# Patient Record
Sex: MALE | Race: BLACK OR AFRICAN AMERICAN | NOT HISPANIC OR LATINO | Employment: OTHER | URBAN - METROPOLITAN AREA
[De-identification: names, ages, dates, MRNs, and addresses within clinical notes are randomized per-mention and may not be internally consistent; named-entity substitution may affect disease eponyms.]

---

## 2017-01-24 ENCOUNTER — HOSPITAL ENCOUNTER (EMERGENCY)
Facility: HOSPITAL | Age: 57
Discharge: LEFT BEFORE DISCHARGE | End: 2017-01-24

## 2017-01-24 ENCOUNTER — APPOINTMENT (OUTPATIENT)
Dept: GENERAL RADIOLOGY | Facility: HOSPITAL | Age: 57
End: 2017-01-24

## 2017-01-24 VITALS
WEIGHT: 294 LBS | SYSTOLIC BLOOD PRESSURE: 125 MMHG | HEART RATE: 79 BPM | HEIGHT: 74 IN | DIASTOLIC BLOOD PRESSURE: 79 MMHG | OXYGEN SATURATION: 96 % | TEMPERATURE: 98 F | BODY MASS INDEX: 37.73 KG/M2 | RESPIRATION RATE: 20 BRPM

## 2017-01-24 LAB
ALBUMIN SERPL-MCNC: 3.8 G/DL (ref 3.2–4.8)
ALBUMIN/GLOB SERPL: 0.9 G/DL (ref 1.5–2.5)
ALP SERPL-CCNC: 130 U/L (ref 25–100)
ALT SERPL W P-5'-P-CCNC: 29 U/L (ref 7–40)
ANION GAP SERPL CALCULATED.3IONS-SCNC: 5 MMOL/L (ref 3–11)
AST SERPL-CCNC: 36 U/L (ref 0–33)
BASOPHILS # BLD AUTO: 0.01 10*3/MM3 (ref 0–0.2)
BASOPHILS NFR BLD AUTO: 0.2 % (ref 0–1)
BILIRUB SERPL-MCNC: 0.5 MG/DL (ref 0.3–1.2)
BNP SERPL-MCNC: 8 PG/ML (ref 0–100)
BUN BLD-MCNC: 16 MG/DL (ref 9–23)
BUN/CREAT SERPL: 14.5 (ref 7–25)
CALCIUM SPEC-SCNC: 9.4 MG/DL (ref 8.7–10.4)
CHLORIDE SERPL-SCNC: 102 MMOL/L (ref 99–109)
CO2 SERPL-SCNC: 25 MMOL/L (ref 20–31)
CREAT BLD-MCNC: 1.1 MG/DL (ref 0.6–1.3)
DEPRECATED RDW RBC AUTO: 40 FL (ref 37–54)
EOSINOPHIL # BLD AUTO: 0.13 10*3/MM3 (ref 0.1–0.3)
EOSINOPHIL NFR BLD AUTO: 2 % (ref 0–3)
ERYTHROCYTE [DISTWIDTH] IN BLOOD BY AUTOMATED COUNT: 12.6 % (ref 11.3–14.5)
GFR SERPL CREATININE-BSD FRML MDRD: 84 ML/MIN/1.73
GLOBULIN UR ELPH-MCNC: 4.2 GM/DL
GLUCOSE BLD-MCNC: 253 MG/DL (ref 70–100)
HCT VFR BLD AUTO: 44.9 % (ref 38.9–50.9)
HGB BLD-MCNC: 16.1 G/DL (ref 13.1–17.5)
HOLD SPECIMEN: NORMAL
HOLD SPECIMEN: NORMAL
IMM GRANULOCYTES # BLD: 0.01 10*3/MM3 (ref 0–0.03)
IMM GRANULOCYTES NFR BLD: 0.2 % (ref 0–0.6)
LIPASE SERPL-CCNC: 50 U/L (ref 6–51)
LYMPHOCYTES # BLD AUTO: 2.44 10*3/MM3 (ref 0.6–4.8)
LYMPHOCYTES NFR BLD AUTO: 38.1 % (ref 24–44)
MCH RBC QN AUTO: 31 PG (ref 27–31)
MCHC RBC AUTO-ENTMCNC: 35.9 G/DL (ref 32–36)
MCV RBC AUTO: 86.3 FL (ref 80–99)
MONOCYTES # BLD AUTO: 0.51 10*3/MM3 (ref 0–1)
MONOCYTES NFR BLD AUTO: 8 % (ref 0–12)
NEUTROPHILS # BLD AUTO: 3.31 10*3/MM3 (ref 1.5–8.3)
NEUTROPHILS NFR BLD AUTO: 51.5 % (ref 41–71)
PLATELET # BLD AUTO: 188 10*3/MM3 (ref 150–450)
PMV BLD AUTO: 9 FL (ref 6–12)
POTASSIUM BLD-SCNC: 4.7 MMOL/L (ref 3.5–5.5)
PROT SERPL-MCNC: 8 G/DL (ref 5.7–8.2)
RBC # BLD AUTO: 5.2 10*6/MM3 (ref 4.2–5.76)
SODIUM BLD-SCNC: 132 MMOL/L (ref 132–146)
WBC NRBC COR # BLD: 6.41 10*3/MM3 (ref 3.5–10.8)
WHOLE BLOOD HOLD SPECIMEN: NORMAL
WHOLE BLOOD HOLD SPECIMEN: NORMAL

## 2017-01-24 PROCEDURE — 83690 ASSAY OF LIPASE: CPT

## 2017-01-24 PROCEDURE — 83880 ASSAY OF NATRIURETIC PEPTIDE: CPT

## 2017-01-24 PROCEDURE — 85025 COMPLETE CBC W/AUTO DIFF WBC: CPT

## 2017-01-24 PROCEDURE — 80053 COMPREHEN METABOLIC PANEL: CPT

## 2017-01-24 PROCEDURE — 99211 OFF/OP EST MAY X REQ PHY/QHP: CPT

## 2017-01-24 PROCEDURE — 93005 ELECTROCARDIOGRAM TRACING: CPT

## 2017-01-24 RX ORDER — SODIUM CHLORIDE 0.9 % (FLUSH) 0.9 %
10 SYRINGE (ML) INJECTION AS NEEDED
Status: DISCONTINUED | OUTPATIENT
Start: 2017-01-24 | End: 2017-01-24 | Stop reason: HOSPADM

## 2017-01-24 RX ORDER — ASPIRIN 81 MG/1
324 TABLET, CHEWABLE ORAL ONCE
Status: DISCONTINUED | OUTPATIENT
Start: 2017-01-24 | End: 2017-01-24 | Stop reason: HOSPADM

## 2017-01-25 ENCOUNTER — DOCUMENTATION (OUTPATIENT)
Dept: NEUROSURGERY | Facility: CLINIC | Age: 57
End: 2017-01-25

## 2018-02-27 ENCOUNTER — OFFICE VISIT (OUTPATIENT)
Dept: NEUROSURGERY | Facility: CLINIC | Age: 58
End: 2018-02-27

## 2018-02-27 VITALS
TEMPERATURE: 97.6 F | DIASTOLIC BLOOD PRESSURE: 76 MMHG | SYSTOLIC BLOOD PRESSURE: 111 MMHG | WEIGHT: 299.8 LBS | BODY MASS INDEX: 38.48 KG/M2 | HEIGHT: 74 IN

## 2018-02-27 DIAGNOSIS — M54.50 CHRONIC BILATERAL LOW BACK PAIN WITHOUT SCIATICA: ICD-10-CM

## 2018-02-27 DIAGNOSIS — Z79.4 TYPE 2 DIABETES MELLITUS WITH HYPERGLYCEMIA, WITH LONG-TERM CURRENT USE OF INSULIN (HCC): ICD-10-CM

## 2018-02-27 DIAGNOSIS — E11.65 TYPE 2 DIABETES MELLITUS WITH HYPERGLYCEMIA, WITH LONG-TERM CURRENT USE OF INSULIN (HCC): ICD-10-CM

## 2018-02-27 DIAGNOSIS — G89.29 CHRONIC BILATERAL LOW BACK PAIN WITHOUT SCIATICA: ICD-10-CM

## 2018-02-27 DIAGNOSIS — M19.90 ARTHRITIS: ICD-10-CM

## 2018-02-27 DIAGNOSIS — M43.10 ACQUIRED SPONDYLOLISTHESIS: Primary | ICD-10-CM

## 2018-02-27 DIAGNOSIS — I10 ESSENTIAL HYPERTENSION: ICD-10-CM

## 2018-02-27 PROBLEM — E11.9 DIABETES MELLITUS (HCC): Status: ACTIVE | Noted: 2018-02-27

## 2018-02-27 PROBLEM — M43.16 SPONDYLOLISTHESIS OF LUMBAR REGION: Status: ACTIVE | Noted: 2018-02-27

## 2018-02-27 PROCEDURE — 99214 OFFICE O/P EST MOD 30 MIN: CPT | Performed by: PHYSICIAN ASSISTANT

## 2018-02-27 RX ORDER — INSULIN NPH HUM/REG INSULIN HM 70-30/ML
45 VIAL (ML) SUBCUTANEOUS 2 TIMES DAILY WITH MEALS
COMMUNITY
Start: 2018-02-08

## 2018-02-27 RX ORDER — DICLOFENAC SODIUM 75 MG/1
TABLET, DELAYED RELEASE ORAL
COMMUNITY
Start: 2018-02-26 | End: 2018-07-24

## 2018-02-27 RX ORDER — HYDROCHLOROTHIAZIDE 12.5 MG/1
12.5 TABLET ORAL EVERY MORNING
Status: ON HOLD | COMMUNITY
Start: 2018-01-22 | End: 2022-07-18

## 2018-02-27 RX ORDER — OXYCODONE AND ACETAMINOPHEN 10; 325 MG/1; MG/1
TABLET ORAL
Refills: 0 | COMMUNITY
Start: 2018-02-23 | End: 2018-07-24

## 2018-02-27 RX ORDER — FUROSEMIDE 20 MG/1
TABLET ORAL
COMMUNITY
Start: 2018-02-07 | End: 2018-07-24

## 2018-02-27 RX ORDER — RANOLAZINE 500 MG/1
500 TABLET, FILM COATED, EXTENDED RELEASE ORAL EVERY 12 HOURS SCHEDULED
COMMUNITY
Start: 2017-12-27 | End: 2022-06-06

## 2018-02-27 RX ORDER — CYCLOBENZAPRINE HCL 10 MG
TABLET ORAL
Refills: 1 | COMMUNITY
Start: 2018-02-23 | End: 2018-07-24

## 2018-02-27 RX ORDER — ATORVASTATIN CALCIUM 20 MG/1
40 TABLET, FILM COATED ORAL EVERY MORNING
COMMUNITY
Start: 2018-02-07

## 2018-03-22 ENCOUNTER — HOSPITAL ENCOUNTER (OUTPATIENT)
Dept: MRI IMAGING | Facility: HOSPITAL | Age: 58
Discharge: HOME | End: 2018-03-22
Admitting: PHYSICIAN ASSISTANT

## 2018-03-22 PROCEDURE — 72148 MRI LUMBAR SPINE W/O DYE: CPT

## 2018-03-23 ENCOUNTER — OFFICE VISIT (OUTPATIENT)
Dept: NEUROSURGERY | Facility: CLINIC | Age: 58
End: 2018-03-23

## 2018-03-23 ENCOUNTER — HOSPITAL ENCOUNTER (OUTPATIENT)
Dept: GENERAL RADIOLOGY | Facility: HOSPITAL | Age: 58
Discharge: HOME | End: 2018-03-23
Attending: NEUROLOGICAL SURGERY | Admitting: NEUROLOGICAL SURGERY

## 2018-03-23 VITALS
OXYGEN SATURATION: 94 % | HEIGHT: 74 IN | TEMPERATURE: 96.3 F | HEART RATE: 96 BPM | WEIGHT: 291 LBS | BODY MASS INDEX: 37.35 KG/M2 | SYSTOLIC BLOOD PRESSURE: 112 MMHG | DIASTOLIC BLOOD PRESSURE: 78 MMHG

## 2018-03-23 DIAGNOSIS — Z79.4 TYPE 2 DIABETES MELLITUS WITH HYPERGLYCEMIA, WITH LONG-TERM CURRENT USE OF INSULIN (HCC): ICD-10-CM

## 2018-03-23 DIAGNOSIS — M19.90 ARTHRITIS: ICD-10-CM

## 2018-03-23 DIAGNOSIS — E11.65 TYPE 2 DIABETES MELLITUS WITH HYPERGLYCEMIA, WITH LONG-TERM CURRENT USE OF INSULIN (HCC): ICD-10-CM

## 2018-03-23 DIAGNOSIS — G89.29 CHRONIC BILATERAL LOW BACK PAIN WITHOUT SCIATICA: ICD-10-CM

## 2018-03-23 DIAGNOSIS — M43.10 ACQUIRED SPONDYLOLISTHESIS: ICD-10-CM

## 2018-03-23 DIAGNOSIS — I10 ESSENTIAL HYPERTENSION: ICD-10-CM

## 2018-03-23 DIAGNOSIS — M48.062 SPINAL STENOSIS, LUMBAR REGION, WITH NEUROGENIC CLAUDICATION: ICD-10-CM

## 2018-03-23 DIAGNOSIS — M54.50 CHRONIC BILATERAL LOW BACK PAIN WITHOUT SCIATICA: ICD-10-CM

## 2018-03-23 DIAGNOSIS — I10 ESSENTIAL HYPERTENSION: Primary | ICD-10-CM

## 2018-03-23 PROCEDURE — 99213 OFFICE O/P EST LOW 20 MIN: CPT | Performed by: NEUROLOGICAL SURGERY

## 2018-03-23 PROCEDURE — 72110 X-RAY EXAM L-2 SPINE 4/>VWS: CPT

## 2018-03-23 NOTE — PROGRESS NOTES
Patient: Amadeo Kong  :  1960  Chart #:  [id number]    Date of Service: 18    Chief Complaint:   Chief Complaint   Patient presents with   • Back Pain       Back Pain   Chronicity: Mr. Kong returns today for a follow-up on his back pain. The current episode started more than 1 year ago (He has normal sensation in his feet bilaterally.  He has pain when his lower extremities are raised, especially the right.). The problem occurs constantly. The problem has been gradually worsening since onset. The pain is present in the lumbar spine. The quality of the pain is described as aching. The pain radiates to the right thigh and left thigh (He has pain that starts in his low back, then runs down his lower extremities bilaterally to the ankles.). The pain is at a severity of 6/10. The pain is moderate (His pain is moderate to severe.  His pain increases when he is ambulatory.). The symptoms are aggravated by position and standing. Stiffness is present in the morning. Associated symptoms include leg pain. Pertinent negatives include no numbness or tingling. (He does not have leg pain when reclining but does have pain with standing and walking, which are limited.  He has not had spine surgery.  He has had R hip replacement and bilateral knee replacement.) Risk factors include lack of exercise, sedentary lifestyle and obesity. He has tried bed rest, muscle relaxant and NSAIDs (he takes diclofenac, flexeril and percocet.) for the symptoms. The treatment provided mild relief.       Radiographic Images:   MRI of the lumbar spine dated 18 shows he has dessication at the L1, L4 and T12 region.  There is minimum spondylosis at L4-L5.  There is mild central stenosis.  He has facet hyptertrophy.  There is fluid in the facets at L4-L5.  His disc appear normal.      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 The acetabular component has been cemented  into place.  The femoral component has been pressfit into place. Normal anatomic alignment has been achieved. The pelvic ring is intact.     Past Medical History:   Diagnosis Date   • Arthritis    • Diabetes mellitus    • Hypertension      Current Outpatient Prescriptions   Medication Sig Dispense Refill   • albuterol (PROVENTIL) (2.5 MG/3ML) 0.083% nebulizer solution Take 2.5 mg by nebulization Every 4 (Four) Hours As Needed for wheezing.     • amLODIPine (NORVASC) 5 MG tablet Take 5 mg by mouth Daily.     • atorvastatin (LIPITOR) 20 MG tablet      • budesonide-formoterol (SYMBICORT) 160-4.5 MCG/ACT inhaler Inhale 2 puffs 2 (Two) Times a Day.     • carvedilol (COREG) 3.125 MG tablet Take 3.125 mg by mouth 2 (Two) Times a Day With Meals.     • cyclobenzaprine (FLEXERIL) 10 MG tablet TK 1 T PO BID PRN  1   • diclofenac (VOLTAREN) 75 MG EC tablet      • furosemide (LASIX) 20 MG tablet      • HUMULIN 70/30 (70-30) 100 UNIT/ML injection      • hydrochlorothiazide (HYDRODIURIL) 12.5 MG tablet      • isosorbide mononitrate (IMDUR) 30 MG 24 hr tablet Take 30 mg by mouth Daily.     • lisinopril (PRINIVIL,ZESTRIL) 20 MG tablet Take 20 mg by mouth Daily.     • montelukast (SINGULAIR) 10 MG tablet Take 10 mg by mouth Every Night.     • oxyCODONE-acetaminophen (PERCOCET)  MG per tablet TK 1 T PO TID  0   • RANEXA 500 MG 12 hr tablet        No current facility-administered medications for this visit.       No Known Allergies  Social History     Social History   • Marital status: Single     Social History Main Topics   • Smoking status: Never Smoker   • Smokeless tobacco: Never Used   • Alcohol use Yes   • Drug use: No   • Sexual activity: Defer     Other Topics Concern   • Not on file     Family History   Problem Relation Age of Onset   • Hypertension Mother      Past Surgical History:   Procedure Laterality Date   • [facility]   • KNEE SURGERY Bilateral     Right knee- 2008, left knee-  "2010- [facility]     Review of Systems   Constitutional: Positive for activity change, appetite change, fatigue and unexpected weight change.   HENT: Positive for congestion, sore throat and tinnitus.    Eyes: Positive for visual disturbance.   Respiratory: Positive for apnea.    Endocrine: Positive for cold intolerance, polydipsia and polyuria.   Genitourinary: Positive for flank pain.   Musculoskeletal: Positive for arthralgias, back pain, joint swelling, myalgias, neck pain and neck stiffness.   Allergic/Immunologic: Positive for environmental allergies and food allergies.   Neurological: Negative for tingling and numbness.   All other systems reviewed and are negative.    Vitals:    03/23/18 0914   BP: 112/78   BP Location: Right arm   Patient Position: Sitting   Pulse: 96   Temp: 96.3 °F (35.7 °C)   TempSrc: Temporal Artery    SpO2: 94%   Weight: 132 kg (291 lb)   Height: 188 cm (74.02\")     Physical Exam  Neurologic Exam  Physical Exam   Constitutional: The patient is oriented to person, place, and time.  The patient appears well-developed and well-nourished and in no distress.   Neat obese o/w healthy male  Neck: Trachea normal and normal range of motion. No thyroid mass present.   Cardiovascular: Regular rhythm.   Pulses:  Carotid pulses are 2+ on the right side, and 2+ on the left side.  Radial pulses are 2+ on the right side, and 2+ on the left side.   Dorsalis pedis pulses are 2+ on the right side, and 2+ on the left side.   Pulmonary/Chest: Effort normal   Musculoskeletal:   Lumbar back: The patient exhibits pain with movement. The patient exhibits normal range of motion, no deformity and no spasm.   Mild stiffness; SLR increased low back pain; Hip ROM normal     R knee does not fully extend;  Both knees replaced.    Neurologic Exam      Mental Status   Oriented to person, place, and time.   Attention: normal. Concentration: normal.   Speech: speech is normal   Level of consciousness: " alert  Knowledge: good and consistent with education.   Normal comprehension.      Cranial Nerves   Cranial nerves II through XII intact.     Motor Exam   Muscle bulk: normal  Overall muscle tone: normal  No Pronator Drift    Strength   Strength 5/5 throughout.      Sensory Exam   Light touch normal.   Proprioception normal.      Gait, Coordination, and Reflexes      Gait: normal     Tremor   Resting tremor: absent  Intention tremor: absent  Action tremor: absent     Reflexes   Right biceps: 1+  Left biceps: 1+  Right triceps: 1+  Left triceps: 1+  Right patellar: 0+  Left patellar: 0+  Right achilles: 0+  Left achilles: 0+  No Babinski signs  Right Herrera: absent  Left Herrera: absent  Right ankle clonus: absent  Left ankle clonus: absent  LIANET normal; R handed      Amadeo was seen today for back pain.    Diagnoses and all orders for this visit:    Essential hypertension  -     XR Spine Lumbar AP & Lateral With Flex & Ext; Future    Acquired spondylolisthesis  -     XR Spine Lumbar AP & Lateral With Flex & Ext; Future    Type 2 diabetes mellitus with hyperglycemia, with long-term current use of insulin  -     XR Spine Lumbar AP & Lateral With Flex & Ext; Future    Chronic bilateral low back pain without sciatica  -     XR Spine Lumbar AP & Lateral With Flex & Ext; Future    Arthritis  -     XR Spine Lumbar AP & Lateral With Flex & Ext; Future    Spinal stenosis, lumbar region, with neurogenic claudication      Plan:  Order lumbar spine x-rays with standing lateral F&E views;  Monitor symptoms for review at next visit after studies;  He is to see Dr. Farfan in a couple of weeks about his left hip.  I will make further recommendations regarding treatment options at next visit.    I, Dr. Yo Bryson, personally performed the services described in the documentation as scribed in my presence, and the documentation is both accurate and complete.    Yo Bryson MD

## 2018-04-04 ENCOUNTER — OFFICE VISIT (OUTPATIENT)
Dept: NEUROSURGERY | Facility: CLINIC | Age: 58
End: 2018-04-04

## 2018-04-04 VITALS
HEART RATE: 82 BPM | DIASTOLIC BLOOD PRESSURE: 68 MMHG | BODY MASS INDEX: 38.84 KG/M2 | SYSTOLIC BLOOD PRESSURE: 120 MMHG | OXYGEN SATURATION: 93 % | TEMPERATURE: 97.3 F | HEIGHT: 74 IN | WEIGHT: 302.6 LBS

## 2018-04-04 DIAGNOSIS — G89.29 CHRONIC BILATERAL LOW BACK PAIN WITH BILATERAL SCIATICA: ICD-10-CM

## 2018-04-04 DIAGNOSIS — M54.42 CHRONIC BILATERAL LOW BACK PAIN WITH BILATERAL SCIATICA: ICD-10-CM

## 2018-04-04 DIAGNOSIS — E11.65 TYPE 2 DIABETES MELLITUS WITH HYPERGLYCEMIA, WITH LONG-TERM CURRENT USE OF INSULIN (HCC): ICD-10-CM

## 2018-04-04 DIAGNOSIS — M19.90 ARTHRITIS: ICD-10-CM

## 2018-04-04 DIAGNOSIS — Z79.4 TYPE 2 DIABETES MELLITUS WITH HYPERGLYCEMIA, WITH LONG-TERM CURRENT USE OF INSULIN (HCC): ICD-10-CM

## 2018-04-04 DIAGNOSIS — M43.10 ACQUIRED SPONDYLOLISTHESIS: ICD-10-CM

## 2018-04-04 DIAGNOSIS — I10 ESSENTIAL HYPERTENSION: Primary | ICD-10-CM

## 2018-04-04 DIAGNOSIS — M48.062 SPINAL STENOSIS, LUMBAR REGION, WITH NEUROGENIC CLAUDICATION: ICD-10-CM

## 2018-04-04 DIAGNOSIS — M54.41 CHRONIC BILATERAL LOW BACK PAIN WITH BILATERAL SCIATICA: ICD-10-CM

## 2018-04-04 PROCEDURE — 99214 OFFICE O/P EST MOD 30 MIN: CPT | Performed by: NEUROLOGICAL SURGERY

## 2018-04-04 NOTE — PROGRESS NOTES
Patient: Amadeo Kong  :  1960  Chart #:  [id number]    Date of Service: 18    Chief Complaint:   Chief Complaint   Patient presents with   • Back Pain       Back Pain   Chronicity: Mr Kong returns today for a follow-up on his back pain.  The current episode started more than 1 year ago. The problem occurs constantly. The problem has been gradually worsening since onset. The pain is present in the lumbar spine (Patient is pain free as long as he is sitting, however as soon as he ambulates his pain increases substantially.). The quality of the pain is described as aching. The pain radiates to the left thigh and right thigh (He has pain that radiates down his lower extremities bilaterally.  His pain does not go past the thighs.). The pain is at a severity of 6/10. The pain is mild (His pain is mild to moderate.). The pain is worse during the day. The symptoms are aggravated by position and standing (His pain increases when he is ambulatory.  ). Stiffness is present in the morning. Risk factors include sedentary lifestyle, obesity and lack of exercise (He is not able to walk for any length of time because of his back pain.). He has tried bed rest, muscle relaxant and heat for the symptoms. The treatment provided mild relief.     There are no other new symptoms or complaints since visit on 3/23/18.    Radiographic Images:   X-ray of the lumbar spine dated 18 shows he has a slip at L4 on L5 vertebras.  He has grade 1 spondylolisthesis that measures 8 mm with extension and 10 mm with flexion at the L4-L5 disc.  Otherwise his spine appears normal.        MRI of the lumbar spine dated 18 shows he has dessication at the L1, L4 and T12 region.  There is minimum spondylosis at L4-L5.  There is mild central stenosis.  He has facet hyptertrophy.  There is fluid in the facets at L4-L5.  His disc appear normal.     Past Medical History:   Diagnosis Date   • Arthritis    • Diabetes mellitus    •  Hypertension      Current Outpatient Prescriptions   Medication Sig Dispense Refill   • albuterol (PROVENTIL) (2.5 MG/3ML) 0.083% nebulizer solution Take 2.5 mg by nebulization Every 4 (Four) Hours As Needed for wheezing.     • amLODIPine (NORVASC) 5 MG tablet Take 5 mg by mouth Daily.     • atorvastatin (LIPITOR) 20 MG tablet      • budesonide-formoterol (SYMBICORT) 160-4.5 MCG/ACT inhaler Inhale 2 puffs 2 (Two) Times a Day.     • carvedilol (COREG) 3.125 MG tablet Take 3.125 mg by mouth 2 (Two) Times a Day With Meals.     • cyclobenzaprine (FLEXERIL) 10 MG tablet TK 1 T PO BID PRN  1   • diclofenac (VOLTAREN) 75 MG EC tablet      • furosemide (LASIX) 20 MG tablet      • HUMULIN 70/30 (70-30) 100 UNIT/ML injection      • hydrochlorothiazide (HYDRODIURIL) 12.5 MG tablet      • isosorbide mononitrate (IMDUR) 30 MG 24 hr tablet Take 30 mg by mouth Daily.     • lisinopril (PRINIVIL,ZESTRIL) 20 MG tablet Take 20 mg by mouth Daily.     • montelukast (SINGULAIR) 10 MG tablet Take 10 mg by mouth Every Night.     • oxyCODONE-acetaminophen (PERCOCET)  MG per tablet TK 1 T PO TID  0   • RANEXA 500 MG 12 hr tablet        No current facility-administered medications for this visit.       No Known Allergies  Social History     Social History   • Marital status: Single     Social History Main Topics   • Smoking status: Never Smoker   • Smokeless tobacco: Never Used   • Alcohol use Yes   • Drug use: No   • Sexual activity: Defer     Other Topics Concern   • Not on file     Family History   Problem Relation Age of Onset   • Hypertension Mother      Past Surgical History:   Procedure Laterality Date   • HIP SURGERY Right 08/11/2014    [facility]   • KNEE SURGERY Bilateral     Right knee- 2008, left knee- 2010- [facility]     Review of Systems   Respiratory: Positive for cough and shortness of breath.    Endocrine: Positive for polydipsia.   Genitourinary: Positive for flank pain.   Musculoskeletal: Positive for  "arthralgias and back pain.   All other systems reviewed and are negative.    Vitals:    04/04/18 0756   BP: 120/68   BP Location: Right arm   Patient Position: Sitting   Pulse: 82   Temp: 97.3 °F (36.3 °C)   TempSrc: Temporal Artery    SpO2: 93%   Weight: (!) 137 kg (302 lb 9.6 oz)   Height: 188 cm (74.02\")     Physical Exam  Neurologic Exam  Constitutional: The patient is oriented to person, place, and time.  The patient appears well-developed and well-nourished and in no distress.   Neat obese o/w healthy male  Neck: Trachea normal and normal range of motion. No thyroid mass present.   Cardiovascular: Regular rhythm.   Pulses:  Carotid pulses are 2+ on the right side, and 2+ on the left side.  Radial pulses are 2+ on the right side, and 2+ on the left side.   Dorsalis pedis pulses are 2+ on the right side, and 2+ on the left side.   Pulmonary/Chest: Effort normal   Musculoskeletal:   Lumbar back: The patient exhibits pain with movement. The patient exhibits normal range of motion, no deformity and no spasm.   Mild stiffness; SLR increased low back pain; Hip ROM normal     R knee does not fully extend;  Both knees replaced.     Neurologic Exam      Mental Status   Oriented to person, place, and time.   Attention: normal. Concentration: normal.   Speech: speech is normal   Level of consciousness: alert  Knowledge: good and consistent with education.   Normal comprehension.      Cranial Nerves   Cranial nerves II through XII intact.      Motor Exam   Muscle bulk: normal  Overall muscle tone: normal  No Pronator Drift     Strength   Strength 5/5 throughout.      Sensory Exam   Light touch normal.   Proprioception normal.      Gait, Coordination, and Reflexes      Gait: normal     Tremor   Resting tremor: absent  Intention tremor: absent  Action tremor: absent     Reflexes   Right biceps: 1+  Left biceps: 1+  Right triceps: 1+  Left triceps: 1+  Right patellar: 0+  Left patellar: 0+  Right achilles: 0+  Left achilles: " 0+  No Babinski signs  Right Herrera: absent  Left Herrera: absent  Right ankle clonus: absent  Left ankle clonus: absent  LIANET normal; R handed      Amadeo was seen today for back pain.    Diagnoses and all orders for this visit:    Essential hypertension    Arthritis    Acquired spondylolisthesis  Comments:  L4L5 with instability    Spinal stenosis, lumbar region, with neurogenic claudication    Type 2 diabetes mellitus with hyperglycemia, with long-term current use of insulin    Chronic bilateral low back pain with bilateral sciatica      Plan:  I recommend L4L5 Nevarez-Ivan osteotomy and interbody fusion with reduction of the spondylolisthesis and L4L5 posterolateral fusion with pedicle screw fixation.  I described the operative procedure in detail as well as the indications, alternatives, and expected postoperative course and results. I described the potential risks and complications of surgery in detail. All questions were answered. The patient has indicated understanding of the planned procedure, accepted the risks, and wishes to proceed with surgery. No guarantee of results was given to the patient. The operative permit will be completed prior to surgery.    The patient's imaging CD's are on the chart.    I, Dr. Yo Bryson, personally performed the services described in the documentation as scribed in my presence, and the documentation is both accurate and complete.    Yo Bryson MD

## 2018-05-15 ENCOUNTER — TELEPHONE (OUTPATIENT)
Dept: NEUROSURGERY | Facility: CLINIC | Age: 58
End: 2018-05-15

## 2018-05-15 NOTE — TELEPHONE ENCOUNTER
Provider:  oliver  Caller: patient  Time of call:  10:09   Phone #:  [phone]  Surgery:  No -  Surgery Date:    Last visit:  04/04/18   Next visit: none    JAZ:         Reason for call:     Patient called and said he has had increased back pain for the last week or so.  He states that he has had back pain for years, however it has recently worsened.  His pain is in his low back and runs down his legs bilaterally to the ankle.    I asked him if he was still taking his Percocet and muscle relaxer, and he said that the pain clinic cut him off because they found something in his urine.      JAZ PENDING

## 2018-05-15 NOTE — TELEPHONE ENCOUNTER
"Pt left additional message \"begging\" to be given a call back regarding surgery. Reiterates the severity of his pain. Spoke with Gabriella personally, she states that due to the extent of the surgery planned and Dr. Bryson's limited availability in the coming months he will be scheduled for sx during the month of July.   "

## 2018-05-15 NOTE — TELEPHONE ENCOUNTER
Spoke with pt and informed him that the next available surgery is in July and that we will not be prescribing medications for pain (referencing the conversation I had with his former PM clinic). He was fairly combative and stated that we were not trying to help him (provide narcotics) and that he was in too much pain to repeat himself. I replied that it was unnecessary for him to repeat himself since I had called to actually deliver information, not to receive it. Irrespective of surgical candidacy, the pt exhibits conspicuous secondary gain behavior.

## 2018-05-15 NOTE — TELEPHONE ENCOUNTER
We will not prescribe any medications. Please let the patient know...It is not safe for him to mix medications, especially ones that are not prescribed by us.

## 2018-05-15 NOTE — TELEPHONE ENCOUNTER
"Spoke with [facility]. While the patient was receiving Percocet and Flexeril from the PM, he tested positive for cocaine, morphine, \"norhydrocodone\" and oxymorphone (they do not prescribe these, clearly). Thus he was discharged.   "

## 2018-05-15 NOTE — TELEPHONE ENCOUNTER
Pt left a message stating he has called us over and over, he states he doesn't want to be selfish or greedy but he wouldn't be doing this if he didn't need us.     Please advise on what to tell pt.       JAZ:  02/23/2018 Oxycodone/Acetaminophen  325MG/10MG  1960 [address] Mauricio Roger #[id number] [facility] 2    03/24/2018 Oxycodone/Acetaminophen  325MG/10MG  1960 [address] 29 Mauricio Qures[place]ion #[id number] [facility] 4    04/23/2018 Oxycodone/Acetaminophen 325MG/5MG 1960 12 2 [place] [id number] 4

## 2018-05-15 NOTE — TELEPHONE ENCOUNTER
I have sent [place] a message about this patient. This is not acceptable, we should not rx anything. DC

## 2018-05-24 NOTE — TELEPHONE ENCOUNTER
Dr. Bryson made aware today of patient's discharge from pain management.  Due to the fact that he tested positive for several narcotics, we will no longer be providing pain medication.

## 2018-06-05 ENCOUNTER — PREP FOR SURGERY (OUTPATIENT)
Dept: OTHER | Facility: HOSPITAL | Age: 58
End: 2018-06-05

## 2018-06-05 DIAGNOSIS — M43.10 ACQUIRED SPONDYLOLISTHESIS: Primary | ICD-10-CM

## 2018-06-05 RX ORDER — CEFAZOLIN SODIUM 2 G/100ML
2 INJECTION, SOLUTION INTRAVENOUS ONCE
Status: CANCELLED | OUTPATIENT
Start: 2018-06-05 | End: 2018-06-05

## 2018-07-09 ENCOUNTER — APPOINTMENT (OUTPATIENT)
Dept: PREADMISSION TESTING | Facility: HOSPITAL | Age: 58
End: 2018-07-09

## 2018-07-24 ENCOUNTER — APPOINTMENT (OUTPATIENT)
Dept: PREADMISSION TESTING | Facility: HOSPITAL | Age: 58
End: 2018-07-24

## 2018-07-24 VITALS — HEIGHT: 73 IN | WEIGHT: 302.2 LBS | BODY MASS INDEX: 40.05 KG/M2

## 2018-07-24 DIAGNOSIS — M43.10 ACQUIRED SPONDYLOLISTHESIS: ICD-10-CM

## 2018-07-24 LAB
ABO GROUP BLD: NORMAL
BLD GP AB SCN SERPL QL: NEGATIVE
DEPRECATED RDW RBC AUTO: 41.1 FL (ref 37–54)
ERYTHROCYTE [DISTWIDTH] IN BLOOD BY AUTOMATED COUNT: 13 % (ref 11.3–14.5)
HBA1C MFR BLD: 8.4 % (ref 4.8–5.6)
HCT VFR BLD AUTO: 42.6 % (ref 38.9–50.9)
HGB BLD-MCNC: 14.6 G/DL (ref 13.1–17.5)
MCH RBC QN AUTO: 29.6 PG (ref 27–31)
MCHC RBC AUTO-ENTMCNC: 34.3 G/DL (ref 32–36)
MCV RBC AUTO: 86.4 FL (ref 80–99)
PLATELET # BLD AUTO: 183 10*3/MM3 (ref 150–450)
PMV BLD AUTO: 9.4 FL (ref 6–12)
POTASSIUM BLD-SCNC: 4.2 MMOL/L (ref 3.5–5.5)
RBC # BLD AUTO: 4.93 10*6/MM3 (ref 4.2–5.76)
RH BLD: POSITIVE
T&S EXPIRATION DATE: NORMAL
WBC NRBC COR # BLD: 6.77 10*3/MM3 (ref 3.5–10.8)

## 2018-07-24 PROCEDURE — 84132 ASSAY OF SERUM POTASSIUM: CPT | Performed by: ANESTHESIOLOGY

## 2018-07-24 PROCEDURE — 83036 HEMOGLOBIN GLYCOSYLATED A1C: CPT | Performed by: ANESTHESIOLOGY

## 2018-07-24 PROCEDURE — 86900 BLOOD TYPING SEROLOGIC ABO: CPT | Performed by: NEUROLOGICAL SURGERY

## 2018-07-24 PROCEDURE — 85027 COMPLETE CBC AUTOMATED: CPT | Performed by: NEUROLOGICAL SURGERY

## 2018-07-24 PROCEDURE — 93010 ELECTROCARDIOGRAM REPORT: CPT | Performed by: INTERNAL MEDICINE

## 2018-07-24 PROCEDURE — 36415 COLL VENOUS BLD VENIPUNCTURE: CPT

## 2018-07-24 PROCEDURE — 86901 BLOOD TYPING SEROLOGIC RH(D): CPT | Performed by: NEUROLOGICAL SURGERY

## 2018-07-24 PROCEDURE — 87081 CULTURE SCREEN ONLY: CPT | Performed by: ANESTHESIOLOGY

## 2018-07-24 PROCEDURE — 86850 RBC ANTIBODY SCREEN: CPT | Performed by: NEUROLOGICAL SURGERY

## 2018-07-24 PROCEDURE — 93005 ELECTROCARDIOGRAM TRACING: CPT

## 2018-07-24 RX ORDER — CELECOXIB 200 MG/1
200 CAPSULE ORAL DAILY
COMMUNITY
End: 2018-10-10

## 2018-07-24 RX ORDER — CYCLOBENZAPRINE HCL 10 MG
10 TABLET ORAL 2 TIMES DAILY PRN
COMMUNITY
End: 2018-10-10

## 2018-07-24 RX ORDER — CETIRIZINE HYDROCHLORIDE 10 MG/1
10 TABLET ORAL EVERY MORNING
COMMUNITY
End: 2022-06-06

## 2018-07-25 PROCEDURE — 99024 POSTOP FOLLOW-UP VISIT: CPT | Performed by: NEUROLOGICAL SURGERY

## 2018-07-26 ENCOUNTER — ANESTHESIA EVENT (OUTPATIENT)
Dept: PERIOP | Facility: HOSPITAL | Age: 58
End: 2018-07-26

## 2018-07-26 LAB — MRSA SPEC QL CULT: NORMAL

## 2018-07-26 RX ORDER — FAMOTIDINE 10 MG/ML
20 INJECTION, SOLUTION INTRAVENOUS ONCE
Status: CANCELLED | OUTPATIENT
Start: 2018-07-26 | End: 2018-07-26

## 2018-07-27 ENCOUNTER — ANESTHESIA (OUTPATIENT)
Dept: PERIOP | Facility: HOSPITAL | Age: 58
End: 2018-07-27

## 2018-07-27 ENCOUNTER — HOSPITAL ENCOUNTER (INPATIENT)
Facility: HOSPITAL | Age: 58
LOS: 4 days | Discharge: HOME HEALTH | End: 2018-07-31
Attending: NEUROLOGICAL SURGERY | Admitting: NEUROLOGICAL SURGERY

## 2018-07-27 ENCOUNTER — APPOINTMENT (OUTPATIENT)
Dept: GENERAL RADIOLOGY | Facility: HOSPITAL | Age: 58
End: 2018-07-27

## 2018-07-27 DIAGNOSIS — R58 BLEEDING: ICD-10-CM

## 2018-07-27 DIAGNOSIS — Z78.9 IMPAIRED MOBILITY AND ADLS: Primary | ICD-10-CM

## 2018-07-27 DIAGNOSIS — Z74.09 IMPAIRED FUNCTIONAL MOBILITY, BALANCE, GAIT, AND ENDURANCE: ICD-10-CM

## 2018-07-27 DIAGNOSIS — M43.10 ACQUIRED SPONDYLOLISTHESIS: ICD-10-CM

## 2018-07-27 DIAGNOSIS — Z74.09 IMPAIRED MOBILITY AND ADLS: Primary | ICD-10-CM

## 2018-07-27 LAB
ABO GROUP BLD: NORMAL
BASE EXCESS BLDA CALC-SCNC: -0.1 MMOL/L (ref 0–2)
BASE EXCESS BLDA CALC-SCNC: -2.2 MMOL/L (ref 0–2)
BLD GP AB SCN SERPL QL: NEGATIVE
CA-I BLDA-SCNC: 1.12 MMOL/L (ref 1.12–1.3)
CA-I BLDA-SCNC: 1.15 MMOL/L (ref 1.12–1.3)
CHLORIDE BLDA-SCNC: 106 MMOL/L (ref 98–105)
CHLORIDE BLDA-SCNC: 106 MMOL/L (ref 98–105)
CO2 BLDA-SCNC: 22.3 MMOL/L (ref 23–27)
CO2 BLDA-SCNC: 23.4 MMOL/L (ref 23–27)
GLUCOSE BLDA-MCNC: 168 MG/DL (ref 67–93)
GLUCOSE BLDA-MCNC: 191 MG/DL (ref 67–93)
GLUCOSE BLDC GLUCOMTR-MCNC: 142 MG/DL (ref 70–130)
GLUCOSE BLDC GLUCOMTR-MCNC: 253 MG/DL (ref 70–130)
HCO3 BLDA-SCNC: 21.3 MMOL/L (ref 20–26)
HCO3 BLDA-SCNC: 22.4 MMOL/L (ref 20–26)
HCT VFR BLDA CALC: 41 % (ref 39–51)
HCT VFR BLDA CALC: 42 % (ref 39–51)
HGB BLDA-MCNC: 13.9 G/DL (ref 10–16)
HGB BLDA-MCNC: 14.2 G/DL (ref 10–16)
PCO2 BLDA: 30.8 MM HG (ref 34–46)
PCO2 BLDA: 33.1 MM HG (ref 34–46)
PH BLDA: 7.43 PH UNITS (ref 7.34–7.46)
PH BLDA: 7.48 PH UNITS (ref 7.34–7.46)
PO2 BLDA: 458 MMHG (ref 79–99)
PO2 BLDA: 561.8 MMHG (ref 79–99)
POTASSIUM BLDA-SCNC: 5.28 MMOL/L (ref 3.5–5.3)
POTASSIUM BLDA-SCNC: 5.53 MMOL/L (ref 3.5–5.3)
RH BLD: POSITIVE
SAO2 % BLD: 99.8 % (ref 92–98)
SAO2 % BLD: 99.8 % (ref 92–98)
SODIUM BLDA-SCNC: 133.2 MMOL/L (ref 134–146)
SODIUM BLDA-SCNC: 133.5 MMOL/L (ref 134–146)
T&S EXPIRATION DATE: NORMAL

## 2018-07-27 PROCEDURE — 22853 INSJ BIOMECHANICAL DEVICE: CPT | Performed by: NEUROLOGICAL SURGERY

## 2018-07-27 PROCEDURE — 25010000002 PHENYLEPHRINE PER 1 ML: Performed by: NURSE ANESTHETIST, CERTIFIED REGISTERED

## 2018-07-27 PROCEDURE — 22840 INSERT SPINE FIXATION DEVICE: CPT | Performed by: NEUROLOGICAL SURGERY

## 2018-07-27 PROCEDURE — 25010000002 PROPOFOL 1000 MG/ML EMULSION: Performed by: NURSE ANESTHETIST, CERTIFIED REGISTERED

## 2018-07-27 PROCEDURE — C1713 ANCHOR/SCREW BN/BN,TIS/BN: HCPCS | Performed by: NEUROLOGICAL SURGERY

## 2018-07-27 PROCEDURE — 86850 RBC ANTIBODY SCREEN: CPT | Performed by: NEUROLOGICAL SURGERY

## 2018-07-27 PROCEDURE — 0ST20ZZ RESECTION OF LUMBAR VERTEBRAL DISC, OPEN APPROACH: ICD-10-PCS | Performed by: NEUROLOGICAL SURGERY

## 2018-07-27 PROCEDURE — 61783 SCAN PROC SPINAL: CPT | Performed by: NEUROLOGICAL SURGERY

## 2018-07-27 PROCEDURE — 84295 ASSAY OF SERUM SODIUM: CPT | Performed by: NEUROLOGICAL SURGERY

## 2018-07-27 PROCEDURE — 86923 COMPATIBILITY TEST ELECTRIC: CPT

## 2018-07-27 PROCEDURE — 25010000002 VANCOMYCIN PER 500 MG: Performed by: NEUROLOGICAL SURGERY

## 2018-07-27 PROCEDURE — 82330 ASSAY OF CALCIUM: CPT | Performed by: NEUROLOGICAL SURGERY

## 2018-07-27 PROCEDURE — 82805 BLOOD GASES W/O2 SATURATION: CPT | Performed by: NEUROLOGICAL SURGERY

## 2018-07-27 PROCEDURE — 0SG0071 FUSION OF LUMBAR VERTEBRAL JOINT WITH AUTOLOGOUS TISSUE SUBSTITUTE, POSTERIOR APPROACH, POSTERIOR COLUMN, OPEN APPROACH: ICD-10-PCS | Performed by: NEUROLOGICAL SURGERY

## 2018-07-27 PROCEDURE — 0SG00AJ FUSION OF LUMBAR VERTEBRAL JOINT WITH INTERBODY FUSION DEVICE, POSTERIOR APPROACH, ANTERIOR COLUMN, OPEN APPROACH: ICD-10-PCS | Performed by: NEUROLOGICAL SURGERY

## 2018-07-27 PROCEDURE — 25010000002 PROPOFOL 10 MG/ML EMULSION: Performed by: NURSE ANESTHETIST, CERTIFIED REGISTERED

## 2018-07-27 PROCEDURE — 25010000002 NEOSTIGMINE PER 0.5 MG: Performed by: NURSE ANESTHETIST, CERTIFIED REGISTERED

## 2018-07-27 PROCEDURE — 25010000002 MIDAZOLAM PER 1 MG: Performed by: NURSE ANESTHETIST, CERTIFIED REGISTERED

## 2018-07-27 PROCEDURE — 82962 GLUCOSE BLOOD TEST: CPT

## 2018-07-27 PROCEDURE — 86900 BLOOD TYPING SEROLOGIC ABO: CPT | Performed by: NEUROLOGICAL SURGERY

## 2018-07-27 PROCEDURE — 25010000002 FENTANYL CITRATE (PF) 100 MCG/2ML SOLUTION: Performed by: NURSE ANESTHETIST, CERTIFIED REGISTERED

## 2018-07-27 PROCEDURE — 25010000002 DEXAMETHASONE PER 1 MG: Performed by: NURSE ANESTHETIST, CERTIFIED REGISTERED

## 2018-07-27 PROCEDURE — 25010000002 HYDROMORPHONE PER 4 MG: Performed by: NURSE ANESTHETIST, CERTIFIED REGISTERED

## 2018-07-27 PROCEDURE — 25010000002 ONDANSETRON PER 1 MG: Performed by: NURSE ANESTHETIST, CERTIFIED REGISTERED

## 2018-07-27 PROCEDURE — 94799 UNLISTED PULMONARY SVC/PX: CPT

## 2018-07-27 PROCEDURE — 82435 ASSAY OF BLOOD CHLORIDE: CPT | Performed by: NEUROLOGICAL SURGERY

## 2018-07-27 PROCEDURE — 22214 INCIS 1 VERTEBRAL SEG LUMBAR: CPT | Performed by: NEUROLOGICAL SURGERY

## 2018-07-27 PROCEDURE — 84132 ASSAY OF SERUM POTASSIUM: CPT | Performed by: NEUROLOGICAL SURGERY

## 2018-07-27 PROCEDURE — 72020 X-RAY EXAM OF SPINE 1 VIEW: CPT

## 2018-07-27 PROCEDURE — 25010000002 CEFAZOLIN PER 500 MG: Performed by: NEUROLOGICAL SURGERY

## 2018-07-27 PROCEDURE — 25010000003 CEFAZOLIN IN DEXTROSE 2-4 GM/100ML-% SOLUTION: Performed by: NEUROLOGICAL SURGERY

## 2018-07-27 PROCEDURE — 85018 HEMOGLOBIN: CPT | Performed by: NEUROLOGICAL SURGERY

## 2018-07-27 PROCEDURE — 25010000003 HYDROMORPHONE PER 4 MG: Performed by: NEUROLOGICAL SURGERY

## 2018-07-27 PROCEDURE — 63710000001 INSULIN LISPRO (HUMAN) PER 5 UNITS: Performed by: NEUROLOGICAL SURGERY

## 2018-07-27 PROCEDURE — 86901 BLOOD TYPING SEROLOGIC RH(D): CPT | Performed by: NEUROLOGICAL SURGERY

## 2018-07-27 PROCEDURE — 22633 ARTHRD CMBN 1NTRSPC LUMBAR: CPT | Performed by: NEUROLOGICAL SURGERY

## 2018-07-27 DEVICE — SCREW 83575509 TSRH OG THIN 7.5MM X 50MM
Type: IMPLANTABLE DEVICE | Site: SPINE LUMBAR | Status: FUNCTIONAL
Brand: TSRH® SPINAL SYSTEM

## 2018-07-27 DEVICE — CROSSLINK 8115534 34TO36MM X10 MULTI
Type: IMPLANTABLE DEVICE | Site: SPINE LUMBAR | Status: FUNCTIONAL
Brand: CD HORIZON® SPINAL SYSTEM

## 2018-07-27 DEVICE — SPACER 2991226 CAPSTONE PEEK 12X26
Type: IMPLANTABLE DEVICE | Site: SPINE LUMBAR | Status: FUNCTIONAL
Brand: CAPSTONE® SPINAL SYSTEM

## 2018-07-27 DEVICE — SCREW 83575409 TSRH OG THIN 7.5MM X 40MM
Type: IMPLANTABLE DEVICE | Site: SPINE LUMBAR | Status: FUNCTIONAL
Brand: TSRH® SPINAL SYSTEM

## 2018-07-27 DEVICE — DBM T44145 5CC ORTHOBLEND SMALL DEFGRAFT
Type: IMPLANTABLE DEVICE | Site: SPINE LUMBAR | Status: FUNCTIONAL
Brand: GRAFTON®AND GRAFTON PLUS®DEMINERALIZED BONE MATRIX (DBM)

## 2018-07-27 DEVICE — SET SCREW 8351500 TSRH 3DX FLUSH BREAK
Type: IMPLANTABLE DEVICE | Site: SPINE LUMBAR | Status: FUNCTIONAL
Brand: TSRH® SPINAL SYSTEM

## 2018-07-27 DEVICE — CONNECTOR 8351520 TSRH 3DX SMALL
Type: IMPLANTABLE DEVICE | Site: SPINE LUMBAR | Status: FUNCTIONAL
Brand: TSRH® SPINAL SYSTEM

## 2018-07-27 DEVICE — BIOLOGIC 7600105 85 BTCP 15 HA 5CC VIAL
Type: IMPLANTABLE DEVICE | Site: SPINE LUMBAR | Status: FUNCTIONAL
Brand: MASTERGRAFT® GRANULES

## 2018-07-27 RX ORDER — MIDAZOLAM HYDROCHLORIDE 1 MG/ML
INJECTION INTRAMUSCULAR; INTRAVENOUS AS NEEDED
Status: DISCONTINUED | OUTPATIENT
Start: 2018-07-27 | End: 2018-07-27 | Stop reason: SURG

## 2018-07-27 RX ORDER — DOCUSATE SODIUM 100 MG/1
100 CAPSULE, LIQUID FILLED ORAL 2 TIMES DAILY PRN
Status: DISCONTINUED | OUTPATIENT
Start: 2018-07-27 | End: 2018-07-31 | Stop reason: HOSPADM

## 2018-07-27 RX ORDER — VECURONIUM BROMIDE 1 MG/ML
INJECTION, POWDER, LYOPHILIZED, FOR SOLUTION INTRAVENOUS AS NEEDED
Status: DISCONTINUED | OUTPATIENT
Start: 2018-07-27 | End: 2018-07-27 | Stop reason: SURG

## 2018-07-27 RX ORDER — ONDANSETRON 2 MG/ML
4 INJECTION INTRAMUSCULAR; INTRAVENOUS EVERY 6 HOURS PRN
Status: DISCONTINUED | OUTPATIENT
Start: 2018-07-27 | End: 2018-07-31 | Stop reason: HOSPADM

## 2018-07-27 RX ORDER — DIPHENOXYLATE HYDROCHLORIDE AND ATROPINE SULFATE 2.5; .025 MG/1; MG/1
1 TABLET ORAL DAILY
Status: DISCONTINUED | OUTPATIENT
Start: 2018-07-28 | End: 2018-07-31 | Stop reason: HOSPADM

## 2018-07-27 RX ORDER — ACETAMINOPHEN 325 MG/1
650 TABLET ORAL EVERY 8 HOURS PRN
Status: DISCONTINUED | OUTPATIENT
Start: 2018-07-27 | End: 2018-07-31 | Stop reason: HOSPADM

## 2018-07-27 RX ORDER — FENTANYL CITRATE 50 UG/ML
50 INJECTION, SOLUTION INTRAMUSCULAR; INTRAVENOUS
Status: DISCONTINUED | OUTPATIENT
Start: 2018-07-27 | End: 2018-07-27 | Stop reason: HOSPADM

## 2018-07-27 RX ORDER — PROMETHAZINE HYDROCHLORIDE 25 MG/ML
6.25 INJECTION, SOLUTION INTRAMUSCULAR; INTRAVENOUS ONCE AS NEEDED
Status: DISCONTINUED | OUTPATIENT
Start: 2018-07-27 | End: 2018-07-27 | Stop reason: HOSPADM

## 2018-07-27 RX ORDER — ONDANSETRON 2 MG/ML
INJECTION INTRAMUSCULAR; INTRAVENOUS AS NEEDED
Status: DISCONTINUED | OUTPATIENT
Start: 2018-07-27 | End: 2018-07-27 | Stop reason: SURG

## 2018-07-27 RX ORDER — DEXTROSE MONOHYDRATE 25 G/50ML
25 INJECTION, SOLUTION INTRAVENOUS
Status: DISCONTINUED | OUTPATIENT
Start: 2018-07-27 | End: 2018-07-31 | Stop reason: HOSPADM

## 2018-07-27 RX ORDER — SODIUM CHLORIDE 9 MG/ML
INJECTION, SOLUTION INTRAVENOUS CONTINUOUS PRN
Status: DISCONTINUED | OUTPATIENT
Start: 2018-07-27 | End: 2018-07-27 | Stop reason: SURG

## 2018-07-27 RX ORDER — MORPHINE SULFATE 15 MG/1
15 TABLET, FILM COATED, EXTENDED RELEASE ORAL EVERY 12 HOURS SCHEDULED
Status: DISCONTINUED | OUTPATIENT
Start: 2018-07-27 | End: 2018-07-31 | Stop reason: HOSPADM

## 2018-07-27 RX ORDER — PROPOFOL 10 MG/ML
VIAL (ML) INTRAVENOUS AS NEEDED
Status: DISCONTINUED | OUTPATIENT
Start: 2018-07-27 | End: 2018-07-27 | Stop reason: SURG

## 2018-07-27 RX ORDER — LIDOCAINE HYDROCHLORIDE 10 MG/ML
0.5 INJECTION, SOLUTION EPIDURAL; INFILTRATION; INTRACAUDAL; PERINEURAL ONCE AS NEEDED
Status: COMPLETED | OUTPATIENT
Start: 2018-07-27 | End: 2018-07-27

## 2018-07-27 RX ORDER — SODIUM CHLORIDE, SODIUM LACTATE, POTASSIUM CHLORIDE, CALCIUM CHLORIDE 600; 310; 30; 20 MG/100ML; MG/100ML; MG/100ML; MG/100ML
9 INJECTION, SOLUTION INTRAVENOUS CONTINUOUS
Status: DISCONTINUED | OUTPATIENT
Start: 2018-07-27 | End: 2018-07-27

## 2018-07-27 RX ORDER — CEFAZOLIN SODIUM IN 0.9 % NACL 3 G/100 ML
3 INTRAVENOUS SOLUTION, PIGGYBACK (ML) INTRAVENOUS EVERY 8 HOURS
Status: COMPLETED | OUTPATIENT
Start: 2018-07-27 | End: 2018-07-28

## 2018-07-27 RX ORDER — SODIUM CHLORIDE, SODIUM LACTATE, POTASSIUM CHLORIDE, CALCIUM CHLORIDE 600; 310; 30; 20 MG/100ML; MG/100ML; MG/100ML; MG/100ML
50 INJECTION, SOLUTION INTRAVENOUS CONTINUOUS
Status: DISCONTINUED | OUTPATIENT
Start: 2018-07-27 | End: 2018-07-31 | Stop reason: HOSPADM

## 2018-07-27 RX ORDER — DIPHENHYDRAMINE HYDROCHLORIDE 50 MG/ML
25 INJECTION INTRAMUSCULAR; INTRAVENOUS EVERY 6 HOURS PRN
Status: DISCONTINUED | OUTPATIENT
Start: 2018-07-27 | End: 2018-07-31 | Stop reason: HOSPADM

## 2018-07-27 RX ORDER — SENNA AND DOCUSATE SODIUM 50; 8.6 MG/1; MG/1
1 TABLET, FILM COATED ORAL NIGHTLY PRN
Status: DISCONTINUED | OUTPATIENT
Start: 2018-07-27 | End: 2018-07-31 | Stop reason: HOSPADM

## 2018-07-27 RX ORDER — HYDROMORPHONE HYDROCHLORIDE 1 MG/ML
0.5 INJECTION, SOLUTION INTRAMUSCULAR; INTRAVENOUS; SUBCUTANEOUS
Status: DISCONTINUED | OUTPATIENT
Start: 2018-07-27 | End: 2018-07-27 | Stop reason: HOSPADM

## 2018-07-27 RX ORDER — GABAPENTIN 300 MG/1
300 CAPSULE ORAL EVERY 8 HOURS SCHEDULED
Status: DISCONTINUED | OUTPATIENT
Start: 2018-07-27 | End: 2018-07-31 | Stop reason: HOSPADM

## 2018-07-27 RX ORDER — ROCURONIUM BROMIDE 10 MG/ML
INJECTION, SOLUTION INTRAVENOUS AS NEEDED
Status: DISCONTINUED | OUTPATIENT
Start: 2018-07-27 | End: 2018-07-27 | Stop reason: SURG

## 2018-07-27 RX ORDER — HYDROCHLOROTHIAZIDE 12.5 MG/1
12.5 TABLET ORAL EVERY MORNING
Status: DISCONTINUED | OUTPATIENT
Start: 2018-07-28 | End: 2018-07-31 | Stop reason: HOSPADM

## 2018-07-27 RX ORDER — SODIUM CHLORIDE 0.9 % (FLUSH) 0.9 %
1-10 SYRINGE (ML) INJECTION AS NEEDED
Status: DISCONTINUED | OUTPATIENT
Start: 2018-07-27 | End: 2018-07-31 | Stop reason: HOSPADM

## 2018-07-27 RX ORDER — DEXAMETHASONE SODIUM PHOSPHATE 4 MG/ML
INJECTION, SOLUTION INTRA-ARTICULAR; INTRALESIONAL; INTRAMUSCULAR; INTRAVENOUS; SOFT TISSUE AS NEEDED
Status: DISCONTINUED | OUTPATIENT
Start: 2018-07-27 | End: 2018-07-27 | Stop reason: SURG

## 2018-07-27 RX ORDER — SODIUM CHLORIDE 0.9 % (FLUSH) 0.9 %
1-10 SYRINGE (ML) INJECTION AS NEEDED
Status: DISCONTINUED | OUTPATIENT
Start: 2018-07-27 | End: 2018-07-27 | Stop reason: HOSPADM

## 2018-07-27 RX ORDER — CETIRIZINE HYDROCHLORIDE 10 MG/1
10 TABLET ORAL EVERY MORNING
Status: DISCONTINUED | OUTPATIENT
Start: 2018-07-28 | End: 2018-07-31 | Stop reason: HOSPADM

## 2018-07-27 RX ORDER — MAGNESIUM HYDROXIDE 1200 MG/15ML
LIQUID ORAL AS NEEDED
Status: DISCONTINUED | OUTPATIENT
Start: 2018-07-27 | End: 2018-07-27 | Stop reason: HOSPADM

## 2018-07-27 RX ORDER — SODIUM CHLORIDE 9 MG/ML
INJECTION, SOLUTION INTRAVENOUS AS NEEDED
Status: DISCONTINUED | OUTPATIENT
Start: 2018-07-27 | End: 2018-07-27 | Stop reason: HOSPADM

## 2018-07-27 RX ORDER — BISACODYL 10 MG
10 SUPPOSITORY, RECTAL RECTAL DAILY PRN
Status: DISCONTINUED | OUTPATIENT
Start: 2018-07-27 | End: 2018-07-31 | Stop reason: HOSPADM

## 2018-07-27 RX ORDER — RANOLAZINE 500 MG/1
500 TABLET, EXTENDED RELEASE ORAL EVERY 12 HOURS SCHEDULED
Status: DISCONTINUED | OUTPATIENT
Start: 2018-07-27 | End: 2018-07-31 | Stop reason: HOSPADM

## 2018-07-27 RX ORDER — LISINOPRIL 20 MG/1
20 TABLET ORAL EVERY MORNING
Status: DISCONTINUED | OUTPATIENT
Start: 2018-07-28 | End: 2018-07-31 | Stop reason: HOSPADM

## 2018-07-27 RX ORDER — ONDANSETRON 2 MG/ML
4 INJECTION INTRAMUSCULAR; INTRAVENOUS ONCE AS NEEDED
Status: DISCONTINUED | OUTPATIENT
Start: 2018-07-27 | End: 2018-07-27 | Stop reason: HOSPADM

## 2018-07-27 RX ORDER — BUDESONIDE AND FORMOTEROL FUMARATE DIHYDRATE 160; 4.5 UG/1; UG/1
2 AEROSOL RESPIRATORY (INHALATION)
Status: DISCONTINUED | OUTPATIENT
Start: 2018-07-27 | End: 2018-07-31 | Stop reason: HOSPADM

## 2018-07-27 RX ORDER — CELECOXIB 200 MG/1
200 CAPSULE ORAL DAILY
Status: DISCONTINUED | OUTPATIENT
Start: 2018-07-28 | End: 2018-07-31 | Stop reason: HOSPADM

## 2018-07-27 RX ORDER — LIDOCAINE HYDROCHLORIDE 10 MG/ML
INJECTION, SOLUTION INFILTRATION; PERINEURAL AS NEEDED
Status: DISCONTINUED | OUTPATIENT
Start: 2018-07-27 | End: 2018-07-27 | Stop reason: SURG

## 2018-07-27 RX ORDER — CARVEDILOL 3.12 MG/1
3.12 TABLET ORAL 2 TIMES DAILY WITH MEALS
Status: DISCONTINUED | OUTPATIENT
Start: 2018-07-27 | End: 2018-07-31 | Stop reason: HOSPADM

## 2018-07-27 RX ORDER — FAMOTIDINE 20 MG/1
20 TABLET, FILM COATED ORAL ONCE
Status: COMPLETED | OUTPATIENT
Start: 2018-07-27 | End: 2018-07-27

## 2018-07-27 RX ORDER — NICOTINE POLACRILEX 4 MG
15 LOZENGE BUCCAL
Status: DISCONTINUED | OUTPATIENT
Start: 2018-07-27 | End: 2018-07-31 | Stop reason: HOSPADM

## 2018-07-27 RX ORDER — ASCORBIC ACID 500 MG
500 TABLET ORAL DAILY
Status: DISCONTINUED | OUTPATIENT
Start: 2018-07-28 | End: 2018-07-31 | Stop reason: HOSPADM

## 2018-07-27 RX ORDER — PROMETHAZINE HYDROCHLORIDE 25 MG/ML
12.5 INJECTION, SOLUTION INTRAMUSCULAR; INTRAVENOUS EVERY 6 HOURS PRN
Status: DISCONTINUED | OUTPATIENT
Start: 2018-07-27 | End: 2018-07-31 | Stop reason: HOSPADM

## 2018-07-27 RX ORDER — GLYCOPYRROLATE 0.2 MG/ML
INJECTION INTRAMUSCULAR; INTRAVENOUS AS NEEDED
Status: DISCONTINUED | OUTPATIENT
Start: 2018-07-27 | End: 2018-07-27 | Stop reason: SURG

## 2018-07-27 RX ORDER — NALOXONE HCL 0.4 MG/ML
0.1 VIAL (ML) INJECTION
Status: DISCONTINUED | OUTPATIENT
Start: 2018-07-27 | End: 2018-07-31 | Stop reason: HOSPADM

## 2018-07-27 RX ORDER — PROMETHAZINE HYDROCHLORIDE 25 MG/1
25 SUPPOSITORY RECTAL ONCE AS NEEDED
Status: DISCONTINUED | OUTPATIENT
Start: 2018-07-27 | End: 2018-07-27 | Stop reason: HOSPADM

## 2018-07-27 RX ORDER — CYCLOBENZAPRINE HCL 10 MG
10 TABLET ORAL 3 TIMES DAILY PRN
Status: DISCONTINUED | OUTPATIENT
Start: 2018-07-27 | End: 2018-07-31 | Stop reason: HOSPADM

## 2018-07-27 RX ORDER — PROMETHAZINE HYDROCHLORIDE 12.5 MG/1
12.5 SUPPOSITORY RECTAL EVERY 6 HOURS PRN
Status: DISCONTINUED | OUTPATIENT
Start: 2018-07-27 | End: 2018-07-31 | Stop reason: HOSPADM

## 2018-07-27 RX ORDER — ONDANSETRON 4 MG/1
4 TABLET, FILM COATED ORAL EVERY 6 HOURS PRN
Status: DISCONTINUED | OUTPATIENT
Start: 2018-07-27 | End: 2018-07-31 | Stop reason: HOSPADM

## 2018-07-27 RX ORDER — ATORVASTATIN CALCIUM 20 MG/1
20 TABLET, FILM COATED ORAL EVERY MORNING
Status: DISCONTINUED | OUTPATIENT
Start: 2018-07-28 | End: 2018-07-31 | Stop reason: HOSPADM

## 2018-07-27 RX ORDER — DIPHENHYDRAMINE HCL 25 MG
25 CAPSULE ORAL NIGHTLY PRN
Status: DISCONTINUED | OUTPATIENT
Start: 2018-07-27 | End: 2018-07-31 | Stop reason: HOSPADM

## 2018-07-27 RX ORDER — FENTANYL CITRATE 50 UG/ML
INJECTION, SOLUTION INTRAMUSCULAR; INTRAVENOUS AS NEEDED
Status: DISCONTINUED | OUTPATIENT
Start: 2018-07-27 | End: 2018-07-27 | Stop reason: SURG

## 2018-07-27 RX ORDER — PROMETHAZINE HYDROCHLORIDE 12.5 MG/1
12.5 TABLET ORAL EVERY 6 HOURS PRN
Status: DISCONTINUED | OUTPATIENT
Start: 2018-07-27 | End: 2018-07-31 | Stop reason: HOSPADM

## 2018-07-27 RX ORDER — PROMETHAZINE HYDROCHLORIDE 25 MG/1
25 TABLET ORAL ONCE AS NEEDED
Status: DISCONTINUED | OUTPATIENT
Start: 2018-07-27 | End: 2018-07-27 | Stop reason: HOSPADM

## 2018-07-27 RX ORDER — HYDROCODONE BITARTRATE AND ACETAMINOPHEN 10; 325 MG/1; MG/1
1 TABLET ORAL EVERY 4 HOURS PRN
Status: DISCONTINUED | OUTPATIENT
Start: 2018-07-27 | End: 2018-07-31 | Stop reason: HOSPADM

## 2018-07-27 RX ORDER — CEFAZOLIN SODIUM 2 G/100ML
2 INJECTION, SOLUTION INTRAVENOUS ONCE
Status: COMPLETED | OUTPATIENT
Start: 2018-07-27 | End: 2018-07-27

## 2018-07-27 RX ADMIN — HYDROCODONE BITARTRATE AND ACETAMINOPHEN 1 TABLET: 10; 325 TABLET ORAL at 23:12

## 2018-07-27 RX ADMIN — HYDROMORPHONE HYDROCHLORIDE 0.5 MG: 1 INJECTION, SOLUTION INTRAMUSCULAR; INTRAVENOUS; SUBCUTANEOUS at 16:56

## 2018-07-27 RX ADMIN — ONDANSETRON 4 MG: 2 INJECTION INTRAMUSCULAR; INTRAVENOUS at 16:17

## 2018-07-27 RX ADMIN — SODIUM CHLORIDE, POTASSIUM CHLORIDE, SODIUM LACTATE AND CALCIUM CHLORIDE: 600; 310; 30; 20 INJECTION, SOLUTION INTRAVENOUS at 13:30

## 2018-07-27 RX ADMIN — VECURONIUM BROMIDE 1 MG: 1 INJECTION, POWDER, LYOPHILIZED, FOR SOLUTION INTRAVENOUS at 15:30

## 2018-07-27 RX ADMIN — MIDAZOLAM HYDROCHLORIDE 2 MG: 1 INJECTION, SOLUTION INTRAMUSCULAR; INTRAVENOUS at 11:57

## 2018-07-27 RX ADMIN — BUDESONIDE AND FORMOTEROL FUMARATE DIHYDRATE 2 PUFF: 160; 4.5 AEROSOL RESPIRATORY (INHALATION) at 20:52

## 2018-07-27 RX ADMIN — VECURONIUM BROMIDE 1 MG: 1 INJECTION, POWDER, LYOPHILIZED, FOR SOLUTION INTRAVENOUS at 14:05

## 2018-07-27 RX ADMIN — ROCURONIUM BROMIDE 50 MG: 10 SOLUTION INTRAVENOUS at 12:04

## 2018-07-27 RX ADMIN — MORPHINE SULFATE 15 MG: 15 TABLET, EXTENDED RELEASE ORAL at 23:12

## 2018-07-27 RX ADMIN — PHENYLEPHRINE HYDROCHLORIDE 100 MCG: 10 INJECTION INTRAVENOUS at 13:31

## 2018-07-27 RX ADMIN — FAMOTIDINE 20 MG: 20 TABLET ORAL at 11:26

## 2018-07-27 RX ADMIN — PHENYLEPHRINE HYDROCHLORIDE 100 MCG: 10 INJECTION INTRAVENOUS at 13:11

## 2018-07-27 RX ADMIN — FENTANYL CITRATE 50 MCG: 50 INJECTION, SOLUTION INTRAMUSCULAR; INTRAVENOUS at 16:48

## 2018-07-27 RX ADMIN — PROPOFOL 25 MCG/KG/MIN: 10 INJECTION, EMULSION INTRAVENOUS at 12:19

## 2018-07-27 RX ADMIN — CEFAZOLIN SODIUM 2 G: 2 INJECTION, SOLUTION INTRAVENOUS at 11:59

## 2018-07-27 RX ADMIN — PHENYLEPHRINE HYDROCHLORIDE 100 MCG: 10 INJECTION INTRAVENOUS at 13:27

## 2018-07-27 RX ADMIN — FENTANYL CITRATE 50 MCG: 50 INJECTION, SOLUTION INTRAMUSCULAR; INTRAVENOUS at 12:10

## 2018-07-27 RX ADMIN — PHENYLEPHRINE HYDROCHLORIDE 100 MCG: 10 INJECTION INTRAVENOUS at 13:49

## 2018-07-27 RX ADMIN — FENTANYL CITRATE 50 MCG: 50 INJECTION INTRAMUSCULAR; INTRAVENOUS at 17:25

## 2018-07-27 RX ADMIN — LIDOCAINE HYDROCHLORIDE 50 MG: 10 INJECTION, SOLUTION INFILTRATION; PERINEURAL at 12:04

## 2018-07-27 RX ADMIN — VECURONIUM BROMIDE 2 MG: 1 INJECTION, POWDER, LYOPHILIZED, FOR SOLUTION INTRAVENOUS at 14:41

## 2018-07-27 RX ADMIN — CEFAZOLIN 3 G: 1 INJECTION, POWDER, FOR SOLUTION INTRAMUSCULAR; INTRAVENOUS at 23:10

## 2018-07-27 RX ADMIN — PROPOFOL 200 MG: 10 INJECTION, EMULSION INTRAVENOUS at 12:04

## 2018-07-27 RX ADMIN — VECURONIUM BROMIDE 2 MG: 1 INJECTION, POWDER, LYOPHILIZED, FOR SOLUTION INTRAVENOUS at 12:41

## 2018-07-27 RX ADMIN — SODIUM CHLORIDE, POTASSIUM CHLORIDE, SODIUM LACTATE AND CALCIUM CHLORIDE 9 ML/HR: 600; 310; 30; 20 INJECTION, SOLUTION INTRAVENOUS at 11:25

## 2018-07-27 RX ADMIN — VECURONIUM BROMIDE 1 MG: 1 INJECTION, POWDER, LYOPHILIZED, FOR SOLUTION INTRAVENOUS at 12:53

## 2018-07-27 RX ADMIN — HYDROMORPHONE HYDROCHLORIDE: 10 INJECTION INTRAMUSCULAR; INTRAVENOUS; SUBCUTANEOUS at 16:59

## 2018-07-27 RX ADMIN — RANOLAZINE 500 MG: 500 TABLET, FILM COATED, EXTENDED RELEASE ORAL at 23:11

## 2018-07-27 RX ADMIN — GLYCOPYRROLATE 0.6 MG: 0.2 INJECTION INTRAMUSCULAR; INTRAVENOUS at 16:36

## 2018-07-27 RX ADMIN — FENTANYL CITRATE 50 MCG: 50 INJECTION, SOLUTION INTRAMUSCULAR; INTRAVENOUS at 12:41

## 2018-07-27 RX ADMIN — VECURONIUM BROMIDE 1 MG: 1 INJECTION, POWDER, LYOPHILIZED, FOR SOLUTION INTRAVENOUS at 13:49

## 2018-07-27 RX ADMIN — LIDOCAINE HYDROCHLORIDE 0.5 ML: 10 INJECTION, SOLUTION EPIDURAL; INFILTRATION; INTRACAUDAL; PERINEURAL at 11:25

## 2018-07-27 RX ADMIN — DEXAMETHASONE SODIUM PHOSPHATE 8 MG: 4 INJECTION, SOLUTION INTRAMUSCULAR; INTRAVENOUS at 12:08

## 2018-07-27 RX ADMIN — SODIUM CHLORIDE: 9 INJECTION, SOLUTION INTRAVENOUS at 15:05

## 2018-07-27 RX ADMIN — Medication 5 MG: at 16:36

## 2018-07-27 RX ADMIN — CEFAZOLIN SODIUM 2 G: 2 INJECTION, SOLUTION INTRAVENOUS at 15:00

## 2018-07-27 RX ADMIN — GABAPENTIN 300 MG: 300 CAPSULE ORAL at 23:11

## 2018-07-27 RX ADMIN — FENTANYL CITRATE 100 MCG: 50 INJECTION, SOLUTION INTRAMUSCULAR; INTRAVENOUS at 12:04

## 2018-07-27 RX ADMIN — INSULIN LISPRO 8 UNITS: 100 INJECTION, SOLUTION INTRAVENOUS; SUBCUTANEOUS at 23:19

## 2018-07-27 RX ADMIN — HYDROMORPHONE HYDROCHLORIDE 0.5 MG: 1 INJECTION, SOLUTION INTRAMUSCULAR; INTRAVENOUS; SUBCUTANEOUS at 16:50

## 2018-07-27 RX ADMIN — MUPIROCIN 1 APPLICATION: 20 OINTMENT TOPICAL at 11:26

## 2018-07-27 NOTE — ANESTHESIA PREPROCEDURE EVALUATION
Anesthesia Evaluation                  Airway   Mallampati: I  TM distance: >3 FB  Neck ROM: full  No difficulty expected  Dental - normal exam     Pulmonary - normal exam   (+) asthma, sleep apnea,   Cardiovascular - normal exam    (+) hypertension, hyperlipidemia,       Neuro/Psych  GI/Hepatic/Renal/Endo    (+)   diabetes mellitus,     Musculoskeletal     Abdominal  - normal exam    Bowel sounds: normal.   Substance History      OB/GYN          Other   (+) arthritis                   Anesthesia Plan    ASA 3     general   (LORI)  intravenous induction     Plan discussed with CRNA.

## 2018-07-27 NOTE — ANESTHESIA PROCEDURE NOTES
Arterial Line    Patient location during procedure: pre-op   Line placed for hemodynamic monitoring.  Performed By   Anesthesiologist: LUIS ALBERTO ERVIN  Preanesthetic Checklist  Completed: patient identified, site marked, surgical consent, pre-op evaluation, timeout performed, IV checked, risks and benefits discussed and monitors and equipment checked  Arterial Line Prep   Sterile Tech: cap, gloves and sterile barriers  Prep: ChloraPrep  Patient monitoring: blood pressure monitoring, continuous pulse oximetry and EKG  Arterial Line Procedure   Laterality:right  Location:  radial artery  Catheter size: 20 G   Guidance: palpation technique  Number of attempts: 1  Successful placement: yes          Post Assessment   Dressing Type: line sutured, occlusive dressing applied, secured with tape and wrist guard applied.   Complications no  Circ/Move/Sens Assessment: normal and unchanged.   Patient Tolerance: patient tolerated the procedure well with no apparent complications

## 2018-07-27 NOTE — ANESTHESIA PROCEDURE NOTES
Airway  Urgency: elective    Date/Time: 7/27/2018 12:06 PM  Airway not difficult    General Information and Staff    Patient location during procedure: OR  CRNA: TG ANAND    Indications and Patient Condition  Indications for airway management: airway protection    Preoxygenated: yes  MILS not maintained throughout  Mask difficulty assessment: 1 - vent by mask    Final Airway Details  Final airway type: endotracheal airway      Successful airway: ETT  Cuffed: yes   Successful intubation technique: video laryngoscopy  Facilitating devices/methods: intubating stylet  Endotracheal tube insertion site: oral  Blade: Yasmine  Blade size: #4  ETT size: 8.0 mm  Cormack-Lehane Classification: grade IIa - partial view of glottis (with glidescope)  Placement verified by: chest auscultation and capnometry   Cuff volume (mL): 7  Measured from: teeth  ETT to teeth (cm): 23  Number of attempts at approach: 1    Additional Comments  Smooth IV induction.  Atraumatic ET intubation.  Dentition unchanged.  Negative epigastric sounds, Breath sound equal bilaterally with symmetric chest rise and fall

## 2018-07-27 NOTE — ANESTHESIA POSTPROCEDURE EVALUATION
Patient: Amadeo Kong    Procedure Summary     Date:  07/27/18 Room / Location:   [facility] /  [facility]    Anesthesia Start:  1158 Anesthesia Stop:  1651    Procedure:  TLIF, OSTEOTOMY L4-5 , POST FUSION L4-5 (N/A Spine Lumbar) Diagnosis:      Surgeon:  Yo Bryson MD Provider:  Yony Dunne MD    Anesthesia Type:  general ASA Status:  3          Anesthesia Type: general  Last vitals  BP   148/92 (07/27/18 1651)   Temp   97.5 °F (36.4 °C) (07/27/18 1651)   Pulse   82 (07/27/18 1651)   Resp   14 (07/27/18 1651)     SpO2   98 % (07/27/18 1651)     Post Anesthesia Care and Evaluation    Patient location during evaluation: PACU  Patient participation: complete - patient participated  Level of consciousness: awake and alert  Pain score: 0  Pain management: adequate  Airway patency: patent  Anesthetic complications: No anesthetic complications  PONV Status: none  Cardiovascular status: hemodynamically stable and acceptable  Respiratory status: nonlabored ventilation, acceptable and nasal cannula  Hydration status: acceptable

## 2018-07-28 LAB
GLUCOSE BLDC GLUCOMTR-MCNC: 227 MG/DL (ref 70–130)
GLUCOSE BLDC GLUCOMTR-MCNC: 248 MG/DL (ref 70–130)
GLUCOSE BLDC GLUCOMTR-MCNC: 267 MG/DL (ref 70–130)
GLUCOSE BLDC GLUCOMTR-MCNC: 309 MG/DL (ref 70–130)
HCT VFR BLD AUTO: 37.2 % (ref 38.9–50.9)
HGB BLD-MCNC: 12.6 G/DL (ref 13.1–17.5)

## 2018-07-28 PROCEDURE — 97161 PT EVAL LOW COMPLEX 20 MIN: CPT

## 2018-07-28 PROCEDURE — 94799 UNLISTED PULMONARY SVC/PX: CPT

## 2018-07-28 PROCEDURE — 85018 HEMOGLOBIN: CPT | Performed by: NEUROLOGICAL SURGERY

## 2018-07-28 PROCEDURE — 25010000003 CEFAZOLIN PER 500 MG: Performed by: NEUROLOGICAL SURGERY

## 2018-07-28 PROCEDURE — 25010000002 ONDANSETRON PER 1 MG: Performed by: NEUROLOGICAL SURGERY

## 2018-07-28 PROCEDURE — 94760 N-INVAS EAR/PLS OXIMETRY 1: CPT

## 2018-07-28 PROCEDURE — 97535 SELF CARE MNGMENT TRAINING: CPT

## 2018-07-28 PROCEDURE — 97530 THERAPEUTIC ACTIVITIES: CPT

## 2018-07-28 PROCEDURE — 82962 GLUCOSE BLOOD TEST: CPT

## 2018-07-28 PROCEDURE — 25010000003 HYDROMORPHONE PER 4 MG: Performed by: NEUROLOGICAL SURGERY

## 2018-07-28 PROCEDURE — 99024 POSTOP FOLLOW-UP VISIT: CPT | Performed by: NEUROLOGICAL SURGERY

## 2018-07-28 PROCEDURE — 97165 OT EVAL LOW COMPLEX 30 MIN: CPT

## 2018-07-28 PROCEDURE — 85014 HEMATOCRIT: CPT | Performed by: NEUROLOGICAL SURGERY

## 2018-07-28 RX ADMIN — HYDROMORPHONE HYDROCHLORIDE: 10 INJECTION INTRAMUSCULAR; INTRAVENOUS; SUBCUTANEOUS at 10:22

## 2018-07-28 RX ADMIN — CELECOXIB 200 MG: 200 CAPSULE ORAL at 08:40

## 2018-07-28 RX ADMIN — INSULIN LISPRO 7 UNITS: 100 INJECTION, SOLUTION INTRAVENOUS; SUBCUTANEOUS at 11:56

## 2018-07-28 RX ADMIN — Medication 1 TABLET: at 08:39

## 2018-07-28 RX ADMIN — MORPHINE SULFATE 15 MG: 15 TABLET, EXTENDED RELEASE ORAL at 08:39

## 2018-07-28 RX ADMIN — LISINOPRIL 20 MG: 20 TABLET ORAL at 08:39

## 2018-07-28 RX ADMIN — INSULIN LISPRO 6 UNITS: 100 INJECTION, SOLUTION INTRAVENOUS; SUBCUTANEOUS at 18:03

## 2018-07-28 RX ADMIN — CYCLOBENZAPRINE HYDROCHLORIDE 10 MG: 10 TABLET, FILM COATED ORAL at 22:47

## 2018-07-28 RX ADMIN — BUDESONIDE AND FORMOTEROL FUMARATE DIHYDRATE 2 PUFF: 160; 4.5 AEROSOL RESPIRATORY (INHALATION) at 09:43

## 2018-07-28 RX ADMIN — BUDESONIDE AND FORMOTEROL FUMARATE DIHYDRATE 2 PUFF: 160; 4.5 AEROSOL RESPIRATORY (INHALATION) at 20:46

## 2018-07-28 RX ADMIN — CARVEDILOL 3.12 MG: 3.12 TABLET, FILM COATED ORAL at 08:40

## 2018-07-28 RX ADMIN — CYCLOBENZAPRINE HYDROCHLORIDE 10 MG: 10 TABLET, FILM COATED ORAL at 02:40

## 2018-07-28 RX ADMIN — HYDROCHLOROTHIAZIDE 12.5 MG: 12.5 TABLET ORAL at 08:39

## 2018-07-28 RX ADMIN — INSULIN LISPRO 4 UNITS: 100 INJECTION, SOLUTION INTRAVENOUS; SUBCUTANEOUS at 22:46

## 2018-07-28 RX ADMIN — CETIRIZINE HYDROCHLORIDE 10 MG: 10 TABLET, FILM COATED ORAL at 08:40

## 2018-07-28 RX ADMIN — SODIUM CHLORIDE, POTASSIUM CHLORIDE, SODIUM LACTATE AND CALCIUM CHLORIDE 125 ML/HR: 600; 310; 30; 20 INJECTION, SOLUTION INTRAVENOUS at 02:35

## 2018-07-28 RX ADMIN — SODIUM CHLORIDE, POTASSIUM CHLORIDE, SODIUM LACTATE AND CALCIUM CHLORIDE 50 ML/HR: 600; 310; 30; 20 INJECTION, SOLUTION INTRAVENOUS at 15:16

## 2018-07-28 RX ADMIN — HYDROCODONE BITARTRATE AND ACETAMINOPHEN 1 TABLET: 10; 325 TABLET ORAL at 02:40

## 2018-07-28 RX ADMIN — GABAPENTIN 300 MG: 300 CAPSULE ORAL at 07:06

## 2018-07-28 RX ADMIN — ONDANSETRON 4 MG: 2 INJECTION, SOLUTION INTRAMUSCULAR; INTRAVENOUS at 18:08

## 2018-07-28 RX ADMIN — CEFAZOLIN 3 G: 1 INJECTION, POWDER, FOR SOLUTION INTRAMUSCULAR; INTRAVENOUS at 07:07

## 2018-07-28 RX ADMIN — RANOLAZINE 500 MG: 500 TABLET, FILM COATED, EXTENDED RELEASE ORAL at 20:15

## 2018-07-28 RX ADMIN — INSULIN LISPRO 4 UNITS: 100 INJECTION, SOLUTION INTRAVENOUS; SUBCUTANEOUS at 08:43

## 2018-07-28 RX ADMIN — HYDROCODONE BITARTRATE AND ACETAMINOPHEN 1 TABLET: 10; 325 TABLET ORAL at 18:07

## 2018-07-28 RX ADMIN — GABAPENTIN 300 MG: 300 CAPSULE ORAL at 22:47

## 2018-07-28 RX ADMIN — ATORVASTATIN CALCIUM 20 MG: 20 TABLET, FILM COATED ORAL at 20:15

## 2018-07-28 RX ADMIN — RANOLAZINE 500 MG: 500 TABLET, FILM COATED, EXTENDED RELEASE ORAL at 08:39

## 2018-07-28 RX ADMIN — MORPHINE SULFATE 15 MG: 15 TABLET, EXTENDED RELEASE ORAL at 20:15

## 2018-07-28 RX ADMIN — OXYCODONE HYDROCHLORIDE AND ACETAMINOPHEN 500 MG: 500 TABLET ORAL at 08:39

## 2018-07-28 RX ADMIN — HYDROCODONE BITARTRATE AND ACETAMINOPHEN 1 TABLET: 10; 325 TABLET ORAL at 22:47

## 2018-07-28 RX ADMIN — GABAPENTIN 300 MG: 300 CAPSULE ORAL at 13:21

## 2018-07-29 LAB
GLUCOSE BLDC GLUCOMTR-MCNC: 255 MG/DL (ref 70–130)
GLUCOSE BLDC GLUCOMTR-MCNC: 278 MG/DL (ref 70–130)
GLUCOSE BLDC GLUCOMTR-MCNC: 289 MG/DL (ref 70–130)
GLUCOSE BLDC GLUCOMTR-MCNC: 318 MG/DL (ref 70–130)

## 2018-07-29 PROCEDURE — 99024 POSTOP FOLLOW-UP VISIT: CPT | Performed by: NEUROLOGICAL SURGERY

## 2018-07-29 PROCEDURE — 97116 GAIT TRAINING THERAPY: CPT

## 2018-07-29 PROCEDURE — 82962 GLUCOSE BLOOD TEST: CPT

## 2018-07-29 PROCEDURE — 94799 UNLISTED PULMONARY SVC/PX: CPT

## 2018-07-29 RX ORDER — TAMSULOSIN HYDROCHLORIDE 0.4 MG/1
0.4 CAPSULE ORAL DAILY
Status: DISCONTINUED | OUTPATIENT
Start: 2018-07-29 | End: 2018-07-31 | Stop reason: HOSPADM

## 2018-07-29 RX ADMIN — RANOLAZINE 500 MG: 500 TABLET, FILM COATED, EXTENDED RELEASE ORAL at 08:15

## 2018-07-29 RX ADMIN — CELECOXIB 200 MG: 200 CAPSULE ORAL at 08:15

## 2018-07-29 RX ADMIN — GABAPENTIN 300 MG: 300 CAPSULE ORAL at 13:16

## 2018-07-29 RX ADMIN — BUDESONIDE AND FORMOTEROL FUMARATE DIHYDRATE 2 PUFF: 160; 4.5 AEROSOL RESPIRATORY (INHALATION) at 19:39

## 2018-07-29 RX ADMIN — INSULIN LISPRO 6 UNITS: 100 INJECTION, SOLUTION INTRAVENOUS; SUBCUTANEOUS at 17:18

## 2018-07-29 RX ADMIN — INSULIN LISPRO 6 UNITS: 100 INJECTION, SOLUTION INTRAVENOUS; SUBCUTANEOUS at 12:25

## 2018-07-29 RX ADMIN — CETIRIZINE HYDROCHLORIDE 10 MG: 10 TABLET, FILM COATED ORAL at 08:15

## 2018-07-29 RX ADMIN — MORPHINE SULFATE 15 MG: 15 TABLET, EXTENDED RELEASE ORAL at 20:02

## 2018-07-29 RX ADMIN — HYDROCHLOROTHIAZIDE 12.5 MG: 12.5 TABLET ORAL at 08:15

## 2018-07-29 RX ADMIN — GABAPENTIN 300 MG: 300 CAPSULE ORAL at 20:02

## 2018-07-29 RX ADMIN — RANOLAZINE 500 MG: 500 TABLET, FILM COATED, EXTENDED RELEASE ORAL at 20:02

## 2018-07-29 RX ADMIN — TAMSULOSIN HYDROCHLORIDE 0.4 MG: 0.4 CAPSULE ORAL at 08:33

## 2018-07-29 RX ADMIN — ATORVASTATIN CALCIUM 20 MG: 20 TABLET, FILM COATED ORAL at 20:02

## 2018-07-29 RX ADMIN — Medication 1 TABLET: at 08:15

## 2018-07-29 RX ADMIN — INSULIN LISPRO 6 UNITS: 100 INJECTION, SOLUTION INTRAVENOUS; SUBCUTANEOUS at 21:04

## 2018-07-29 RX ADMIN — MORPHINE SULFATE 15 MG: 15 TABLET, EXTENDED RELEASE ORAL at 08:14

## 2018-07-29 RX ADMIN — OXYCODONE HYDROCHLORIDE AND ACETAMINOPHEN 500 MG: 500 TABLET ORAL at 08:15

## 2018-07-29 RX ADMIN — BUDESONIDE AND FORMOTEROL FUMARATE DIHYDRATE 2 PUFF: 160; 4.5 AEROSOL RESPIRATORY (INHALATION) at 10:23

## 2018-07-29 RX ADMIN — INSULIN LISPRO 7 UNITS: 100 INJECTION, SOLUTION INTRAVENOUS; SUBCUTANEOUS at 08:16

## 2018-07-30 LAB
GLUCOSE BLDC GLUCOMTR-MCNC: 222 MG/DL (ref 70–130)
GLUCOSE BLDC GLUCOMTR-MCNC: 244 MG/DL (ref 70–130)
GLUCOSE BLDC GLUCOMTR-MCNC: 246 MG/DL (ref 70–130)
GLUCOSE BLDC GLUCOMTR-MCNC: 275 MG/DL (ref 70–130)

## 2018-07-30 PROCEDURE — 94799 UNLISTED PULMONARY SVC/PX: CPT

## 2018-07-30 PROCEDURE — 97530 THERAPEUTIC ACTIVITIES: CPT

## 2018-07-30 PROCEDURE — G0108 DIAB MANAGE TRN  PER INDIV: HCPCS | Performed by: REGISTERED NURSE

## 2018-07-30 PROCEDURE — 99024 POSTOP FOLLOW-UP VISIT: CPT | Performed by: NEUROLOGICAL SURGERY

## 2018-07-30 PROCEDURE — 94640 AIRWAY INHALATION TREATMENT: CPT

## 2018-07-30 PROCEDURE — 97116 GAIT TRAINING THERAPY: CPT

## 2018-07-30 PROCEDURE — 94760 N-INVAS EAR/PLS OXIMETRY 1: CPT

## 2018-07-30 PROCEDURE — 82962 GLUCOSE BLOOD TEST: CPT

## 2018-07-30 RX ADMIN — HYDROCODONE BITARTRATE AND ACETAMINOPHEN 1 TABLET: 10; 325 TABLET ORAL at 11:41

## 2018-07-30 RX ADMIN — INSULIN LISPRO 4 UNITS: 100 INJECTION, SOLUTION INTRAVENOUS; SUBCUTANEOUS at 11:38

## 2018-07-30 RX ADMIN — ATORVASTATIN CALCIUM 20 MG: 20 TABLET, FILM COATED ORAL at 20:01

## 2018-07-30 RX ADMIN — GABAPENTIN 300 MG: 300 CAPSULE ORAL at 13:04

## 2018-07-30 RX ADMIN — HYDROCHLOROTHIAZIDE 12.5 MG: 12.5 TABLET ORAL at 08:14

## 2018-07-30 RX ADMIN — TAMSULOSIN HYDROCHLORIDE 0.4 MG: 0.4 CAPSULE ORAL at 08:24

## 2018-07-30 RX ADMIN — CARVEDILOL 3.12 MG: 3.12 TABLET, FILM COATED ORAL at 08:15

## 2018-07-30 RX ADMIN — INSULIN LISPRO 4 UNITS: 100 INJECTION, SOLUTION INTRAVENOUS; SUBCUTANEOUS at 17:14

## 2018-07-30 RX ADMIN — INSULIN LISPRO 6 UNITS: 100 INJECTION, SOLUTION INTRAVENOUS; SUBCUTANEOUS at 08:16

## 2018-07-30 RX ADMIN — Medication 1 TABLET: at 08:15

## 2018-07-30 RX ADMIN — GABAPENTIN 300 MG: 300 CAPSULE ORAL at 20:01

## 2018-07-30 RX ADMIN — HYDROCODONE BITARTRATE AND ACETAMINOPHEN 1 TABLET: 10; 325 TABLET ORAL at 05:56

## 2018-07-30 RX ADMIN — OXYCODONE HYDROCHLORIDE AND ACETAMINOPHEN 500 MG: 500 TABLET ORAL at 08:15

## 2018-07-30 RX ADMIN — INSULIN LISPRO 4 UNITS: 100 INJECTION, SOLUTION INTRAVENOUS; SUBCUTANEOUS at 21:35

## 2018-07-30 RX ADMIN — CETIRIZINE HYDROCHLORIDE 10 MG: 10 TABLET, FILM COATED ORAL at 08:15

## 2018-07-30 RX ADMIN — MORPHINE SULFATE 15 MG: 15 TABLET, EXTENDED RELEASE ORAL at 08:15

## 2018-07-30 RX ADMIN — GABAPENTIN 300 MG: 300 CAPSULE ORAL at 05:56

## 2018-07-30 RX ADMIN — CYCLOBENZAPRINE HYDROCHLORIDE 10 MG: 10 TABLET, FILM COATED ORAL at 00:33

## 2018-07-30 RX ADMIN — RANOLAZINE 500 MG: 500 TABLET, FILM COATED, EXTENDED RELEASE ORAL at 08:15

## 2018-07-30 RX ADMIN — BUDESONIDE AND FORMOTEROL FUMARATE DIHYDRATE 2 PUFF: 160; 4.5 AEROSOL RESPIRATORY (INHALATION) at 06:49

## 2018-07-30 RX ADMIN — MORPHINE SULFATE 15 MG: 15 TABLET, EXTENDED RELEASE ORAL at 20:00

## 2018-07-30 RX ADMIN — MAGNESIUM HYDROXIDE 10 ML: 2400 SUSPENSION ORAL at 05:56

## 2018-07-30 RX ADMIN — BUDESONIDE AND FORMOTEROL FUMARATE DIHYDRATE 2 PUFF: 160; 4.5 AEROSOL RESPIRATORY (INHALATION) at 21:42

## 2018-07-30 RX ADMIN — CARVEDILOL 3.12 MG: 3.12 TABLET, FILM COATED ORAL at 17:14

## 2018-07-30 RX ADMIN — HYDROCODONE BITARTRATE AND ACETAMINOPHEN 1 TABLET: 10; 325 TABLET ORAL at 20:00

## 2018-07-30 RX ADMIN — CELECOXIB 200 MG: 200 CAPSULE ORAL at 08:15

## 2018-07-30 RX ADMIN — Medication 1 TABLET: at 00:33

## 2018-07-30 RX ADMIN — RANOLAZINE 500 MG: 500 TABLET, FILM COATED, EXTENDED RELEASE ORAL at 20:00

## 2018-07-30 RX ADMIN — LISINOPRIL 20 MG: 20 TABLET ORAL at 08:15

## 2018-07-30 RX ADMIN — HYDROCODONE BITARTRATE AND ACETAMINOPHEN 1 TABLET: 10; 325 TABLET ORAL at 00:33

## 2018-07-31 VITALS
RESPIRATION RATE: 16 BRPM | HEIGHT: 73 IN | DIASTOLIC BLOOD PRESSURE: 61 MMHG | SYSTOLIC BLOOD PRESSURE: 109 MMHG | BODY MASS INDEX: 40.02 KG/M2 | OXYGEN SATURATION: 94 % | WEIGHT: 302 LBS | HEART RATE: 85 BPM | TEMPERATURE: 97.8 F

## 2018-07-31 LAB
ABO + RH BLD: NORMAL
ABO + RH BLD: NORMAL
BH BB BLOOD EXPIRATION DATE: NORMAL
BH BB BLOOD EXPIRATION DATE: NORMAL
BH BB BLOOD TYPE BARCODE: 5100
BH BB BLOOD TYPE BARCODE: 5100
BH BB DISPENSE STATUS: NORMAL
BH BB DISPENSE STATUS: NORMAL
BH BB PRODUCT CODE: NORMAL
BH BB PRODUCT CODE: NORMAL
BH BB UNIT NUMBER: NORMAL
BH BB UNIT NUMBER: NORMAL
GLUCOSE BLDC GLUCOMTR-MCNC: 250 MG/DL (ref 70–130)
GLUCOSE BLDC GLUCOMTR-MCNC: 317 MG/DL (ref 70–130)
UNIT  ABO: NORMAL
UNIT  ABO: NORMAL
UNIT  RH: NORMAL
UNIT  RH: NORMAL

## 2018-07-31 PROCEDURE — 99024 POSTOP FOLLOW-UP VISIT: CPT | Performed by: NEUROLOGICAL SURGERY

## 2018-07-31 PROCEDURE — 94640 AIRWAY INHALATION TREATMENT: CPT

## 2018-07-31 PROCEDURE — 82962 GLUCOSE BLOOD TEST: CPT

## 2018-07-31 PROCEDURE — 97535 SELF CARE MNGMENT TRAINING: CPT

## 2018-07-31 RX ORDER — MORPHINE SULFATE 15 MG/1
15 TABLET, FILM COATED, EXTENDED RELEASE ORAL EVERY 12 HOURS SCHEDULED
Qty: 14 TABLET | Refills: 0 | Status: SHIPPED | OUTPATIENT
Start: 2018-07-31 | End: 2018-08-07

## 2018-07-31 RX ORDER — TAMSULOSIN HYDROCHLORIDE 0.4 MG/1
0.4 CAPSULE ORAL DAILY
Qty: 30 CAPSULE | Refills: 2 | Status: SHIPPED | OUTPATIENT
Start: 2018-07-31 | End: 2018-08-01

## 2018-07-31 RX ORDER — HYDROCODONE BITARTRATE AND ACETAMINOPHEN 10; 325 MG/1; MG/1
1 TABLET ORAL EVERY 6 HOURS PRN
Qty: 120 TABLET | Refills: 0 | Status: SHIPPED | OUTPATIENT
Start: 2018-07-31 | End: 2018-08-13 | Stop reason: SDUPTHER

## 2018-07-31 RX ADMIN — CYCLOBENZAPRINE HYDROCHLORIDE 10 MG: 10 TABLET, FILM COATED ORAL at 03:01

## 2018-07-31 RX ADMIN — CELECOXIB 200 MG: 200 CAPSULE ORAL at 09:38

## 2018-07-31 RX ADMIN — INSULIN LISPRO 7 UNITS: 100 INJECTION, SOLUTION INTRAVENOUS; SUBCUTANEOUS at 13:10

## 2018-07-31 RX ADMIN — MAGNESIUM HYDROXIDE 10 ML: 2400 SUSPENSION ORAL at 03:01

## 2018-07-31 RX ADMIN — HYDROCODONE BITARTRATE AND ACETAMINOPHEN 1 TABLET: 10; 325 TABLET ORAL at 03:01

## 2018-07-31 RX ADMIN — INSULIN LISPRO 6 UNITS: 100 INJECTION, SOLUTION INTRAVENOUS; SUBCUTANEOUS at 09:42

## 2018-07-31 RX ADMIN — BISACODYL 10 MG: 10 SUPPOSITORY RECTAL at 04:51

## 2018-07-31 RX ADMIN — CARVEDILOL 3.12 MG: 3.12 TABLET, FILM COATED ORAL at 09:38

## 2018-07-31 RX ADMIN — MORPHINE SULFATE 15 MG: 15 TABLET, EXTENDED RELEASE ORAL at 09:38

## 2018-07-31 RX ADMIN — ATORVASTATIN CALCIUM 20 MG: 20 TABLET, FILM COATED ORAL at 09:38

## 2018-07-31 RX ADMIN — GABAPENTIN 300 MG: 300 CAPSULE ORAL at 04:51

## 2018-07-31 RX ADMIN — OXYCODONE HYDROCHLORIDE AND ACETAMINOPHEN 500 MG: 500 TABLET ORAL at 09:38

## 2018-07-31 RX ADMIN — Medication 1 TABLET: at 03:02

## 2018-07-31 RX ADMIN — CETIRIZINE HYDROCHLORIDE 10 MG: 10 TABLET, FILM COATED ORAL at 09:38

## 2018-07-31 RX ADMIN — BUDESONIDE AND FORMOTEROL FUMARATE DIHYDRATE 2 PUFF: 160; 4.5 AEROSOL RESPIRATORY (INHALATION) at 08:17

## 2018-07-31 RX ADMIN — LISINOPRIL 20 MG: 20 TABLET ORAL at 09:38

## 2018-07-31 RX ADMIN — Medication 1 TABLET: at 09:38

## 2018-07-31 RX ADMIN — HYDROCHLOROTHIAZIDE 12.5 MG: 12.5 TABLET ORAL at 09:37

## 2018-07-31 RX ADMIN — RANOLAZINE 500 MG: 500 TABLET, FILM COATED, EXTENDED RELEASE ORAL at 09:37

## 2018-07-31 RX ADMIN — TAMSULOSIN HYDROCHLORIDE 0.4 MG: 0.4 CAPSULE ORAL at 09:38

## 2018-07-31 RX ADMIN — HYDROCODONE BITARTRATE AND ACETAMINOPHEN 1 TABLET: 10; 325 TABLET ORAL at 13:09

## 2018-08-01 ENCOUNTER — READMISSION MANAGEMENT (OUTPATIENT)
Dept: CALL CENTER | Facility: HOSPITAL | Age: 58
End: 2018-08-01

## 2018-08-01 RX ORDER — TAMSULOSIN HYDROCHLORIDE 0.4 MG/1
0.4 CAPSULE ORAL DAILY
Qty: 30 CAPSULE | Refills: 2 | Status: SHIPPED | OUTPATIENT
Start: 2018-08-01 | End: 2022-06-06

## 2018-08-01 NOTE — OUTREACH NOTE
Prep Survey      Responses   Facility patient discharged from?  [facility]   Is patient eligible?  Yes   Discharge diagnosis  Acquired spondylolisthesis   TLIF, OSTEOTOMY L4-5 , POST FUSION L4-5   Does the patient have one of the following disease processes/diagnoses(primary or secondary)?  General Surgery   Does the patient have Home health ordered?  Yes   What is the Home health agency?   [facility]   Is there a DME ordered?  Yes   What DME was ordered?  [facility] and [facility]   Comments regarding appointments  Needs Appt W/ Dr. Roland   Prep survey completed?  Yes          Heather Gutierrez RN

## 2018-08-02 ENCOUNTER — READMISSION MANAGEMENT (OUTPATIENT)
Dept: CALL CENTER | Facility: HOSPITAL | Age: 58
End: 2018-08-02

## 2018-08-02 NOTE — OUTREACH NOTE
General Surgery Week 1 Survey      Responses   Facility patient discharged from?  [place]   Does the patient have one of the following disease processes/diagnoses(primary or secondary)?  General Surgery   Is there a successful TCM telephone encounter documented?  No   Week 1 attempt successful?  Yes   Call start time  1224   Call end time  1237   Discharge diagnosis  Acquired spondylolisthesis   TLIF, OSTEOTOMY L4-5 , POST FUSION L4-5   Meds reviewed with patient/caregiver?  Yes   Is the patient having any side effects they believe may be caused by any medication additions or changes?  No   Does the patient have all medications related to this admission filled (includes all antibiotics, pain medications, etc.)  Yes   Is the patient taking all medications as directed (includes completed medication regime)?  Yes   Does the patient have a follow up appointment scheduled with their surgeon?  Yes   Has the patient kept scheduled appointments due by today?  N/A   What is the Home health agency?   [facility]   Has home health visited the patient within 72 hours of discharge?  Yes   What DME was ordered?  [place], [facility]   Has all DME been delivered?  Yes   Comments  pt states pain is severe with pain meds, advised pt to call PCP if needed to discuss pain management   Did the patient receive a copy of their discharge instructions?  Yes   Nursing interventions  Reviewed instructions with patient   What is the patient's perception of their health status since discharge?  Improving   Is the patient /caregiver able to teach back basic post-op care?  Continue use of incentive spirometry at least 1 week post discharge, Practice 'cough and deep breath', Drive as instructed by MD in discharge instructions, Take showers only when approved by MD-sponge bathe until then, No tub bath, swimming, or hot tub until instructed by MD, Keep incision areas clean,dry and protected, Lifting as instructed by MD in  discharge instructions   Is the patient/caregiver able to teach back signs and symptoms of incisional infection?  Increased redness, swelling or pain at the incisonal site, Increased drainage or bleeding, Incisional warmth, Pus or odor from incision, Fever   Is the patient/caregiver able to teach back steps to recovery at home?  Set small, achievable goals for return to baseline health, Rest and rebuild strength, gradually increase activity, Eat a well-balance diet, Make a list of questions for surgeon's appointment   Is the patient/caregiver able to teach back the hierarchy of who to call/visit for symptoms/problems? PCP, Specialist, Home health nurse, Urgent Care, ED, 911  Yes   Week 1 call completed?  Yes          Ashley Herbert RN

## 2018-08-03 ENCOUNTER — TELEPHONE (OUTPATIENT)
Dept: NEUROSURGERY | Facility: CLINIC | Age: 58
End: 2018-08-03

## 2018-08-03 NOTE — TELEPHONE ENCOUNTER
"Provider: Dr. Bryson  Caller: female, identity uncertain  Time of call: 929  Phone #:  [phone]  Surgery:  TLIF, OSTEOTOMY L4-5 , POST FUSION L4-5  Surgery Date: 7/27/18   Last visit:  04/04/18   Next visit: 8/13/18    JAZ:         04/23/2018 Oxycodone/Acetaminophen 325MG/5MG 1960 12 3 DELANEY Hightower Brian [facility] #[id number] [facility] 3  07/09/2018 Hydrocodone/Acetaminophen  325MG/5MG  1960 8 2 SELINA [place] [facility] #[id number] [facility] 20 3  07/31/2018 Hydrocodone/Acetaminophen  325MG/10MG  1960 120 30 MEGAN Bryson Brett [facility] 40 4  07/31/2018 Morphine Sulfate Er 15MG 1960 14 7 MEGAN Bryson Brett [facility] 30 4    Reason for call:         A female left message on behalf of pt stating that he is in \"an awful lot of pain\" and the pain medication that Dr. Bryson last prescribed is ineffective. I have tried calling her back to obtain further details but she could not be reached.       "

## 2018-08-06 ENCOUNTER — TELEPHONE (OUTPATIENT)
Dept: NEUROSURGERY | Facility: CLINIC | Age: 58
End: 2018-08-06

## 2018-08-06 ENCOUNTER — OFFICE VISIT (OUTPATIENT)
Dept: NEUROSURGERY | Facility: CLINIC | Age: 58
End: 2018-08-06

## 2018-08-06 VITALS
OXYGEN SATURATION: 99 % | BODY MASS INDEX: 41.06 KG/M2 | HEIGHT: 73 IN | WEIGHT: 309.8 LBS | RESPIRATION RATE: 20 BRPM | TEMPERATURE: 96.4 F

## 2018-08-06 DIAGNOSIS — M54.42 CHRONIC BILATERAL LOW BACK PAIN WITH BILATERAL SCIATICA: ICD-10-CM

## 2018-08-06 DIAGNOSIS — M43.10 ACQUIRED SPONDYLOLISTHESIS: ICD-10-CM

## 2018-08-06 DIAGNOSIS — G89.29 CHRONIC BILATERAL LOW BACK PAIN WITHOUT SCIATICA: ICD-10-CM

## 2018-08-06 DIAGNOSIS — M54.50 CHRONIC BILATERAL LOW BACK PAIN WITHOUT SCIATICA: ICD-10-CM

## 2018-08-06 DIAGNOSIS — Z51.89 VISIT FOR WOUND CHECK: Primary | ICD-10-CM

## 2018-08-06 DIAGNOSIS — M54.41 CHRONIC BILATERAL LOW BACK PAIN WITH BILATERAL SCIATICA: ICD-10-CM

## 2018-08-06 DIAGNOSIS — G89.29 CHRONIC BILATERAL LOW BACK PAIN WITH BILATERAL SCIATICA: ICD-10-CM

## 2018-08-06 PROCEDURE — 99024 POSTOP FOLLOW-UP VISIT: CPT | Performed by: PHYSICIAN ASSISTANT

## 2018-08-06 NOTE — TELEPHONE ENCOUNTER
Provider: Braydon  Caller: López)  Time of call:  9:08am   Phone #: [phone]   Surgery:  TLIF, OSTEOTOMY L4-5 , POST FUSION L4-5  Surgery Date:  7/27/18  Last visit:   4/4/18  Next visit: 8/13/18      Reason for call:       Pt called and was directed straight to my line. Pt spoke with the  On call (Stanley) over the weekend in regard to incision.  Told pt to call us this morning.     --Pt incision is still bleeding from surgery, not a lot but its constant.     No unusual drainage/fever/ swelling or other signs of infection.     Pt states he started having bleeding from his rectum on the day he was discharged home & he didn't receive his support stockings that he was supposed to get.     Pt is insisting on being seen today, he is very anxious/worried.

## 2018-08-06 NOTE — PROGRESS NOTES
Patient: Amadeo Kong  : 1960  Chart #: [id number]    Date of Service: 2018    CHIEF COMPLAINT: Wound check     History of Present Illness Patient is 10 days status post decompression and fusion L4-5 for correction of spondylolisthesis and stabilization of the spine in attempt to relieve back and leg pain.  He called our office today wanting to be seen because he continues to have some incisional drainage.  Drainage has slowed. He mostly notices it in the morning. Described as clear/bloody.  No fevers or chills. He complains of ongoing pain with no improvement from his pre-operative state.  No new symptoms.  He is also distraught that he did not get compression stockings while in the hospital. He has some old ones from a previous surgery that he has been using.       Review of Systems   Constitutional: Negative for activity change, appetite change, chills, diaphoresis, fatigue, fever and unexpected weight change.   HENT: Negative for congestion, dental problem, drooling, ear discharge, ear pain, facial swelling, hearing loss, mouth sores, nosebleeds, postnasal drip, rhinorrhea, sinus pressure, sneezing, sore throat, tinnitus, trouble swallowing and voice change.    Eyes: Negative for photophobia, pain, discharge, redness, itching and visual disturbance.   Respiratory: Negative for apnea, cough, choking, chest tightness, shortness of breath, wheezing and stridor.    Cardiovascular: Negative for chest pain, palpitations and leg swelling.   Gastrointestinal: Negative for abdominal distention, abdominal pain, anal bleeding, blood in stool, constipation, diarrhea, nausea, rectal pain and vomiting.   Endocrine: Negative for cold intolerance, heat intolerance, polydipsia, polyphagia and polyuria.   Genitourinary: Negative for decreased urine volume, difficulty urinating, dysuria, enuresis, flank pain, frequency, genital sores, hematuria and urgency.   Musculoskeletal: Positive for back pain. Negative for  "arthralgias, gait problem, joint swelling, myalgias, neck pain and neck stiffness.   Skin: Positive for wound. Negative for color change, pallor and rash.   Allergic/Immunologic: Negative for environmental allergies, food allergies and immunocompromised state.   Neurological: Negative for dizziness, tremors, seizures, syncope, facial asymmetry, speech difficulty, weakness, light-headedness, numbness and headaches.   Hematological: Negative for adenopathy. Does not bruise/bleed easily.   Psychiatric/Behavioral: Negative for agitation, behavioral problems, confusion, decreased concentration, dysphoric mood, hallucinations, self-injury, sleep disturbance and suicidal ideas. The patient is not nervous/anxious and is not hyperactive.    All other systems reviewed and are negative.      Objective   Vital Signs: Temperature 96.4 °F (35.8 °C), temperature source Temporal Artery , resp. rate 20, height 185.4 cm (73\"), weight (!) 141 kg (309 lb 12.8 oz), SpO2 99 %.  Physical Exam   Constitutional: He appears well-developed.   Skin:   Staples are in place and incision is intact.  There is evidence of some weepy drainage from the superior margin of the incision. Serosanguinous in appearance. No evidence of infection    Nursing note and vitals reviewed.  Patient has compression stockings on. No notable LE edema. Ambulates with assistance of a rolling walker.     Assessment/Plan   Medical Decision Making: Mr. Kong has some weepy drainage from his incision, not unexpected for the post-operative timeframe and his large habitus. No signs or symptoms of infection. He was advised to change his bandage as needed.  He will keep his regular follow up next week for staple removal. I discussed signs and symptoms of infection for which he will notify our office.         Amadeo was seen today for wound check.    Diagnoses and all orders for this visit:    Visit for wound check    Acquired spondylolisthesis    Chronic bilateral low back pain " without sciatica    Chronic bilateral low back pain with bilateral sciatica               Racquel Angela PA-C  Patient Care Team:  Kimber Roland MD as PCP - General (Family Medicine)  Ileana Page MD as Consulting Physician (Interventional Cardiology)

## 2018-08-07 NOTE — DISCHARGE SUMMARY
Date of Discharge: 7/31/2018    PREOPERATIVE DIAGNOSES:  1. L4-L5 degenerative spondylolisthesis and instability.   2. L4-L5 degenerative spinal stenosis.   3. Intractable mechanical back and leg pain.      POSTOPERATIVE DIAGNOSES:  1. L4-L5 degenerative spondylolisthesis and instability.   2. L4-L5 degenerative spinal stenosis.   3. Intractable mechanical back and leg pain.      PROCEDURES PERFORMED:   1. L4-L5 Smith-Ivan osteotomy.   2. Right L4-L5 transforaminal lumbar interbody fusion with a 12 x 26 mm Capstone cage and morselized bone graft.   3. L4-L5 posterolateral fusion with morselized bone graft and TSRH-3D pedicle screw fixation.   4. Reduction of L4-L5 spondylolisthesis.   5. Local bone autograft harvesting.      SURGEON: Yo Bryson MD     ASSISTANT: Neeta Farfan PA-C      HPI:  This 57-year-old gentleman presents with intractable back and leg pain. He has neurogenic claudication. Imaging studies of the spine show a degenerative spondylolisthesis at L4-L5 with gross instability. He also has spinal stenosis at L4-L5. The patient presents for correction of the spondylolisthesis and stabilization of the spine in an attempt to relieve back and leg pain.     Hospital Course:  The pt was admitted on 7/27/2018 for the above procedure.  The pt tolerated the procedure well without complication.  POD#1, the pt c/o back pain. He denied lower extremity pain.  Taking po well.  PT and OT were consulted for assistance with ambulation.  POD#2, the pt was ambulating without lower extremity pain.  The pt was having difficulty voiding.  He was placed on Flomax, and intermittent catheterizations were continued.  POD#3, the pt denied any new neurologic complaints.  He was voiding better.  POD#4, the pt had less back pain.  He was ambulating and voiding without difficulty.  JOANN drain was removed.  Surgical wound was intact.  The pt was discharged home with home PT.         Consults:   Consults     No orders found  from 6/28/2018 to 7/28/2018.          Condition on Discharge:  Stable and satisfactory.    Vital Signs  Temp:  [96.4 °F (35.8 °C)] 96.4 °F (35.8 °C)  Resp:  [20] 20    Discharge Disposition  Home or Self Care    Discharge Medications     Discharge Medications      New Medications      Instructions Start Date   HYDROcodone-acetaminophen  MG per tablet  Commonly known as:  NORCO   1 tablet, Oral, Every 6 Hours PRN      Morphine 15 MG 12 hr tablet  Commonly known as:  MS CONTIN   15 mg, Oral, Every 12 Hours Scheduled      tamsulosin 0.4 MG capsule 24 hr capsule  Commonly known as:  FLOMAX   0.4 mg, Oral, Daily         Continue These Medications      Instructions Start Date   atorvastatin 20 MG tablet  Commonly known as:  LIPITOR   20 mg, Oral, Every Morning      budesonide-formoterol 160-4.5 MCG/ACT inhaler  Commonly known as:  SYMBICORT   2 puffs, Inhalation, 2 Times Daily - RT      carvedilol 3.125 MG tablet  Commonly known as:  COREG   3.125 mg, Oral, 2 Times Daily With Meals      celecoxib 200 MG capsule  Commonly known as:  CeleBREX   200 mg, Oral, Daily      cetirizine 10 MG tablet  Commonly known as:  zyrTEC   10 mg, Oral, Every Morning      cyclobenzaprine 10 MG tablet  Commonly known as:  FLEXERIL   10 mg, Oral, 2 Times Daily PRN      HUMULIN 70/30 (70-30) 100 UNIT/ML injection  Generic drug:  insulin NPH-insulin regular   45 Units, Subcutaneous, 2 Times Daily With Meals      hydrochlorothiazide 12.5 MG tablet  Commonly known as:  HYDRODIURIL   12.5 mg, Oral, Every Morning      lisinopril 20 MG tablet  Commonly known as:  PRINIVIL,ZESTRIL   20 mg, Oral, Every Morning      RANEXA 500 MG 12 hr tablet  Generic drug:  ranolazine   500 mg, Oral, Every 12 Hours Scheduled             Discharge Diet   Diet Instructions     You may resume a regular diet.                  Activity at Discharge  Activity Instructions     No bending, lifting, or twisting at that waist.     Use ice as needed for pain and swelling.      You may shower August 2, 2018.     Staples will be removed at Dr. Bryson's office on August 13, 2018.                  Follow-up Appointments  Future Appointments  Date Time Provider Department Center   8/13/2018 11:30 AM Julio César, CELIO Mcgarry [facility] None     Additional Instructions for the Follow-ups that You Need to Schedule     Discharge Follow-up with Specified Provider: [facility] 8/13/18 for staple removal    As directed      To:  [facility] 8/13/18 for staple removal    Follow Up Details:  may shower 8/02/18 the remove dressing;  call for any problems.               Neeta Farfan PA-C

## 2018-08-09 ENCOUNTER — READMISSION MANAGEMENT (OUTPATIENT)
Dept: CALL CENTER | Facility: HOSPITAL | Age: 58
End: 2018-08-09

## 2018-08-09 NOTE — OUTREACH NOTE
General Surgery Week 2 Survey      Responses   Facility patient discharged from?  [place]   Does the patient have one of the following disease processes/diagnoses(primary or secondary)?  General Surgery   Week 2 attempt successful?  No   Unsuccessful attempts  Attempt 1          Filippo Cook RN

## 2018-08-10 ENCOUNTER — READMISSION MANAGEMENT (OUTPATIENT)
Dept: CALL CENTER | Facility: HOSPITAL | Age: 58
End: 2018-08-10

## 2018-08-10 NOTE — OUTREACH NOTE
General Surgery Week 2 Survey      Responses   Facility patient discharged from?  [place]   Does the patient have one of the following disease processes/diagnoses(primary or secondary)?  General Surgery   Week 2 attempt successful?  Yes   Call start time  1114   Call end time  1128   Discharge diagnosis  Acquired spondylolisthesis   TLIF, OSTEOTOMY L4-5 , POST FUSION L4-5   Meds reviewed with patient/caregiver?  Yes   Is the patient having any side effects they believe may be caused by any medication additions or changes?  No   Does the patient have all medications related to this admission filled (includes all antibiotics, pain medications, etc.)  Yes   Is the patient taking all medications as directed (includes completed medication regime)?  Yes   Does the patient have a follow up appointment scheduled with their surgeon?  Yes   Comments  appt with Dr Bryson on 8/13 for staple removal   What is the Home health agency?   [facility]   Has home health visited the patient within 72 hours of discharge?  Yes   Home health comments  pt states very satisfied with HH PT, Scott feels has been very beneficial   What DME was ordered?  [place], [facility]   Has all DME been delivered?  Yes   Comments  pt states pain is severe with pain meds, advised pt to call PCP if needed to discuss pain management, taking hot showers for pain relief with good results   Did the patient receive a copy of their discharge instructions?  Yes   Nursing interventions  Reviewed instructions with patient   What is the patient's perception of their health status since discharge?  Improving   Nursing interventions  Nurse provided patient education   Is the patient /caregiver able to teach back basic post-op care?  Drive as instructed by MD in discharge instructions, Take showers only when approved by MD-sponge bathe until then, No tub bath, swimming, or hot tub until instructed by MD, Keep incision areas clean,dry and  protected, Do not remove steri-strips, Lifting as instructed by MD in discharge instructions   Is the patient/caregiver able to teach back signs and symptoms of incisional infection?  Increased redness, swelling or pain at the incisonal site, Increased drainage or bleeding, Incisional warmth, Pus or odor from incision, Fever   Is the patient/caregiver able to teach back steps to recovery at home?  Set small, achievable goals for return to baseline health, Rest and rebuild strength, gradually increase activity, Make a list of questions for surgeon's appointment   Is the patient/caregiver able to teach back the hierarchy of who to call/visit for symptoms/problems? PCP, Specialist, Home health nurse, Urgent Care, ED, 911  Yes   Week 2 call completed?  Yes          Ashley Herbert RN

## 2018-08-13 ENCOUNTER — OFFICE VISIT (OUTPATIENT)
Dept: NEUROSURGERY | Facility: CLINIC | Age: 58
End: 2018-08-13

## 2018-08-13 ENCOUNTER — HOSPITAL ENCOUNTER (EMERGENCY)
Facility: HOSPITAL | Age: 58
Discharge: HOME | End: 2018-08-14
Attending: EMERGENCY MEDICINE | Admitting: EMERGENCY MEDICINE

## 2018-08-13 VITALS
TEMPERATURE: 97.6 F | WEIGHT: 308.2 LBS | SYSTOLIC BLOOD PRESSURE: 138 MMHG | DIASTOLIC BLOOD PRESSURE: 82 MMHG | BODY MASS INDEX: 39.55 KG/M2 | HEIGHT: 74 IN

## 2018-08-13 VITALS
SYSTOLIC BLOOD PRESSURE: 150 MMHG | DIASTOLIC BLOOD PRESSURE: 87 MMHG | TEMPERATURE: 98.7 F | HEART RATE: 90 BPM | BODY MASS INDEX: 38.5 KG/M2 | WEIGHT: 300 LBS | HEIGHT: 74 IN | OXYGEN SATURATION: 98 % | RESPIRATION RATE: 16 BRPM

## 2018-08-13 DIAGNOSIS — Z98.1 STATUS POST LUMBAR SPINAL FUSION: ICD-10-CM

## 2018-08-13 DIAGNOSIS — M43.10 ACQUIRED SPONDYLOLISTHESIS: Primary | ICD-10-CM

## 2018-08-13 DIAGNOSIS — T81.89XA PROBLEM INVOLVING SURGICAL INCISION: Primary | ICD-10-CM

## 2018-08-13 DIAGNOSIS — M54.41 CHRONIC BILATERAL LOW BACK PAIN WITH BILATERAL SCIATICA: ICD-10-CM

## 2018-08-13 DIAGNOSIS — G89.29 CHRONIC BILATERAL LOW BACK PAIN WITH BILATERAL SCIATICA: ICD-10-CM

## 2018-08-13 DIAGNOSIS — M54.42 CHRONIC BILATERAL LOW BACK PAIN WITH BILATERAL SCIATICA: ICD-10-CM

## 2018-08-13 DIAGNOSIS — Z79.4 TYPE 2 DIABETES MELLITUS WITH HYPERGLYCEMIA, WITH LONG-TERM CURRENT USE OF INSULIN (HCC): ICD-10-CM

## 2018-08-13 DIAGNOSIS — E11.65 TYPE 2 DIABETES MELLITUS WITH HYPERGLYCEMIA, WITH LONG-TERM CURRENT USE OF INSULIN (HCC): ICD-10-CM

## 2018-08-13 PROCEDURE — 99024 POSTOP FOLLOW-UP VISIT: CPT | Performed by: NEUROLOGICAL SURGERY

## 2018-08-13 PROCEDURE — 99283 EMERGENCY DEPT VISIT LOW MDM: CPT

## 2018-08-13 RX ORDER — HYDROCODONE BITARTRATE AND ACETAMINOPHEN 10; 325 MG/1; MG/1
1 TABLET ORAL EVERY 6 HOURS PRN
Qty: 120 TABLET | Refills: 0 | Status: SHIPPED | OUTPATIENT
Start: 2018-08-13 | End: 2018-09-13 | Stop reason: DRUGHIGH

## 2018-08-13 NOTE — PROGRESS NOTES
Patient: Amadeo Kong  :  1960  Chart #:  [id number]    Date of Service: 18    Chief Complaint:   Chief Complaint   Patient presents with   • Post-op       HPI returns for staple removal after lumbar fusion at L4L5 on 18;  Continues low back pain;  No leg pain    Radiographic Images:   none    Past Medical History:   Diagnosis Date   • Arthritis    • Asthma     inhaler daily    • Diabetes mellitus (CMS/[facility]) 2000    insulin daily; checks blood sugars on occasion-run 150-180   • Elevated cholesterol    • History of sleep apnea     years ago diagnosed but never used a machine at home    • History of staph infection     wound infection after left knee replacement -saw infectious disease MD    • Hypertension    • Wears glasses      Current Outpatient Prescriptions   Medication Sig Dispense Refill   • atorvastatin (LIPITOR) 20 MG tablet Take 20 mg by mouth Every Morning.     • budesonide-formoterol (SYMBICORT) 160-4.5 MCG/ACT inhaler Inhale 2 puffs 2 (Two) Times a Day.     • carvedilol (COREG) 3.125 MG tablet Take 3.125 mg by mouth 2 (Two) Times a Day With Meals.     • celecoxib (CeleBREX) 200 MG capsule Take 200 mg by mouth Daily.     • cetirizine (zyrTEC) 10 MG tablet Take 10 mg by mouth Every Morning.     • cyclobenzaprine (FLEXERIL) 10 MG tablet Take 10 mg by mouth 2 (Two) Times a Day As Needed for Muscle Spasms.     • HUMULIN 70/30 (70-30) 100 UNIT/ML injection Inject 45 Units under the skin into the appropriate area as directed 2 (Two) Times a Day With Meals.     • hydrochlorothiazide (HYDRODIURIL) 12.5 MG tablet Take 12.5 mg by mouth Every Morning.     • HYDROcodone-acetaminophen (NORCO)  MG per tablet Take 1 tablet by mouth Every 6 (Six) Hours As Needed for Moderate Pain 120 tablet 0   • lisinopril (PRINIVIL,ZESTRIL) 20 MG tablet Take 20 mg by mouth Every Morning.     • RANEXA 500 MG 12 hr tablet Take 500 mg by mouth Every 12 (Twelve) Hours.     • tamsulosin (FLOMAX) 0.4 MG capsule 24  "hr capsule Take 1 capsule by mouth Daily. 30 capsule 2     No current facility-administered medications for this visit.       No Known Allergies  Social History     Social History   • Marital status: Single     Social History Main Topics   • Smoking status: Never Smoker   • Smokeless tobacco: Never Used   • Alcohol use No   • Drug use: No   • Sexual activity: Defer     Other Topics Concern   • Not on file     Family History   Problem Relation Age of Onset   • Hypertension Mother      Past Surgical History:   Procedure Laterality Date   • COLONOSCOPY     • HIP SURGERY Right 08/11/2014    [facility]   • INCISION AND DRAINAGE OF WOUND Left 2010    x2 of total knee replacement wound -iv antibiotics   • KNEE SURGERY Bilateral     Right knee- 2008, left knee- 2010- [facility]   • LUMBAR LAMINECTOMY WITH FUSION N/A 7/27/2018    Procedure: TLIF, OSTEOTOMY L4-5 , POST FUSION L4-5;  Surgeon: Yo Bryson MD;  Location: [facility];  Service: Neurosurgery   • ROTATOR CUFF REPAIR Right    • TONSILLECTOMY       Review of Systems   Musculoskeletal: Positive for back pain.   All other systems reviewed and are negative.    Vitals:    08/13/18 0858   BP: 138/82   Temp: 97.6 °F (36.4 °C)   Weight: (!) 140 kg (308 lb 3.2 oz)   Height: 188 cm (74\")     Physical Exam  Neurologic Exam  Lumbar wound healing well;  Still some sanguinous drainage.  Neuro without change.    Amadeo was seen today for post-op.    Diagnoses and all orders for this visit:    Acquired spondylolisthesis    Chronic bilateral low back pain with bilateral sciatica    Type 2 diabetes mellitus with hyperglycemia, with long-term current use of insulin (CMS/[facility])    Other orders  -     HYDROcodone-acetaminophen (NORCO)  MG per tablet; Take 1 tablet by mouth Every 6 (Six) Hours As Needed for Moderate Pain      Plan:  Remove staples;  Renew prescription for pain medication;  Return for wound inspection 8/17/18;  Call for any problems.     IDr. Yo " Braydon, personally performed the services described in the documentation as scribed in my presence, and the documentation is both accurate and complete.    Yo Bryson MD

## 2018-08-14 RX ORDER — HYDROCODONE BITARTRATE AND ACETAMINOPHEN 10; 325 MG/1; MG/1
1 TABLET ORAL ONCE
Status: COMPLETED | OUTPATIENT
Start: 2018-08-14 | End: 2018-08-14

## 2018-08-14 RX ADMIN — HYDROCODONE BITARTRATE AND ACETAMINOPHEN 1 TABLET: 10; 325 TABLET ORAL at 00:21

## 2018-08-14 NOTE — ED PROVIDER NOTES
Subjective   Amadeo Kong is a 57 y.o.male who presents to the ED for a wound check. The patient had staples removed today s/p having a back surgery. He had strips put on there and was told to let them come off on its own. He went home and took a shower and he says the strips fell off in the shower. He said he felt something open and then saw that the wound had come open. He now feels pain all throughout his back. He has taken 10 mg of Hydrocodone for pain relief at home. He says Dr. Bryson performed the surgery. There are no other acute complaints at this time.               History provided by:  Patient  Wound Check   Location:  Lower mid back  Quality:  Wound opened after having staples removed s/p back surgery.   Severity:  Mild  Onset quality:  Sudden  Duration:  5 hours  Timing:  Constant  Progression:  Unchanged  Chronicity:  New  Context:  In the shower  Relieved by:  None tried  Worsened by:  Nothing  Ineffective treatments:  None tried      Review of Systems   Musculoskeletal: Positive for back pain.   Skin: Positive for wound (staples removed s/p back surgery. ).       Past Medical History:   Diagnosis Date   • Arthritis    • Asthma     inhaler daily    • Diabetes mellitus (CMS/[facility]) 2000    insulin daily; checks blood sugars on occasion-run 150-180   • Elevated cholesterol    • History of sleep apnea     years ago diagnosed but never used a machine at home    • History of staph infection 2010    wound infection after left knee replacement -saw infectious disease MD    • Hypertension    • Wears glasses        No Known Allergies    Past Surgical History:   Procedure Laterality Date   • COLONOSCOPY     • HIP SURGERY Right 08/11/2014    [facility]   • INCISION AND DRAINAGE OF WOUND Left 2010    x2 of total knee replacement wound -iv antibiotics   • KNEE SURGERY Bilateral     Right knee- 2008, left knee- 2010- [facility]   • LUMBAR LAMINECTOMY WITH FUSION N/A 7/27/2018    Procedure: TLIF, OSTEOTOMY  L4-5 , POST FUSION L4-5;  Surgeon: Yo Bryson MD;  [facility];  Service: Neurosurgery   • ROTATOR CUFF REPAIR Right    • TONSILLECTOMY         Family History   Problem Relation Age of Onset   • Hypertension Mother        Social History     Social History   • Marital status: Single     Social History Main Topics   • Smoking status: Never Smoker   • Smokeless tobacco: Never Used   • Alcohol use No   • Drug use: No   • Sexual activity: Defer     Other Topics Concern   • Not on file         Objective   Physical Exam   Constitutional: He is oriented to person, place, and time. He appears well-developed and well-nourished. No distress.   HENT:   Head: Normocephalic and atraumatic.   Nose: Nose normal.   Eyes: Conjunctivae are normal. No scleral icterus.   Neck: Normal range of motion. Neck supple.   Cardiovascular: Normal rate, regular rhythm and normal heart sounds.    No murmur heard.  Pulmonary/Chest: Effort normal and breath sounds normal. No respiratory distress.   Abdominal: Soft. Bowel sounds are normal. There is no tenderness.   Musculoskeletal: Normal range of motion. He exhibits no edema.   Neurological: He is alert and oriented to person, place, and time.   Skin: Skin is warm and dry.   Superficial fissure on the otherwise well aproximated surgical incision that consist of about 2 cm of the superior portion of his midline incision.    Psychiatric: He has a normal mood and affect. His behavior is normal.   Nursing note and vitals reviewed.      Procedures         ED Course  ED Course as of Aug 14 0036   Tue Aug 14, 2018   0033 Incision site was cleansed with peroxide, dried and mastisol and new steristrips were applied.    [ML]      ED Course User Index  [ML] Ros Burris, APRN      No results found for this or any previous visit (from the past 24 hour(s)).  Note: In addition to lab results from this visit, the labs listed above may include labs taken at another facility or during a different  "encounter within the last 24 hours. Please correlate lab times with ED admission and discharge times for further clarification of the services performed during this visit.    No orders to display     Vitals:    08/13/18 2221   BP: 150/87   BP Location: Right arm   Pulse: 90   Resp: 16   Temp: 98.7 °F (37.1 °C)   TempSrc: Oral   SpO2: 98%   Weight: 136 kg (300 lb)   Height: 188 cm (74\")     Medications   HYDROcodone-acetaminophen (NORCO)  MG per tablet 1 tablet (not administered)     ECG/EMG Results (last 24 hours)     ** No results found for the last 24 hours. **                          [facility]    Final diagnoses:   Problem involving surgical incision       Documentation assistance provided by frank Mancini.  Information recorded by the scribtran was done at my direction and has been verified and validated by me.     Luis Mancini  08/14/18 0011       Ros Burris APRN  08/14/18 0036    "

## 2018-08-14 NOTE — DISCHARGE INSTRUCTIONS
Take your pain medications as instructed. Follow up with your neurosurgeon, Dr. Bryson on Friday as planned.  Avoid showering on the incision until seen by Dr. Bryson.  Thank you    Immediately return to the ED for worsening symptoms.

## 2018-08-17 ENCOUNTER — OFFICE VISIT (OUTPATIENT)
Dept: NEUROSURGERY | Facility: CLINIC | Age: 58
End: 2018-08-17

## 2018-08-17 VITALS
HEIGHT: 74 IN | SYSTOLIC BLOOD PRESSURE: 140 MMHG | DIASTOLIC BLOOD PRESSURE: 92 MMHG | BODY MASS INDEX: 38.5 KG/M2 | WEIGHT: 300 LBS | TEMPERATURE: 97 F

## 2018-08-17 DIAGNOSIS — M54.41 CHRONIC BILATERAL LOW BACK PAIN WITH BILATERAL SCIATICA: ICD-10-CM

## 2018-08-17 DIAGNOSIS — M54.50 CHRONIC BILATERAL LOW BACK PAIN WITHOUT SCIATICA: ICD-10-CM

## 2018-08-17 DIAGNOSIS — G89.29 CHRONIC BILATERAL LOW BACK PAIN WITH BILATERAL SCIATICA: ICD-10-CM

## 2018-08-17 DIAGNOSIS — M25.552 HIP PAIN, BILATERAL: ICD-10-CM

## 2018-08-17 DIAGNOSIS — M54.42 CHRONIC BILATERAL LOW BACK PAIN WITH BILATERAL SCIATICA: ICD-10-CM

## 2018-08-17 DIAGNOSIS — M25.551 HIP PAIN, BILATERAL: ICD-10-CM

## 2018-08-17 DIAGNOSIS — M43.10 ACQUIRED SPONDYLOLISTHESIS: Primary | ICD-10-CM

## 2018-08-17 DIAGNOSIS — E11.65 TYPE 2 DIABETES MELLITUS WITH HYPERGLYCEMIA, WITH LONG-TERM CURRENT USE OF INSULIN (HCC): ICD-10-CM

## 2018-08-17 DIAGNOSIS — M19.90 ARTHRITIS: ICD-10-CM

## 2018-08-17 DIAGNOSIS — G89.29 CHRONIC BILATERAL LOW BACK PAIN WITHOUT SCIATICA: ICD-10-CM

## 2018-08-17 DIAGNOSIS — Z79.4 TYPE 2 DIABETES MELLITUS WITH HYPERGLYCEMIA, WITH LONG-TERM CURRENT USE OF INSULIN (HCC): ICD-10-CM

## 2018-08-17 DIAGNOSIS — Z51.89 VISIT FOR WOUND CHECK: ICD-10-CM

## 2018-08-17 DIAGNOSIS — I10 ESSENTIAL HYPERTENSION: ICD-10-CM

## 2018-08-17 PROCEDURE — 99024 POSTOP FOLLOW-UP VISIT: CPT | Performed by: NEUROLOGICAL SURGERY

## 2018-08-17 RX ORDER — SULFAMETHOXAZOLE AND TRIMETHOPRIM 800; 160 MG/1; MG/1
1 TABLET ORAL 2 TIMES DAILY
Qty: 20 TABLET | Refills: 1 | Status: SHIPPED | OUTPATIENT
Start: 2018-08-17 | End: 2018-08-30 | Stop reason: HOSPADM

## 2018-08-17 NOTE — PROGRESS NOTES
Patient: Amadeo Kong  :  1960  Chart #:  [id number]    Date of Service: 18    Chief Complaint:   Chief Complaint   Patient presents with   • Post-op     wound check       Back Pain   Chronicity: Mr. Kong returns today for a wound check. The current episode started more than 1 month ago. The problem has been gradually improving since onset. The pain is present in the lumbar spine. The quality of the pain is described as aching. Radiates to: Most of his pain is currently in the hip. The pain is at a severity of 4/10. The pain is mild. The pain is worse during the day. The symptoms are aggravated by position. Risk factors include lack of exercise, obesity and sedentary lifestyle. He has tried bed rest, heat and ice for the symptoms. The treatment provided moderate relief.       On 18 he had a L4-L5 Smith-Ivan osteotomy. 2. Right L4-L5 transforaminal lumbar interbody fusion with a 12 x 26 mm Capstone cage and morselized bone graft. 3. L4-L5 posterolateral fusion with morselized bone graft and TSRH-3D pedicle screw fixation.  4. Reduction of L4-L5 spondylolisthesis.  5. Local bone autograft harvesting.         Past Medical History:   Diagnosis Date   • Arthritis    • Asthma     inhaler daily    • Diabetes mellitus (CMS/[facility]) 2000    insulin daily; checks blood sugars on occasion-run 150-180   • Elevated cholesterol    • History of sleep apnea     years ago diagnosed but never used a machine at home    • History of staph infection     wound infection after left knee replacement -saw infectious disease MD    • Hypertension    • Wears glasses      Current Outpatient Prescriptions   Medication Sig Dispense Refill   • atorvastatin (LIPITOR) 20 MG tablet Take 20 mg by mouth Every Morning.     • budesonide-formoterol (SYMBICORT) 160-4.5 MCG/ACT inhaler Inhale 2 puffs 2 (Two) Times a Day.     • carvedilol (COREG) 3.125 MG tablet Take 3.125 mg by mouth 2 (Two) Times a Day With Meals.     • celecoxib  (CeleBREX) 200 MG capsule Take 200 mg by mouth Daily.     • cetirizine (zyrTEC) 10 MG tablet Take 10 mg by mouth Every Morning.     • cyclobenzaprine (FLEXERIL) 10 MG tablet Take 10 mg by mouth 2 (Two) Times a Day As Needed for Muscle Spasms.     • HUMULIN 70/30 (70-30) 100 UNIT/ML injection Inject 45 Units under the skin into the appropriate area as directed 2 (Two) Times a Day With Meals.     • hydrochlorothiazide (HYDRODIURIL) 12.5 MG tablet Take 12.5 mg by mouth Every Morning.     • HYDROcodone-acetaminophen (NORCO)  MG per tablet Take 1 tablet by mouth Every 6 (Six) Hours As Needed for Moderate Pain 120 tablet 0   • lisinopril (PRINIVIL,ZESTRIL) 20 MG tablet Take 20 mg by mouth Every Morning.     • RANEXA 500 MG 12 hr tablet Take 500 mg by mouth Every 12 (Twelve) Hours.     • tamsulosin (FLOMAX) 0.4 MG capsule 24 hr capsule Take 1 capsule by mouth Daily. 30 capsule 2     No current facility-administered medications for this visit.       No Known Allergies  Social History     Social History   • Marital status: Single     Social History Main Topics   • Smoking status: Never Smoker   • Smokeless tobacco: Never Used   • Alcohol use No   • Drug use: No   • Sexual activity: Defer     Other Topics Concern   • Not on file     Family History   Problem Relation Age of Onset   • Hypertension Mother      Past Surgical History:   Procedure Laterality Date   • COLONOSCOPY     • HIP SURGERY Right 08/11/2014    [facility]   • INCISION AND DRAINAGE OF WOUND Left 2010    x2 of total knee replacement wound -iv antibiotics   • KNEE SURGERY Bilateral     Right knee- 2008, left knee- 2010- [facility]   • LUMBAR LAMINECTOMY WITH FUSION N/A 7/27/2018    Procedure: TLIF, OSTEOTOMY L4-5 , POST FUSION L4-5;  Surgeon: Yo Bryson MD;  Location: [facility] OR;  Service: Neurosurgery   • ROTATOR CUFF REPAIR Right    • TONSILLECTOMY       Review of Systems   Musculoskeletal: Positive for back pain.   All other systems reviewed  "and are negative.    Vitals:    08/17/18 0826   BP: 140/92   BP Location: Right arm   Patient Position: Sitting   Temp: 97 °F (36.1 °C)   TempSrc: Temporal Artery    Weight: 136 kg (300 lb)   Height: 188 cm (74.02\")     Physical Exam  Neurologic Exam  Upper part of lumbar incision open where he had a large scar;  Minimal drainage;      Amadeo was seen today for post-op.    Diagnoses and all orders for this visit:    Acquired spondylolisthesis  -     sulfamethoxazole-trimethoprim (BACTRIM DS) 800-160 MG per tablet; Take 1 tablet by mouth 2 (Two) Times a Day.    Visit for wound check  -     sulfamethoxazole-trimethoprim (BACTRIM DS) 800-160 MG per tablet; Take 1 tablet by mouth 2 (Two) Times a Day.    Chronic bilateral low back pain with bilateral sciatica  -     sulfamethoxazole-trimethoprim (BACTRIM DS) 800-160 MG per tablet; Take 1 tablet by mouth 2 (Two) Times a Day.    Essential hypertension  -     sulfamethoxazole-trimethoprim (BACTRIM DS) 800-160 MG per tablet; Take 1 tablet by mouth 2 (Two) Times a Day.    Type 2 diabetes mellitus with hyperglycemia, with long-term current use of insulin (CMS/[facility])  -     sulfamethoxazole-trimethoprim (BACTRIM DS) 800-160 MG per tablet; Take 1 tablet by mouth 2 (Two) Times a Day.    Arthritis  -     sulfamethoxazole-trimethoprim (BACTRIM DS) 800-160 MG per tablet; Take 1 tablet by mouth 2 (Two) Times a Day.    Chronic bilateral low back pain without sciatica  -     sulfamethoxazole-trimethoprim (BACTRIM DS) 800-160 MG per tablet; Take 1 tablet by mouth 2 (Two) Times a Day.    Hip pain, bilateral      Plan: start Bactrim DS 1 po bid and f/u 8/22/18 for re-evaluation.  Local wound care.  He needs orthopedic f/u with Dr. Farfan.      I, Dr. Yo Bryson, personally performed the services described in the documentation as scribed in my presence, and the documentation is both accurate and complete.    Yo Bryson MD  "

## 2018-08-20 ENCOUNTER — READMISSION MANAGEMENT (OUTPATIENT)
Dept: CALL CENTER | Facility: HOSPITAL | Age: 58
End: 2018-08-20

## 2018-08-20 ENCOUNTER — TELEPHONE (OUTPATIENT)
Dept: NEUROSURGERY | Facility: CLINIC | Age: 58
End: 2018-08-20

## 2018-08-20 RX ORDER — CEPHALEXIN 500 MG/1
500 CAPSULE ORAL 4 TIMES DAILY
Qty: 28 CAPSULE | Refills: 0 | Status: SHIPPED | OUTPATIENT
Start: 2018-08-20 | End: 2018-08-30 | Stop reason: HOSPADM

## 2018-08-20 NOTE — OUTREACH NOTE
General Surgery Week 3 Survey      Responses   Facility patient discharged from?  [place]   Does the patient have one of the following disease processes/diagnoses(primary or secondary)?  General Surgery   Week 3 attempt successful?  Yes   Call start time  1358   Call end time  1404   Discharge diagnosis  Acquired spondylolisthesis   TLIF, OSTEOTOMY L4-5 , POST FUSION L4-5   Meds reviewed with patient/caregiver?  Yes   Is the patient having any side effects they believe may be caused by any medication additions or changes?  No   Does the patient have all medications related to this admission filled (includes all antibiotics, pain medications, etc.)  Yes   Is the patient taking all medications as directed (includes completed medication regime)?  Yes   Does the patient have a follow up appointment scheduled with their surgeon?  Yes   Has the patient kept scheduled appointments due by today?  N/A   Comments  appt with Dr Bryson on 8/13 for staple removal   What is the Home health agency?   [facility]   Has home health visited the patient within 72 hours of discharge?  Yes   Home health comments  pt states very satisfied with HH PT, Scott, feels has been very beneficial   What DME was ordered?  [facility], [facility] Walker and [facility]   Has all DME been delivered?  Yes   Comments  patient still in moderate amount of pain, He will see his doctor (Surgeon) for a followup on Wed 08/22. Still with small amount of drainage from incison. No redness or edema. No fever.    Did the patient receive a copy of their discharge instructions?  Yes   Nursing interventions  Reviewed instructions with patient   What is the patient's perception of their health status since discharge?  Improving   Nursing interventions  Nurse provided patient education   Is the patient /caregiver able to teach back basic post-op care?  Drive as instructed by MD in discharge instructions, Take showers only when approved by MD-sponge bathe until then, No  tub bath, swimming, or hot tub until instructed by MD, Keep incision areas clean,dry and protected, Lifting as instructed by MD in discharge instructions   Is the patient/caregiver able to teach back signs and symptoms of incisional infection?  Increased redness, swelling or pain at the incisonal site, Increased drainage or bleeding, Incisional warmth, Pus or odor from incision, Fever   Is the patient/caregiver able to teach back steps to recovery at home?  Set small, achievable goals for return to baseline health, Rest and rebuild strength, gradually increase activity, Make a list of questions for surgeon's appointment   Is the patient/caregiver able to teach back the hierarchy of who to call/visit for symptoms/problems? PCP, Specialist, Home health nurse, Urgent Care, ED, 911  Yes   Week 3 call completed?  Yes          Adithya Gonzalez RN

## 2018-08-20 NOTE — TELEPHONE ENCOUNTER
Called Angelica back and adv her to have the patient stop taking the bactrim and that we had sent a new prescription in for Keflex, she understood.

## 2018-08-20 NOTE — TELEPHONE ENCOUNTER
Provider:  Braydon  Caller: Angelica Schofield (girlfriend)  Time of call:   10:57 AM  Phone #:  [phone]  Surgery:  TLIF, OSTEOTOMY L4-5 , POST FUSION L4-5  Surgery Date:  07/27/18  Last visit:   08/17/18  Next visit: 08/22/18    Reason for call:       Patient was prescribed Bactrim and is now breaking out in blisters on his hand, wife wants to know what they should do

## 2018-08-22 ENCOUNTER — OFFICE VISIT (OUTPATIENT)
Dept: NEUROSURGERY | Facility: CLINIC | Age: 58
End: 2018-08-22

## 2018-08-22 VITALS
DIASTOLIC BLOOD PRESSURE: 84 MMHG | SYSTOLIC BLOOD PRESSURE: 138 MMHG | WEIGHT: 301 LBS | HEIGHT: 74 IN | TEMPERATURE: 97.8 F | HEART RATE: 77 BPM | BODY MASS INDEX: 38.63 KG/M2

## 2018-08-22 DIAGNOSIS — M19.90 ARTHRITIS: ICD-10-CM

## 2018-08-22 DIAGNOSIS — G89.29 CHRONIC BILATERAL LOW BACK PAIN WITHOUT SCIATICA: ICD-10-CM

## 2018-08-22 DIAGNOSIS — Z51.89 VISIT FOR WOUND CHECK: ICD-10-CM

## 2018-08-22 DIAGNOSIS — Z79.4 TYPE 2 DIABETES MELLITUS WITH HYPERGLYCEMIA, WITH LONG-TERM CURRENT USE OF INSULIN (HCC): ICD-10-CM

## 2018-08-22 DIAGNOSIS — M43.10 ACQUIRED SPONDYLOLISTHESIS: Primary | ICD-10-CM

## 2018-08-22 DIAGNOSIS — I10 ESSENTIAL HYPERTENSION: ICD-10-CM

## 2018-08-22 DIAGNOSIS — M54.50 CHRONIC BILATERAL LOW BACK PAIN WITHOUT SCIATICA: ICD-10-CM

## 2018-08-22 DIAGNOSIS — E11.65 TYPE 2 DIABETES MELLITUS WITH HYPERGLYCEMIA, WITH LONG-TERM CURRENT USE OF INSULIN (HCC): ICD-10-CM

## 2018-08-22 DIAGNOSIS — M25.551 HIP PAIN, BILATERAL: ICD-10-CM

## 2018-08-22 DIAGNOSIS — M25.552 HIP PAIN, BILATERAL: ICD-10-CM

## 2018-08-22 PROCEDURE — 99024 POSTOP FOLLOW-UP VISIT: CPT | Performed by: NEUROLOGICAL SURGERY

## 2018-08-22 RX ORDER — OXYCODONE AND ACETAMINOPHEN 7.5; 325 MG/1; MG/1
1 TABLET ORAL EVERY 6 HOURS PRN
Qty: 120 TABLET | Refills: 0 | Status: SHIPPED | OUTPATIENT
Start: 2018-08-22 | End: 2018-08-23

## 2018-08-22 NOTE — PROGRESS NOTES
Patient: Amadeo Kong  :  1960  Chart #:  [id number]    Date of Service: 18    Chief Complaint:   Chief Complaint   Patient presents with   • Wound Check     follow-up       Back Pain   Chronicity: Mr. Kong returns today for a follow-up on his wound.  It looks much better today. Episode onset: The problem has been gradually improving since onset. The problem has been gradually improving since onset. The pain is present in the lumbar spine. The quality of the pain is described as aching. The pain is at a severity of 10/10 (Patient had an allergic reaction to the bactrim.  He developed blisters on his hands bilaterally.). The pain is moderate (He has had extreme pain with the blisters on his hand.). Stiffness is present in the morning. Risk factors include lack of exercise, sedentary lifestyle and obesity. He has tried analgesics and walking for the symptoms. The treatment provided moderate relief.       Radiographic Images:   none    Past Medical History:   Diagnosis Date   • Arthritis    • Asthma     inhaler daily    • Diabetes mellitus (CMS/[facility]) 2000    insulin daily; checks blood sugars on occasion-run 150-180   • Elevated cholesterol    • History of sleep apnea     years ago diagnosed but never used a machine at home    • History of staph infection     wound infection after left knee replacement -saw infectious disease MD    • Hypertension    • Wears glasses      Current Outpatient Prescriptions   Medication Sig Dispense Refill   • atorvastatin (LIPITOR) 20 MG tablet Take 20 mg by mouth Every Morning.     • budesonide-formoterol (SYMBICORT) 160-4.5 MCG/ACT inhaler Inhale 2 puffs 2 (Two) Times a Day.     • carvedilol (COREG) 3.125 MG tablet Take 3.125 mg by mouth 2 (Two) Times a Day With Meals.     • celecoxib (CeleBREX) 200 MG capsule Take 200 mg by mouth Daily.     • cephalexin (KEFLEX) 500 MG capsule Take 1 capsule by mouth 4 (Four) Times a Day. 28 capsule 0   • cetirizine (zyrTEC) 10 MG  tablet Take 10 mg by mouth Every Morning.     • cyclobenzaprine (FLEXERIL) 10 MG tablet Take 10 mg by mouth 2 (Two) Times a Day As Needed for Muscle Spasms.     • HUMULIN 70/30 (70-30) 100 UNIT/ML injection Inject 45 Units under the skin into the appropriate area as directed 2 (Two) Times a Day With Meals.     • hydrochlorothiazide (HYDRODIURIL) 12.5 MG tablet Take 12.5 mg by mouth Every Morning.     • HYDROcodone-acetaminophen (NORCO)  MG per tablet Take 1 tablet by mouth Every 6 (Six) Hours As Needed for Moderate Pain 120 tablet 0   • lisinopril (PRINIVIL,ZESTRIL) 20 MG tablet Take 20 mg by mouth Every Morning.     • oxyCODONE-acetaminophen (PERCOCET) 7.5-325 MG per tablet Take 1 tablet by mouth Every 6 (Six) Hours As Needed for Moderate Pain . 120 tablet 0   • RANEXA 500 MG 12 hr tablet Take 500 mg by mouth Every 12 (Twelve) Hours.     • sulfamethoxazole-trimethoprim (BACTRIM DS) 800-160 MG per tablet Take 1 tablet by mouth 2 (Two) Times a Day. 20 tablet 1   • tamsulosin (FLOMAX) 0.4 MG capsule 24 hr capsule Take 1 capsule by mouth Daily. 30 capsule 2     No current facility-administered medications for this visit.       Allergies   Allergen Reactions   • Bactrim [Sulfamethoxazole-Trimethoprim] Other (See Comments)     Patient developed blisters on his hands.     Social History     Social History   • Marital status: Single     Social History Main Topics   • Smoking status: Never Smoker   • Smokeless tobacco: Never Used   • Alcohol use No   • Drug use: No   • Sexual activity: Defer     Other Topics Concern   • Not on file     Family History   Problem Relation Age of Onset   • Hypertension Mother      Past Surgical History:   Procedure Laterality Date   • COLONOSCOPY     • HIP SURGERY Right 08/11/2014    [facility]   • INCISION AND DRAINAGE OF WOUND Left 2010    x2 of total knee replacement wound -iv antibiotics   • KNEE SURGERY Bilateral     Right knee- 2008, left knee- 2010- [facility]   • LUMBAR  "LAMINECTOMY WITH FUSION N/A 7/27/2018    Procedure: TLIF, OSTEOTOMY L4-5 , POST FUSION L4-5;  Surgeon: Yo Bryson MD;  Location: [facility];  Service: Neurosurgery   • ROTATOR CUFF REPAIR Right    • TONSILLECTOMY       Review of Systems   Musculoskeletal: Positive for back pain.   All other systems reviewed and are negative.    Vitals:    08/22/18 0815   BP: 138/84   Pulse: 77   Temp: 97.8 °F (36.6 °C)   TempSrc: Temporal Artery    Weight: (!) 137 kg (301 lb)   Height: 188 cm (74\")     Physical Exam  Neurologic Exam  There is small dehiscence of incision where he has an old keloid scan;  No drainage, erythema or induation.   He developed blisters on hands after starting Bactrim-      Amadeo was seen today for wound check.    Diagnoses and all orders for this visit:    Acquired spondylolisthesis  -     oxyCODONE-acetaminophen (PERCOCET) 7.5-325 MG per tablet; Take 1 tablet by mouth Every 6 (Six) Hours As Needed for Moderate Pain .    Type 2 diabetes mellitus with hyperglycemia, with long-term current use of insulin (CMS/[facility])  -     oxyCODONE-acetaminophen (PERCOCET) 7.5-325 MG per tablet; Take 1 tablet by mouth Every 6 (Six) Hours As Needed for Moderate Pain .    Visit for wound check  -     oxyCODONE-acetaminophen (PERCOCET) 7.5-325 MG per tablet; Take 1 tablet by mouth Every 6 (Six) Hours As Needed for Moderate Pain .    Arthritis  -     oxyCODONE-acetaminophen (PERCOCET) 7.5-325 MG per tablet; Take 1 tablet by mouth Every 6 (Six) Hours As Needed for Moderate Pain .    Chronic bilateral low back pain without sciatica  -     oxyCODONE-acetaminophen (PERCOCET) 7.5-325 MG per tablet; Take 1 tablet by mouth Every 6 (Six) Hours As Needed for Moderate Pain .    Essential hypertension  -     oxyCODONE-acetaminophen (PERCOCET) 7.5-325 MG per tablet; Take 1 tablet by mouth Every 6 (Six) Hours As Needed for Moderate Pain .    Hip pain, bilateral  -     oxyCODONE-acetaminophen (PERCOCET) 7.5-325 MG per tablet; Take 1 tablet " by mouth Every 6 (Six) Hours As Needed for Moderate Pain .      Plan:  Complete course of Keflex;  Keep incision covered;  Return for re-evaluation and wound inspection 8/27/18.  Provide prescription for percocet for pain.      I, Dr. Yo Bryson, personally performed the services described in the documentation as scribed in my presence, and the documentation is both accurate and complete.    Yo Bryson MD

## 2018-08-23 ENCOUNTER — APPOINTMENT (OUTPATIENT)
Dept: MRI IMAGING | Facility: HOSPITAL | Age: 58
End: 2018-08-23

## 2018-08-23 ENCOUNTER — HOSPITAL ENCOUNTER (EMERGENCY)
Facility: HOSPITAL | Age: 58
Discharge: HOME | End: 2018-08-23
Attending: EMERGENCY MEDICINE | Admitting: EMERGENCY MEDICINE

## 2018-08-23 ENCOUNTER — APPOINTMENT (OUTPATIENT)
Dept: GENERAL RADIOLOGY | Facility: HOSPITAL | Age: 58
End: 2018-08-23

## 2018-08-23 VITALS
OXYGEN SATURATION: 99 % | DIASTOLIC BLOOD PRESSURE: 97 MMHG | RESPIRATION RATE: 16 BRPM | WEIGHT: 300 LBS | HEART RATE: 73 BPM | TEMPERATURE: 97.7 F | BODY MASS INDEX: 38.5 KG/M2 | HEIGHT: 74 IN | SYSTOLIC BLOOD PRESSURE: 166 MMHG

## 2018-08-23 DIAGNOSIS — M54.41 RIGHT-SIDED LOW BACK PAIN WITH RIGHT-SIDED SCIATICA, UNSPECIFIED CHRONICITY: Primary | ICD-10-CM

## 2018-08-23 PROCEDURE — A9577 INJ MULTIHANCE: HCPCS | Performed by: EMERGENCY MEDICINE

## 2018-08-23 PROCEDURE — 0 GADOBENATE DIMEGLUMINE 529 MG/ML SOLUTION: Performed by: EMERGENCY MEDICINE

## 2018-08-23 PROCEDURE — 86140 C-REACTIVE PROTEIN: CPT | Performed by: EMERGENCY MEDICINE

## 2018-08-23 PROCEDURE — 85025 COMPLETE CBC W/AUTO DIFF WBC: CPT | Performed by: EMERGENCY MEDICINE

## 2018-08-23 PROCEDURE — 99284 EMERGENCY DEPT VISIT MOD MDM: CPT

## 2018-08-23 PROCEDURE — 96376 TX/PRO/DX INJ SAME DRUG ADON: CPT

## 2018-08-23 PROCEDURE — 96374 THER/PROPH/DIAG INJ IV PUSH: CPT

## 2018-08-23 PROCEDURE — 25010000002 HYDROMORPHONE PER 4 MG: Performed by: EMERGENCY MEDICINE

## 2018-08-23 PROCEDURE — 80053 COMPREHEN METABOLIC PANEL: CPT | Performed by: EMERGENCY MEDICINE

## 2018-08-23 PROCEDURE — 72158 MRI LUMBAR SPINE W/O & W/DYE: CPT

## 2018-08-23 PROCEDURE — 73502 X-RAY EXAM HIP UNI 2-3 VIEWS: CPT

## 2018-08-23 RX ORDER — HYDROMORPHONE HYDROCHLORIDE 1 MG/ML
0.5 INJECTION, SOLUTION INTRAMUSCULAR; INTRAVENOUS; SUBCUTANEOUS
Status: DISCONTINUED | OUTPATIENT
Start: 2018-08-23 | End: 2018-08-24 | Stop reason: HOSPADM

## 2018-08-23 RX ORDER — HYDROMORPHONE HYDROCHLORIDE 1 MG/ML
0.5 INJECTION, SOLUTION INTRAMUSCULAR; INTRAVENOUS; SUBCUTANEOUS ONCE
Status: COMPLETED | OUTPATIENT
Start: 2018-08-23 | End: 2018-08-23

## 2018-08-23 RX ORDER — OXYCODONE AND ACETAMINOPHEN 10; 325 MG/1; MG/1
1 TABLET ORAL EVERY 6 HOURS PRN
Qty: 20 TABLET | Refills: 0 | Status: SHIPPED | OUTPATIENT
Start: 2018-08-23 | End: 2018-08-30 | Stop reason: HOSPADM

## 2018-08-23 RX ORDER — SODIUM CHLORIDE 0.9 % (FLUSH) 0.9 %
10 SYRINGE (ML) INJECTION AS NEEDED
Status: DISCONTINUED | OUTPATIENT
Start: 2018-08-23 | End: 2018-08-24 | Stop reason: HOSPADM

## 2018-08-23 RX ADMIN — HYDROMORPHONE HYDROCHLORIDE 0.5 MG: 1 INJECTION, SOLUTION INTRAMUSCULAR; INTRAVENOUS; SUBCUTANEOUS at 19:55

## 2018-08-23 RX ADMIN — HYDROMORPHONE HYDROCHLORIDE 0.5 MG: 1 INJECTION, SOLUTION INTRAMUSCULAR; INTRAVENOUS; SUBCUTANEOUS at 23:25

## 2018-08-23 RX ADMIN — GADOBENATE DIMEGLUMINE 15 ML: 529 INJECTION, SOLUTION INTRAVENOUS at 21:15

## 2018-08-24 ENCOUNTER — READMISSION MANAGEMENT (OUTPATIENT)
Dept: CALL CENTER | Facility: HOSPITAL | Age: 58
End: 2018-08-24

## 2018-08-24 ENCOUNTER — TELEPHONE (OUTPATIENT)
Dept: NEUROSURGERY | Facility: CLINIC | Age: 58
End: 2018-08-24

## 2018-08-24 ENCOUNTER — APPOINTMENT (OUTPATIENT)
Dept: CT IMAGING | Facility: HOSPITAL | Age: 58
End: 2018-08-24

## 2018-08-24 ENCOUNTER — HOSPITAL ENCOUNTER (INPATIENT)
Facility: HOSPITAL | Age: 58
LOS: 3 days | Discharge: HOME HEALTH | End: 2018-08-30
Attending: EMERGENCY MEDICINE | Admitting: NEUROLOGICAL SURGERY

## 2018-08-24 DIAGNOSIS — M48.061 STENOSIS OF LATERAL RECESS OF LUMBAR SPINE: ICD-10-CM

## 2018-08-24 DIAGNOSIS — M48.00 STENOSIS OF LATERAL RECESS OF MULTIPLE LEVELS OF SPINAL CANAL: ICD-10-CM

## 2018-08-24 DIAGNOSIS — Z74.09 IMPAIRED FUNCTIONAL MOBILITY, BALANCE, GAIT, AND ENDURANCE: ICD-10-CM

## 2018-08-24 DIAGNOSIS — M54.31 SCIATICA OF RIGHT SIDE: ICD-10-CM

## 2018-08-24 DIAGNOSIS — M54.16 LUMBAR RADICULOPATHY: ICD-10-CM

## 2018-08-24 DIAGNOSIS — M43.16 SPONDYLOLISTHESIS OF LUMBAR REGION: ICD-10-CM

## 2018-08-24 DIAGNOSIS — Z78.9 IMPAIRED MOBILITY AND ADLS: ICD-10-CM

## 2018-08-24 DIAGNOSIS — Z74.09 IMPAIRED MOBILITY AND ADLS: ICD-10-CM

## 2018-08-24 DIAGNOSIS — M54.9 INTRACTABLE BACK PAIN: Primary | ICD-10-CM

## 2018-08-24 PROBLEM — D64.9 ANEMIA: Status: ACTIVE | Noted: 2018-08-24

## 2018-08-24 PROBLEM — E87.1 HYPONATREMIA: Status: ACTIVE | Noted: 2018-08-24

## 2018-08-24 LAB
ALBUMIN SERPL-MCNC: 3.85 G/DL (ref 3.2–4.8)
ALBUMIN/GLOB SERPL: 0.8 G/DL (ref 1.5–2.5)
ALP SERPL-CCNC: 100 U/L (ref 25–100)
ALT SERPL W P-5'-P-CCNC: 15 U/L (ref 7–40)
ANION GAP SERPL CALCULATED.3IONS-SCNC: 6 MMOL/L (ref 3–11)
AST SERPL-CCNC: 19 U/L (ref 0–33)
BASOPHILS # BLD AUTO: 0.02 10*3/MM3 (ref 0–0.2)
BASOPHILS NFR BLD AUTO: 0.3 % (ref 0–1)
BILIRUB SERPL-MCNC: 0.4 MG/DL (ref 0.3–1.2)
BUN BLD-MCNC: 15 MG/DL (ref 9–23)
BUN/CREAT SERPL: 12 (ref 7–25)
CALCIUM SPEC-SCNC: 9.3 MG/DL (ref 8.7–10.4)
CHLORIDE SERPL-SCNC: 99 MMOL/L (ref 99–109)
CO2 SERPL-SCNC: 24 MMOL/L (ref 20–31)
CREAT BLD-MCNC: 1.25 MG/DL (ref 0.6–1.3)
CRP SERPL-MCNC: 1.35 MG/DL (ref 0–1)
DEPRECATED RDW RBC AUTO: 40.6 FL (ref 37–54)
EOSINOPHIL # BLD AUTO: 0.25 10*3/MM3 (ref 0–0.3)
EOSINOPHIL NFR BLD AUTO: 3.2 % (ref 0–3)
ERYTHROCYTE [DISTWIDTH] IN BLOOD BY AUTOMATED COUNT: 12.8 % (ref 11.3–14.5)
ERYTHROCYTE [SEDIMENTATION RATE] IN BLOOD: 74 MM/HR (ref 0–20)
GFR SERPL CREATININE-BSD FRML MDRD: 72 ML/MIN/1.73
GLOBULIN UR ELPH-MCNC: 4.9 GM/DL
GLUCOSE BLD-MCNC: 169 MG/DL (ref 70–100)
HCT VFR BLD AUTO: 34.7 % (ref 38.9–50.9)
HGB BLD-MCNC: 11.7 G/DL (ref 13.1–17.5)
IMM GRANULOCYTES # BLD: 0.01 10*3/MM3 (ref 0–0.03)
IMM GRANULOCYTES NFR BLD: 0.1 % (ref 0–0.6)
LYMPHOCYTES # BLD AUTO: 2.8 10*3/MM3 (ref 0.6–4.8)
LYMPHOCYTES NFR BLD AUTO: 35.8 % (ref 24–44)
MCH RBC QN AUTO: 29.2 PG (ref 27–31)
MCHC RBC AUTO-ENTMCNC: 33.7 G/DL (ref 32–36)
MCV RBC AUTO: 86.5 FL (ref 80–99)
MONOCYTES # BLD AUTO: 0.74 10*3/MM3 (ref 0–1)
MONOCYTES NFR BLD AUTO: 9.5 % (ref 0–12)
NEUTROPHILS # BLD AUTO: 4.02 10*3/MM3 (ref 1.5–8.3)
NEUTROPHILS NFR BLD AUTO: 51.2 % (ref 41–71)
PLATELET # BLD AUTO: 266 10*3/MM3 (ref 150–450)
PMV BLD AUTO: 8.7 FL (ref 6–12)
POTASSIUM BLD-SCNC: 5.3 MMOL/L (ref 3.5–5.5)
PROT SERPL-MCNC: 8.7 G/DL (ref 5.7–8.2)
RBC # BLD AUTO: 4.01 10*6/MM3 (ref 4.2–5.76)
SODIUM BLD-SCNC: 129 MMOL/L (ref 132–146)
WBC NRBC COR # BLD: 7.83 10*3/MM3 (ref 3.5–10.8)

## 2018-08-24 PROCEDURE — 85025 COMPLETE CBC W/AUTO DIFF WBC: CPT | Performed by: EMERGENCY MEDICINE

## 2018-08-24 PROCEDURE — 99284 EMERGENCY DEPT VISIT MOD MDM: CPT

## 2018-08-24 PROCEDURE — G0378 HOSPITAL OBSERVATION PER HR: HCPCS

## 2018-08-24 PROCEDURE — 99024 POSTOP FOLLOW-UP VISIT: CPT | Performed by: PHYSICIAN ASSISTANT

## 2018-08-24 PROCEDURE — 72131 CT LUMBAR SPINE W/O DYE: CPT

## 2018-08-24 PROCEDURE — 99223 1ST HOSP IP/OBS HIGH 75: CPT | Performed by: HOSPITALIST

## 2018-08-24 PROCEDURE — 25010000002 MORPHINE PER 10 MG: Performed by: EMERGENCY MEDICINE

## 2018-08-24 PROCEDURE — 63710000001 INSULIN DETEMIR PER 5 UNITS: Performed by: NURSE PRACTITIONER

## 2018-08-24 PROCEDURE — 25010000002 ONDANSETRON PER 1 MG: Performed by: EMERGENCY MEDICINE

## 2018-08-24 PROCEDURE — 86140 C-REACTIVE PROTEIN: CPT | Performed by: EMERGENCY MEDICINE

## 2018-08-24 PROCEDURE — 80053 COMPREHEN METABOLIC PANEL: CPT | Performed by: EMERGENCY MEDICINE

## 2018-08-24 PROCEDURE — 63710000001 INSULIN LISPRO (HUMAN) PER 5 UNITS: Performed by: NURSE PRACTITIONER

## 2018-08-24 PROCEDURE — 72192 CT PELVIS W/O DYE: CPT

## 2018-08-24 RX ORDER — MORPHINE SULFATE 2 MG/ML
2 INJECTION, SOLUTION INTRAMUSCULAR; INTRAVENOUS EVERY 4 HOURS PRN
Status: DISCONTINUED | OUTPATIENT
Start: 2018-08-24 | End: 2018-08-27

## 2018-08-24 RX ORDER — CYCLOBENZAPRINE HCL 10 MG
10 TABLET ORAL EVERY 8 HOURS SCHEDULED
Status: DISCONTINUED | OUTPATIENT
Start: 2018-08-24 | End: 2018-08-30 | Stop reason: HOSPADM

## 2018-08-24 RX ORDER — DEXTROSE MONOHYDRATE 25 G/50ML
25 INJECTION, SOLUTION INTRAVENOUS
Status: DISCONTINUED | OUTPATIENT
Start: 2018-08-24 | End: 2018-08-30 | Stop reason: HOSPADM

## 2018-08-24 RX ORDER — NICOTINE POLACRILEX 4 MG
15 LOZENGE BUCCAL
Status: DISCONTINUED | OUTPATIENT
Start: 2018-08-24 | End: 2018-08-30 | Stop reason: HOSPADM

## 2018-08-24 RX ORDER — TAMSULOSIN HYDROCHLORIDE 0.4 MG/1
0.4 CAPSULE ORAL DAILY
Status: DISCONTINUED | OUTPATIENT
Start: 2018-08-25 | End: 2018-08-30 | Stop reason: HOSPADM

## 2018-08-24 RX ORDER — RANOLAZINE 500 MG/1
500 TABLET, EXTENDED RELEASE ORAL EVERY 12 HOURS SCHEDULED
Status: DISCONTINUED | OUTPATIENT
Start: 2018-08-24 | End: 2018-08-30 | Stop reason: HOSPADM

## 2018-08-24 RX ORDER — MORPHINE SULFATE 4 MG/ML
4 INJECTION, SOLUTION INTRAMUSCULAR; INTRAVENOUS ONCE
Status: COMPLETED | OUTPATIENT
Start: 2018-08-24 | End: 2018-08-24

## 2018-08-24 RX ORDER — GABAPENTIN 300 MG/1
300 CAPSULE ORAL EVERY 8 HOURS SCHEDULED
Status: DISCONTINUED | OUTPATIENT
Start: 2018-08-24 | End: 2018-08-27 | Stop reason: SDUPTHER

## 2018-08-24 RX ORDER — SODIUM CHLORIDE 9 MG/ML
100 INJECTION, SOLUTION INTRAVENOUS ONCE
Status: COMPLETED | OUTPATIENT
Start: 2018-08-24 | End: 2018-08-25

## 2018-08-24 RX ORDER — CARVEDILOL 3.12 MG/1
3.12 TABLET ORAL 2 TIMES DAILY WITH MEALS
Status: DISCONTINUED | OUTPATIENT
Start: 2018-08-24 | End: 2018-08-30 | Stop reason: HOSPADM

## 2018-08-24 RX ORDER — ATORVASTATIN CALCIUM 20 MG/1
20 TABLET, FILM COATED ORAL NIGHTLY
Status: DISCONTINUED | OUTPATIENT
Start: 2018-08-24 | End: 2018-08-30 | Stop reason: HOSPADM

## 2018-08-24 RX ORDER — ONDANSETRON 4 MG/1
4 TABLET, FILM COATED ORAL EVERY 6 HOURS PRN
Status: DISCONTINUED | OUTPATIENT
Start: 2018-08-24 | End: 2018-08-27 | Stop reason: SDUPTHER

## 2018-08-24 RX ORDER — BUDESONIDE AND FORMOTEROL FUMARATE DIHYDRATE 160; 4.5 UG/1; UG/1
2 AEROSOL RESPIRATORY (INHALATION)
Status: DISCONTINUED | OUTPATIENT
Start: 2018-08-24 | End: 2018-08-30 | Stop reason: HOSPADM

## 2018-08-24 RX ORDER — CETIRIZINE HYDROCHLORIDE 10 MG/1
10 TABLET ORAL EVERY MORNING
Status: DISCONTINUED | OUTPATIENT
Start: 2018-08-25 | End: 2018-08-30 | Stop reason: HOSPADM

## 2018-08-24 RX ORDER — ONDANSETRON 2 MG/ML
4 INJECTION INTRAMUSCULAR; INTRAVENOUS ONCE
Status: COMPLETED | OUTPATIENT
Start: 2018-08-24 | End: 2018-08-24

## 2018-08-24 RX ORDER — CELECOXIB 200 MG/1
200 CAPSULE ORAL DAILY
Status: DISCONTINUED | OUTPATIENT
Start: 2018-08-25 | End: 2018-08-30 | Stop reason: HOSPADM

## 2018-08-24 RX ORDER — HYDROCODONE BITARTRATE AND ACETAMINOPHEN 10; 325 MG/1; MG/1
1 TABLET ORAL EVERY 6 HOURS PRN
Status: DISCONTINUED | OUTPATIENT
Start: 2018-08-24 | End: 2018-08-25

## 2018-08-24 RX ORDER — SODIUM CHLORIDE 0.9 % (FLUSH) 0.9 %
1-10 SYRINGE (ML) INJECTION AS NEEDED
Status: DISCONTINUED | OUTPATIENT
Start: 2018-08-24 | End: 2018-08-27 | Stop reason: SDUPTHER

## 2018-08-24 RX ORDER — LISINOPRIL 10 MG/1
20 TABLET ORAL EVERY MORNING
Status: DISCONTINUED | OUTPATIENT
Start: 2018-08-25 | End: 2018-08-30 | Stop reason: HOSPADM

## 2018-08-24 RX ORDER — ONDANSETRON 2 MG/ML
4 INJECTION INTRAMUSCULAR; INTRAVENOUS EVERY 6 HOURS PRN
Status: DISCONTINUED | OUTPATIENT
Start: 2018-08-24 | End: 2018-08-27 | Stop reason: SDUPTHER

## 2018-08-24 RX ADMIN — ATORVASTATIN CALCIUM 20 MG: 20 TABLET, FILM COATED ORAL at 21:33

## 2018-08-24 RX ADMIN — INSULIN DETEMIR 10 UNITS: 100 INJECTION, SOLUTION SUBCUTANEOUS at 21:34

## 2018-08-24 RX ADMIN — MORPHINE SULFATE 4 MG: 4 INJECTION INTRAVENOUS at 19:01

## 2018-08-24 RX ADMIN — HYDROCODONE BITARTRATE AND ACETAMINOPHEN 1 TABLET: 10; 325 TABLET ORAL at 20:23

## 2018-08-24 RX ADMIN — ONDANSETRON HYDROCHLORIDE 4 MG: 2 INJECTION, SOLUTION INTRAMUSCULAR; INTRAVENOUS at 16:19

## 2018-08-24 RX ADMIN — GABAPENTIN 300 MG: 300 CAPSULE ORAL at 21:33

## 2018-08-24 RX ADMIN — CARVEDILOL 3.12 MG: 3.12 TABLET, FILM COATED ORAL at 21:33

## 2018-08-24 RX ADMIN — INSULIN LISPRO 2 UNITS: 100 INJECTION, SOLUTION INTRAVENOUS; SUBCUTANEOUS at 21:32

## 2018-08-24 RX ADMIN — CYCLOBENZAPRINE HYDROCHLORIDE 10 MG: 10 TABLET, FILM COATED ORAL at 16:22

## 2018-08-24 RX ADMIN — CYCLOBENZAPRINE HYDROCHLORIDE 10 MG: 10 TABLET, FILM COATED ORAL at 21:33

## 2018-08-24 RX ADMIN — GABAPENTIN 300 MG: 300 CAPSULE ORAL at 17:08

## 2018-08-24 RX ADMIN — RANOLAZINE 500 MG: 500 TABLET, FILM COATED, EXTENDED RELEASE ORAL at 21:33

## 2018-08-24 RX ADMIN — MORPHINE SULFATE 4 MG: 4 INJECTION INTRAVENOUS at 16:20

## 2018-08-24 NOTE — TELEPHONE ENCOUNTER
Provider:  Braydon  Caller: female caller / pt (2 separate msgs)  Time of call:   11:24am  Phone #:  [phone]  Surgery:  TLIF, OSTEOTOMY L4-5 , POST FUSION L4-5  Surgery Date:  07/27/18  Last visit:   08/22/18  Next visit: 08/27/18    Reason for call:         Female caller left msg requesting advice states pt is in excruciating pain. Was seen in [facility] ED last night and still having leg, hip and thigh pain.      Pt left msg 20 minutes later in tears, pleading for help and begging to be admitted. He states he cannot walk and is in severe pain and needs help.

## 2018-08-24 NOTE — TELEPHONE ENCOUNTER
Patient called again, very upset saying he can't walk. States that he is calling 911 for an ambulance and going to the ED.  Dr. Bryson is aware.

## 2018-08-25 LAB
ANION GAP SERPL CALCULATED.3IONS-SCNC: 5 MMOL/L (ref 3–11)
BASOPHILS # BLD AUTO: 0.02 10*3/MM3 (ref 0–0.2)
BASOPHILS NFR BLD AUTO: 0.3 % (ref 0–1)
BUN BLD-MCNC: 18 MG/DL (ref 9–23)
BUN/CREAT SERPL: 12.8 (ref 7–25)
CALCIUM SPEC-SCNC: 8.9 MG/DL (ref 8.7–10.4)
CHLORIDE SERPL-SCNC: 100 MMOL/L (ref 99–109)
CO2 SERPL-SCNC: 26 MMOL/L (ref 20–31)
CREAT BLD-MCNC: 1.41 MG/DL (ref 0.6–1.3)
DEPRECATED RDW RBC AUTO: 41.6 FL (ref 37–54)
EOSINOPHIL # BLD AUTO: 0.26 10*3/MM3 (ref 0–0.3)
EOSINOPHIL NFR BLD AUTO: 3.7 % (ref 0–3)
ERYTHROCYTE [DISTWIDTH] IN BLOOD BY AUTOMATED COUNT: 12.9 % (ref 11.3–14.5)
GFR SERPL CREATININE-BSD FRML MDRD: 63 ML/MIN/1.73
GLUCOSE BLD-MCNC: 152 MG/DL (ref 70–100)
GLUCOSE BLDC GLUCOMTR-MCNC: 148 MG/DL (ref 70–130)
GLUCOSE BLDC GLUCOMTR-MCNC: 151 MG/DL (ref 70–130)
GLUCOSE BLDC GLUCOMTR-MCNC: 175 MG/DL (ref 70–130)
GLUCOSE BLDC GLUCOMTR-MCNC: 316 MG/DL (ref 70–130)
HBA1C MFR BLD: 7.4 % (ref 4.8–5.6)
HCT VFR BLD AUTO: 35 % (ref 38.9–50.9)
HGB BLD-MCNC: 11.6 G/DL (ref 13.1–17.5)
IMM GRANULOCYTES # BLD: 0.01 10*3/MM3 (ref 0–0.03)
IMM GRANULOCYTES NFR BLD: 0.1 % (ref 0–0.6)
LYMPHOCYTES # BLD AUTO: 2.76 10*3/MM3 (ref 0.6–4.8)
LYMPHOCYTES NFR BLD AUTO: 39.1 % (ref 24–44)
MCH RBC QN AUTO: 28.9 PG (ref 27–31)
MCHC RBC AUTO-ENTMCNC: 33.1 G/DL (ref 32–36)
MCV RBC AUTO: 87.3 FL (ref 80–99)
MONOCYTES # BLD AUTO: 0.67 10*3/MM3 (ref 0–1)
MONOCYTES NFR BLD AUTO: 9.5 % (ref 0–12)
NEUTROPHILS # BLD AUTO: 3.35 10*3/MM3 (ref 1.5–8.3)
NEUTROPHILS NFR BLD AUTO: 47.4 % (ref 41–71)
PLATELET # BLD AUTO: 248 10*3/MM3 (ref 150–450)
PMV BLD AUTO: 9 FL (ref 6–12)
POTASSIUM BLD-SCNC: 5 MMOL/L (ref 3.5–5.5)
RBC # BLD AUTO: 4.01 10*6/MM3 (ref 4.2–5.76)
SODIUM BLD-SCNC: 131 MMOL/L (ref 132–146)
WBC NRBC COR # BLD: 7.06 10*3/MM3 (ref 3.5–10.8)

## 2018-08-25 PROCEDURE — 99232 SBSQ HOSP IP/OBS MODERATE 35: CPT | Performed by: INTERNAL MEDICINE

## 2018-08-25 PROCEDURE — 82962 GLUCOSE BLOOD TEST: CPT

## 2018-08-25 PROCEDURE — 25010000002 ENOXAPARIN PER 10 MG: Performed by: NURSE PRACTITIONER

## 2018-08-25 PROCEDURE — 94799 UNLISTED PULMONARY SVC/PX: CPT

## 2018-08-25 PROCEDURE — 83036 HEMOGLOBIN GLYCOSYLATED A1C: CPT | Performed by: NURSE PRACTITIONER

## 2018-08-25 PROCEDURE — 85025 COMPLETE CBC W/AUTO DIFF WBC: CPT | Performed by: NURSE PRACTITIONER

## 2018-08-25 PROCEDURE — 80048 BASIC METABOLIC PNL TOTAL CA: CPT | Performed by: NURSE PRACTITIONER

## 2018-08-25 PROCEDURE — 99024 POSTOP FOLLOW-UP VISIT: CPT | Performed by: NEUROLOGICAL SURGERY

## 2018-08-25 PROCEDURE — 63710000001 INSULIN DETEMIR PER 5 UNITS: Performed by: NURSE PRACTITIONER

## 2018-08-25 PROCEDURE — 25010000002 MORPHINE SULFATE (PF) 2 MG/ML SOLUTION: Performed by: NURSE PRACTITIONER

## 2018-08-25 PROCEDURE — G0378 HOSPITAL OBSERVATION PER HR: HCPCS

## 2018-08-25 PROCEDURE — 25010000002 MORPHINE PER 10 MG: Performed by: NURSE PRACTITIONER

## 2018-08-25 PROCEDURE — 94640 AIRWAY INHALATION TREATMENT: CPT

## 2018-08-25 RX ORDER — HYDROCODONE BITARTRATE AND ACETAMINOPHEN 10; 325 MG/1; MG/1
1 TABLET ORAL EVERY 4 HOURS PRN
Status: DISCONTINUED | OUTPATIENT
Start: 2018-08-25 | End: 2018-08-30 | Stop reason: HOSPADM

## 2018-08-25 RX ADMIN — CELECOXIB 200 MG: 200 CAPSULE ORAL at 11:36

## 2018-08-25 RX ADMIN — BUDESONIDE AND FORMOTEROL FUMARATE DIHYDRATE 2 PUFF: 160; 4.5 AEROSOL RESPIRATORY (INHALATION) at 19:44

## 2018-08-25 RX ADMIN — CYCLOBENZAPRINE HYDROCHLORIDE 10 MG: 10 TABLET, FILM COATED ORAL at 15:32

## 2018-08-25 RX ADMIN — CETIRIZINE HYDROCHLORIDE 10 MG: 10 TABLET, FILM COATED ORAL at 06:12

## 2018-08-25 RX ADMIN — CYCLOBENZAPRINE HYDROCHLORIDE 10 MG: 10 TABLET, FILM COATED ORAL at 06:12

## 2018-08-25 RX ADMIN — MORPHINE SULFATE 2 MG: 10 INJECTION INTRAVENOUS at 07:30

## 2018-08-25 RX ADMIN — GABAPENTIN 300 MG: 300 CAPSULE ORAL at 15:30

## 2018-08-25 RX ADMIN — MORPHINE SULFATE 2 MG: 10 INJECTION INTRAVENOUS at 15:27

## 2018-08-25 RX ADMIN — CYCLOBENZAPRINE HYDROCHLORIDE 10 MG: 10 TABLET, FILM COATED ORAL at 21:02

## 2018-08-25 RX ADMIN — RANOLAZINE 500 MG: 500 TABLET, FILM COATED, EXTENDED RELEASE ORAL at 19:29

## 2018-08-25 RX ADMIN — INSULIN LISPRO 5 UNITS: 100 INJECTION, SOLUTION INTRAVENOUS; SUBCUTANEOUS at 19:46

## 2018-08-25 RX ADMIN — HYDROCODONE BITARTRATE AND ACETAMINOPHEN 1 TABLET: 10; 325 TABLET ORAL at 15:27

## 2018-08-25 RX ADMIN — MORPHINE SULFATE 2 MG: 10 INJECTION INTRAVENOUS at 19:29

## 2018-08-25 RX ADMIN — ATORVASTATIN CALCIUM 20 MG: 20 TABLET, FILM COATED ORAL at 19:29

## 2018-08-25 RX ADMIN — HYDROCODONE BITARTRATE AND ACETAMINOPHEN 1 TABLET: 10; 325 TABLET ORAL at 20:32

## 2018-08-25 RX ADMIN — HYDROCODONE BITARTRATE AND ACETAMINOPHEN 1 TABLET: 10; 325 TABLET ORAL at 07:45

## 2018-08-25 RX ADMIN — GABAPENTIN 300 MG: 300 CAPSULE ORAL at 21:02

## 2018-08-25 RX ADMIN — INSULIN DETEMIR 10 UNITS: 100 INJECTION, SOLUTION SUBCUTANEOUS at 21:06

## 2018-08-25 RX ADMIN — RANOLAZINE 500 MG: 500 TABLET, FILM COATED, EXTENDED RELEASE ORAL at 15:30

## 2018-08-25 RX ADMIN — ENOXAPARIN SODIUM 40 MG: 40 INJECTION SUBCUTANEOUS at 08:30

## 2018-08-25 RX ADMIN — INSULIN LISPRO 2 UNITS: 100 INJECTION, SOLUTION INTRAVENOUS; SUBCUTANEOUS at 08:30

## 2018-08-25 RX ADMIN — GABAPENTIN 300 MG: 300 CAPSULE ORAL at 06:11

## 2018-08-25 RX ADMIN — CARVEDILOL 3.12 MG: 3.12 TABLET, FILM COATED ORAL at 19:46

## 2018-08-25 RX ADMIN — INSULIN LISPRO 2 UNITS: 100 INJECTION, SOLUTION INTRAVENOUS; SUBCUTANEOUS at 11:42

## 2018-08-25 RX ADMIN — MORPHINE SULFATE 2 MG: 10 INJECTION INTRAVENOUS at 11:35

## 2018-08-25 RX ADMIN — CARVEDILOL 3.12 MG: 3.12 TABLET, FILM COATED ORAL at 08:30

## 2018-08-25 RX ADMIN — LISINOPRIL 20 MG: 10 TABLET ORAL at 06:12

## 2018-08-25 RX ADMIN — MORPHINE SULFATE 2 MG: 10 INJECTION INTRAVENOUS at 00:30

## 2018-08-25 RX ADMIN — MORPHINE SULFATE 2 MG: 10 INJECTION INTRAVENOUS at 23:31

## 2018-08-25 RX ADMIN — SODIUM CHLORIDE 100 ML/HR: 9 INJECTION, SOLUTION INTRAVENOUS at 06:11

## 2018-08-25 RX ADMIN — BUDESONIDE AND FORMOTEROL FUMARATE DIHYDRATE 2 PUFF: 160; 4.5 AEROSOL RESPIRATORY (INHALATION) at 08:12

## 2018-08-25 RX ADMIN — TAMSULOSIN HYDROCHLORIDE 0.4 MG: 0.4 CAPSULE ORAL at 08:30

## 2018-08-25 RX ADMIN — HYDROCODONE BITARTRATE AND ACETAMINOPHEN 1 TABLET: 10; 325 TABLET ORAL at 11:35

## 2018-08-25 NOTE — OUTREACH NOTE
General Surgery Week 4 Survey      Responses   Facility patient discharged from?  [place]   Does the patient have one of the following disease processes/diagnoses(primary or secondary)?  General Surgery   Week 4 attempt successful?  No   Revoke  Readmitted          Becki Quarles RN

## 2018-08-26 LAB
ANION GAP SERPL CALCULATED.3IONS-SCNC: 7 MMOL/L (ref 3–11)
BUN BLD-MCNC: 19 MG/DL (ref 9–23)
BUN/CREAT SERPL: 15.6 (ref 7–25)
CALCIUM SPEC-SCNC: 9 MG/DL (ref 8.7–10.4)
CHLORIDE SERPL-SCNC: 101 MMOL/L (ref 99–109)
CO2 SERPL-SCNC: 25 MMOL/L (ref 20–31)
CREAT BLD-MCNC: 1.22 MG/DL (ref 0.6–1.3)
GFR SERPL CREATININE-BSD FRML MDRD: 74 ML/MIN/1.73
GLUCOSE BLD-MCNC: 176 MG/DL (ref 70–100)
GLUCOSE BLDC GLUCOMTR-MCNC: 191 MG/DL (ref 70–130)
GLUCOSE BLDC GLUCOMTR-MCNC: 197 MG/DL (ref 70–130)
GLUCOSE BLDC GLUCOMTR-MCNC: 198 MG/DL (ref 70–130)
GLUCOSE BLDC GLUCOMTR-MCNC: 239 MG/DL (ref 70–130)
POTASSIUM BLD-SCNC: 4.8 MMOL/L (ref 3.5–5.5)
SODIUM BLD-SCNC: 133 MMOL/L (ref 132–146)

## 2018-08-26 PROCEDURE — G0378 HOSPITAL OBSERVATION PER HR: HCPCS

## 2018-08-26 PROCEDURE — 94799 UNLISTED PULMONARY SVC/PX: CPT

## 2018-08-26 PROCEDURE — 80048 BASIC METABOLIC PNL TOTAL CA: CPT | Performed by: INTERNAL MEDICINE

## 2018-08-26 PROCEDURE — 25010000002 MORPHINE SULFATE (PF) 2 MG/ML SOLUTION: Performed by: NURSE PRACTITIONER

## 2018-08-26 PROCEDURE — 82962 GLUCOSE BLOOD TEST: CPT

## 2018-08-26 PROCEDURE — 99232 SBSQ HOSP IP/OBS MODERATE 35: CPT | Performed by: NURSE PRACTITIONER

## 2018-08-26 PROCEDURE — 63710000001 INSULIN DETEMIR PER 5 UNITS: Performed by: NURSE PRACTITIONER

## 2018-08-26 PROCEDURE — 25010000002 ENOXAPARIN PER 10 MG: Performed by: NURSE PRACTITIONER

## 2018-08-26 RX ADMIN — CARVEDILOL 3.12 MG: 3.12 TABLET, FILM COATED ORAL at 17:04

## 2018-08-26 RX ADMIN — GABAPENTIN 300 MG: 300 CAPSULE ORAL at 21:02

## 2018-08-26 RX ADMIN — INSULIN LISPRO 2 UNITS: 100 INJECTION, SOLUTION INTRAVENOUS; SUBCUTANEOUS at 08:21

## 2018-08-26 RX ADMIN — CYCLOBENZAPRINE HYDROCHLORIDE 10 MG: 10 TABLET, FILM COATED ORAL at 21:02

## 2018-08-26 RX ADMIN — CYCLOBENZAPRINE HYDROCHLORIDE 10 MG: 10 TABLET, FILM COATED ORAL at 13:08

## 2018-08-26 RX ADMIN — HYDROCODONE BITARTRATE AND ACETAMINOPHEN 1 TABLET: 10; 325 TABLET ORAL at 15:44

## 2018-08-26 RX ADMIN — TAMSULOSIN HYDROCHLORIDE 0.4 MG: 0.4 CAPSULE ORAL at 08:20

## 2018-08-26 RX ADMIN — MORPHINE SULFATE 2 MG: 10 INJECTION INTRAVENOUS at 05:55

## 2018-08-26 RX ADMIN — INSULIN LISPRO 2 UNITS: 100 INJECTION, SOLUTION INTRAVENOUS; SUBCUTANEOUS at 21:03

## 2018-08-26 RX ADMIN — CARVEDILOL 3.12 MG: 3.12 TABLET, FILM COATED ORAL at 08:20

## 2018-08-26 RX ADMIN — HYDROCODONE BITARTRATE AND ACETAMINOPHEN 1 TABLET: 10; 325 TABLET ORAL at 23:34

## 2018-08-26 RX ADMIN — MORPHINE SULFATE 2 MG: 10 INJECTION INTRAVENOUS at 21:02

## 2018-08-26 RX ADMIN — INSULIN DETEMIR 10 UNITS: 100 INJECTION, SOLUTION SUBCUTANEOUS at 21:03

## 2018-08-26 RX ADMIN — INSULIN LISPRO 3 UNITS: 100 INJECTION, SOLUTION INTRAVENOUS; SUBCUTANEOUS at 13:08

## 2018-08-26 RX ADMIN — CYCLOBENZAPRINE HYDROCHLORIDE 10 MG: 10 TABLET, FILM COATED ORAL at 05:13

## 2018-08-26 RX ADMIN — GABAPENTIN 300 MG: 300 CAPSULE ORAL at 05:13

## 2018-08-26 RX ADMIN — INSULIN LISPRO 2 UNITS: 100 INJECTION, SOLUTION INTRAVENOUS; SUBCUTANEOUS at 18:15

## 2018-08-26 RX ADMIN — ATORVASTATIN CALCIUM 20 MG: 20 TABLET, FILM COATED ORAL at 21:02

## 2018-08-26 RX ADMIN — CELECOXIB 200 MG: 200 CAPSULE ORAL at 08:20

## 2018-08-26 RX ADMIN — MORPHINE SULFATE 2 MG: 10 INJECTION INTRAVENOUS at 17:04

## 2018-08-26 RX ADMIN — HYDROCODONE BITARTRATE AND ACETAMINOPHEN 1 TABLET: 10; 325 TABLET ORAL at 19:41

## 2018-08-26 RX ADMIN — RANOLAZINE 500 MG: 500 TABLET, FILM COATED, EXTENDED RELEASE ORAL at 21:02

## 2018-08-26 RX ADMIN — ENOXAPARIN SODIUM 40 MG: 40 INJECTION SUBCUTANEOUS at 08:20

## 2018-08-26 RX ADMIN — RANOLAZINE 500 MG: 500 TABLET, FILM COATED, EXTENDED RELEASE ORAL at 11:36

## 2018-08-26 RX ADMIN — HYDROCODONE BITARTRATE AND ACETAMINOPHEN 1 TABLET: 10; 325 TABLET ORAL at 05:13

## 2018-08-26 RX ADMIN — LISINOPRIL 20 MG: 10 TABLET ORAL at 05:13

## 2018-08-26 RX ADMIN — MORPHINE SULFATE 2 MG: 10 INJECTION INTRAVENOUS at 11:36

## 2018-08-26 RX ADMIN — CETIRIZINE HYDROCHLORIDE 10 MG: 10 TABLET, FILM COATED ORAL at 05:12

## 2018-08-26 RX ADMIN — BUDESONIDE AND FORMOTEROL FUMARATE DIHYDRATE 2 PUFF: 160; 4.5 AEROSOL RESPIRATORY (INHALATION) at 20:50

## 2018-08-26 RX ADMIN — HYDROCODONE BITARTRATE AND ACETAMINOPHEN 1 TABLET: 10; 325 TABLET ORAL at 10:21

## 2018-08-26 RX ADMIN — GABAPENTIN 300 MG: 300 CAPSULE ORAL at 13:08

## 2018-08-27 ENCOUNTER — APPOINTMENT (OUTPATIENT)
Dept: GENERAL RADIOLOGY | Facility: HOSPITAL | Age: 58
End: 2018-08-27

## 2018-08-27 ENCOUNTER — ANESTHESIA EVENT (OUTPATIENT)
Dept: PERIOP | Facility: HOSPITAL | Age: 58
End: 2018-08-27

## 2018-08-27 ENCOUNTER — PREP FOR SURGERY (OUTPATIENT)
Dept: OTHER | Facility: HOSPITAL | Age: 58
End: 2018-08-27

## 2018-08-27 ENCOUNTER — ANESTHESIA (OUTPATIENT)
Dept: PERIOP | Facility: HOSPITAL | Age: 58
End: 2018-08-27

## 2018-08-27 DIAGNOSIS — M48.00 STENOSIS OF LATERAL RECESS OF MULTIPLE LEVELS OF SPINAL CANAL: Primary | ICD-10-CM

## 2018-08-27 PROBLEM — M48.061 STENOSIS OF LATERAL RECESS OF LUMBAR SPINE: Status: ACTIVE | Noted: 2018-08-27

## 2018-08-27 LAB
GLUCOSE BLDC GLUCOMTR-MCNC: 157 MG/DL (ref 70–130)
GLUCOSE BLDC GLUCOMTR-MCNC: 284 MG/DL (ref 70–130)
GLUCOSE BLDC GLUCOMTR-MCNC: 335 MG/DL (ref 70–130)
OSMOLALITY UR: 275 MOSM/KG (ref 300–1100)
SODIUM UR-SCNC: 37 MMOL/L (ref 30–90)

## 2018-08-27 PROCEDURE — C1713 ANCHOR/SCREW BN/BN,TIS/BN: HCPCS | Performed by: NEUROLOGICAL SURGERY

## 2018-08-27 PROCEDURE — 84300 ASSAY OF URINE SODIUM: CPT | Performed by: NURSE PRACTITIONER

## 2018-08-27 PROCEDURE — 25010000002 MIDAZOLAM PER 1 MG: Performed by: NURSE ANESTHETIST, CERTIFIED REGISTERED

## 2018-08-27 PROCEDURE — 25010000002 ONDANSETRON PER 1 MG: Performed by: NURSE ANESTHETIST, CERTIFIED REGISTERED

## 2018-08-27 PROCEDURE — 25010000003 HYDROMORPHONE PER 4 MG: Performed by: NEUROLOGICAL SURGERY

## 2018-08-27 PROCEDURE — 63710000001 INSULIN DETEMIR PER 5 UNITS: Performed by: NURSE PRACTITIONER

## 2018-08-27 PROCEDURE — 25010000002 PHENYLEPHRINE PER 1 ML: Performed by: NURSE ANESTHETIST, CERTIFIED REGISTERED

## 2018-08-27 PROCEDURE — 94640 AIRWAY INHALATION TREATMENT: CPT

## 2018-08-27 PROCEDURE — 25010000002 DEXAMETHASONE PER 1 MG: Performed by: NURSE ANESTHETIST, CERTIFIED REGISTERED

## 2018-08-27 PROCEDURE — 0SG00AJ FUSION OF LUMBAR VERTEBRAL JOINT WITH INTERBODY FUSION DEVICE, POSTERIOR APPROACH, ANTERIOR COLUMN, OPEN APPROACH: ICD-10-PCS | Performed by: NEUROLOGICAL SURGERY

## 2018-08-27 PROCEDURE — 22849 REINSERT SPINAL FIXATION: CPT | Performed by: NEUROLOGICAL SURGERY

## 2018-08-27 PROCEDURE — 25010000002 PROPOFOL 10 MG/ML EMULSION: Performed by: NURSE ANESTHETIST, CERTIFIED REGISTERED

## 2018-08-27 PROCEDURE — 83935 ASSAY OF URINE OSMOLALITY: CPT | Performed by: NURSE PRACTITIONER

## 2018-08-27 PROCEDURE — 94799 UNLISTED PULMONARY SVC/PX: CPT

## 2018-08-27 PROCEDURE — 0SP00JZ REMOVAL OF SYNTHETIC SUBSTITUTE FROM LUMBAR VERTEBRAL JOINT, OPEN APPROACH: ICD-10-PCS | Performed by: NEUROLOGICAL SURGERY

## 2018-08-27 PROCEDURE — 82962 GLUCOSE BLOOD TEST: CPT

## 2018-08-27 PROCEDURE — 25010000003 CEFAZOLIN IN DEXTROSE 2-4 GM/100ML-% SOLUTION: Performed by: NEUROLOGICAL SURGERY

## 2018-08-27 PROCEDURE — 99232 SBSQ HOSP IP/OBS MODERATE 35: CPT | Performed by: NURSE PRACTITIONER

## 2018-08-27 PROCEDURE — 25010000002 FENTANYL CITRATE (PF) 100 MCG/2ML SOLUTION: Performed by: NURSE ANESTHETIST, CERTIFIED REGISTERED

## 2018-08-27 PROCEDURE — 25010000002 MORPHINE SULFATE (PF) 2 MG/ML SOLUTION: Performed by: NURSE PRACTITIONER

## 2018-08-27 PROCEDURE — 99024 POSTOP FOLLOW-UP VISIT: CPT | Performed by: NEUROLOGICAL SURGERY

## 2018-08-27 PROCEDURE — 25010000002 NEOSTIGMINE 10 MG/10ML SOLUTION: Performed by: NURSE ANESTHETIST, CERTIFIED REGISTERED

## 2018-08-27 PROCEDURE — 22612 ARTHRD PST TQ 1NTRSPC LUMBAR: CPT | Performed by: NEUROLOGICAL SURGERY

## 2018-08-27 PROCEDURE — 76000 FLUOROSCOPY <1 HR PHYS/QHP: CPT

## 2018-08-27 DEVICE — SPACER 3992614 26MM X 14MM
Type: IMPLANTABLE DEVICE | Site: SPINE LUMBAR | Status: FUNCTIONAL
Brand: CAPSTONE PTC™ SPINAL SYSTEM

## 2018-08-27 DEVICE — BONE CEMENT C10A KYPHON ACTIVOS 10
Type: IMPLANTABLE DEVICE | Site: SPINE LUMBAR | Status: FUNCTIONAL
Brand: ACTIVOS™ 10 BONE CEMENT

## 2018-08-27 DEVICE — SET SCREW 8351500 TSRH 3DX FLUSH BREAK
Type: IMPLANTABLE DEVICE | Site: SPINE LUMBAR | Status: FUNCTIONAL
Brand: TSRH® SPINAL SYSTEM

## 2018-08-27 DEVICE — SCREW 83585409 TSRH OG THIN 8.5MM X 40MM
Type: IMPLANTABLE DEVICE | Site: SPINE LUMBAR | Status: FUNCTIONAL
Brand: TSRH® SPINAL SYSTEM

## 2018-08-27 DEVICE — SCREW 83585509 TSRH OG THIN 8.5MM X 50MM
Type: IMPLANTABLE DEVICE | Site: SPINE LUMBAR | Status: FUNCTIONAL
Brand: TSRH® SPINAL SYSTEM

## 2018-08-27 RX ORDER — DEXAMETHASONE SODIUM PHOSPHATE 10 MG/ML
INJECTION INTRAMUSCULAR; INTRAVENOUS AS NEEDED
Status: DISCONTINUED | OUTPATIENT
Start: 2018-08-27 | End: 2018-08-27 | Stop reason: SURG

## 2018-08-27 RX ORDER — FAMOTIDINE 20 MG/1
20 TABLET, FILM COATED ORAL
Status: DISCONTINUED | OUTPATIENT
Start: 2018-08-27 | End: 2018-08-27 | Stop reason: HOSPADM

## 2018-08-27 RX ORDER — SODIUM CHLORIDE, SODIUM LACTATE, POTASSIUM CHLORIDE, CALCIUM CHLORIDE 600; 310; 30; 20 MG/100ML; MG/100ML; MG/100ML; MG/100ML
9 INJECTION, SOLUTION INTRAVENOUS CONTINUOUS PRN
Status: DISCONTINUED | OUTPATIENT
Start: 2018-08-27 | End: 2018-08-27 | Stop reason: HOSPADM

## 2018-08-27 RX ORDER — SENNA AND DOCUSATE SODIUM 50; 8.6 MG/1; MG/1
1 TABLET, FILM COATED ORAL NIGHTLY PRN
Status: DISCONTINUED | OUTPATIENT
Start: 2018-08-27 | End: 2018-08-30 | Stop reason: HOSPADM

## 2018-08-27 RX ORDER — PROMETHAZINE HYDROCHLORIDE 12.5 MG/1
12.5 SUPPOSITORY RECTAL EVERY 6 HOURS PRN
Status: DISCONTINUED | OUTPATIENT
Start: 2018-08-27 | End: 2018-08-30 | Stop reason: HOSPADM

## 2018-08-27 RX ORDER — FENTANYL CITRATE 50 UG/ML
50 INJECTION, SOLUTION INTRAMUSCULAR; INTRAVENOUS
Status: DISCONTINUED | OUTPATIENT
Start: 2018-08-27 | End: 2018-08-27 | Stop reason: HOSPADM

## 2018-08-27 RX ORDER — SODIUM CHLORIDE, SODIUM LACTATE, POTASSIUM CHLORIDE, CALCIUM CHLORIDE 600; 310; 30; 20 MG/100ML; MG/100ML; MG/100ML; MG/100ML
50 INJECTION, SOLUTION INTRAVENOUS CONTINUOUS
Status: DISCONTINUED | OUTPATIENT
Start: 2018-08-27 | End: 2018-08-28

## 2018-08-27 RX ORDER — PROMETHAZINE HYDROCHLORIDE 25 MG/1
12.5 TABLET ORAL EVERY 6 HOURS PRN
Status: DISCONTINUED | OUTPATIENT
Start: 2018-08-27 | End: 2018-08-30 | Stop reason: HOSPADM

## 2018-08-27 RX ORDER — DIPHENHYDRAMINE HYDROCHLORIDE 50 MG/ML
25 INJECTION INTRAMUSCULAR; INTRAVENOUS EVERY 6 HOURS PRN
Status: DISCONTINUED | OUTPATIENT
Start: 2018-08-27 | End: 2018-08-30 | Stop reason: HOSPADM

## 2018-08-27 RX ORDER — MIDAZOLAM HYDROCHLORIDE 1 MG/ML
INJECTION INTRAMUSCULAR; INTRAVENOUS AS NEEDED
Status: DISCONTINUED | OUTPATIENT
Start: 2018-08-27 | End: 2018-08-27 | Stop reason: SURG

## 2018-08-27 RX ORDER — FENTANYL CITRATE 50 UG/ML
INJECTION, SOLUTION INTRAMUSCULAR; INTRAVENOUS AS NEEDED
Status: DISCONTINUED | OUTPATIENT
Start: 2018-08-27 | End: 2018-08-27 | Stop reason: SURG

## 2018-08-27 RX ORDER — NALOXONE HCL 0.4 MG/ML
0.1 VIAL (ML) INJECTION
Status: DISCONTINUED | OUTPATIENT
Start: 2018-08-27 | End: 2018-08-28

## 2018-08-27 RX ORDER — HYDROMORPHONE HYDROCHLORIDE 1 MG/ML
0.5 INJECTION, SOLUTION INTRAMUSCULAR; INTRAVENOUS; SUBCUTANEOUS
Status: DISCONTINUED | OUTPATIENT
Start: 2018-08-27 | End: 2018-08-27 | Stop reason: HOSPADM

## 2018-08-27 RX ORDER — ALBUTEROL SULFATE 90 UG/1
2 AEROSOL, METERED RESPIRATORY (INHALATION) 4 TIMES DAILY PRN
COMMUNITY

## 2018-08-27 RX ORDER — CEFAZOLIN SODIUM 2 G/100ML
2 INJECTION, SOLUTION INTRAVENOUS EVERY 8 HOURS
Status: COMPLETED | OUTPATIENT
Start: 2018-08-27 | End: 2018-08-28

## 2018-08-27 RX ORDER — NEOSTIGMINE METHYLSULFATE 1 MG/ML
INJECTION, SOLUTION INTRAVENOUS AS NEEDED
Status: DISCONTINUED | OUTPATIENT
Start: 2018-08-27 | End: 2018-08-27 | Stop reason: SURG

## 2018-08-27 RX ORDER — DEXAMETHASONE SODIUM PHOSPHATE 4 MG/ML
8 VIAL (ML) INJECTION ONCE AS NEEDED
Status: DISCONTINUED | OUTPATIENT
Start: 2018-08-27 | End: 2018-08-27 | Stop reason: HOSPADM

## 2018-08-27 RX ORDER — SODIUM CHLORIDE 9 MG/ML
INJECTION, SOLUTION INTRAVENOUS AS NEEDED
Status: DISCONTINUED | OUTPATIENT
Start: 2018-08-27 | End: 2018-08-27 | Stop reason: HOSPADM

## 2018-08-27 RX ORDER — ATRACURIUM BESYLATE 10 MG/ML
INJECTION, SOLUTION INTRAVENOUS AS NEEDED
Status: DISCONTINUED | OUTPATIENT
Start: 2018-08-27 | End: 2018-08-27 | Stop reason: SURG

## 2018-08-27 RX ORDER — ROCURONIUM BROMIDE 10 MG/ML
INJECTION, SOLUTION INTRAVENOUS AS NEEDED
Status: DISCONTINUED | OUTPATIENT
Start: 2018-08-27 | End: 2018-08-27 | Stop reason: SURG

## 2018-08-27 RX ORDER — BISACODYL 10 MG
10 SUPPOSITORY, RECTAL RECTAL DAILY PRN
Status: DISCONTINUED | OUTPATIENT
Start: 2018-08-27 | End: 2018-08-30 | Stop reason: HOSPADM

## 2018-08-27 RX ORDER — CEFAZOLIN SODIUM IN 0.9 % NACL 3 G/100 ML
3 INTRAVENOUS SOLUTION, PIGGYBACK (ML) INTRAVENOUS ONCE
Status: COMPLETED | OUTPATIENT
Start: 2018-08-27 | End: 2018-08-27

## 2018-08-27 RX ORDER — ESMOLOL HYDROCHLORIDE 10 MG/ML
INJECTION INTRAVENOUS AS NEEDED
Status: DISCONTINUED | OUTPATIENT
Start: 2018-08-27 | End: 2018-08-27 | Stop reason: SURG

## 2018-08-27 RX ORDER — LIDOCAINE HYDROCHLORIDE 20 MG/ML
INJECTION, SOLUTION INFILTRATION; PERINEURAL AS NEEDED
Status: DISCONTINUED | OUTPATIENT
Start: 2018-08-27 | End: 2018-08-27 | Stop reason: SURG

## 2018-08-27 RX ORDER — CEFAZOLIN SODIUM IN 0.9 % NACL 3 G/100 ML
3 INTRAVENOUS SOLUTION, PIGGYBACK (ML) INTRAVENOUS ONCE
Status: CANCELLED | OUTPATIENT
Start: 2018-08-27 | End: 2018-08-27

## 2018-08-27 RX ORDER — GLYCOPYRROLATE 0.2 MG/ML
INJECTION INTRAMUSCULAR; INTRAVENOUS AS NEEDED
Status: DISCONTINUED | OUTPATIENT
Start: 2018-08-27 | End: 2018-08-27 | Stop reason: SURG

## 2018-08-27 RX ORDER — SODIUM CHLORIDE 0.9 % (FLUSH) 0.9 %
1-10 SYRINGE (ML) INJECTION AS NEEDED
Status: DISCONTINUED | OUTPATIENT
Start: 2018-08-27 | End: 2018-08-30 | Stop reason: HOSPADM

## 2018-08-27 RX ORDER — ONDANSETRON 2 MG/ML
4 INJECTION INTRAMUSCULAR; INTRAVENOUS ONCE AS NEEDED
Status: COMPLETED | OUTPATIENT
Start: 2018-08-27 | End: 2018-08-27

## 2018-08-27 RX ORDER — MAGNESIUM HYDROXIDE 1200 MG/15ML
LIQUID ORAL AS NEEDED
Status: DISCONTINUED | OUTPATIENT
Start: 2018-08-27 | End: 2018-08-27 | Stop reason: HOSPADM

## 2018-08-27 RX ORDER — MORPHINE SULFATE 15 MG/1
15 TABLET, FILM COATED, EXTENDED RELEASE ORAL EVERY 12 HOURS SCHEDULED
Status: DISCONTINUED | OUTPATIENT
Start: 2018-08-27 | End: 2018-08-30 | Stop reason: HOSPADM

## 2018-08-27 RX ORDER — FERROUS SULFATE 325(65) MG
325 TABLET ORAL 2 TIMES DAILY WITH MEALS
Status: DISCONTINUED | OUTPATIENT
Start: 2018-08-27 | End: 2018-08-30 | Stop reason: HOSPADM

## 2018-08-27 RX ORDER — PROPOFOL 10 MG/ML
VIAL (ML) INTRAVENOUS AS NEEDED
Status: DISCONTINUED | OUTPATIENT
Start: 2018-08-27 | End: 2018-08-27 | Stop reason: SURG

## 2018-08-27 RX ORDER — ONDANSETRON 2 MG/ML
4 INJECTION INTRAMUSCULAR; INTRAVENOUS EVERY 6 HOURS PRN
Status: DISCONTINUED | OUTPATIENT
Start: 2018-08-27 | End: 2018-08-30 | Stop reason: HOSPADM

## 2018-08-27 RX ORDER — ACETAMINOPHEN 325 MG/1
650 TABLET ORAL EVERY 8 HOURS PRN
Status: DISCONTINUED | OUTPATIENT
Start: 2018-08-27 | End: 2018-08-30 | Stop reason: HOSPADM

## 2018-08-27 RX ORDER — DIPHENOXYLATE HYDROCHLORIDE AND ATROPINE SULFATE 2.5; .025 MG/1; MG/1
1 TABLET ORAL DAILY
Status: DISCONTINUED | OUTPATIENT
Start: 2018-08-27 | End: 2018-08-30 | Stop reason: HOSPADM

## 2018-08-27 RX ORDER — GABAPENTIN 300 MG/1
300 CAPSULE ORAL EVERY 8 HOURS SCHEDULED
Status: DISCONTINUED | OUTPATIENT
Start: 2018-08-27 | End: 2018-08-30 | Stop reason: HOSPADM

## 2018-08-27 RX ORDER — DIPHENHYDRAMINE HCL 25 MG
25 CAPSULE ORAL NIGHTLY PRN
Status: DISCONTINUED | OUTPATIENT
Start: 2018-08-27 | End: 2018-08-30 | Stop reason: HOSPADM

## 2018-08-27 RX ORDER — PROMETHAZINE HYDROCHLORIDE 25 MG/ML
12.5 INJECTION, SOLUTION INTRAMUSCULAR; INTRAVENOUS EVERY 6 HOURS PRN
Status: DISCONTINUED | OUTPATIENT
Start: 2018-08-27 | End: 2018-08-30 | Stop reason: HOSPADM

## 2018-08-27 RX ORDER — ONDANSETRON 4 MG/1
4 TABLET, FILM COATED ORAL EVERY 6 HOURS PRN
Status: DISCONTINUED | OUTPATIENT
Start: 2018-08-27 | End: 2018-08-30 | Stop reason: HOSPADM

## 2018-08-27 RX ORDER — ASCORBIC ACID 500 MG
500 TABLET ORAL DAILY
Status: DISCONTINUED | OUTPATIENT
Start: 2018-08-27 | End: 2018-08-30 | Stop reason: HOSPADM

## 2018-08-27 RX ORDER — DOCUSATE SODIUM 100 MG/1
100 CAPSULE, LIQUID FILLED ORAL 2 TIMES DAILY PRN
Status: DISCONTINUED | OUTPATIENT
Start: 2018-08-27 | End: 2018-08-30 | Stop reason: HOSPADM

## 2018-08-27 RX ORDER — SODIUM CHLORIDE 0.9 % (FLUSH) 0.9 %
1-10 SYRINGE (ML) INJECTION AS NEEDED
Status: DISCONTINUED | OUTPATIENT
Start: 2018-08-27 | End: 2018-08-27 | Stop reason: HOSPADM

## 2018-08-27 RX ORDER — LIDOCAINE HYDROCHLORIDE 10 MG/ML
0.5 INJECTION, SOLUTION EPIDURAL; INFILTRATION; INTRACAUDAL; PERINEURAL ONCE AS NEEDED
Status: COMPLETED | OUTPATIENT
Start: 2018-08-27 | End: 2018-08-27

## 2018-08-27 RX ADMIN — BUDESONIDE AND FORMOTEROL FUMARATE DIHYDRATE 2 PUFF: 160; 4.5 AEROSOL RESPIRATORY (INHALATION) at 21:00

## 2018-08-27 RX ADMIN — GLYCOPYRROLATE 0.4 MG: 0.2 INJECTION, SOLUTION INTRAMUSCULAR; INTRAVENOUS at 15:32

## 2018-08-27 RX ADMIN — FENTANYL CITRATE 50 MCG: 50 INJECTION, SOLUTION INTRAMUSCULAR; INTRAVENOUS at 15:04

## 2018-08-27 RX ADMIN — DEXAMETHASONE SODIUM PHOSPHATE 4 MG: 10 INJECTION INTRAMUSCULAR; INTRAVENOUS at 13:37

## 2018-08-27 RX ADMIN — FENTANYL CITRATE 50 MCG: 50 INJECTION, SOLUTION INTRAMUSCULAR; INTRAVENOUS at 15:57

## 2018-08-27 RX ADMIN — ATRACURIUM BESYLATE 10 MG: 10 INJECTION, SOLUTION INTRAVENOUS at 14:17

## 2018-08-27 RX ADMIN — INSULIN DETEMIR 10 UNITS: 100 INJECTION, SOLUTION SUBCUTANEOUS at 21:04

## 2018-08-27 RX ADMIN — CYCLOBENZAPRINE HYDROCHLORIDE 10 MG: 10 TABLET, FILM COATED ORAL at 21:04

## 2018-08-27 RX ADMIN — LISINOPRIL 20 MG: 10 TABLET ORAL at 05:05

## 2018-08-27 RX ADMIN — MORPHINE SULFATE 2 MG: 10 INJECTION INTRAVENOUS at 09:08

## 2018-08-27 RX ADMIN — ATORVASTATIN CALCIUM 20 MG: 20 TABLET, FILM COATED ORAL at 21:04

## 2018-08-27 RX ADMIN — CARVEDILOL 3.12 MG: 3.12 TABLET, FILM COATED ORAL at 17:56

## 2018-08-27 RX ADMIN — Medication 3 G: at 13:15

## 2018-08-27 RX ADMIN — SODIUM CHLORIDE, POTASSIUM CHLORIDE, SODIUM LACTATE AND CALCIUM CHLORIDE 9 ML/HR: 600; 310; 30; 20 INJECTION, SOLUTION INTRAVENOUS at 12:44

## 2018-08-27 RX ADMIN — MIDAZOLAM HYDROCHLORIDE 2 MG: 1 INJECTION, SOLUTION INTRAMUSCULAR; INTRAVENOUS at 13:15

## 2018-08-27 RX ADMIN — CETIRIZINE HYDROCHLORIDE 10 MG: 10 TABLET, FILM COATED ORAL at 09:07

## 2018-08-27 RX ADMIN — ESMOLOL HYDROCHLORIDE 10 MG: 10 INJECTION INTRAVENOUS at 13:40

## 2018-08-27 RX ADMIN — CYCLOBENZAPRINE HYDROCHLORIDE 10 MG: 10 TABLET, FILM COATED ORAL at 05:04

## 2018-08-27 RX ADMIN — LIDOCAINE HYDROCHLORIDE 50 MG: 20 INJECTION, SOLUTION INFILTRATION; PERINEURAL at 13:19

## 2018-08-27 RX ADMIN — ATRACURIUM BESYLATE 10 MG: 10 INJECTION, SOLUTION INTRAVENOUS at 13:42

## 2018-08-27 RX ADMIN — TAMSULOSIN HYDROCHLORIDE 0.4 MG: 0.4 CAPSULE ORAL at 08:01

## 2018-08-27 RX ADMIN — FENTANYL CITRATE 50 MCG: 50 INJECTION, SOLUTION INTRAMUSCULAR; INTRAVENOUS at 13:40

## 2018-08-27 RX ADMIN — HYDROCODONE BITARTRATE AND ACETAMINOPHEN 1 TABLET: 10; 325 TABLET ORAL at 23:37

## 2018-08-27 RX ADMIN — BUDESONIDE AND FORMOTEROL FUMARATE DIHYDRATE 2 PUFF: 160; 4.5 AEROSOL RESPIRATORY (INHALATION) at 09:00

## 2018-08-27 RX ADMIN — SODIUM CHLORIDE, POTASSIUM CHLORIDE, SODIUM LACTATE AND CALCIUM CHLORIDE 90 ML/HR: 600; 310; 30; 20 INJECTION, SOLUTION INTRAVENOUS at 18:02

## 2018-08-27 RX ADMIN — CARVEDILOL 3.12 MG: 3.12 TABLET, FILM COATED ORAL at 08:01

## 2018-08-27 RX ADMIN — FAMOTIDINE 20 MG: 20 TABLET ORAL at 12:43

## 2018-08-27 RX ADMIN — HYDROCODONE BITARTRATE AND ACETAMINOPHEN 1 TABLET: 10; 325 TABLET ORAL at 07:58

## 2018-08-27 RX ADMIN — NEOSTIGMINE METHYLSULFATE 2.5 MG: 1 INJECTION, SOLUTION INTRAVENOUS at 15:32

## 2018-08-27 RX ADMIN — PROPOFOL 50 MG: 10 INJECTION, EMULSION INTRAVENOUS at 15:28

## 2018-08-27 RX ADMIN — FENTANYL CITRATE 50 MCG: 50 INJECTION, SOLUTION INTRAMUSCULAR; INTRAVENOUS at 15:18

## 2018-08-27 RX ADMIN — GABAPENTIN 300 MG: 300 CAPSULE ORAL at 21:04

## 2018-08-27 RX ADMIN — MORPHINE SULFATE 2 MG: 10 INJECTION INTRAVENOUS at 01:14

## 2018-08-27 RX ADMIN — ROCURONIUM BROMIDE 50 MG: 10 SOLUTION INTRAVENOUS at 13:20

## 2018-08-27 RX ADMIN — PHENYLEPHRINE HYDROCHLORIDE 100 MCG: 10 INJECTION INTRAVENOUS at 13:55

## 2018-08-27 RX ADMIN — Medication 325 MG: at 18:01

## 2018-08-27 RX ADMIN — INSULIN LISPRO 4 UNITS: 100 INJECTION, SOLUTION INTRAVENOUS; SUBCUTANEOUS at 07:59

## 2018-08-27 RX ADMIN — MORPHINE SULFATE 2 MG: 10 INJECTION INTRAVENOUS at 05:04

## 2018-08-27 RX ADMIN — PHENYLEPHRINE HYDROCHLORIDE 150 MCG: 10 INJECTION INTRAVENOUS at 14:16

## 2018-08-27 RX ADMIN — PROPOFOL 20 MG: 10 INJECTION, EMULSION INTRAVENOUS at 13:20

## 2018-08-27 RX ADMIN — PHENYLEPHRINE HYDROCHLORIDE 100 MCG: 10 INJECTION INTRAVENOUS at 13:58

## 2018-08-27 RX ADMIN — FENTANYL CITRATE 50 MCG: 50 INJECTION, SOLUTION INTRAMUSCULAR; INTRAVENOUS at 15:52

## 2018-08-27 RX ADMIN — INSULIN LISPRO 6 UNITS: 100 INJECTION, SOLUTION INTRAVENOUS; SUBCUTANEOUS at 21:04

## 2018-08-27 RX ADMIN — HYDROCODONE BITARTRATE AND ACETAMINOPHEN 1 TABLET: 10; 325 TABLET ORAL at 18:00

## 2018-08-27 RX ADMIN — MORPHINE SULFATE 15 MG: 15 TABLET, EXTENDED RELEASE ORAL at 21:04

## 2018-08-27 RX ADMIN — CARVEDILOL 3.12 MG: 3.12 TABLET, FILM COATED ORAL at 18:01

## 2018-08-27 RX ADMIN — PHENYLEPHRINE HYDROCHLORIDE 150 MCG: 10 INJECTION INTRAVENOUS at 14:26

## 2018-08-27 RX ADMIN — HYDROMORPHONE HYDROCHLORIDE: 10 INJECTION, SOLUTION INTRAMUSCULAR; INTRAVENOUS; SUBCUTANEOUS at 16:35

## 2018-08-27 RX ADMIN — LIDOCAINE HYDROCHLORIDE 0.3 ML: 10 INJECTION, SOLUTION EPIDURAL; INFILTRATION; INTRACAUDAL; PERINEURAL at 12:43

## 2018-08-27 RX ADMIN — GABAPENTIN 300 MG: 300 CAPSULE ORAL at 05:04

## 2018-08-27 RX ADMIN — CEFAZOLIN SODIUM 2 G: 2 INJECTION, SOLUTION INTRAVENOUS at 21:04

## 2018-08-27 RX ADMIN — Medication 10 ML: at 12:44

## 2018-08-27 RX ADMIN — HYDROCODONE BITARTRATE AND ACETAMINOPHEN 1 TABLET: 10; 325 TABLET ORAL at 03:45

## 2018-08-27 RX ADMIN — PHENYLEPHRINE HYDROCHLORIDE 200 MCG: 10 INJECTION INTRAVENOUS at 14:31

## 2018-08-27 RX ADMIN — OXYCODONE HYDROCHLORIDE AND ACETAMINOPHEN 500 MG: 500 TABLET ORAL at 18:01

## 2018-08-27 RX ADMIN — CELECOXIB 200 MG: 200 CAPSULE ORAL at 08:01

## 2018-08-27 RX ADMIN — RANOLAZINE 500 MG: 500 TABLET, FILM COATED, EXTENDED RELEASE ORAL at 08:01

## 2018-08-27 RX ADMIN — ONDANSETRON 4 MG: 2 INJECTION INTRAMUSCULAR; INTRAVENOUS at 15:37

## 2018-08-27 RX ADMIN — ESMOLOL HYDROCHLORIDE 10 MG: 10 INJECTION INTRAVENOUS at 13:29

## 2018-08-27 RX ADMIN — RANOLAZINE 500 MG: 500 TABLET, FILM COATED, EXTENDED RELEASE ORAL at 21:04

## 2018-08-27 RX ADMIN — Medication 1 TABLET: at 18:01

## 2018-08-27 NOTE — ANESTHESIA PROCEDURE NOTES
Airway  Urgency: elective    Airway not difficult    General Information and Staff    Patient location during procedure: OR    Indications and Patient Condition  Indications for airway management: airway protection    Preoxygenated: yes  Mask difficulty assessment: 1 - vent by mask    Final Airway Details  Final airway type: endotracheal airway      Successful airway: ETT  Cuffed: yes   Successful intubation technique: direct laryngoscopy  Facilitating devices/methods: intubating stylet  Endotracheal tube insertion site: oral  Blade: Jackson  Blade size: #2  ETT size: 8.0 mm  Cormack-Lehane Classification: grade IIa - partial view of glottis  Placement verified by: chest auscultation and capnometry   Measured from: lips  ETT to lips (cm): 23  Number of attempts at approach: 1

## 2018-08-27 NOTE — ANESTHESIA POSTPROCEDURE EVALUATION
Patient: Amadeo Kong    Procedure Summary     Date:  08/27/18 Room / Location:   [facility]    Anesthesia Start:  1315 Anesthesia Stop:      Procedure:  LUMBAR LAMINECTOMY POSTERIOR LUMBAR INTERBODY FUSION (N/A Spine Lumbar) Diagnosis:      Surgeon:  Yo Bryson MD Provider:  Griffin Smith MD    Anesthesia Type:  general ASA Status:  3          Anesthesia Type: general  Last vitals   BP   111/70   Temp   97.5 °F (36.4 °C) (08/27/18 1553)   Pulse   87 (08/27/18 1553)   Resp   18 (08/27/18 1553)     SpO2   95 % (08/27/18 1553)     Post Anesthesia Care and Evaluation    Patient location during evaluation: PACU  Patient participation: complete - patient cannot participate  Level of consciousness: responsive to physical stimuli  Pain score: 0  Pain management: adequate  Airway patency: patent  Anesthetic complications: No anesthetic complications  PONV Status: none  Cardiovascular status: stable  Respiratory status: acceptable, nasal cannula, oral airway and spontaneous ventilation  Hydration status: stable    Comments: Pt transferred to PACU with O2. Vital signs stable. Report to PACU RN and care accepted.

## 2018-08-27 NOTE — ANESTHESIA PREPROCEDURE EVALUATION
Anesthesia Evaluation     Patient summary reviewed and Nursing notes reviewed   NPO Solid Status: > 8 hours  NPO Liquid Status: > 8 hours           Airway   Mallampati: III  TM distance: >3 FB  Neck ROM: limited  Possible difficult intubation  Dental    (+) poor dentition    Pulmonary    (+) asthma (MDI  PRN ),   (-) not a smoker  Cardiovascular     (+) hypertension, hyperlipidemia,   (-) past MI, CAD, dysrhythmias, angina, cardiac stents, CABG      Neuro/Psych  (+) numbness,     (-) seizures, TIA, CVA  GI/Hepatic/Renal/Endo    (+)   diabetes mellitus type 2 using insulin,   (-) liver disease, no renal disease    Musculoskeletal     Abdominal    Substance History      OB/GYN          Other   (+) arthritis     ROS/Med Hx Other: Sp Back sx 4 weeks ago s anesth problems       Phys Exam Other: Used Glidescope last month                 Anesthesia Plan    ASA 3     general   (Glidescope )  intravenous induction   Anesthetic plan and risks discussed with patient.    Plan discussed with CRNA.

## 2018-08-28 LAB
ANION GAP SERPL CALCULATED.3IONS-SCNC: 6 MMOL/L (ref 3–11)
BUN BLD-MCNC: 22 MG/DL (ref 9–23)
BUN/CREAT SERPL: 16.1 (ref 7–25)
CALCIUM SPEC-SCNC: 8.8 MG/DL (ref 8.7–10.4)
CHLORIDE SERPL-SCNC: 99 MMOL/L (ref 99–109)
CO2 SERPL-SCNC: 23 MMOL/L (ref 20–31)
CREAT BLD-MCNC: 1.37 MG/DL (ref 0.6–1.3)
DEPRECATED RDW RBC AUTO: 39.1 FL (ref 37–54)
ERYTHROCYTE [DISTWIDTH] IN BLOOD BY AUTOMATED COUNT: 12.8 % (ref 11.3–14.5)
GFR SERPL CREATININE-BSD FRML MDRD: 65 ML/MIN/1.73
GLUCOSE BLD-MCNC: 297 MG/DL (ref 70–100)
GLUCOSE BLDC GLUCOMTR-MCNC: 249 MG/DL (ref 70–130)
GLUCOSE BLDC GLUCOMTR-MCNC: 262 MG/DL (ref 70–130)
GLUCOSE BLDC GLUCOMTR-MCNC: 309 MG/DL (ref 70–130)
GLUCOSE BLDC GLUCOMTR-MCNC: 331 MG/DL (ref 70–130)
HCT VFR BLD AUTO: 30.3 % (ref 38.9–50.9)
HCT VFR BLD AUTO: 30.3 % (ref 38.9–50.9)
HGB BLD-MCNC: 10.3 G/DL (ref 13.1–17.5)
HGB BLD-MCNC: 10.3 G/DL (ref 13.1–17.5)
MCH RBC QN AUTO: 28.8 PG (ref 27–31)
MCHC RBC AUTO-ENTMCNC: 34 G/DL (ref 32–36)
MCV RBC AUTO: 84.6 FL (ref 80–99)
PLATELET # BLD AUTO: 216 10*3/MM3 (ref 150–450)
PMV BLD AUTO: 8.9 FL (ref 6–12)
POTASSIUM BLD-SCNC: 5 MMOL/L (ref 3.5–5.5)
RBC # BLD AUTO: 3.58 10*6/MM3 (ref 4.2–5.76)
SODIUM BLD-SCNC: 128 MMOL/L (ref 132–146)
WBC NRBC COR # BLD: 9.6 10*3/MM3 (ref 3.5–10.8)

## 2018-08-28 PROCEDURE — 85027 COMPLETE CBC AUTOMATED: CPT | Performed by: NURSE PRACTITIONER

## 2018-08-28 PROCEDURE — 99232 SBSQ HOSP IP/OBS MODERATE 35: CPT | Performed by: NURSE PRACTITIONER

## 2018-08-28 PROCEDURE — 85014 HEMATOCRIT: CPT | Performed by: NEUROLOGICAL SURGERY

## 2018-08-28 PROCEDURE — G8978 MOBILITY CURRENT STATUS: HCPCS

## 2018-08-28 PROCEDURE — 94640 AIRWAY INHALATION TREATMENT: CPT

## 2018-08-28 PROCEDURE — 80048 BASIC METABOLIC PNL TOTAL CA: CPT | Performed by: NURSE PRACTITIONER

## 2018-08-28 PROCEDURE — 63710000001 INSULIN DETEMIR PER 5 UNITS: Performed by: NURSE PRACTITIONER

## 2018-08-28 PROCEDURE — 63710000001 DIPHENHYDRAMINE PER 50 MG: Performed by: NEUROLOGICAL SURGERY

## 2018-08-28 PROCEDURE — 99024 POSTOP FOLLOW-UP VISIT: CPT | Performed by: NEUROLOGICAL SURGERY

## 2018-08-28 PROCEDURE — G8979 MOBILITY GOAL STATUS: HCPCS

## 2018-08-28 PROCEDURE — 85018 HEMOGLOBIN: CPT | Performed by: NEUROLOGICAL SURGERY

## 2018-08-28 PROCEDURE — 97161 PT EVAL LOW COMPLEX 20 MIN: CPT

## 2018-08-28 PROCEDURE — 97530 THERAPEUTIC ACTIVITIES: CPT

## 2018-08-28 PROCEDURE — 97166 OT EVAL MOD COMPLEX 45 MIN: CPT

## 2018-08-28 PROCEDURE — 94799 UNLISTED PULMONARY SVC/PX: CPT

## 2018-08-28 PROCEDURE — 63710000001 INSULIN LISPRO (HUMAN) PER 5 UNITS: Performed by: NURSE PRACTITIONER

## 2018-08-28 PROCEDURE — 25010000003 CEFAZOLIN IN DEXTROSE 2-4 GM/100ML-% SOLUTION: Performed by: NEUROLOGICAL SURGERY

## 2018-08-28 PROCEDURE — 82962 GLUCOSE BLOOD TEST: CPT

## 2018-08-28 RX ORDER — NALOXONE HCL 0.4 MG/ML
0.1 VIAL (ML) INJECTION
Status: DISCONTINUED | OUTPATIENT
Start: 2018-08-28 | End: 2018-08-30 | Stop reason: HOSPADM

## 2018-08-28 RX ORDER — SODIUM CHLORIDE 9 MG/ML
50 INJECTION, SOLUTION INTRAVENOUS CONTINUOUS
Status: DISCONTINUED | OUTPATIENT
Start: 2018-08-28 | End: 2018-08-30 | Stop reason: HOSPADM

## 2018-08-28 RX ADMIN — INSULIN LISPRO 6 UNITS: 100 INJECTION, SOLUTION INTRAVENOUS; SUBCUTANEOUS at 17:11

## 2018-08-28 RX ADMIN — INSULIN LISPRO 4 UNITS: 100 INJECTION, SOLUTION INTRAVENOUS; SUBCUTANEOUS at 08:17

## 2018-08-28 RX ADMIN — GABAPENTIN 300 MG: 300 CAPSULE ORAL at 20:36

## 2018-08-28 RX ADMIN — HYDROCODONE BITARTRATE AND ACETAMINOPHEN 1 TABLET: 10; 325 TABLET ORAL at 10:59

## 2018-08-28 RX ADMIN — GABAPENTIN 300 MG: 300 CAPSULE ORAL at 05:11

## 2018-08-28 RX ADMIN — RANOLAZINE 500 MG: 500 TABLET, FILM COATED, EXTENDED RELEASE ORAL at 20:35

## 2018-08-28 RX ADMIN — CETIRIZINE HYDROCHLORIDE 10 MG: 10 TABLET, FILM COATED ORAL at 05:11

## 2018-08-28 RX ADMIN — Medication 325 MG: at 17:12

## 2018-08-28 RX ADMIN — ATORVASTATIN CALCIUM 20 MG: 20 TABLET, FILM COATED ORAL at 20:35

## 2018-08-28 RX ADMIN — CARVEDILOL 3.12 MG: 3.12 TABLET, FILM COATED ORAL at 17:12

## 2018-08-28 RX ADMIN — Medication 1 TABLET: at 08:17

## 2018-08-28 RX ADMIN — BUDESONIDE AND FORMOTEROL FUMARATE DIHYDRATE 2 PUFF: 160; 4.5 AEROSOL RESPIRATORY (INHALATION) at 08:52

## 2018-08-28 RX ADMIN — INSULIN LISPRO 6 UNITS: 100 INJECTION, SOLUTION INTRAVENOUS; SUBCUTANEOUS at 11:45

## 2018-08-28 RX ADMIN — CEFAZOLIN SODIUM 2 G: 2 INJECTION, SOLUTION INTRAVENOUS at 05:11

## 2018-08-28 RX ADMIN — CYCLOBENZAPRINE HYDROCHLORIDE 10 MG: 10 TABLET, FILM COATED ORAL at 14:44

## 2018-08-28 RX ADMIN — RANOLAZINE 500 MG: 500 TABLET, FILM COATED, EXTENDED RELEASE ORAL at 08:17

## 2018-08-28 RX ADMIN — CELECOXIB 200 MG: 200 CAPSULE ORAL at 08:17

## 2018-08-28 RX ADMIN — MORPHINE SULFATE 15 MG: 15 TABLET, EXTENDED RELEASE ORAL at 20:36

## 2018-08-28 RX ADMIN — BUDESONIDE AND FORMOTEROL FUMARATE DIHYDRATE 2 PUFF: 160; 4.5 AEROSOL RESPIRATORY (INHALATION) at 20:24

## 2018-08-28 RX ADMIN — GABAPENTIN 300 MG: 300 CAPSULE ORAL at 14:44

## 2018-08-28 RX ADMIN — LISINOPRIL 20 MG: 10 TABLET ORAL at 05:11

## 2018-08-28 RX ADMIN — CYCLOBENZAPRINE HYDROCHLORIDE 10 MG: 10 TABLET, FILM COATED ORAL at 20:36

## 2018-08-28 RX ADMIN — HYDROCODONE BITARTRATE AND ACETAMINOPHEN 1 TABLET: 10; 325 TABLET ORAL at 05:11

## 2018-08-28 RX ADMIN — Medication 325 MG: at 08:17

## 2018-08-28 RX ADMIN — TAMSULOSIN HYDROCHLORIDE 0.4 MG: 0.4 CAPSULE ORAL at 08:17

## 2018-08-28 RX ADMIN — HYDROCODONE BITARTRATE AND ACETAMINOPHEN 1 TABLET: 10; 325 TABLET ORAL at 20:36

## 2018-08-28 RX ADMIN — DIPHENHYDRAMINE HYDROCHLORIDE 25 MG: 25 CAPSULE ORAL at 20:36

## 2018-08-28 RX ADMIN — INSULIN DETEMIR 15 UNITS: 100 INJECTION, SOLUTION SUBCUTANEOUS at 20:34

## 2018-08-28 RX ADMIN — HYDROCODONE BITARTRATE AND ACETAMINOPHEN 1 TABLET: 10; 325 TABLET ORAL at 14:44

## 2018-08-28 RX ADMIN — CARVEDILOL 3.12 MG: 3.12 TABLET, FILM COATED ORAL at 08:16

## 2018-08-28 RX ADMIN — MORPHINE SULFATE 15 MG: 15 TABLET, EXTENDED RELEASE ORAL at 08:16

## 2018-08-28 RX ADMIN — HYDROMORPHONE HYDROCHLORIDE: 10 INJECTION INTRAMUSCULAR; INTRAVENOUS; SUBCUTANEOUS at 17:12

## 2018-08-28 RX ADMIN — CYCLOBENZAPRINE HYDROCHLORIDE 10 MG: 10 TABLET, FILM COATED ORAL at 05:11

## 2018-08-28 RX ADMIN — INSULIN LISPRO 7 UNITS: 100 INJECTION, SOLUTION INTRAVENOUS; SUBCUTANEOUS at 11:45

## 2018-08-28 RX ADMIN — SODIUM CHLORIDE 75 ML/HR: 9 INJECTION, SOLUTION INTRAVENOUS at 14:30

## 2018-08-28 RX ADMIN — INSULIN LISPRO 3 UNITS: 100 INJECTION, SOLUTION INTRAVENOUS; SUBCUTANEOUS at 20:36

## 2018-08-28 RX ADMIN — OXYCODONE HYDROCHLORIDE AND ACETAMINOPHEN 500 MG: 500 TABLET ORAL at 08:17

## 2018-08-29 LAB
ANION GAP SERPL CALCULATED.3IONS-SCNC: 6 MMOL/L (ref 3–11)
BUN BLD-MCNC: 24 MG/DL (ref 9–23)
BUN/CREAT SERPL: 19.2 (ref 7–25)
CALCIUM SPEC-SCNC: 8.9 MG/DL (ref 8.7–10.4)
CHLORIDE SERPL-SCNC: 101 MMOL/L (ref 99–109)
CO2 SERPL-SCNC: 22 MMOL/L (ref 20–31)
CREAT BLD-MCNC: 1.25 MG/DL (ref 0.6–1.3)
DEPRECATED RDW RBC AUTO: 40.1 FL (ref 37–54)
ERYTHROCYTE [DISTWIDTH] IN BLOOD BY AUTOMATED COUNT: 12.9 % (ref 11.3–14.5)
GFR SERPL CREATININE-BSD FRML MDRD: 72 ML/MIN/1.73
GLUCOSE BLD-MCNC: 280 MG/DL (ref 70–100)
GLUCOSE BLDC GLUCOMTR-MCNC: 230 MG/DL (ref 70–130)
GLUCOSE BLDC GLUCOMTR-MCNC: 245 MG/DL (ref 70–130)
GLUCOSE BLDC GLUCOMTR-MCNC: 254 MG/DL (ref 70–130)
GLUCOSE BLDC GLUCOMTR-MCNC: 336 MG/DL (ref 70–130)
HCT VFR BLD AUTO: 29 % (ref 38.9–50.9)
HGB BLD-MCNC: 9.8 G/DL (ref 13.1–17.5)
MCH RBC QN AUTO: 28.7 PG (ref 27–31)
MCHC RBC AUTO-ENTMCNC: 33.8 G/DL (ref 32–36)
MCV RBC AUTO: 85 FL (ref 80–99)
PLATELET # BLD AUTO: 198 10*3/MM3 (ref 150–450)
PMV BLD AUTO: 9 FL (ref 6–12)
POTASSIUM BLD-SCNC: 4.6 MMOL/L (ref 3.5–5.5)
RBC # BLD AUTO: 3.41 10*6/MM3 (ref 4.2–5.76)
SODIUM BLD-SCNC: 129 MMOL/L (ref 132–146)
WBC NRBC COR # BLD: 9.38 10*3/MM3 (ref 3.5–10.8)

## 2018-08-29 PROCEDURE — 63710000001 INSULIN DETEMIR PER 5 UNITS: Performed by: NURSE PRACTITIONER

## 2018-08-29 PROCEDURE — 94799 UNLISTED PULMONARY SVC/PX: CPT

## 2018-08-29 PROCEDURE — 85027 COMPLETE CBC AUTOMATED: CPT | Performed by: NURSE PRACTITIONER

## 2018-08-29 PROCEDURE — 82962 GLUCOSE BLOOD TEST: CPT

## 2018-08-29 PROCEDURE — 63710000001 DIPHENHYDRAMINE PER 50 MG: Performed by: NEUROLOGICAL SURGERY

## 2018-08-29 PROCEDURE — 99233 SBSQ HOSP IP/OBS HIGH 50: CPT | Performed by: HOSPITALIST

## 2018-08-29 PROCEDURE — 99024 POSTOP FOLLOW-UP VISIT: CPT | Performed by: NEUROLOGICAL SURGERY

## 2018-08-29 PROCEDURE — 94640 AIRWAY INHALATION TREATMENT: CPT

## 2018-08-29 PROCEDURE — 80048 BASIC METABOLIC PNL TOTAL CA: CPT | Performed by: NURSE PRACTITIONER

## 2018-08-29 RX ADMIN — INSULIN LISPRO 4 UNITS: 100 INJECTION, SOLUTION INTRAVENOUS; SUBCUTANEOUS at 16:49

## 2018-08-29 RX ADMIN — INSULIN LISPRO 6 UNITS: 100 INJECTION, SOLUTION INTRAVENOUS; SUBCUTANEOUS at 16:49

## 2018-08-29 RX ADMIN — HYDROCODONE BITARTRATE AND ACETAMINOPHEN 1 TABLET: 10; 325 TABLET ORAL at 16:54

## 2018-08-29 RX ADMIN — HYDROCODONE BITARTRATE AND ACETAMINOPHEN 1 TABLET: 10; 325 TABLET ORAL at 00:22

## 2018-08-29 RX ADMIN — CETIRIZINE HYDROCHLORIDE 10 MG: 10 TABLET, FILM COATED ORAL at 05:08

## 2018-08-29 RX ADMIN — LISINOPRIL 20 MG: 10 TABLET ORAL at 05:08

## 2018-08-29 RX ADMIN — GABAPENTIN 300 MG: 300 CAPSULE ORAL at 12:45

## 2018-08-29 RX ADMIN — INSULIN LISPRO 6 UNITS: 100 INJECTION, SOLUTION INTRAVENOUS; SUBCUTANEOUS at 12:46

## 2018-08-29 RX ADMIN — ATORVASTATIN CALCIUM 20 MG: 20 TABLET, FILM COATED ORAL at 20:40

## 2018-08-29 RX ADMIN — CYCLOBENZAPRINE HYDROCHLORIDE 10 MG: 10 TABLET, FILM COATED ORAL at 12:45

## 2018-08-29 RX ADMIN — CYCLOBENZAPRINE HYDROCHLORIDE 10 MG: 10 TABLET, FILM COATED ORAL at 05:08

## 2018-08-29 RX ADMIN — HYDROCODONE BITARTRATE AND ACETAMINOPHEN 1 TABLET: 10; 325 TABLET ORAL at 12:45

## 2018-08-29 RX ADMIN — CELECOXIB 200 MG: 200 CAPSULE ORAL at 08:27

## 2018-08-29 RX ADMIN — RANOLAZINE 500 MG: 500 TABLET, FILM COATED, EXTENDED RELEASE ORAL at 08:26

## 2018-08-29 RX ADMIN — RANOLAZINE 500 MG: 500 TABLET, FILM COATED, EXTENDED RELEASE ORAL at 20:41

## 2018-08-29 RX ADMIN — GABAPENTIN 300 MG: 300 CAPSULE ORAL at 20:40

## 2018-08-29 RX ADMIN — INSULIN LISPRO 6 UNITS: 100 INJECTION, SOLUTION INTRAVENOUS; SUBCUTANEOUS at 08:26

## 2018-08-29 RX ADMIN — Medication 1 TABLET: at 08:26

## 2018-08-29 RX ADMIN — INSULIN DETEMIR 15 UNITS: 100 INJECTION, SOLUTION SUBCUTANEOUS at 20:43

## 2018-08-29 RX ADMIN — CARVEDILOL 3.12 MG: 3.12 TABLET, FILM COATED ORAL at 08:26

## 2018-08-29 RX ADMIN — BUDESONIDE AND FORMOTEROL FUMARATE DIHYDRATE 2 PUFF: 160; 4.5 AEROSOL RESPIRATORY (INHALATION) at 19:48

## 2018-08-29 RX ADMIN — MORPHINE SULFATE 15 MG: 15 TABLET, EXTENDED RELEASE ORAL at 08:26

## 2018-08-29 RX ADMIN — Medication 325 MG: at 08:26

## 2018-08-29 RX ADMIN — DIPHENHYDRAMINE HYDROCHLORIDE 25 MG: 25 CAPSULE ORAL at 12:45

## 2018-08-29 RX ADMIN — BUDESONIDE AND FORMOTEROL FUMARATE DIHYDRATE 2 PUFF: 160; 4.5 AEROSOL RESPIRATORY (INHALATION) at 09:36

## 2018-08-29 RX ADMIN — TAMSULOSIN HYDROCHLORIDE 0.4 MG: 0.4 CAPSULE ORAL at 08:26

## 2018-08-29 RX ADMIN — HYDROCODONE BITARTRATE AND ACETAMINOPHEN 1 TABLET: 10; 325 TABLET ORAL at 05:08

## 2018-08-29 RX ADMIN — INSULIN LISPRO 3 UNITS: 100 INJECTION, SOLUTION INTRAVENOUS; SUBCUTANEOUS at 20:42

## 2018-08-29 RX ADMIN — INSULIN DETEMIR 15 UNITS: 100 INJECTION, SOLUTION SUBCUTANEOUS at 08:30

## 2018-08-29 RX ADMIN — INSULIN LISPRO 6 UNITS: 100 INJECTION, SOLUTION INTRAVENOUS; SUBCUTANEOUS at 12:45

## 2018-08-29 RX ADMIN — GABAPENTIN 300 MG: 300 CAPSULE ORAL at 05:08

## 2018-08-29 RX ADMIN — MORPHINE SULFATE 15 MG: 15 TABLET, EXTENDED RELEASE ORAL at 20:40

## 2018-08-29 RX ADMIN — INSULIN LISPRO 7 UNITS: 100 INJECTION, SOLUTION INTRAVENOUS; SUBCUTANEOUS at 08:29

## 2018-08-29 RX ADMIN — OXYCODONE HYDROCHLORIDE AND ACETAMINOPHEN 500 MG: 500 TABLET ORAL at 08:26

## 2018-08-29 RX ADMIN — Medication 325 MG: at 16:48

## 2018-08-29 RX ADMIN — CARVEDILOL 3.12 MG: 3.12 TABLET, FILM COATED ORAL at 16:49

## 2018-08-29 RX ADMIN — CYCLOBENZAPRINE HYDROCHLORIDE 10 MG: 10 TABLET, FILM COATED ORAL at 20:41

## 2018-08-30 VITALS
OXYGEN SATURATION: 98 % | HEIGHT: 74 IN | HEART RATE: 101 BPM | TEMPERATURE: 98.2 F | SYSTOLIC BLOOD PRESSURE: 121 MMHG | DIASTOLIC BLOOD PRESSURE: 104 MMHG | BODY MASS INDEX: 38.76 KG/M2 | RESPIRATION RATE: 20 BRPM | WEIGHT: 302 LBS

## 2018-08-30 LAB
ANION GAP SERPL CALCULATED.3IONS-SCNC: 7 MMOL/L (ref 3–11)
BUN BLD-MCNC: 26 MG/DL (ref 9–23)
BUN/CREAT SERPL: 22.8 (ref 7–25)
CALCIUM SPEC-SCNC: 8.7 MG/DL (ref 8.7–10.4)
CHLORIDE SERPL-SCNC: 98 MMOL/L (ref 99–109)
CO2 SERPL-SCNC: 23 MMOL/L (ref 20–31)
CREAT BLD-MCNC: 1.14 MG/DL (ref 0.6–1.3)
GFR SERPL CREATININE-BSD FRML MDRD: 80 ML/MIN/1.73
GLUCOSE BLD-MCNC: 254 MG/DL (ref 70–100)
GLUCOSE BLDC GLUCOMTR-MCNC: 265 MG/DL (ref 70–130)
POTASSIUM BLD-SCNC: 4.8 MMOL/L (ref 3.5–5.5)
SODIUM BLD-SCNC: 128 MMOL/L (ref 132–146)

## 2018-08-30 PROCEDURE — 94640 AIRWAY INHALATION TREATMENT: CPT

## 2018-08-30 PROCEDURE — 99238 HOSP IP/OBS DSCHRG MGMT 30/<: CPT | Performed by: HOSPITALIST

## 2018-08-30 PROCEDURE — 63710000001 INSULIN DETEMIR PER 5 UNITS: Performed by: NURSE PRACTITIONER

## 2018-08-30 PROCEDURE — 97535 SELF CARE MNGMENT TRAINING: CPT | Performed by: OCCUPATIONAL THERAPIST

## 2018-08-30 PROCEDURE — 82962 GLUCOSE BLOOD TEST: CPT

## 2018-08-30 PROCEDURE — 97116 GAIT TRAINING THERAPY: CPT

## 2018-08-30 PROCEDURE — 80048 BASIC METABOLIC PNL TOTAL CA: CPT | Performed by: HOSPITALIST

## 2018-08-30 PROCEDURE — 97110 THERAPEUTIC EXERCISES: CPT

## 2018-08-30 PROCEDURE — 99024 POSTOP FOLLOW-UP VISIT: CPT | Performed by: NEUROLOGICAL SURGERY

## 2018-08-30 RX ORDER — FERROUS SULFATE 325(65) MG
325 TABLET ORAL 2 TIMES DAILY WITH MEALS
Qty: 60 TABLET | Refills: 2 | Status: ON HOLD | OUTPATIENT
Start: 2018-08-30 | End: 2022-07-18

## 2018-08-30 RX ORDER — MORPHINE SULFATE 15 MG/1
15 TABLET, FILM COATED, EXTENDED RELEASE ORAL EVERY 12 HOURS SCHEDULED
Qty: 14 TABLET | Refills: 0 | Status: SHIPPED | OUTPATIENT
Start: 2018-08-30 | End: 2018-09-07

## 2018-08-30 RX ORDER — HYDROCODONE BITARTRATE AND ACETAMINOPHEN 10; 325 MG/1; MG/1
1 TABLET ORAL EVERY 6 HOURS PRN
Qty: 120 TABLET | Refills: 0 | Status: SHIPPED | OUTPATIENT
Start: 2018-08-30 | End: 2018-09-13 | Stop reason: DRUGHIGH

## 2018-08-30 RX ADMIN — INSULIN LISPRO 6 UNITS: 100 INJECTION, SOLUTION INTRAVENOUS; SUBCUTANEOUS at 09:23

## 2018-08-30 RX ADMIN — CYCLOBENZAPRINE HYDROCHLORIDE 10 MG: 10 TABLET, FILM COATED ORAL at 05:36

## 2018-08-30 RX ADMIN — GABAPENTIN 300 MG: 300 CAPSULE ORAL at 05:36

## 2018-08-30 RX ADMIN — CETIRIZINE HYDROCHLORIDE 10 MG: 10 TABLET, FILM COATED ORAL at 05:36

## 2018-08-30 RX ADMIN — CARVEDILOL 3.12 MG: 3.12 TABLET, FILM COATED ORAL at 10:19

## 2018-08-30 RX ADMIN — MORPHINE SULFATE 15 MG: 15 TABLET, EXTENDED RELEASE ORAL at 10:19

## 2018-08-30 RX ADMIN — INSULIN DETEMIR 15 UNITS: 100 INJECTION, SOLUTION SUBCUTANEOUS at 10:22

## 2018-08-30 RX ADMIN — LISINOPRIL 20 MG: 10 TABLET ORAL at 05:36

## 2018-08-30 RX ADMIN — BUDESONIDE AND FORMOTEROL FUMARATE DIHYDRATE 2 PUFF: 160; 4.5 AEROSOL RESPIRATORY (INHALATION) at 09:27

## 2018-08-30 RX ADMIN — INSULIN LISPRO 6 UNITS: 100 INJECTION, SOLUTION INTRAVENOUS; SUBCUTANEOUS at 09:24

## 2018-08-30 RX ADMIN — CELECOXIB 200 MG: 200 CAPSULE ORAL at 10:19

## 2018-08-30 RX ADMIN — HYDROCODONE BITARTRATE AND ACETAMINOPHEN 1 TABLET: 10; 325 TABLET ORAL at 01:14

## 2018-08-30 RX ADMIN — Medication 325 MG: at 10:19

## 2018-08-30 RX ADMIN — HYDROCODONE BITARTRATE AND ACETAMINOPHEN 1 TABLET: 10; 325 TABLET ORAL at 05:07

## 2018-08-30 RX ADMIN — HYDROCODONE BITARTRATE AND ACETAMINOPHEN 1 TABLET: 10; 325 TABLET ORAL at 09:21

## 2018-08-30 RX ADMIN — TAMSULOSIN HYDROCHLORIDE 0.4 MG: 0.4 CAPSULE ORAL at 10:19

## 2018-08-30 RX ADMIN — Medication 1 TABLET: at 10:18

## 2018-08-30 RX ADMIN — OXYCODONE HYDROCHLORIDE AND ACETAMINOPHEN 500 MG: 500 TABLET ORAL at 10:18

## 2018-08-30 RX ADMIN — RANOLAZINE 500 MG: 500 TABLET, FILM COATED, EXTENDED RELEASE ORAL at 10:18

## 2018-08-31 ENCOUNTER — READMISSION MANAGEMENT (OUTPATIENT)
Dept: CALL CENTER | Facility: HOSPITAL | Age: 58
End: 2018-08-31

## 2018-09-02 ENCOUNTER — HOSPITAL ENCOUNTER (EMERGENCY)
Facility: HOSPITAL | Age: 58
Discharge: HOME | End: 2018-09-03
Attending: EMERGENCY MEDICINE | Admitting: EMERGENCY MEDICINE

## 2018-09-02 DIAGNOSIS — I10 UNCONTROLLED HYPERTENSION: ICD-10-CM

## 2018-09-02 DIAGNOSIS — M54.41 RIGHT-SIDED LOW BACK PAIN WITH RIGHT-SIDED SCIATICA, UNSPECIFIED CHRONICITY: Primary | ICD-10-CM

## 2018-09-02 PROCEDURE — 96372 THER/PROPH/DIAG INJ SC/IM: CPT

## 2018-09-02 PROCEDURE — 99283 EMERGENCY DEPT VISIT LOW MDM: CPT

## 2018-09-03 ENCOUNTER — APPOINTMENT (OUTPATIENT)
Dept: CT IMAGING | Facility: HOSPITAL | Age: 58
End: 2018-09-03

## 2018-09-03 VITALS
SYSTOLIC BLOOD PRESSURE: 161 MMHG | WEIGHT: 300 LBS | RESPIRATION RATE: 16 BRPM | DIASTOLIC BLOOD PRESSURE: 89 MMHG | TEMPERATURE: 98.9 F | OXYGEN SATURATION: 97 % | BODY MASS INDEX: 38.5 KG/M2 | HEIGHT: 74 IN | HEART RATE: 84 BPM

## 2018-09-03 PROCEDURE — 96372 THER/PROPH/DIAG INJ SC/IM: CPT

## 2018-09-03 PROCEDURE — 72131 CT LUMBAR SPINE W/O DYE: CPT

## 2018-09-03 PROCEDURE — 72192 CT PELVIS W/O DYE: CPT

## 2018-09-03 PROCEDURE — 25010000002 KETOROLAC TROMETHAMINE PER 15 MG: Performed by: EMERGENCY MEDICINE

## 2018-09-03 RX ORDER — KETOROLAC TROMETHAMINE 30 MG/ML
30 INJECTION, SOLUTION INTRAMUSCULAR; INTRAVENOUS ONCE
Status: COMPLETED | OUTPATIENT
Start: 2018-09-03 | End: 2018-09-03

## 2018-09-03 RX ORDER — DIAZEPAM 5 MG/1
5 TABLET ORAL ONCE
Status: COMPLETED | OUTPATIENT
Start: 2018-09-03 | End: 2018-09-03

## 2018-09-03 RX ORDER — HYDROCODONE BITARTRATE AND ACETAMINOPHEN 5; 325 MG/1; MG/1
1 TABLET ORAL ONCE
Status: COMPLETED | OUTPATIENT
Start: 2018-09-03 | End: 2018-09-03

## 2018-09-03 RX ADMIN — HYDROCODONE BITARTRATE AND ACETAMINOPHEN 1 TABLET: 5; 325 TABLET ORAL at 01:18

## 2018-09-03 RX ADMIN — DIAZEPAM 5 MG: 5 TABLET ORAL at 01:18

## 2018-09-03 RX ADMIN — KETOROLAC TROMETHAMINE 30 MG: 30 INJECTION, SOLUTION INTRAMUSCULAR at 01:17

## 2018-09-03 NOTE — ED PROVIDER NOTES
Subjective   Mr. Amadeo Kong is a 57 y.o. male who presents to the ED with c/o right hip pain. He reports he had his back hardware repaired 6 days ago by Dr. Bryson. He states yesterday he developed right hip pain which he reports radiates from his lower back and shoots down his right leg. He has taken Percocet and Morphine with minimal relief.   He denies bowel or bladder incontinence. Pt denies any saddle anesthesia.  There are no other acute symptoms at this time.         History provided by:  Patient  Lower Extremity Issue   Location:  Hip  Time since incident:  6 days  Injury: no    Hip location:  R hip  Pain details:     Severity:  Moderate    Onset quality:  Sudden    Duration:  16 days    Timing:  Constant    Progression:  Worsening  Chronicity:  New  Dislocation: no    Foreign body present:  No foreign bodies  Tetanus status:  Unknown  Prior injury to area:  No  Relieved by:  Nothing  Associated symptoms: back pain    Associated symptoms: no decreased ROM, no fever, no numbness, no swelling and no tingling        Review of Systems   Constitutional: Negative for chills and fever.   Respiratory: Negative for shortness of breath.    Cardiovascular: Negative for chest pain and palpitations.   Gastrointestinal: Negative for abdominal pain, nausea and vomiting.   Musculoskeletal: Positive for back pain.   Neurological: Negative for weakness and numbness.   All other systems reviewed and are negative.      Past Medical History:   Diagnosis Date   • Arthritis    • Asthma     inhaler daily    • Diabetes mellitus (CMS/[facility]) 2000    insulin daily; checks blood sugars on occasion-run 150-180   • Elevated cholesterol    • History of sleep apnea     years ago diagnosed but never used a machine at home    • History of staph infection 2010    wound infection after left knee replacement -saw infectious disease MD    • Hypertension    • Wears glasses        Allergies   Allergen Reactions   • Bactrim  [Sulfamethoxazole-Trimethoprim] Other (See Comments)     Patient developed blisters on his hands.       Past Surgical History:   Procedure Laterality Date   • COLONOSCOPY     • HIP SURGERY Right 08/11/2014    [facility]   • INCISION AND DRAINAGE OF WOUND Left 2010    x2 of total knee replacement wound -iv antibiotics   • KNEE SURGERY Bilateral     Right knee- 2008, left knee- 2010- [facility]   • LUMBAR LAMINECTOMY WITH FUSION N/A 7/27/2018    Procedure: TLIF, OSTEOTOMY L4-5 , POST FUSION L4-5;  Surgeon: Yo Bryson MD;  Location:  [facility];  Service: Neurosurgery   • LUMBAR LAMINECTOMY WITH FUSION N/A 8/27/2018    Procedure: LUMBAR LAMINECTOMY POSTERIOR LUMBAR INTERBODY FUSION;  Surgeon: Yo Bryson MD;  Location:  [facility];  Service: Neurosurgery   • ROTATOR CUFF REPAIR Right    • TONSILLECTOMY         Family History   Problem Relation Age of Onset   • Hypertension Mother        Social History     Social History   • Marital status: Single     Social History Main Topics   • Smoking status: Never Smoker   • Smokeless tobacco: Never Used   • Alcohol use No   • Drug use: No   • Sexual activity: Defer     Other Topics Concern   • Not on file         Objective   Physical Exam   Constitutional: He is oriented to person, place, and time. He appears well-developed and well-nourished. No distress.   HENT:   Head: Normocephalic and atraumatic.   Right Ear: External ear normal.   Left Ear: External ear normal.   Mouth/Throat: Oropharynx is clear and moist.   Eyes: Conjunctivae and EOM are normal.   Neck: Normal range of motion.   Cardiovascular: Normal rate, regular rhythm and intact distal pulses.    Pulmonary/Chest: Effort normal and breath sounds normal. No respiratory distress.   Abdominal: Soft. Bowel sounds are normal. He exhibits no distension. There is no tenderness.   Musculoskeletal: Normal range of motion.        Right hip: He exhibits tenderness (tenderness palpated RT SI joint, mild rt lateral  hip tenderness.  ). He exhibits normal range of motion, normal strength and no swelling.        Lumbar back: He exhibits tenderness (surgical staples intact along lumbar spine.  mild tenderness palpated at incision. Pt has RT SI joint tenderness palpated.  ). He exhibits no swelling, no edema and normal pulse.   NO edema, no cellulitis, no limited ROM at hip.    Neurological: He is alert and oriented to person, place, and time. No cranial nerve deficit. Coordination normal.   Skin: Skin is warm and dry. No erythema.   Psychiatric: He has a normal mood and affect.   Nursing note and vitals reviewed.      Procedures         ED Course  ED Course as of Sep 03 0449   Mon Sep 03, 2018   2395 [id number]  Pt is advised of results.   The incision site is healing well.  There are no signs of infection at the site.  Staples are intact and free from edema, erythema or drainage.  Patient's right hip is benign.  There are no signs symptoms of cellulitis.  There is no edema, erythema or warmth appreciated on exam.  Patient has full range of motion of extremities.  Sensation is intact bilaterally.  He will be discharged home. Pt to continue his current pain meds.  Patient to follow-up with Dr. Bryson.  Patient agrees and verbalizes understanding.  [KG]      ED Course User Index  [KG] Mojgan Burger, APRN       No results found for this or any previous visit (from the past 24 hour(s)).  Note: In addition to lab results from this visit, the labs listed above may include labs taken at another facility or during a different encounter within the last 24 hours. Please correlate lab times with ED admission and discharge times for further clarification of the services performed during this visit.    CT Lumbar Spine Without Contrast   Final Result      1.  No acute fracture.      2.  Small amount of gas within the soft tissues of the lower back at    approximately the L1 level, possibly from recent procedure, although infection    from gas  "forming organism possible.      3.  Incidental/non-acute findings are described above.      THIS DOCUMENT HAS BEEN ELECTRONICALLY SIGNED BY ADAN HINES MD      CT Pelvis Without Contrast   Final Result      1. No acute findings.      2. Non-acute findings are described above.      THIS DOCUMENT HAS BEEN ELECTRONICALLY SIGNED BY ADAN HINES MD        Vitals:    09/02/18 2314 09/03/18 0150   BP: 175/100 161/89   BP Location: Left arm    Patient Position: Sitting    Pulse: 95 84   Resp: 16 16   Temp: 98.9 °F (37.2 °C)    TempSrc: Oral    SpO2: 96% 97%   Weight: 136 kg (300 lb)    Height: 188 cm (74\")      Medications   ketorolac (TORADOL) injection 30 mg (30 mg Intramuscular Given 9/3/18 0117)   diazePAM (VALIUM) tablet 5 mg (5 mg Oral Given 9/3/18 0118)   HYDROcodone-acetaminophen (NORCO) 5-325 MG per tablet 1 tablet (1 tablet Oral Given 9/3/18 0118)     ECG/EMG Results (last 24 hours)     ** No results found for the last 24 hours. **                      [facility]    Final diagnoses:   Right-sided low back pain with right-sided sciatica, unspecified chronicity   Uncontrolled hypertension       Documentation assistance provided by frank Gonzalez.  Information recorded by the scribe was done at my direction and has been verified and validated by me.     Ashok Gonzalez  09/03/18 0127       Mojgan Burger, REID  09/03/18 [id number]    "

## 2018-09-04 ENCOUNTER — HOSPITAL ENCOUNTER (EMERGENCY)
Facility: HOSPITAL | Age: 58
Discharge: HOME | End: 2018-09-04
Attending: EMERGENCY MEDICINE | Admitting: EMERGENCY MEDICINE

## 2018-09-04 VITALS
RESPIRATION RATE: 18 BRPM | SYSTOLIC BLOOD PRESSURE: 139 MMHG | HEART RATE: 90 BPM | HEIGHT: 74 IN | DIASTOLIC BLOOD PRESSURE: 94 MMHG | BODY MASS INDEX: 38.5 KG/M2 | WEIGHT: 300 LBS | OXYGEN SATURATION: 97 % | TEMPERATURE: 98.5 F

## 2018-09-04 DIAGNOSIS — Z98.1 STATUS POST LUMBAR SPINAL FUSION: ICD-10-CM

## 2018-09-04 DIAGNOSIS — G89.29 CHRONIC HIP PAIN, RIGHT: ICD-10-CM

## 2018-09-04 DIAGNOSIS — M25.551 CHRONIC HIP PAIN, RIGHT: ICD-10-CM

## 2018-09-04 DIAGNOSIS — M54.10 RADICULAR PAIN OF RIGHT LOWER EXTREMITY: Primary | ICD-10-CM

## 2018-09-04 PROCEDURE — 99283 EMERGENCY DEPT VISIT LOW MDM: CPT

## 2018-09-04 PROCEDURE — 96372 THER/PROPH/DIAG INJ SC/IM: CPT

## 2018-09-04 PROCEDURE — 25010000002 HYDROMORPHONE PER 4 MG: Performed by: EMERGENCY MEDICINE

## 2018-09-04 RX ORDER — ONDANSETRON 2 MG/ML
4 INJECTION INTRAMUSCULAR; INTRAVENOUS ONCE
Status: DISCONTINUED | OUTPATIENT
Start: 2018-09-04 | End: 2018-09-04

## 2018-09-04 RX ORDER — ONDANSETRON 4 MG/1
4 TABLET, FILM COATED ORAL ONCE
Status: COMPLETED | OUTPATIENT
Start: 2018-09-04 | End: 2018-09-04

## 2018-09-04 RX ORDER — SODIUM CHLORIDE 9 MG/ML
125 INJECTION, SOLUTION INTRAVENOUS CONTINUOUS
Status: DISCONTINUED | OUTPATIENT
Start: 2018-09-04 | End: 2018-09-04

## 2018-09-04 RX ORDER — SODIUM CHLORIDE 0.9 % (FLUSH) 0.9 %
10 SYRINGE (ML) INJECTION AS NEEDED
Status: DISCONTINUED | OUTPATIENT
Start: 2018-09-04 | End: 2018-09-04

## 2018-09-04 RX ADMIN — HYDROMORPHONE HYDROCHLORIDE 1 MG: 1 INJECTION, SOLUTION INTRAMUSCULAR; INTRAVENOUS; SUBCUTANEOUS at 01:43

## 2018-09-04 RX ADMIN — ONDANSETRON 4 MG: 4 TABLET, FILM COATED ORAL at 01:43

## 2018-09-04 NOTE — DISCHARGE INSTRUCTIONS
Do not drive tonight or tomorrow morning.  See neurosurgeon tomorrow as discussed.  Return to the ED for development of fever.  Maintain your usual medication regimen.  Thank you

## 2018-09-04 NOTE — ED PROVIDER NOTES
Subjective   Mr. Amadeo Kong is a 57 y.o. male who presents to the ED with c/o right hip pain onset approx. one week ago. Pt reports one week ago he had lumbar laminectomy with fusion and hardware replacement performed by Dr. Bryson. He states for the past 2 days he has been experiencing progressively worsening right hip pain which radiates to lumbar back and down right lower extremity to mid-thigh. He visited this ED last night where CT lumbar spine and CT pelvis were performed, both revealing no acute findings. Per records, he was given Norco, Valium, and Toradol IM, which he states improved the pain for a couple hours, however the pain has returned which prompts ED visit now. He states he has difficulty sleeping secondary to pain, however he denies new injury, saddle anesthesia, SoA, N/V/D, constipation, fever and any other acute sx at this time. He takes Percocet and Morphine at home which does not provide relief. Pt has hx of HTN, arthritis, staph infection, DM, HLD, asthma, sleep apnea,           History provided by:  Patient  Lower Extremity Issue   Location:  Hip  Hip location:  R hip  Pain details:     Radiates to:  R leg    Severity:  Moderate    Onset quality:  Sudden    Duration:  2 days    Timing:  Constant    Progression:  Unchanged  Chronicity:  New  Relieved by:  Nothing  Worsened by:  Nothing  Ineffective treatments: percocet, morphine.  Associated symptoms: no fever        Review of Systems   Constitutional: Negative for fever.   Respiratory: Negative for shortness of breath.    Gastrointestinal: Negative for constipation, diarrhea, nausea and vomiting.   Musculoskeletal: Positive for arthralgias (R hip radiating to RLE).   Neurological:        Denies saddles anesthesia    Psychiatric/Behavioral: Positive for sleep disturbance (secondary to pain).   All other systems reviewed and are negative.      Past Medical History:   Diagnosis Date   • Arthritis    • Asthma     inhaler daily    • Diabetes  mellitus (CMS/[facility]) 2000    insulin daily; checks blood sugars on occasion-run 150-180   • Elevated cholesterol    • History of sleep apnea     years ago diagnosed but never used a machine at home    • History of staph infection 2010    wound infection after left knee replacement -saw infectious disease MD    • Hypertension    • Wears glasses        Allergies   Allergen Reactions   • Bactrim [Sulfamethoxazole-Trimethoprim] Other (See Comments)     Patient developed blisters on his hands.       Past Surgical History:   Procedure Laterality Date   • COLONOSCOPY     • H[facility] SURGERY Right 08[facility]11/2014    [facility]   • INCISION AND DRAINAGE OF WOUND Left 2010    x2 of total knee replacement wound -iv antibiotics   • KNEE SURGERY Bilateral     Right knee- 2008, left knee- 2010- [facility]   • LUMBAR LAMINECTOMY WITH FUSION N/A 7/27/2018    Procedure: TLIF, OSTEOTOMY L4-5 , POST FUSION L4-5;  Surgeon: Yo Bryson MD;  [facility]  Service: Neurosurgery   • LUMBAR LAMINECTOMY WITH FUSION N/A 8/27/2018    Procedure: LUMBAR LAMINECTOMY POSTERIOR LUMBAR INTERBODY FUSION;  Surgeon: Yo Bryson MD;  [facility];  Service: Neurosurgery   • ROTATOR CUFF REPAIR Right    • TONSILLECTOMY         Family History   Problem Relation Age of Onset   • Hypertension Mother        Social History     Social History   • Marital status: Single     Social History Main Topics   • Smoking status: Never Smoker   • Smokeless tobacco: Never Used   • Alcohol use No   • Drug use: No   • Sexual activity: Defer     Other Topics Concern   • Not on file         Objective   Physical Exam   Constitutional: He is oriented to person, place, and time. He appears well-developed and well-nourished. No distress.   HENT:   Head: Normocephalic and atraumatic.   Right Ear: External ear normal.   Left Ear: External ear normal.   Nose: Nose normal.   Eyes: Conjunctivae are normal. No scleral icterus.   Neck: Normal range of motion.    Cardiovascular: Normal rate, regular rhythm and normal heart sounds.  Exam reveals no gallop and no friction rub.    No murmur heard.  Pulmonary/Chest: Effort normal and breath sounds normal. No respiratory distress. He has no wheezes. He has no rales. He exhibits no tenderness.   Abdominal: Soft. Bowel sounds are normal.   Musculoskeletal: Normal range of motion. He exhibits no deformity.   Pelvis is stable.    Neurological: He is alert and oriented to person, place, and time. No cranial nerve deficit or sensory deficit.   Normal strength against resistance to flexion and extension in BLE. Normal sensation in all dermatomes.    Skin: Skin is warm and dry. He is not diaphoretic.   Midline lumbar region incision is well approximated without signs of infection with numerous staples in place.    Psychiatric: He has a normal mood and affect. His behavior is normal.   Nursing note and vitals reviewed.      Procedures         ED Course  ED Course as of Sep 04 0140   Tue Sep 04, 2018   0125 I have conferred with Dr. Olivas and the patient.  I am of the opinion that the best course of action is to admit the patient for intractable pain.  He is one week post operative and this is his 2nd ED visit post operative without relief of his pain on 40mg of oxycodone per day. The patient and his family member initially agreed to this plan of care; however, I was called back tohis exam room where he explained that he has a social issue (he has reason to believe that someone will eliezer his home if he is not present at home tonight) but assures he will see his neurosurgeon group tomorrow, without fail.  His exam is stable.  He has no VS c/w sepsis.  He has stable gait with use of his cane.  I will administer a single IM injection prior to DC   [ML]      ED Course User Index  [ML] Ros Burris, REID     No results found for this or any previous visit (from the past 24 hour(s)).  Note: In addition to lab results from this visit, the  "labs listed above may include labs taken at another facility or during a different encounter within the last 24 hours. Please correlate lab times with ED admission and discharge times for further clarification of the services performed during this visit.    No orders to display     Vitals:    09/04/18 0031   BP: 139/94   Pulse: 90   Resp: 18   Temp: 98.5 °F (36.9 °C)   TempSrc: Oral   SpO2: 97%   Weight: 136 kg (300 lb)   Height: 188 cm (74\")     Medications   HYDROmorphone (DILAUDID) injection 1 mg (not administered)   ondansetron (ZOFRAN) tablet 4 mg (not administered)     ECG/EMG Results (last 24 hours)     ** No results found for the last 24 hours. **                        [facility]    Final diagnoses:   Radicular pain of right lower extremity   Chronic hip pain, right   Status post lumbar spinal fusion       Documentation assistance provided by frank Floyd.  Information recorded by the scribe was done at my direction and has been verified and validated by me.     Avtar Floyd  09/04/18 0107       Ros Burris APRN  09/04/18 0140    "

## 2018-09-05 ENCOUNTER — READMISSION MANAGEMENT (OUTPATIENT)
Dept: CALL CENTER | Facility: HOSPITAL | Age: 58
End: 2018-09-05

## 2018-09-05 NOTE — OUTREACH NOTE
General Surgery Week 1 Survey      Responses   Facility patient discharged from?  [facility]   Does the patient have one of the following disease processes/diagnoses(primary or secondary)?  General Surgery   Is there a successful TCM telephone encounter documented?  No   Week 1 attempt successful?  No   Unsuccessful attempts  Attempt 1          Adithya Gonzalez RN

## 2018-09-06 ENCOUNTER — READMISSION MANAGEMENT (OUTPATIENT)
Dept: CALL CENTER | Facility: HOSPITAL | Age: 58
End: 2018-09-06

## 2018-09-06 NOTE — OUTREACH NOTE
General Surgery Week 1 Survey      Responses   Facility patient discharged from?  [facility]   Does the patient have one of the following disease processes/diagnoses(primary or secondary)?  General Surgery   Is there a successful TCM telephone encounter documented?  No   Week 1 attempt successful?  No          Amber Rain RN

## 2018-09-07 ENCOUNTER — HOSPITAL ENCOUNTER (EMERGENCY)
Facility: HOSPITAL | Age: 58
Discharge: HOME | End: 2018-09-07
Attending: EMERGENCY MEDICINE | Admitting: EMERGENCY MEDICINE

## 2018-09-07 ENCOUNTER — APPOINTMENT (OUTPATIENT)
Dept: GENERAL RADIOLOGY | Facility: HOSPITAL | Age: 58
End: 2018-09-07

## 2018-09-07 VITALS
SYSTOLIC BLOOD PRESSURE: 131 MMHG | TEMPERATURE: 98.2 F | DIASTOLIC BLOOD PRESSURE: 92 MMHG | WEIGHT: 300 LBS | HEIGHT: 74 IN | HEART RATE: 80 BPM | BODY MASS INDEX: 38.5 KG/M2 | RESPIRATION RATE: 18 BRPM | OXYGEN SATURATION: 98 %

## 2018-09-07 VITALS
HEIGHT: 74 IN | SYSTOLIC BLOOD PRESSURE: 150 MMHG | OXYGEN SATURATION: 98 % | WEIGHT: 300 LBS | HEART RATE: 96 BPM | TEMPERATURE: 98.4 F | RESPIRATION RATE: 18 BRPM | BODY MASS INDEX: 38.5 KG/M2 | DIASTOLIC BLOOD PRESSURE: 97 MMHG

## 2018-09-07 DIAGNOSIS — R07.89 ACUTE CHEST WALL PAIN: Primary | ICD-10-CM

## 2018-09-07 DIAGNOSIS — G57.93 NEUROPATHIC PAIN OF BOTH LEGS: ICD-10-CM

## 2018-09-07 DIAGNOSIS — M54.16 RADICULOPATHY, LUMBAR REGION: Primary | ICD-10-CM

## 2018-09-07 DIAGNOSIS — J18.9 PNEUMONIA OF RIGHT LOWER LOBE DUE TO INFECTIOUS ORGANISM: ICD-10-CM

## 2018-09-07 LAB
ALBUMIN SERPL-MCNC: 3.88 G/DL (ref 3.2–4.8)
ALBUMIN/GLOB SERPL: 0.9 G/DL (ref 1.5–2.5)
ALP SERPL-CCNC: 131 U/L (ref 25–100)
ALT SERPL W P-5'-P-CCNC: 12 U/L (ref 7–40)
ANION GAP SERPL CALCULATED.3IONS-SCNC: 5 MMOL/L (ref 3–11)
AST SERPL-CCNC: 21 U/L (ref 0–33)
BASOPHILS # BLD AUTO: 0.01 10*3/MM3 (ref 0–0.2)
BASOPHILS NFR BLD AUTO: 0.1 % (ref 0–1)
BILIRUB SERPL-MCNC: 0.3 MG/DL (ref 0.3–1.2)
BNP SERPL-MCNC: 12 PG/ML (ref 0–100)
BUN BLD-MCNC: 13 MG/DL (ref 9–23)
BUN/CREAT SERPL: 10.7 (ref 7–25)
CALCIUM SPEC-SCNC: 9.2 MG/DL (ref 8.7–10.4)
CHLORIDE SERPL-SCNC: 101 MMOL/L (ref 99–109)
CO2 SERPL-SCNC: 28 MMOL/L (ref 20–31)
CREAT BLD-MCNC: 1.22 MG/DL (ref 0.6–1.3)
DEPRECATED RDW RBC AUTO: 38.8 FL (ref 37–54)
EOSINOPHIL # BLD AUTO: 0.18 10*3/MM3 (ref 0–0.3)
EOSINOPHIL NFR BLD AUTO: 2.4 % (ref 0–3)
ERYTHROCYTE [DISTWIDTH] IN BLOOD BY AUTOMATED COUNT: 12.7 % (ref 11.3–14.5)
GFR SERPL CREATININE-BSD FRML MDRD: 74 ML/MIN/1.73
GLOBULIN UR ELPH-MCNC: 4.1 GM/DL
GLUCOSE BLD-MCNC: 187 MG/DL (ref 70–100)
HCT VFR BLD AUTO: 33.5 % (ref 38.9–50.9)
HGB BLD-MCNC: 11.2 G/DL (ref 13.1–17.5)
HOLD SPECIMEN: NORMAL
HOLD SPECIMEN: NORMAL
IMM GRANULOCYTES # BLD: 0.01 10*3/MM3 (ref 0–0.03)
IMM GRANULOCYTES NFR BLD: 0.1 % (ref 0–0.6)
LIPASE SERPL-CCNC: 34 U/L (ref 6–51)
LYMPHOCYTES # BLD AUTO: 2.51 10*3/MM3 (ref 0.6–4.8)
LYMPHOCYTES NFR BLD AUTO: 33.9 % (ref 24–44)
MCH RBC QN AUTO: 28.3 PG (ref 27–31)
MCHC RBC AUTO-ENTMCNC: 33.4 G/DL (ref 32–36)
MCV RBC AUTO: 84.6 FL (ref 80–99)
MONOCYTES # BLD AUTO: 0.6 10*3/MM3 (ref 0–1)
MONOCYTES NFR BLD AUTO: 8.1 % (ref 0–12)
NEUTROPHILS # BLD AUTO: 4.1 10*3/MM3 (ref 1.5–8.3)
NEUTROPHILS NFR BLD AUTO: 55.5 % (ref 41–71)
PLATELET # BLD AUTO: 251 10*3/MM3 (ref 150–450)
PMV BLD AUTO: 8.6 FL (ref 6–12)
POTASSIUM BLD-SCNC: 4.4 MMOL/L (ref 3.5–5.5)
PROT SERPL-MCNC: 8 G/DL (ref 5.7–8.2)
RBC # BLD AUTO: 3.96 10*6/MM3 (ref 4.2–5.76)
SODIUM BLD-SCNC: 134 MMOL/L (ref 132–146)
TROPONIN I SERPL-MCNC: 0 NG/ML (ref 0–0.07)
TROPONIN I SERPL-MCNC: 0 NG/ML (ref 0–0.07)
WBC NRBC COR # BLD: 7.4 10*3/MM3 (ref 3.5–10.8)
WHOLE BLOOD HOLD SPECIMEN: NORMAL
WHOLE BLOOD HOLD SPECIMEN: NORMAL

## 2018-09-07 PROCEDURE — 25010000002 HYDROMORPHONE PER 4 MG: Performed by: EMERGENCY MEDICINE

## 2018-09-07 PROCEDURE — 25010000002 PROMETHAZINE PER 50 MG: Performed by: EMERGENCY MEDICINE

## 2018-09-07 PROCEDURE — 96372 THER/PROPH/DIAG INJ SC/IM: CPT

## 2018-09-07 PROCEDURE — 99285 EMERGENCY DEPT VISIT HI MDM: CPT

## 2018-09-07 PROCEDURE — 99283 EMERGENCY DEPT VISIT LOW MDM: CPT

## 2018-09-07 PROCEDURE — 83690 ASSAY OF LIPASE: CPT | Performed by: EMERGENCY MEDICINE

## 2018-09-07 PROCEDURE — 83880 ASSAY OF NATRIURETIC PEPTIDE: CPT | Performed by: EMERGENCY MEDICINE

## 2018-09-07 PROCEDURE — 84484 ASSAY OF TROPONIN QUANT: CPT

## 2018-09-07 PROCEDURE — 80053 COMPREHEN METABOLIC PANEL: CPT | Performed by: EMERGENCY MEDICINE

## 2018-09-07 PROCEDURE — 71045 X-RAY EXAM CHEST 1 VIEW: CPT

## 2018-09-07 PROCEDURE — 93005 ELECTROCARDIOGRAM TRACING: CPT | Performed by: EMERGENCY MEDICINE

## 2018-09-07 PROCEDURE — 85025 COMPLETE CBC W/AUTO DIFF WBC: CPT | Performed by: EMERGENCY MEDICINE

## 2018-09-07 RX ORDER — GABAPENTIN 100 MG/1
100 CAPSULE ORAL ONCE
Status: COMPLETED | OUTPATIENT
Start: 2018-09-07 | End: 2018-09-07

## 2018-09-07 RX ORDER — PROMETHAZINE HYDROCHLORIDE 25 MG/ML
25 INJECTION, SOLUTION INTRAMUSCULAR; INTRAVENOUS ONCE
Status: DISCONTINUED | OUTPATIENT
Start: 2018-09-07 | End: 2018-09-07

## 2018-09-07 RX ORDER — SODIUM CHLORIDE 0.9 % (FLUSH) 0.9 %
10 SYRINGE (ML) INJECTION AS NEEDED
Status: DISCONTINUED | OUTPATIENT
Start: 2018-09-07 | End: 2018-09-07 | Stop reason: HOSPADM

## 2018-09-07 RX ORDER — LIDOCAINE 50 MG/G
1 PATCH TOPICAL EVERY 24 HOURS
Qty: 30 EACH | Refills: 0 | Status: SHIPPED | OUTPATIENT
Start: 2018-09-07 | End: 2022-06-06

## 2018-09-07 RX ORDER — AZITHROMYCIN 250 MG/1
250 TABLET, FILM COATED ORAL DAILY
Qty: 6 TABLET | Refills: 0 | Status: SHIPPED | OUTPATIENT
Start: 2018-09-07 | End: 2018-10-20 | Stop reason: HOSPADM

## 2018-09-07 RX ORDER — ASPIRIN 81 MG/1
324 TABLET, CHEWABLE ORAL ONCE
Status: COMPLETED | OUTPATIENT
Start: 2018-09-07 | End: 2018-09-07

## 2018-09-07 RX ORDER — GABAPENTIN 300 MG/1
300 CAPSULE ORAL DAILY
Qty: 7 CAPSULE | Refills: 0 | Status: SHIPPED | OUTPATIENT
Start: 2018-09-07 | End: 2023-06-06

## 2018-09-07 RX ORDER — PROMETHAZINE HYDROCHLORIDE 25 MG/ML
25 INJECTION, SOLUTION INTRAMUSCULAR; INTRAVENOUS ONCE
Status: COMPLETED | OUTPATIENT
Start: 2018-09-07 | End: 2018-09-07

## 2018-09-07 RX ADMIN — PROMETHAZINE HYDROCHLORIDE 25 MG: 25 INJECTION INTRAMUSCULAR; INTRAVENOUS at 11:51

## 2018-09-07 RX ADMIN — GABAPENTIN 100 MG: 100 CAPSULE ORAL at 04:22

## 2018-09-07 RX ADMIN — HYDROMORPHONE HYDROCHLORIDE 1 MG: 1 INJECTION, SOLUTION INTRAMUSCULAR; INTRAVENOUS; SUBCUTANEOUS at 11:51

## 2018-09-07 RX ADMIN — ASPIRIN 81 MG 324 MG: 81 TABLET ORAL at 02:21

## 2018-09-07 NOTE — ED PROVIDER NOTES
Subjective   Patient comes to the emergency Department today for the second time today.  He was seen initially earlier today for right-sided chest pain which turned out to be a small pneumonia.  He is on antibiotics.  He has chronic pain in the right hip and down the right thigh.  This been going on since he's had multiple surgeries.  Visits to our emergency department for this chronic pain.  He is recently tried on Neurontin which cause some blistering of his hands.  He was subsequently stopped.  He's had a decrease in his pain medication initially was on morphine orally and oxycodone.  He's been taken down to the Lortab tens at this point.  Patient had no new falls no new trauma this pain is no different just continuing he is unable to sleep.  States that they discussed possibly pain management referral but is not occurred up to this point.  Patient is had multiple workups including CAT scans of the spine and the hip she's been seen multiple times by neurosurgery.        History provided by:  Patient   used: No    Lower Extremity Issue   Location:  Leg  Injury: no    Leg location:  R upper leg  Pain details:     Quality:  Aching, burning and sharp    Radiates to:  R leg    Severity:  Severe    Onset quality:  Gradual    Timing:  Constant    Progression:  Waxing and waning  Chronicity:  Chronic  Dislocation: no    Foreign body present:  No foreign bodies  Tetanus status:  Up to date  Prior injury to area:  No  Relieved by:  Nothing  Worsened by:  Extension  Associated symptoms: no fever        Review of Systems   Constitutional: Negative for chills and fever.   Respiratory: Positive for cough.    Gastrointestinal: Negative for abdominal pain, nausea and vomiting.   Psychiatric/Behavioral: Negative.    All other systems reviewed and are negative.      Past Medical History:   Diagnosis Date   • Arthritis    • Asthma     inhaler daily    • Diabetes mellitus (CMS/[facility]) 2000    insulin daily; checks  blood sugars on occasion-run 150-180   • Elevated cholesterol    • History of sleep apnea     years ago diagnosed but never used a machine at home    • History of staph infection 2010    wound infection after left knee replacement -saw infectious disease MD    • Hypertension    • Wears glasses        Allergies   Allergen Reactions   • Bactrim [Sulfamethoxazole-Trimethoprim] Other (See Comments)     Patient developed blisters on his hands.       Past Surgical History:   Procedure Laterality Date   • COLONOSCOPY     • HIP SURGERY Right 08/11/2014    [facility]   • INCISION AND DRAINAGE OF WOUND Left 2010    x2 of total knee replacement wound -iv antibiotics   • KNEE SURGERY Bilateral     [facility]   • LUMBAR LAMINECTOMY WITH FUSION N/A 7/27/2018    Procedure: TLIF, OSTEOTOMY L4-5 , POST FUSION L4-5;  Surgeon: Yo Bryson MD;  Location:  [facility];  Service: Neurosurgery   • LUMBAR LAMINECTOMY WITH FUSION N/A 8/27/2018    Procedure: LUMBAR LAMINECTOMY POSTERIOR LUMBAR INTERBODY FUSION;  Surgeon: Yo Bryson MD;  Location:  [facility];  Service: Neurosurgery   • ROTATOR CUFF REPAIR Right    • TONSILLECTOMY         Family History   Problem Relation Age of Onset   • Hypertension Mother        Social History     Social History   • Marital status: Single     Social History Main Topics   • Smoking status: Never Smoker   • Smokeless tobacco: Never Used   • Alcohol use No   • Drug use: No   • Sexual activity: Defer     Other Topics Concern   • Not on file           Objective   Physical Exam   Constitutional: He is oriented to person, place, and time. He appears well-developed and well-nourished.   Warm pink dry   HENT:   Head: Normocephalic and atraumatic.   Right Ear: External ear normal.   Left Ear: External ear normal.   Nose: Nose normal.   Mouth/Throat: Oropharynx is clear and moist.   Eyes: Pupils are equal, round, and reactive to light. Conjunctivae and EOM are normal.  No scleral icterus.   Neck: Normal range of motion. No thyromegaly present.   Cardiovascular: Normal rate, regular rhythm and normal heart sounds.    Pulmonary/Chest: Effort normal and breath sounds normal. No respiratory distress. He has no wheezes. He has no rales. He exhibits no tenderness.   Abdominal: Soft. Bowel sounds are normal. He exhibits no distension. There is no tenderness.   Musculoskeletal: Normal range of motion.   Wound of the lumbar spine appears to be healing well Staples are still in place is no redness or induration.  No drainage from the wound.  Seneschal right hip reveals no rash no lesions no redness no induration.  No limited range of motion.  Scars are well healed well formed.   Lymphadenopathy:     He has no cervical adenopathy.   Neurological: He is alert and oriented to person, place, and time. He has normal reflexes. He displays normal reflexes. No cranial nerve deficit. Coordination normal.   Skin: Skin is warm and dry.   Psychiatric: He has a normal mood and affect. His behavior is normal. Judgment and thought content normal.   Nursing note and vitals reviewed.      Procedures           ED Course  ED Course as of Sep 10 0716   Fri Sep 07, 2018   1121 Patient was discussed with Oz Nickerson PA-C says they will see the patient Monday him call the office to set up an appointment possibly to discuss pain management.  [ANGELIC]   1123 Should the patient's records from earlier today.  Discussed with neurosurgery.  [ANGELIC]   1123 Patient states this pain is no different than has been chronic and ongoing.  [ANGELIC]      ED Course User Index  [ANGELIC] Jersey Sorto PA        No results found for this or any previous visit (from the past 24 hour(s)).  Note: In addition to lab results from this visit, the labs listed above may include labs taken at another facility or during a different encounter within the last 24 hours. Please correlate lab times with ED admission and discharge times for further  "clarification of the services performed during this visit.    No orders to display     Vitals:    09/07/18 0847   BP: 150/97   BP Location: Left arm   Patient Position: Lying   Pulse: 96   Resp: 18   Temp: 98.4 °F (36.9 °C)   TempSrc: Oral   SpO2: 98%   Weight: 136 kg (300 lb)   Height: 188 cm (74\")     Medications   HYDROmorphone (DILAUDID) injection 1 mg (1 mg Intramuscular Given 9/7/18 1151)   promethazine (PHENERGAN) injection 25 mg (25 mg Intramuscular Given 9/7/18 1151)     ECG/EMG Results (last 24 hours)     ** No results found for the last 24 hours. **                  MDM  Number of Diagnoses or Management Options  Radiculopathy, lumbar region: new and requires workup     Amount and/or Complexity of Data Reviewed  Review and summarize past medical records: yes  Discuss the patient with other providers: yes    Patient Progress  Patient progress: stable        Final diagnoses:   Radiculopathy, lumbar region            Jersey Sorto PA  09/10/18 0716    "

## 2018-09-07 NOTE — ED PROVIDER NOTES
Subjective   Amadeo Kong is a 57 y.o.male with a past medical history significant for hypertension, hyperlipidemia, and diabetes mellitus who presents to the emergency department with complaints of sudden onset of severe chest pain on the right side of the chest that woke him from sleep approximately two hours ago at 0115. This is sharp and non-radiating in nature. He also complains of chills but denies cough, fever, or difficulty breathing. He was seen by Dr. Yo Bryson for a lumbar laminectomy with posterior lumbar interbody fusion on 7/27 and had a subsequent encounter on 8/27 for severe right leg pain in the anterior thigh and leg. The pain in his lower extremities is not new or different than baseline. There are no other acute complaints at this time.        History provided by:  Patient  Chest Pain   Pain location:  R chest  Pain radiates to:  Does not radiate  Pain severity:  Severe  Onset quality:  Sudden  Duration:  2 hours  Timing:  Constant  Progression:  Improving  Chronicity:  New  Context: at rest    Relieved by:  None tried  Worsened by:  Nothing  Ineffective treatments:  None tried  Associated symptoms: no cough, no fever and no shortness of breath    Risk factors: diabetes mellitus, high cholesterol, hypertension and male sex    Risk factors: no smoking        Review of Systems   Constitutional: Positive for chills. Negative for fever.   Respiratory: Negative for cough and shortness of breath.    Cardiovascular: Positive for chest pain.   Musculoskeletal: Positive for myalgias.   All other systems reviewed and are negative.      Past Medical History:   Diagnosis Date   • Arthritis    • Asthma     inhaler daily    • Diabetes mellitus (CMS/[facility]) 2000    insulin daily; checks blood sugars on occasion-run 150-180   • Elevated cholesterol    • History of sleep apnea     years ago diagnosed but never used a machine at home    • History of staph infection 2010    wound infection after left knee replacement  -saw infectious disease MD    • Hypertension    • Wears glasses        Allergies   Allergen Reactions   • Bactrim [Sulfamethoxazole-Trimethoprim] Other (See Comments)     Patient developed blisters on his hands.       Past Surgical History:   Procedure Laterality Date   • COLONOSCOPY     • [facility]   • INCISION AND DRAINAGE OF WOUND Left 2010    x2 of total knee replacement wound -iv antibiotics   • KNEE SURGERY Bilateral     Right [facility]- 2008[facility]   • LUMBAR LAMINECTOMY WITH FUSION N/A 7/27/2018    Procedure: TLIF, OSTEOTOMY L4-5 , POST FUSION L4-5;  Surgeon: Yo Bryson MD;  Location:  [facility];  Service: Neurosurgery   • LUMBAR LAMINECTOMY WITH FUSION N/A 8/27/2018    Procedure: LUMBAR LAMINECTOMY POSTERIOR LUMBAR INTERBODY FUSION;  Surgeon: Yo Bryson MD;  Location:  [facility];  Service: Neurosurgery   • ROTATOR CUFF REPAIR Right    • TONSILLECTOMY         Family History   Problem Relation Age of Onset   • Hypertension Mother        Social History     Social History   • Marital status: Single     Social History Main Topics   • Smoking status: Never Smoker   • Smokeless tobacco: Never Used   • Alcohol use No   • Drug use: No   • Sexual activity: Defer     Other Topics Concern   • Not on file         Objective   Physical Exam   Constitutional: He is oriented to person, place, and time. He appears well-developed and well-nourished. No distress.   HENT:   Head: Normocephalic and atraumatic.   Eyes: Conjunctivae are normal. No scleral icterus.   Neck: Normal range of motion. Neck supple.   Cardiovascular: Normal rate, regular rhythm and normal heart sounds.    No murmur heard.  Pulmonary/Chest: Effort normal and breath sounds normal. No respiratory distress. He exhibits tenderness.   Reproducible pain with palpation of the right chest wall.   Abdominal: Soft. Bowel sounds are normal. There is no tenderness. There is no rebound and no guarding.    Musculoskeletal: He exhibits no edema.   No edema. Equal calf size.   Neurological: He is alert and oriented to person, place, and time.   Skin: Skin is warm and dry. No erythema.   Psychiatric: He has a normal mood and affect. His behavior is normal.   Nursing note and vitals reviewed.      Procedures         ED Course     Recent Results (from the past 24 hour(s))   Comprehensive Metabolic Panel    Collection Time: 09/07/18  2:16 AM   Result Value Ref Range    Glucose 187 (H) 70 - 100 mg/dL    BUN 13 9 - 23 mg/dL    Creatinine 1.22 0.60 - 1.30 mg/dL    Sodium 134 132 - 146 mmol/L    Potassium 4.4 3.5 - 5.5 mmol/L    Chloride 101 99 - 109 mmol/L    CO2 28.0 20.0 - 31.0 mmol/L    Calcium 9.2 8.7 - 10.4 mg/dL    Total Protein 8.0 5.7 - 8.2 g/dL    Albumin 3.88 3.20 - 4.80 g/dL    ALT (SGPT) 12 7 - 40 U/L    AST (SGOT) 21 0 - 33 U/L    Alkaline Phosphatase 131 (H) 25 - 100 U/L    Total Bilirubin 0.3 0.3 - 1.2 mg/dL    eGFR  African Amer 74 >60 mL/min/1.73    Globulin 4.1 gm/dL    A/G Ratio 0.9 (L) 1.5 - 2.5 g/dL    BUN/Creatinine Ratio 10.7 7.0 - 25.0    Anion Gap 5.0 3.0 - 11.0 mmol/L   Lipase    Collection Time: 09/07/18  2:16 AM   Result Value Ref Range    Lipase 34 6 - 51 U/L   BNP    Collection Time: 09/07/18  2:16 AM   Result Value Ref Range    BNP 12.0 0.0 - 100.0 pg/mL   Light Blue Top    Collection Time: 09/07/18  2:16 AM   Result Value Ref Range    Extra Tube hold for add-on    Green Top (Gel)    Collection Time: 09/07/18  2:16 AM   Result Value Ref Range    Extra Tube Hold for add-ons.    Lavender Top    Collection Time: 09/07/18  2:16 AM   Result Value Ref Range    Extra Tube hold for add-on    Gold Top - SST    Collection Time: 09/07/18  2:16 AM   Result Value Ref Range    Extra Tube Hold for add-ons.    CBC Auto Differential    Collection Time: 09/07/18  2:16 AM   Result Value Ref Range    WBC 7.40 3.50 - 10.80 10*3/mm3    RBC 3.96 (L) 4.20 - 5.76 10*6/mm3    Hemoglobin 11.2 (L) 13.1 - 17.5 g/dL     Hematocrit 33.5 (L) 38.9 - 50.9 %    MCV 84.6 80.0 - 99.0 fL    MCH 28.3 27.0 - 31.0 pg    MCHC 33.4 32.0 - 36.0 g/dL    RDW 12.7 11.3 - 14.5 %    RDW-SD 38.8 37.0 - 54.0 fl    MPV 8.6 6.0 - 12.0 fL    Platelets 251 150 - 450 10*3/mm3    Neutrophil % 55.5 41.0 - 71.0 %    Lymphocyte % 33.9 24.0 - 44.0 %    Monocyte % 8.1 0.0 - 12.0 %    Eosinophil % 2.4 0.0 - 3.0 %    Basophil % 0.1 0.0 - 1.0 %    Immature Grans % 0.1 0.0 - 0.6 %    Neutrophils, Absolute 4.10 1.50 - 8.30 10*3/mm3    Lymphocytes, Absolute 2.51 0.60 - 4.80 10*3/mm3    Monocytes, Absolute 0.60 0.00 - 1.00 10*3/mm3    Eosinophils, Absolute 0.18 0.00 - 0.30 10*3/mm3    Basophils, Absolute 0.01 0.00 - 0.20 10*3/mm3    Immature Grans, Absolute 0.01 0.00 - 0.03 10*3/mm3   POC Troponin, Rapid    Collection Time: 09/07/18  2:46 AM   Result Value Ref Range    Troponin I 0.00 0.00 - 0.07 ng/mL   POC Troponin, Rapid    Collection Time: 09/07/18  4:32 AM   Result Value Ref Range    Troponin I 0.00 0.00 - 0.07 ng/mL     Note: In addition to lab results from this visit, the labs listed above may include labs taken at another facility or during a different encounter within the last 24 hours. Please correlate lab times with ED admission and discharge times for further clarification of the services performed during this visit.    XR Chest 1 View   Final Result     Ill-defined opacity within the right lower lobe, which may represent    developing pneumonia.      THIS DOCUMENT HAS BEEN ELECTRONICALLY SIGNED BY GO DÍAZ MD        Vitals:    09/07/18 0300 09/07/18 0400 09/07/18 0430 09/07/18 0538   BP: 119/78 132/98 130/95 131/92   BP Location:    Right arm   Patient Position:    Lying   Pulse: 86 84 83 80   Resp:    18   Temp:       SpO2: 97%   98%   Weight:       Height:         Medications   sodium chloride 0.9 % flush 10 mL (not administered)   aspirin chewable tablet 324 mg (324 mg Oral Given 9/7/18 0221)   gabapentin (NEURONTIN) capsule 100 mg (100 mg Oral Given  9/7/18 0422)     ECG/EMG Results (last 24 hours)     Procedure Component Value Units Date/Time    ECG 12 Lead [[id number]] Collected:  09/07/18 0201     Updated:  09/07/18 0201                       MDM  Number of Diagnoses or Management Options  Acute chest wall pain: new and requires workup  Neuropathic pain of both legs: new and requires workup  Pneumonia of right lower lobe due to infectious organism (CMS/HCC): new and requires workup  Diagnosis management comments: Patient has reproducible pain to palpation over the right chest.  Therefore I feel the patient's right anterior chest pain is muscular skeletal nature.    Chest x-ray however does report a right lower lobe pneumonia.  I will treat this as pneumonia with a course of azithromycin.  But the patient symptoms are felt to be more appropriately correlated with chest wall pain.     Patient's lower extremity pain is described as a burning sensation, however he has no edema, equal calf size, no signs of DVT, and normal temperature to palpation of his lower extremity.    The patient's lower extremity pain is neuropathic in nature and  I will initiate gabapentin.    I advised him to follow-up with his primary care physician for further discussion of this lower extremity pain within the next week.         Amount and/or Complexity of Data Reviewed  Clinical lab tests: ordered and reviewed  Tests in the radiology section of CPT®: ordered and reviewed  Decide to obtain previous medical records or to obtain history from someone other than the patient: yes  Obtain history from someone other than the patient: yes  Review and summarize past medical records: yes  Independent visualization of images, tracings, or specimens: yes        Final diagnoses:   Acute chest wall pain   Pneumonia of right lower lobe due to infectious organism (CMS/HCC)   Neuropathic pain of both legs       Documentation assistance provided by frank Monge.  Information recorded by the  scribe was done at my direction and has been verified and validated by me.     Adina Monge  09/07/18 0350       Tatum Albright MD  09/07/18 0678

## 2018-09-07 NOTE — DISCHARGE INSTRUCTIONS
Patient advised to complete azithromycin as prescribed.    Take gabapentin as prescribed for lower extremity neuropathic pain.     Follow-up with primary care physician within the next week for further evaluation of lower extremity neuropathic pain.

## 2018-09-10 ENCOUNTER — READMISSION MANAGEMENT (OUTPATIENT)
Dept: CALL CENTER | Facility: HOSPITAL | Age: 58
End: 2018-09-10

## 2018-09-10 NOTE — OUTREACH NOTE
Medical Week 1 Survey      Responses   Facility patient discharged from?  [facility]   Does the patient have one of the following disease processes/diagnoses(primary or secondary)?  General Surgery   Is there a successful TCM telephone encounter documented?  No   Week 1 attempt successful?  No   Unsuccessful attempts  Attempt 2          Ashley Herbert RN

## 2018-09-12 ENCOUNTER — READMISSION MANAGEMENT (OUTPATIENT)
Dept: CALL CENTER | Facility: HOSPITAL | Age: 58
End: 2018-09-12

## 2018-09-12 NOTE — OUTREACH NOTE
Medical Week 1 Survey      Responses   Facility patient discharged from?  [facility]   Does the patient have one of the following disease processes/diagnoses(primary or secondary)?  General Surgery   Is there a successful TCM telephone encounter documented?  No   Week 1 attempt successful?  Yes   Call end time  1146   Discharge diagnosis  repositioning of L4-L5 interbody cage on 8/27/18    Is patient permission given to speak with other caregiver?  Yes   List who call center can speak with  Angelica Schofield     Person spoke with today (if not patient) and relationship  Angelica Schofield   significant other   Meds reviewed with patient/caregiver?  Yes   Medication comments  Angelica did not know if pt was taking medications as ordered   Does the patient have a primary care provider?   Yes   Does the patient have an appointment with their PCP within 7 days of discharge?  Greater than 7 days   What is preventing the patient from scheduling follow up appointments within 7 days of discharge?  Earlier appointment not available   Has the patient kept scheduled appointments due by today?  N/A   What is the Home health agency?   [facility]   Has home health visited the patient within 72 hours of discharge?  Yes   Comments  Angelica says pain is better   Did the patient receive a copy of their discharge instructions?  Yes   Nursing interventions  Reviewed instructions with patient   What is the patient's perception of their health status since discharge?  Improving   Is the patient/caregiver able to teach back signs and symptoms related to disease process for when to call PCP?  Yes   Is the patient/caregiver able to teach back signs and symptoms related to disease process for when to call 911?  Yes   Week 1 call completed?  Yes          Jasmin Sood RN

## 2018-09-13 ENCOUNTER — DOCUMENTATION (OUTPATIENT)
Dept: NEUROSURGERY | Facility: CLINIC | Age: 58
End: 2018-09-13

## 2018-09-13 ENCOUNTER — OFFICE VISIT (OUTPATIENT)
Dept: NEUROSURGERY | Facility: CLINIC | Age: 58
End: 2018-09-13

## 2018-09-13 VITALS
BODY MASS INDEX: 36.45 KG/M2 | TEMPERATURE: 97.7 F | HEIGHT: 74 IN | DIASTOLIC BLOOD PRESSURE: 88 MMHG | SYSTOLIC BLOOD PRESSURE: 122 MMHG | WEIGHT: 284 LBS

## 2018-09-13 DIAGNOSIS — M48.062 SPINAL STENOSIS, LUMBAR REGION, WITH NEUROGENIC CLAUDICATION: Primary | ICD-10-CM

## 2018-09-13 PROCEDURE — 99024 POSTOP FOLLOW-UP VISIT: CPT | Performed by: PHYSICIAN ASSISTANT

## 2018-09-13 RX ORDER — OXYCODONE AND ACETAMINOPHEN 7.5; 325 MG/1; MG/1
1 TABLET ORAL EVERY 6 HOURS PRN
Qty: 60 TABLET | Refills: 0 | Status: SHIPPED | OUTPATIENT
Start: 2018-09-13 | End: 2022-04-11

## 2018-09-13 NOTE — TELEPHONE ENCOUNTER
Provider: Graeme   Caller: self, office visit      Surgery: LUMBAR LAMINECTOMY POSTERIOR LUMBAR INTERBODY FUSION    Surgery Date: 8/27/18   Last visit: 9/13/18    Next visit: 10/2018    JAZ:         Reason for call:       Refill request, handwritten during office visit.

## 2018-09-13 NOTE — PROGRESS NOTES
Patient: Amadeo Kong  : 1960    Primary Care Provider: Kimber Roland MD      Chief Complaint: Right hip pain    History of Present Illness:       Patient is on fortunate 57-year-old male who underwent a L4 5 TLIF on 2018 performed by Dr. Yo Bryson.  Patient presented to the emergency department numerous times with no complaints of low back and right leg pain.  CT of the lumbar spine was performed and showed a pseudoarthrosis and the cage back out into the dura on the right side.  Patient required a revision L4 5.  TLIF on 2018.  Patient went to the emergency department 3 times in between the surgery and this follow-up visit, but seems to be doing very well.  Patient is here today for a staple removal.    Patient's incision is clean, dry.  No signs of infection, bleeding, or erythema.  There is some overlap of the superior portion of the skin that has shown some delayed healing, but is not weeping or showing any signs of infection.    Patient has been on quite a bit of pain medicine but has been trying to wean himself down off of the Norco.  Patient states it has been making him nauseous and would prefer Percocet.  We have made arrangements for this, but in a decreased potency.  Patient is been transitioned to Percocet 7.5 4 times a day, #60.  From Norco 10 4 times a day #120.    This is the patient's first postoperative prescription.    Patient is able to walk upright with a limp, but has been having issues with his right hip, and he believes this is the major part of his issue.  I have reassured the patient that he is looking much better than when I saw him in the emergency department and he agrees 100%.  Patient states that he has had very good reduction in his leg pain and now is only having some surgical soreness and right hip pain in the lateral aspect.    Review of Systems   Constitutional: Negative for activity change, appetite change, chills, diaphoresis, fatigue, fever and  unexpected weight change.   HENT: Negative for congestion, dental problem, drooling, ear discharge, ear pain, facial swelling, hearing loss, mouth sores, nosebleeds, postnasal drip, rhinorrhea, sinus pressure, sneezing, sore throat, tinnitus, trouble swallowing and voice change.    Eyes: Negative for photophobia, pain, discharge, redness, itching and visual disturbance.   Respiratory: Negative for apnea, cough, choking, chest tightness, shortness of breath, wheezing and stridor.    Cardiovascular: Negative for chest pain, palpitations and leg swelling.   Gastrointestinal: Negative for abdominal distention, abdominal pain, anal bleeding, blood in stool, constipation, diarrhea, nausea, rectal pain and vomiting.   Endocrine: Negative for cold intolerance, heat intolerance, polydipsia, polyphagia and polyuria.   Genitourinary: Negative for decreased urine volume, difficulty urinating, dysuria, enuresis, flank pain, frequency, genital sores, hematuria and urgency.   Musculoskeletal: Negative for arthralgias, back pain, gait problem, joint swelling, myalgias, neck pain and neck stiffness.   Skin: Negative for color change, pallor, rash and wound.   Allergic/Immunologic: Negative for environmental allergies, food allergies and immunocompromised state.   Neurological: Negative for dizziness, tremors, seizures, syncope, facial asymmetry, speech difficulty, weakness, light-headedness, numbness and headaches.   Hematological: Negative for adenopathy. Does not bruise/bleed easily.   Psychiatric/Behavioral: Negative for agitation, behavioral problems, confusion, decreased concentration, dysphoric mood, hallucinations, self-injury, sleep disturbance and suicidal ideas. The patient is not nervous/anxious and is not hyperactive.        Past Medical History:     Past Medical History:   Diagnosis Date   • Arthritis    • Asthma     inhaler daily    • Diabetes mellitus (CMS/[facility]) 2000    insulin daily; checks blood sugars on  "occasion-run 150-180   • Elevated cholesterol    • History of sleep apnea     years ago diagnosed but never used a machine at home    • History of staph infection 2010    wound infection after left knee replacement -saw infectious disease MD    • Hypertension    • Wears glasses        Family History:     Family History   Problem Relation Age of Onset   • Hypertension Mother        Social History:    reports that he has never smoked. He has never used smokeless tobacco. He reports that he does not drink alcohol or use drugs.   SMOKING STATUS: Nonsmoker    Surgical History:     Past Surgical History:   Procedure Laterality Date   • COLONOSCOPY     • [facility] Right [facility]11/[facility]   • INCISION AND DRAINAGE OF WOUND Left 2010    x2 of total knee replacement wound -iv antibiotics   • KNEE SURGERY Bilateral     Right knee- 2008, left knee- 2010- [facility]   • LUMBAR LAMINECTOMY WITH FUSION N/A 7/27/2018    Procedure: TLIF, OSTEOTOMY L4-5 , POST FUSION L4-5;  Surgeon: Yo Bryson MD;  Location: [facility] OR;  Service: Neurosurgery   • LUMBAR LAMINECTOMY WITH FUSION N/A 8/27/2018    Procedure: LUMBAR LAMINECTOMY POSTERIOR LUMBAR INTERBODY FUSION;  Surgeon: Yo Bryson MD;  Location: [facility] OR;  Service: Neurosurgery   • ROTATOR CUFF REPAIR Right    • TONSILLECTOMY         Allergies:   Bactrim [sulfamethoxazole-trimethoprim]    Physical Exam:    Vital Signs:/88 (BP Location: Right arm, Patient Position: Sitting)   Temp 97.7 °F (36.5 °C) (Temporal Artery )   Ht 188 cm (74.02\")   Wt 129 kg (284 lb)   BMI 36.45 kg/m²    BMI: Body mass index is 36.45 kg/m².    GENEREAL:   The patient is in no acute distress, and is able to answer all questions appropriately.  Skin:  The incision is well-healed and well approximated.  No signs of infection, bleeding, or erythema.  Musculoskeletal: The patient’s strength is intact in upper and lower extremities upon direct testing.  Dorsiflexion and " plantarflexion are equal bilaterally @ 5/5. Hip flexion against resistance.  The patient’s gait is normal without antalgia.  Neurologic: The patient is alert and oriented by 3.  Recent memory, language, attention span, and fund of knowledge are all with within normal limits.   Sensation is equal bilaterally with no deficit.    Reflexes are 2+ at the patellar and Achilles tendon bilaterally.      Medical Decision Making    Data Review:   No new films reviewed this visit    Diagnosis:   Obesity  Revision L4 5 TLIF    Treatment Options:   Patient is going to pursue physical therapy.  Patient will try to wean down on the pain medicine.  Patient is also going to follow up in 4 weeks with AP and lateral x-ray of the lumbar spine.    It has been a pleasure providing neurosurgical care.    Oz Nickerson PA-C      No diagnosis found.

## 2018-09-13 NOTE — PROGRESS NOTES
I spoke with patient's pharmacist and let her know of patient's surgical history.  She had some concerns about filling his medication even though a Prior - Auth was not necessary.  All her questions were answered, so at that point she agreed to fill the Percocet 7.5/325 mg.  Patient is aware because he was at the drive through at the pharmacy.

## 2018-09-18 ENCOUNTER — HOSPITAL ENCOUNTER (EMERGENCY)
Facility: HOSPITAL | Age: 58
Discharge: HOME | End: 2018-09-18
Attending: EMERGENCY MEDICINE | Admitting: EMERGENCY MEDICINE

## 2018-09-18 VITALS
RESPIRATION RATE: 18 BRPM | TEMPERATURE: 99.2 F | SYSTOLIC BLOOD PRESSURE: 120 MMHG | HEART RATE: 85 BPM | OXYGEN SATURATION: 100 % | HEIGHT: 74 IN | BODY MASS INDEX: 36.32 KG/M2 | WEIGHT: 283 LBS | DIASTOLIC BLOOD PRESSURE: 89 MMHG

## 2018-09-18 DIAGNOSIS — G89.29 CHRONIC LOW BACK PAIN WITHOUT SCIATICA, UNSPECIFIED BACK PAIN LATERALITY: ICD-10-CM

## 2018-09-18 DIAGNOSIS — M54.50 CHRONIC LOW BACK PAIN WITHOUT SCIATICA, UNSPECIFIED BACK PAIN LATERALITY: ICD-10-CM

## 2018-09-18 DIAGNOSIS — G89.29 CHRONIC RIGHT HIP PAIN: Primary | ICD-10-CM

## 2018-09-18 DIAGNOSIS — M25.551 CHRONIC RIGHT HIP PAIN: Primary | ICD-10-CM

## 2018-09-18 PROCEDURE — 25010000002 HYDROMORPHONE PER 4 MG: Performed by: EMERGENCY MEDICINE

## 2018-09-18 PROCEDURE — 99283 EMERGENCY DEPT VISIT LOW MDM: CPT

## 2018-09-18 PROCEDURE — 96372 THER/PROPH/DIAG INJ SC/IM: CPT

## 2018-09-18 RX ADMIN — HYDROMORPHONE HYDROCHLORIDE 1 MG: 1 INJECTION, SOLUTION INTRAMUSCULAR; INTRAVENOUS; SUBCUTANEOUS at 14:16

## 2018-09-18 NOTE — ED PROVIDER NOTES
Subjective   Mr. Amadeo Kong is a 57 y.o. male who presents to the ED with c/o back pain. He reports that he had 2 back surgeries by Dr. Bryson, neurosurgery, on July 27th and was doing okay afterwards but some screws then came loose and he had to go back for another surgery on August 27th. However, ever since his surgery on August 27th he has had constant lower back pain that has caused difficulty sleeping. He notes that the back pain radiates down his right leg to his knee and is the same pain that he has been seen for multiple times in the past month. He states that he has been back to Dr. Bryson's office since the surgery and was prescribed 7.5mg  Percocet with no relief. He also complains of right hip pain but he denies any new numbness / tingling, weakness or incontinence. He also denies any recent falls or injuries. His next appointment with neurosurgery is on October 10th. He has an appointment to see Dr. Farfan, orthopedics, about his right hip on September 27th. He has a hx of DM, HTN, arthritis, and asthma. No other acute complaints at this time.        History provided by:  Patient  Back Pain   Location:  Lumbar spine  Pain severity:  Severe  Duration:  3 weeks  Timing:  Constant  Progression:  Unchanged  Chronicity:  New  Context: not recent injury    Context comment:  Recent surgery  Associated symptoms: no bladder incontinence and no bowel incontinence        Review of Systems   Gastrointestinal: Negative for bowel incontinence.   Genitourinary: Negative for bladder incontinence.   Musculoskeletal: Positive for arthralgias (Right hip pain) and back pain.   All other systems reviewed and are negative.      Past Medical History:   Diagnosis Date   • Arthritis    • Asthma     inhaler daily    • Diabetes mellitus (CMS/[facility]) 2000    insulin daily; checks blood sugars on occasion-run 150-180   • Elevated cholesterol    • History of sleep apnea     years ago diagnosed but never used a machine at home    • History  of staph infection 2010    wound infection after left knee replacement -saw infectious disease MD    • Hypertension    • Wears glasses        Allergies   Allergen Reactions   • Bactrim [Sulfamethoxazole-Trimethoprim] Other (See Comments)     Patient developed blisters on his hands.       Past Surgical History:   Procedure Laterality Date   • COLONOSCOPY     • HIP SURGERY Right 08/11/2014    [facility]   • INCISION AND DRAINAGE OF WOUND Left 2010    x2 of total knee replacement wound -iv antibiotics   • KNEE SURGERY Bilateral     Right knee- 2008, left knee- 2010- [facility]   • LUMBAR LAMINECTOMY WITH FUSION N/A 7/27/2018    Procedure: TLIF, OSTEOTOMY L4-5 , POST FUSION L4-5;  Surgeon: Yo Bryson MD;  Location:  [facility] OR;  Service: Neurosurgery   • LUMBAR LAMINECTOMY WITH FUSION N/A 8/27/2018    Procedure: LUMBAR LAMINECTOMY POSTERIOR LUMBAR INTERBODY FUSION;  Surgeon: Yo Bryson MD;  Location:  [facility] OR;  Service: Neurosurgery   • ROTATOR CUFF REPAIR Right    • TONSILLECTOMY         Family History   Problem Relation Age of Onset   • Hypertension Mother        Social History     Social History   • Marital status: Single     Social History Main Topics   • Smoking status: Never Smoker   • Smokeless tobacco: Never Used   • Alcohol use No   • Drug use: No   • Sexual activity: Defer     Other Topics Concern   • Not on file         Objective   Physical Exam   Constitutional: He is oriented to person, place, and time. He appears well-developed and well-nourished. No distress.   HENT:   Head: Normocephalic and atraumatic.   Nose: Nose normal.   Eyes: Conjunctivae are normal. No scleral icterus.   Neck: Normal range of motion. Neck supple.   Cardiovascular: Normal rate, regular rhythm and normal heart sounds.    No murmur heard.  Pulmonary/Chest: Effort normal and breath sounds normal. No respiratory distress.   Musculoskeletal: Normal range of motion. He exhibits tenderness.   TTP of the right lateral hip  "with negative straight right sided leg raise. No TTP of the lumbar spine on exam.   Neurological: He is alert and oriented to person, place, and time.   Patient has a surgical scar to his lumbar back that appears to be healing well with no surrounding erythema or edema. 5/5 strength to all extremities. Neurovascularly intact.   Skin: Skin is warm and dry. He is not diaphoretic.   Psychiatric: He has a normal mood and affect. His behavior is normal.   Nursing note and vitals reviewed.      Procedures         ED Course      Reviewed old records. Noted recent surgical notes, Neurosurgery f/u and ED notes.   Last CT Pelvis / Lumbar spine 9-3-18.     Due to patient's ongoing pain and no new pain as well as no new symptoms of numbness, tingling or weakness will not re image. He has full ROM of LE. He has no signs of infection with no edema, erythema or drainage. Pt has no TTP to lumbar spine but TTP to right hip which he states is an ongoing issue and he needs a hip replacement. He is already on Percocet 7.5mg at home for pain.     Reviewed records from . Noted seen in ED 9-11-18 and 9-17-18 (yesterday) and had negative xrays of right hip and labs with normal WBC (7.6) and CRP (1.0).     No results found for this or any previous visit (from the past 24 hour(s)).  Note: In addition to lab results from this visit, the labs listed above may include labs taken at another facility or during a different encounter within the last 24 hours. Please correlate lab times with ED admission and discharge times for further clarification of the services performed during this visit.    No orders to display     Vitals:    09/18/18 1242   BP: 120/89   BP Location: Left arm   Patient Position: Sitting   Pulse: 85   Resp: 18   Temp: 99.2 °F (37.3 °C)   TempSrc: Oral   SpO2: 100%   Weight: 128 kg (283 lb)   Height: 188 cm (74\")     Medications   HYDROmorphone (DILAUDID) injection 1 mg (1 mg Intramuscular Given 9/18/18 1416)                "         MDM    Final diagnoses:   Chronic right hip pain   Chronic low back pain without sciatica, unspecified back pain laterality       Documentation assistance provided by frank Ramirez.  Information recorded by the scribtran was done at my direction and has been verified and validated by me.     Anjlai Ramirez  09/18/18 2329       Maricarmen Argueta PA  09/18/18 1276

## 2018-09-19 ENCOUNTER — TELEPHONE (OUTPATIENT)
Dept: NEUROSURGERY | Facility: CLINIC | Age: 58
End: 2018-09-19

## 2018-09-19 ENCOUNTER — TELEPHONE (OUTPATIENT)
Dept: INTERNAL MEDICINE | Facility: CLINIC | Age: 58
End: 2018-09-19

## 2018-09-19 RX ORDER — TIZANIDINE 4 MG/1
TABLET ORAL
Qty: 281 TABLET | Refills: 0 | OUTPATIENT
Start: 2018-09-19

## 2018-09-19 RX ORDER — METHYLPREDNISOLONE 4 MG/1
TABLET ORAL
Qty: 1 EACH | Refills: 0 | Status: SHIPPED | OUTPATIENT
Start: 2018-09-19 | End: 2018-10-20 | Stop reason: HOSPADM

## 2018-09-19 RX ORDER — TIZANIDINE HYDROCHLORIDE 4 MG/1
4 CAPSULE, GELATIN COATED ORAL 3 TIMES DAILY
Qty: 50 CAPSULE | Refills: 0 | Status: SHIPPED | OUTPATIENT
Start: 2018-09-19 | End: 2018-10-01 | Stop reason: ALTCHOICE

## 2018-09-19 NOTE — TELEPHONE ENCOUNTER
Patient called my colleague this morning at [phone] to receive assistance for scheduling surgery.  Patient states that he was given our number to schedule right hip surgery b/c his current surgeon, Dr. Randell Farfan, is booked up and he is unable to wait a long time.  I advised patient that I am unable to schedule surgery, only primary care.  Patient states he has a pcp and doesn't wish to change.  I advised him to contact pcp for referral to surgeon.  Patient verbalized understanding.

## 2018-09-19 NOTE — TELEPHONE ENCOUNTER
"Provider: Graeme   Caller: self 2x and DENTON Lao 1x        Surgery: LUMBAR LAMINECTOMY POSTERIOR LUMBAR INTERBODY FUSION    Surgery Date: 8/27/18   Last visit: 9/13/18  -PT ALSO SEEN IN ED 09/18/18  Next visit: 10/2018 w/ BETTINA SALOMONPER: 08/22/2018 Oxycodone/Acetaminophen  325MG/7.5MG  1960 119 29 MEGAN Bryson [place] [id number] [place] [address]    08/23/2018 Oxycodone/Acetaminophen  325MG/10MG  1960 20 5 ERIN Bryant [place] [id number] [place]    08/28/2018 Hydrocodone/Acetaminophen  325MG/10MG  1960 120 30 MEGAN Bryson [place] [id number] [place] [address] 3    09/02/2018 Morphine Sulfate Er 15MG 1960 14 7 MEGAN Bryson [place] [facility] [place] [address] 3  09/07/2018 Gabapentin 300MG 1960 7 7 EVAN Albright [place] [id number] [place] 3    09/13/2018 Oxycodone/Acetaminophen  325MG/7.5MG  1960 60 15 ARA Bedolla Christian  [place] [facility] [place] [address]    Reason: pt left 2 VM this morning stating his pain so intense and he that the percocet Rx'd to him on 09/13/18 is not helping with the pain.     -pt has had same complaints for a month now.     -pt has constant lower back pain that has caused difficulty sleeping. He notes that the back pain radiates down his right leg to his knee and is the same pain that he has been seen for multiple times in the past month.    -pt also went to  ED on 09/11/18- I am obtaining records now.     -Pt was sent directly to me after his 4th time calling thi morning, pt kept stating \"these 7.5's are cutting it-I cant do PT with 7.5's, Pilar had a huge back surgery.\"       -pt demanding to see a MD, also stating he is going to go back to the ED today.    -Please advise?   "

## 2018-09-19 NOTE — TELEPHONE ENCOUNTER
Patient largely complains of hip pain.  He has no signs or symptoms of infection.  CRP from 9/11/2018 was 1.0.  Recent studies of his low back and hip have been stable.  He should have adequate pain medication. We are not increasing.  I prescribed a steroid and a new muscle relaxer to see if this calms his pain. He has no new symptoms. No B/B issues.

## 2018-09-19 NOTE — TELEPHONE ENCOUNTER
Pharmacy called and I gave the approval to switch the tizanidine to tabs for insurnace coverage.     [facility] has already spoke w/ pt and has akso sent a medrol pk.     -pt will keep his regular f/u on 10/11 w/ Dr. Germain

## 2018-09-19 NOTE — TELEPHONE ENCOUNTER
Pt called in again- He is aware of medrol pk and muscle relaxer and will be going to pick them up-     -I instructed pt to give the pk a few days before he starts seeing relief.     -we will call with update.

## 2018-09-21 ENCOUNTER — READMISSION MANAGEMENT (OUTPATIENT)
Dept: CALL CENTER | Facility: HOSPITAL | Age: 58
End: 2018-09-21

## 2018-09-21 NOTE — OUTREACH NOTE
Medical Week 2 Survey      Responses   Facility patient discharged from?  [facility]   Does the patient have one of the following disease processes/diagnoses(primary or secondary)?  General Surgery   Week 2 attempt successful?  No   Unsuccessful attempts  Attempt 1          Adithya Gonzalez RN

## 2018-09-24 ENCOUNTER — TELEPHONE (OUTPATIENT)
Dept: NEUROSURGERY | Facility: CLINIC | Age: 58
End: 2018-09-24

## 2018-09-24 RX ORDER — GABAPENTIN 300 MG/1
300 CAPSULE ORAL NIGHTLY
Qty: 30 CAPSULE | Refills: 1 | OUTPATIENT
Start: 2018-09-24 | End: 2018-10-10

## 2018-09-24 NOTE — TELEPHONE ENCOUNTER
Provider:  Marcellus  Caller: Amadeo Kong  Time of call:   11:38 AM  Phone #:  [phone]  Surgery:  Redo TLIF L4/5  Surgery Date:  08/27/18  Last visit:   09/13/18  Next visit: 10/11/18      Reason for call:       Patient called in to complain that he is still having the back pain that radiates down his right leg to knee, he said this is the same pain he was having before surgery. (He was in our ER for this on 09/18)    He was given a muscle relaxer and a steroid pack by our office on 9/19, with no relief.   He wants a PA to call him back or he is going to call an ambulance.

## 2018-09-25 ENCOUNTER — READMISSION MANAGEMENT (OUTPATIENT)
Dept: CALL CENTER | Facility: HOSPITAL | Age: 58
End: 2018-09-25

## 2018-09-25 NOTE — OUTREACH NOTE
Medical Week 2 Survey      Responses   Facility patient discharged from?  [facility]   Does the patient have one of the following disease processes/diagnoses(primary or secondary)?  Other   Week 2 attempt successful?  No   Unsuccessful attempts  Attempt 2          Nedra Davila RN

## 2018-09-27 ENCOUNTER — READMISSION MANAGEMENT (OUTPATIENT)
Dept: CALL CENTER | Facility: HOSPITAL | Age: 58
End: 2018-09-27

## 2018-09-27 ENCOUNTER — TELEPHONE (OUTPATIENT)
Dept: NEUROSURGERY | Facility: CLINIC | Age: 58
End: 2018-09-27

## 2018-09-27 NOTE — OUTREACH NOTE
Medical Week 2 Survey      Responses   Facility patient discharged from?  [facility]   Does the patient have one of the following disease processes/diagnoses(primary or secondary)?  Other   Week 2 attempt successful?  No   Unsuccessful attempts  Attempt 3          Gabriella Weiner RN

## 2018-09-27 NOTE — TELEPHONE ENCOUNTER
"Provider:  Marcellus  Caller: self  Time of call: 1:45    Phone #:  [phone]  Surgery: LUMBAR LAMINECTOMY POSTERIOR LUMBAR INTERBODY FUSION    Surgery Date: 8/27/18   Last visit: 9/13/18    Next visit: 10/11/18    JAZ:         Reason for call:       Patient called and states that he had an injection at Dr. Farfan's office today and he is still having a lot of pain in his leg and thigh.  I called and got the records from that office visit and after Michelle Brock reviewed them he suggested that he start taking a second gabapentin at night.    I called him to give him this information and he admitted to not taking the gabapentin because it makes him feel \"loopy and strange\". I explained to him that the medicine was designed to help with nerve pain and that he should at least give it a try for a few days to see if that makes a difference with his pain.   He has agreed to take the gabapentin once a day, probably during the day till Monday, then he will calls me back to let me know how he is doing.      "

## 2018-10-01 ENCOUNTER — OFFICE VISIT (OUTPATIENT)
Dept: NEUROSURGERY | Facility: CLINIC | Age: 58
End: 2018-10-01

## 2018-10-01 VITALS
HEIGHT: 74 IN | DIASTOLIC BLOOD PRESSURE: 70 MMHG | TEMPERATURE: 97.8 F | SYSTOLIC BLOOD PRESSURE: 106 MMHG | WEIGHT: 294 LBS | BODY MASS INDEX: 37.73 KG/M2

## 2018-10-01 DIAGNOSIS — M19.90 ARTHRITIS: ICD-10-CM

## 2018-10-01 DIAGNOSIS — M79.604 ANTERIOR LEG PAIN, RIGHT: Primary | ICD-10-CM

## 2018-10-01 DIAGNOSIS — M48.061 STENOSIS OF LATERAL RECESS OF LUMBAR SPINE: ICD-10-CM

## 2018-10-01 DIAGNOSIS — G89.29 CHRONIC BILATERAL LOW BACK PAIN WITHOUT SCIATICA: ICD-10-CM

## 2018-10-01 DIAGNOSIS — M54.50 CHRONIC BILATERAL LOW BACK PAIN WITHOUT SCIATICA: ICD-10-CM

## 2018-10-01 DIAGNOSIS — M43.16 SPONDYLOLISTHESIS OF LUMBAR REGION: ICD-10-CM

## 2018-10-01 DIAGNOSIS — M54.9 INTRACTABLE BACK PAIN: ICD-10-CM

## 2018-10-01 PROCEDURE — 99024 POSTOP FOLLOW-UP VISIT: CPT | Performed by: PHYSICIAN ASSISTANT

## 2018-10-01 RX ORDER — BACLOFEN 10 MG/1
20 TABLET ORAL 2 TIMES DAILY
Qty: 40 TABLET | Refills: 1 | Status: SHIPPED | OUTPATIENT
Start: 2018-10-01 | End: 2018-10-10

## 2018-10-01 NOTE — PROGRESS NOTES
Amadeo Kong  1960  10/01/2018  [id number]    Chief Complaint   Patient presents with   • Post-op     HPI:  Mr. Kong presents to the office today complaining of right thigh pain, pain from the right hip down to the right knee and abnormal sensations in the lower extremity on the right.  He states his pain is a severe stabbing pain in the right leg, this pain is similar to the pain that he had prior to his second surgery.  He got better after both surgeries initially.  He states he cannot sit without pain, lay down, stand or ambulate without pain.  He states something has to be done, he cannot live like this, he cannot walk and he just wants to walk.  The patient reports that he did start the Neurontin 300 mg at bedtime but it causes him to feel groggy the next morning, he doesn't like the way it makes him feel. He is voiding.   He went to orthopedic surgery and they did x-rays and told him that his hip was okay and not the source of his pain.    Past Medical History:   Diagnosis Date   • Arthritis    • Asthma     inhaler daily    • Diabetes mellitus (CMS/[facility]) 2000    insulin daily; checks blood sugars on occasion-run 150-180   • Elevated cholesterol    • History of sleep apnea     years ago diagnosed but never used a machine at home    • History of staph infection 2010    wound infection after left knee replacement -saw infectious disease MD    • Hypertension    • Wears glasses        Allergies   Allergen Reactions   • Bactrim [Sulfamethoxazole-Trimethoprim] Other (See Comments)     Patient developed blisters on his hands.         Current Outpatient Prescriptions:   •  albuterol (PROVENTIL HFA) 108 (90 Base) MCG/ACT inhaler, Inhale 2 puffs 4 (Four) Times a Day As Needed for Shortness of Air., Disp: , Rfl:   •  atorvastatin (LIPITOR) 20 MG tablet, Take 20 mg by mouth Every Morning., Disp: , Rfl:   •  azithromycin (ZITHROMAX) 250 MG tablet, Take 1 tablet by mouth Daily. Take 2 tablets the first day, then 1  tablet daily for 4 days., Disp: 6 tablet, Rfl: 0  •  budesonide-formoterol (SYMBICORT) 160-4.5 MCG/ACT inhaler, Inhale 2 puffs 2 (Two) Times a Day., Disp: , Rfl:   •  carvedilol (COREG) 3.125 MG tablet, Take 3.125 mg by mouth 2 (Two) Times a Day With Meals., Disp: , Rfl:   •  celecoxib (CeleBREX) 200 MG capsule, Take 200 mg by mouth Daily., Disp: , Rfl:   •  cetirizine (zyrTEC) 10 MG tablet, Take 10 mg by mouth Every Morning., Disp: , Rfl:   •  cyclobenzaprine (FLEXERIL) 10 MG tablet, Take 10 mg by mouth 2 (Two) Times a Day As Needed for Muscle Spasms., Disp: , Rfl:   •  ferrous sulfate 325 (65 FE) MG tablet, Take 1 tablet by mouth 2 (Two) Times a Day With Meals., Disp: 60 tablet, Rfl: 2  •  gabapentin (NEURONTIN) 300 MG capsule, Take 1 capsule by mouth Every Night., Disp: 30 capsule, Rfl: 1  •  HUMULIN 70/30 (70-30) 100 UNIT/ML injection, Inject 45 Units under the skin into the appropriate area as directed 2 (Two) Times a Day With Meals., Disp: , Rfl:   •  hydrochlorothiazide (HYDRODIURIL) 12.5 MG tablet, Take 12.5 mg by mouth Every Morning., Disp: , Rfl:   •  lidocaine (LIDODERM) 5 %, Place 1 patch on the skin as directed by provider Daily. Remove & Discard patch within 12 hours or as directed by MD, Disp: 30 each, Rfl: 0  •  lisinopril (PRINIVIL,ZESTRIL) 20 MG tablet, Take 20 mg by mouth Every Morning., Disp: , Rfl:   •  MethylPREDNISolone (MEDROL, PNADA,) 4 MG tablet, Take as directed on package instructions., Disp: 1 each, Rfl: 0  •  oxyCODONE-acetaminophen (PERCOCET) 7.5-325 MG per tablet, Take 1 tablet by mouth Every 6 (Six) Hours As Needed for Moderate Pain ., Disp: 60 tablet, Rfl: 0  •  RANEXA 500 MG 12 hr tablet, Take 500 mg by mouth Every 12 (Twelve) Hours., Disp: , Rfl:   •  tamsulosin (FLOMAX) 0.4 MG capsule 24 hr capsule, Take 1 capsule by mouth Daily., Disp: 30 capsule, Rfl: 2  •  TiZANidine (ZANAFLEX) 4 MG capsule, Take 1 capsule by mouth 3 (Three) Times a Day., Disp: 50 capsule, Rfl: 0    Social  "History     Social History   • Marital status: Single     Social History Main Topics   • Smoking status: Never Smoker   • Smokeless tobacco: Never Used   • Alcohol use No   • Drug use: No   • Sexual activity: Defer     Other Topics Concern   • Not on file       Family History   Problem Relation Age of Onset   • Hypertension Mother        Review of Systems   Musculoskeletal: Positive for back pain.   Neurological: Positive for numbness.   Psychiatric/Behavioral: Positive for agitation. The patient is nervous/anxious.    All other systems reviewed and are negative.      PE:  Physical Exam  /70   Temp 97.8 °F (36.6 °C) (Temporal Artery )   Ht 188 cm (74\")   Wt 133 kg (294 lb)   BMI 37.75 kg/m²   Incision is healed. He does have keloid  In the scar.    Neurologic Exam  Motor is intact, he ambulates with a walker in a flex forward position. He stood and took 3 steps in the exam room, screamed out in pain and started crying.  He backed up and sat back down.    MDM  I have discussed with Mr. Kong that we should proceed with a lumbar myelogram to look for any nerve root compression as a source of his pain, he is in agreement.  We will also obtain EMG and nerve conduction study at the same visit.  We discussed his Neurontin and he states he just cannot take the 300 mg and we will call in 100 mg to take at at bedtime and see how he does.  He says that the Neurontin did help with some of his pain.  I have also discontinued his thigh tizanidine and started him on baclofen 20 mg twice a day for muscle spasms.    Maya Brock PA-C        "

## 2018-10-03 DIAGNOSIS — M43.16 SPONDYLOLISTHESIS OF LUMBAR REGION: ICD-10-CM

## 2018-10-03 DIAGNOSIS — M48.062 SPINAL STENOSIS, LUMBAR REGION, WITH NEUROGENIC CLAUDICATION: ICD-10-CM

## 2018-10-03 DIAGNOSIS — M54.9 INTRACTABLE BACK PAIN: ICD-10-CM

## 2018-10-03 DIAGNOSIS — M79.604 ANTERIOR LEG PAIN, RIGHT: Primary | ICD-10-CM

## 2018-10-04 RX ORDER — BACLOFEN 10 MG/1
20 TABLET ORAL 2 TIMES DAILY
Qty: 40 TABLET | Refills: 1 | OUTPATIENT
Start: 2018-10-04

## 2018-10-04 NOTE — TELEPHONE ENCOUNTER
This was just prescribed to him for the first time on 10/1/18. Why is there a request for a refill from the pharmacy already? Could you call the pharmacy and see why this automated refill request came to us?

## 2018-10-04 NOTE — TELEPHONE ENCOUNTER
Provider:  Graeme  Caller:  Automated refill request  Surgery:    07/27/18  TLIF L4/5   08/27/18   Lumbar fusion     Last visit:  10/01/18  Next visit:  10/11/18    Reason for call:         Requested Prescriptions     Pending Prescriptions Disp Refills   • baclofen (LIORESAL) 10 MG tablet 40 tablet 1     Sig: Take 2 tablets by mouth 2 (Two) Times a Day.

## 2018-10-04 NOTE — TELEPHONE ENCOUNTER
Spoke with the pharmacy, they apologized for the mistake. Pt just picked this RX up a few days ago.     Please refuse med.

## 2018-10-05 NOTE — TELEPHONE ENCOUNTER
Provider: Braydon/ Marcellus   Caller: self  Surgery:    07/27/18  TLIF L4/5   08/27/18   Lumbar fusion      Last visit:  10/01/18  Next visit:  f/u after myelo      JAZ: 09/07/2018 Gabapentin 300MG 1960 7 7 KARRI VERONICA EVAN [place] [id number] [place]  09/13/2018 Oxycodone/Acetaminophen  325MG/7.5MG  1960 60 15 KIMBERLI LANTIGUA  [place] [id number] [place] 3  09/24/2018 Gabapentin 300MG 1960 30 30 KUSHAL BROWNING [place] [id number] [place]    09/24/2018 Hydrocodone/Acetaminophen  325MG/10MG  1960 120 30 KYLE LICONA [place] [id number] [place] [address]    10/01/2018 Gabapentin 100MG 1960 30 30 KUSHAL BROWNING [place] [id number] [place]    10/01/2018 Oxycodone/Acetaminophen  325MG/7.5MG  1960 45 15 KUSHAL BROWNING [place] [id number] [place] [address]      Reason for call:   pt LVM stating he can not get his pain under control and he is now almost out of percocet 7.5mg because he is over taking them due to the pain being so strong.       Pt also received a Norco 10mg 30 day supply on 09/24/18, and then percocet 7.5mg 15 day supply at  with croucher- to help break through pain.     Pt will be having a MYELO on 10/17/18 and EMG.       -how would you like to proceed?     - PA'S PLEASE NOTE WE HAVE MANY PHONE ENCOUNTERS ON THIS PT AND THE MISUSE OF MEDICATIONS, WHILE HE HAS BEEN UNABLE TO CONTROL HIS PAIN.

## 2018-10-05 NOTE — TELEPHONE ENCOUNTER
Obviously taking more pain meds is not helping. He can take extra tylenol, he can have baclofen for muscle spasms. Otherwise, he can see his pcp on Monday. We have to do the myelogram to see if there is something for surgery.

## 2018-10-10 ENCOUNTER — TELEPHONE (OUTPATIENT)
Dept: NEUROSURGERY | Facility: CLINIC | Age: 58
End: 2018-10-10

## 2018-10-10 ENCOUNTER — HOSPITAL ENCOUNTER (EMERGENCY)
Facility: HOSPITAL | Age: 58
Discharge: HOME | End: 2018-10-10
Attending: EMERGENCY MEDICINE | Admitting: NURSE PRACTITIONER

## 2018-10-10 ENCOUNTER — APPOINTMENT (OUTPATIENT)
Dept: CT IMAGING | Facility: HOSPITAL | Age: 58
End: 2018-10-10

## 2018-10-10 VITALS
BODY MASS INDEX: 37.6 KG/M2 | SYSTOLIC BLOOD PRESSURE: 127 MMHG | DIASTOLIC BLOOD PRESSURE: 96 MMHG | RESPIRATION RATE: 20 BRPM | WEIGHT: 293 LBS | OXYGEN SATURATION: 97 % | TEMPERATURE: 98.1 F | HEART RATE: 62 BPM | HEIGHT: 74 IN

## 2018-10-10 DIAGNOSIS — N28.9 RENAL INSUFFICIENCY: ICD-10-CM

## 2018-10-10 DIAGNOSIS — M54.16 LUMBAR RADICULOPATHY, CHRONIC: Primary | ICD-10-CM

## 2018-10-10 LAB
ALBUMIN SERPL-MCNC: 3.64 G/DL (ref 3.2–4.8)
ALBUMIN/GLOB SERPL: 1.2 G/DL (ref 1.5–2.5)
ALP SERPL-CCNC: 114 U/L (ref 25–100)
ALT SERPL W P-5'-P-CCNC: 15 U/L (ref 7–40)
AMPHET+METHAMPHET UR QL: NEGATIVE
AMPHETAMINES UR QL: POSITIVE
ANION GAP SERPL CALCULATED.3IONS-SCNC: 3 MMOL/L (ref 3–11)
APAP SERPL-MCNC: <10 MCG/ML (ref 0–30)
AST SERPL-CCNC: 19 U/L (ref 0–33)
BARBITURATES UR QL SCN: NEGATIVE
BASOPHILS # BLD AUTO: 0.01 10*3/MM3 (ref 0–0.2)
BASOPHILS NFR BLD AUTO: 0.1 % (ref 0–1)
BENZODIAZ UR QL SCN: NEGATIVE
BILIRUB SERPL-MCNC: 0.2 MG/DL (ref 0.3–1.2)
BUN BLD-MCNC: 19 MG/DL (ref 9–23)
BUN/CREAT SERPL: 9 (ref 7–25)
BUPRENORPHINE SERPL-MCNC: NEGATIVE NG/ML
CALCIUM SPEC-SCNC: 8.8 MG/DL (ref 8.7–10.4)
CANNABINOIDS SERPL QL: NEGATIVE
CHLORIDE SERPL-SCNC: 102 MMOL/L (ref 99–109)
CO2 SERPL-SCNC: 29 MMOL/L (ref 20–31)
COCAINE UR QL: POSITIVE
CREAT BLD-MCNC: 2.11 MG/DL (ref 0.6–1.3)
DEPRECATED RDW RBC AUTO: 40.8 FL (ref 37–54)
EOSINOPHIL # BLD AUTO: 0.24 10*3/MM3 (ref 0–0.3)
EOSINOPHIL NFR BLD AUTO: 3.3 % (ref 0–3)
ERYTHROCYTE [DISTWIDTH] IN BLOOD BY AUTOMATED COUNT: 13.5 % (ref 11.3–14.5)
GFR SERPL CREATININE-BSD FRML MDRD: 39 ML/MIN/1.73
GLOBULIN UR ELPH-MCNC: 3.2 GM/DL
GLUCOSE BLD-MCNC: 145 MG/DL (ref 70–100)
HCT VFR BLD AUTO: 33.9 % (ref 38.9–50.9)
HGB BLD-MCNC: 11.3 G/DL (ref 13.1–17.5)
IMM GRANULOCYTES # BLD: 0.01 10*3/MM3 (ref 0–0.03)
IMM GRANULOCYTES NFR BLD: 0.1 % (ref 0–0.6)
LYMPHOCYTES # BLD AUTO: 2.24 10*3/MM3 (ref 0.6–4.8)
LYMPHOCYTES NFR BLD AUTO: 31.2 % (ref 24–44)
MCH RBC QN AUTO: 27.9 PG (ref 27–31)
MCHC RBC AUTO-ENTMCNC: 33.3 G/DL (ref 32–36)
MCV RBC AUTO: 83.7 FL (ref 80–99)
METHADONE UR QL SCN: NEGATIVE
MONOCYTES # BLD AUTO: 0.34 10*3/MM3 (ref 0–1)
MONOCYTES NFR BLD AUTO: 4.7 % (ref 0–12)
NEUTROPHILS # BLD AUTO: 4.34 10*3/MM3 (ref 1.5–8.3)
NEUTROPHILS NFR BLD AUTO: 60.7 % (ref 41–71)
OPIATES UR QL: POSITIVE
OXYCODONE UR QL SCN: NEGATIVE
PCP UR QL SCN: NEGATIVE
PLATELET # BLD AUTO: 214 10*3/MM3 (ref 150–450)
PMV BLD AUTO: 8.8 FL (ref 6–12)
POTASSIUM BLD-SCNC: 5.1 MMOL/L (ref 3.5–5.5)
PROPOXYPH UR QL: NEGATIVE
PROT SERPL-MCNC: 6.8 G/DL (ref 5.7–8.2)
RBC # BLD AUTO: 4.05 10*6/MM3 (ref 4.2–5.76)
SALICYLATES SERPL-MCNC: <1 MG/DL (ref 0–29)
SODIUM BLD-SCNC: 134 MMOL/L (ref 132–146)
TRICYCLICS UR QL SCN: NEGATIVE
WBC NRBC COR # BLD: 7.17 10*3/MM3 (ref 3.5–10.8)

## 2018-10-10 PROCEDURE — 25010000002 KETOROLAC TROMETHAMINE PER 15 MG: Performed by: EMERGENCY MEDICINE

## 2018-10-10 PROCEDURE — 96374 THER/PROPH/DIAG INJ IV PUSH: CPT

## 2018-10-10 PROCEDURE — 85025 COMPLETE CBC W/AUTO DIFF WBC: CPT | Performed by: NURSE PRACTITIONER

## 2018-10-10 PROCEDURE — 80307 DRUG TEST PRSMV CHEM ANLYZR: CPT | Performed by: NURSE PRACTITIONER

## 2018-10-10 PROCEDURE — 99283 EMERGENCY DEPT VISIT LOW MDM: CPT

## 2018-10-10 PROCEDURE — 72131 CT LUMBAR SPINE W/O DYE: CPT

## 2018-10-10 PROCEDURE — 96375 TX/PRO/DX INJ NEW DRUG ADDON: CPT

## 2018-10-10 PROCEDURE — 80306 DRUG TEST PRSMV INSTRMNT: CPT | Performed by: NURSE PRACTITIONER

## 2018-10-10 PROCEDURE — 25010000002 HYDROMORPHONE 1 MG/ML SOLUTION: Performed by: EMERGENCY MEDICINE

## 2018-10-10 PROCEDURE — 80053 COMPREHEN METABOLIC PANEL: CPT | Performed by: NURSE PRACTITIONER

## 2018-10-10 RX ORDER — GABAPENTIN 100 MG/1
100 CAPSULE ORAL 3 TIMES DAILY
Qty: 45 CAPSULE | Refills: 0 | Status: SHIPPED | OUTPATIENT
Start: 2018-10-10 | End: 2022-06-06

## 2018-10-10 RX ORDER — KETOROLAC TROMETHAMINE 15 MG/ML
15 INJECTION, SOLUTION INTRAMUSCULAR; INTRAVENOUS ONCE
Status: COMPLETED | OUTPATIENT
Start: 2018-10-10 | End: 2018-10-10

## 2018-10-10 RX ORDER — BACLOFEN 10 MG/1
20 TABLET ORAL 3 TIMES DAILY
Qty: 40 TABLET | Refills: 0 | Status: SHIPPED | OUTPATIENT
Start: 2018-10-10 | End: 2019-01-16 | Stop reason: SDUPTHER

## 2018-10-10 RX ORDER — SODIUM CHLORIDE 0.9 % (FLUSH) 0.9 %
10 SYRINGE (ML) INJECTION AS NEEDED
Status: DISCONTINUED | OUTPATIENT
Start: 2018-10-10 | End: 2018-10-10 | Stop reason: HOSPADM

## 2018-10-10 RX ORDER — CYCLOBENZAPRINE HCL 10 MG
10 TABLET ORAL ONCE
Status: COMPLETED | OUTPATIENT
Start: 2018-10-10 | End: 2018-10-10

## 2018-10-10 RX ORDER — HYDROCODONE BITARTRATE AND ACETAMINOPHEN 5; 325 MG/1; MG/1
1 TABLET ORAL ONCE
Status: COMPLETED | OUTPATIENT
Start: 2018-10-10 | End: 2018-10-10

## 2018-10-10 RX ADMIN — HYDROCODONE BITARTRATE AND ACETAMINOPHEN 1 TABLET: 5; 325 TABLET ORAL at 09:28

## 2018-10-10 RX ADMIN — HYDROMORPHONE HYDROCHLORIDE 1 MG: 1 INJECTION, SOLUTION INTRAMUSCULAR; INTRAVENOUS; SUBCUTANEOUS at 08:19

## 2018-10-10 RX ADMIN — CYCLOBENZAPRINE HYDROCHLORIDE 10 MG: 10 TABLET, FILM COATED ORAL at 08:07

## 2018-10-10 RX ADMIN — KETOROLAC TROMETHAMINE 15 MG: 15 INJECTION, SOLUTION INTRAMUSCULAR; INTRAVENOUS at 08:21

## 2018-10-10 NOTE — DISCHARGE INSTRUCTIONS
Dr. Bryson's office will reschedule your myelogram for an earlier date.  Contact the office for details.  It will take approximately 2 days to reschedule.  Follow-up in Dr. Bryson's office in one week as scheduled.

## 2018-10-10 NOTE — TELEPHONE ENCOUNTER
"Provider:  Kem  Caller: pt   Time of call:   9:30  Phone #:  [phone]  Surgery:  8/27/2018  Surgery Date:  LUMBAR LAMINECTOMY POSTERIOR LUMBAR INTERBODY FUSION  Last visit:   10/1/2018  Next visit: OMAR SEBASTIAN:         Reason for call:         Pt called stating that he wanted it to go on record that he is in the ED today and that \"this proves that something needs to be done and he needs to be admitted to the hospital\".     Pt is in ED for LBP and RLE pain.  Note in is Epic.       "

## 2018-10-10 NOTE — PROGRESS NOTES
Patient presented to the ED with back and right leg pain, unable to walk. These symptoms have been present for several weeks. Prior xrays and CT scans have been completed not showing evidence that further neurosurgerical intervention is required. At the office visit with me last week I ordered an OP myelogram that is scheduled for next week however due to ongoing pain the patient presented to the ED again.  Dr. Germain has reviewed the prior epic notes and reviewed the previous studies and agrees that myelogram is appropriate. Patient is asking that the study be completed sooner than next week. We will discuss this with our office  and plan accordingly. Dr. Germain spoke with the ED, NP provider and made recommendations for a change in his neurontin from 100 mg daily to QID, increase Baclofen from BID to TID.   Our office will be in contact with the patient.    Maya Brock PA-C

## 2018-10-10 NOTE — TELEPHONE ENCOUNTER
Dr. Germain and I went to see the patient in the ED, Dr. Germain agrees with the appropriate study that I have ordered as an OP. The ED, NP provided Mr. Kong with a change in his meds and he was to be discharged home. I will ask Gabriella to review his schedule and attempt to move his myelogram.    The patient's JAZ was reviewed and he is taking more narcotic meds than Rx'd. Dr. Germain was not willing to give more pain meds.    Maya Brock PA-C

## 2018-10-11 ENCOUNTER — HOSPITAL ENCOUNTER (EMERGENCY)
Facility: HOSPITAL | Age: 58
Discharge: HOME | End: 2018-10-11
Attending: EMERGENCY MEDICINE | Admitting: EMERGENCY MEDICINE

## 2018-10-11 VITALS
SYSTOLIC BLOOD PRESSURE: 119 MMHG | HEART RATE: 87 BPM | RESPIRATION RATE: 16 BRPM | HEIGHT: 74 IN | BODY MASS INDEX: 37.22 KG/M2 | TEMPERATURE: 98.8 F | DIASTOLIC BLOOD PRESSURE: 69 MMHG | WEIGHT: 290 LBS | OXYGEN SATURATION: 96 %

## 2018-10-11 DIAGNOSIS — M54.16 CHRONIC LUMBAR RADICULOPATHY: Primary | ICD-10-CM

## 2018-10-11 PROCEDURE — 25010000002 HYDROMORPHONE 1 MG/ML SOLUTION: Performed by: EMERGENCY MEDICINE

## 2018-10-11 PROCEDURE — 99283 EMERGENCY DEPT VISIT LOW MDM: CPT

## 2018-10-11 PROCEDURE — 96372 THER/PROPH/DIAG INJ SC/IM: CPT

## 2018-10-11 PROCEDURE — 25010000002 METHYLPREDNISOLONE PER 125 MG: Performed by: EMERGENCY MEDICINE

## 2018-10-11 RX ORDER — METHYLPREDNISOLONE SODIUM SUCCINATE 125 MG/2ML
80 INJECTION, POWDER, LYOPHILIZED, FOR SOLUTION INTRAMUSCULAR; INTRAVENOUS ONCE
Status: COMPLETED | OUTPATIENT
Start: 2018-10-11 | End: 2018-10-11

## 2018-10-11 RX ADMIN — METHYLPREDNISOLONE SODIUM SUCCINATE 80 MG: 125 INJECTION, POWDER, FOR SOLUTION INTRAMUSCULAR; INTRAVENOUS at 05:09

## 2018-10-11 RX ADMIN — HYDROMORPHONE HYDROCHLORIDE 1 MG: 1 INJECTION, SOLUTION INTRAMUSCULAR; INTRAVENOUS; SUBCUTANEOUS at 05:08

## 2018-10-11 NOTE — DISCHARGE INSTRUCTIONS
Patient advised to contact neurosurgery and neurology to discuss further outpatient management of chronic low back pain.

## 2018-10-11 NOTE — ED PROVIDER NOTES
Subjective   57-year-old male presents with a complaint of right lower back pain radiating into the right buttocks and the right posterior leg.  Patient reports this pain is chronic, but has not been controlled by the medication that he has at home.  He presented yesterday to the emergency department and was evaluated by his neurosurgeon, Dr. Germain, who continues to suggest outpatient management and outpatient myelogram to be accomplished.  He now re-presents this morning once again for evaluation of right lower back pain.  Patient is able to stand without assistance and bear weight on the right lower extremity.  He denies any fever, no recent direct trauma to his back.  Has had previous surgery, but has no some signs or evidence of current infection.  He has no saddle anesthesia.  No bowel or urinary incontinence or retention.  No other aggravating, alleviating, or associated factors.        Back Pain   Location:  Lumbar spine  Quality:  Shooting  Radiates to:  R posterior upper leg  Pain severity:  Severe  Pain is:  Same all the time  Onset quality:  Gradual  Duration:  2 weeks  Timing:  Constant  Progression:  Unchanged  Chronicity:  Chronic  Context: physical stress    Context: not falling, not jumping from heights, not recent illness and not recent injury    Relieved by:  Nothing  Worsened by:  Movement, twisting, standing, ambulation and coughing  Associated symptoms: no abdominal pain, no bladder incontinence, no bowel incontinence, no chest pain, no dysuria, no fever, no headaches, no numbness, no paresthesias, no pelvic pain, no perianal numbness, no tingling and no weakness    Risk factors: lack of exercise and obesity    Risk factors: no hx of cancer, no recent surgery and no steroid use        Review of Systems   Constitutional: Negative for chills, fatigue and fever.   HENT: Negative for congestion, ear pain, postnasal drip, sinus pressure and sore throat.    Eyes: Negative for pain, redness and visual  disturbance.   Respiratory: Negative for cough, chest tightness and shortness of breath.    Cardiovascular: Negative for chest pain, palpitations and leg swelling.   Gastrointestinal: Negative for abdominal pain, anal bleeding, blood in stool, bowel incontinence, diarrhea, nausea and vomiting.   Endocrine: Negative for polydipsia and polyuria.   Genitourinary: Negative for bladder incontinence, difficulty urinating, dysuria, frequency, pelvic pain and urgency.   Musculoskeletal: Positive for back pain. Negative for arthralgias and neck pain.   Skin: Negative for pallor and rash.   Allergic/Immunologic: Negative for environmental allergies and immunocompromised state.   Neurological: Negative for dizziness, tingling, weakness, numbness, headaches and paresthesias.   Hematological: Negative for adenopathy.   Psychiatric/Behavioral: Negative for confusion, self-injury and suicidal ideas. The patient is not nervous/anxious.    All other systems reviewed and are negative.      Past Medical History:   Diagnosis Date   • Arthritis    • Asthma     inhaler daily    • Diabetes mellitus (CMS/[facility]) 2000    insulin daily; checks blood sugars on occasion-run 150-180   • Elevated cholesterol    • History of sleep apnea     years ago diagnosed but never used a machine at home    • History of staph infection 2010    wound infection after left knee replacement -saw infectious disease MD    • Hypertension    • Wears glasses        Allergies   Allergen Reactions   • Bactrim [Sulfamethoxazole-Trimethoprim] Other (See Comments)     Patient developed blisters on his hands.       Past Surgical History:   Procedure Laterality Date   • COLONOSCOPY     • [facility]   • INCISION AND DRAINAGE OF WOUND Left 2010    x2 of total knee replacement wound -iv antibiotics   • KNEE SURGERY Bilateral     Right knee- 2008, left knee- 2010- [facility]   • LUMBAR LAMINECTOMY WITH FUSION N/A 7/27/2018    Procedure:  TLIF, OSTEOTOMY L4-5 , POST FUSION L4-5;  Surgeon: Yo Bryson MD;  Location:  [facility];  Service: Neurosurgery   • LUMBAR LAMINECTOMY WITH FUSION N/A 8/27/2018    Procedure: LUMBAR LAMINECTOMY POSTERIOR LUMBAR INTERBODY FUSION;  Surgeon: Yo Bryson MD;  Location:  [facility];  Service: Neurosurgery   • ROTATOR CUFF REPAIR Right    • TONSILLECTOMY         Family History   Problem Relation Age of Onset   • Hypertension Mother        Social History     Social History   • Marital status: Single     Social History Main Topics   • Smoking status: Never Smoker   • Smokeless tobacco: Never Used   • Alcohol use No   • Drug use: No   • Sexual activity: Defer     Other Topics Concern   • Not on file           Objective   Physical Exam   Constitutional: He is oriented to person, place, and time. He appears well-developed and well-nourished.  Non-toxic appearance. No distress.   HENT:   Head: Normocephalic and atraumatic.   Right Ear: External ear normal.   Left Ear: External ear normal.   Nose: Nose normal.   Eyes: Pupils are equal, round, and reactive to light. EOM and lids are normal.   Neck: Normal range of motion. Neck supple. No tracheal deviation present.   Cardiovascular: Normal rate, regular rhythm and normal heart sounds.  Exam reveals no gallop, no friction rub and no decreased pulses.    No murmur heard.  Pulmonary/Chest: Effort normal and breath sounds normal. No respiratory distress. He has no decreased breath sounds. He has no wheezes. He has no rhonchi. He has no rales.   Abdominal: Soft. Normal appearance and bowel sounds are normal. There is no tenderness. There is no rebound and no guarding.   Musculoskeletal: Normal range of motion. He exhibits no deformity.        Lumbar back: He exhibits normal range of motion, no tenderness, no edema, no deformity and no laceration.        Back:    Lymphadenopathy:     He has no cervical adenopathy.   Neurological: He is alert and oriented to person, place, and  time. He has normal strength. No cranial nerve deficit or sensory deficit.   Skin: Skin is warm and dry. No rash noted. He is not diaphoretic.   Psychiatric: He has a normal mood and affect. His speech is normal and behavior is normal. Judgment and thought content normal. Cognition and memory are normal.   Nursing note and vitals reviewed.      Procedures           ED Course                  MDM  Number of Diagnoses or Management Options  Chronic lumbar radiculopathy: new and requires workup  Diagnosis management comments: Review his records and imaging was reviewed    Patient presents with physical exam that shows intact sensory and motor function to the bilateral lower extremities.    Patient will be given IM Dilaudid and IM Solu-Medrol here in the ER.    He will be discharged with the advise to keep outpatient follow-up and complete outpatient imaging as scheduled.       Amount and/or Complexity of Data Reviewed  Review and summarize past medical records: yes  Independent visualization of images, tracings, or specimens: yes          Final diagnoses:   Chronic lumbar radiculopathy            Tatum Albright MD  10/11/18 8718

## 2018-10-12 ENCOUNTER — TELEPHONE (OUTPATIENT)
Dept: NEUROSURGERY | Facility: CLINIC | Age: 58
End: 2018-10-12

## 2018-10-12 NOTE — TELEPHONE ENCOUNTER
I told patient that there was nothing we could do, perKrunal CASTELLANOS until he has the myelogram.  He verbalized understanding.

## 2018-10-12 NOTE — TELEPHONE ENCOUNTER
Provider:  Marcellus  Caller: patient  Time of call:  3:28   Phone #:  [phone]  Surgery:  TLIF osteotomy L4-L5 post fusion L4-L5  Surgery Date:  07/27/18  Last visit:   10/01/18  Next visit: 10/17/18 myelogram?    JAZ:         Reason for call:     It appears patient was seen again in the ED yesterday.  He states that he can hardly walk.  He has numbness that's starts in his right thigh and runs down his RLE to the calf.  He said what the ED gave him did not help.  They gave him 2 IM injections, Hydromorphone 1 mg and Methylprednisolone 80 mg.  He is adamate that we do something.

## 2018-10-17 ENCOUNTER — HOSPITAL ENCOUNTER (OUTPATIENT)
Dept: CT IMAGING | Facility: HOSPITAL | Age: 58
Discharge: HOME | End: 2018-10-17

## 2018-10-17 ENCOUNTER — HOSPITAL ENCOUNTER (EMERGENCY)
Facility: HOSPITAL | Age: 58
Discharge: HOME | End: 2018-10-17
Attending: EMERGENCY MEDICINE | Admitting: EMERGENCY MEDICINE

## 2018-10-17 ENCOUNTER — HOSPITAL ENCOUNTER (OUTPATIENT)
Dept: GENERAL RADIOLOGY | Facility: HOSPITAL | Age: 58
Discharge: HOME | End: 2018-10-17
Admitting: NEUROLOGICAL SURGERY

## 2018-10-17 ENCOUNTER — APPOINTMENT (OUTPATIENT)
Dept: GENERAL RADIOLOGY | Facility: HOSPITAL | Age: 58
End: 2018-10-17

## 2018-10-17 ENCOUNTER — HOSPITAL ENCOUNTER (INPATIENT)
Facility: HOSPITAL | Age: 58
LOS: 2 days | Discharge: HOME | End: 2018-10-20
Attending: EMERGENCY MEDICINE | Admitting: INTERNAL MEDICINE

## 2018-10-17 ENCOUNTER — HOSPITAL ENCOUNTER (OUTPATIENT)
Dept: NEUROLOGY | Facility: HOSPITAL | Age: 58
Discharge: HOME | End: 2018-10-17

## 2018-10-17 ENCOUNTER — TELEPHONE (OUTPATIENT)
Dept: NEUROSURGERY | Facility: CLINIC | Age: 58
End: 2018-10-17

## 2018-10-17 VITALS
RESPIRATION RATE: 18 BRPM | HEIGHT: 74 IN | DIASTOLIC BLOOD PRESSURE: 86 MMHG | SYSTOLIC BLOOD PRESSURE: 123 MMHG | BODY MASS INDEX: 21.94 KG/M2 | HEART RATE: 116 BPM | TEMPERATURE: 97.4 F | WEIGHT: 171 LBS | OXYGEN SATURATION: 97 %

## 2018-10-17 VITALS
OXYGEN SATURATION: 100 % | HEART RATE: 92 BPM | RESPIRATION RATE: 18 BRPM | WEIGHT: 271.2 LBS | SYSTOLIC BLOOD PRESSURE: 92 MMHG | DIASTOLIC BLOOD PRESSURE: 57 MMHG | BODY MASS INDEX: 34.8 KG/M2 | HEIGHT: 74 IN | TEMPERATURE: 97.8 F

## 2018-10-17 DIAGNOSIS — M19.90 ARTHRITIS: ICD-10-CM

## 2018-10-17 DIAGNOSIS — E86.0 DEHYDRATION: ICD-10-CM

## 2018-10-17 DIAGNOSIS — R73.9 HYPERGLYCEMIA: Primary | ICD-10-CM

## 2018-10-17 DIAGNOSIS — M54.16 LUMBAR BACK PAIN WITH RADICULOPATHY AFFECTING RIGHT LOWER EXTREMITY: Primary | ICD-10-CM

## 2018-10-17 DIAGNOSIS — E13.10 DIABETIC KETOACIDOSIS WITHOUT COMA ASSOCIATED WITH OTHER SPECIFIED DIABETES MELLITUS (HCC): ICD-10-CM

## 2018-10-17 DIAGNOSIS — M54.50 CHRONIC BILATERAL LOW BACK PAIN WITHOUT SCIATICA: ICD-10-CM

## 2018-10-17 DIAGNOSIS — M79.604 ANTERIOR LEG PAIN, RIGHT: ICD-10-CM

## 2018-10-17 DIAGNOSIS — M43.16 SPONDYLOLISTHESIS OF LUMBAR REGION: ICD-10-CM

## 2018-10-17 DIAGNOSIS — M48.061 STENOSIS OF LATERAL RECESS OF LUMBAR SPINE: ICD-10-CM

## 2018-10-17 DIAGNOSIS — G89.29 CHRONIC BILATERAL LOW BACK PAIN WITHOUT SCIATICA: ICD-10-CM

## 2018-10-17 DIAGNOSIS — M54.9 INTRACTABLE BACK PAIN: Primary | ICD-10-CM

## 2018-10-17 LAB
ALBUMIN SERPL-MCNC: 4.43 G/DL (ref 3.2–4.8)
ALBUMIN/GLOB SERPL: 1.1 G/DL (ref 1.5–2.5)
ALP SERPL-CCNC: 104 U/L (ref 25–100)
ALT SERPL W P-5'-P-CCNC: 19 U/L (ref 7–40)
ANION GAP SERPL CALCULATED.3IONS-SCNC: 14 MMOL/L (ref 3–11)
AST SERPL-CCNC: 21 U/L (ref 0–33)
BASOPHILS # BLD AUTO: 0.02 10*3/MM3 (ref 0–0.2)
BASOPHILS NFR BLD AUTO: 0.2 % (ref 0–1)
BILIRUB SERPL-MCNC: 0.8 MG/DL (ref 0.3–1.2)
BILIRUB UR QL STRIP: NEGATIVE
BUN BLD-MCNC: 41 MG/DL (ref 9–23)
BUN/CREAT SERPL: 10.9 (ref 7–25)
CALCIUM SPEC-SCNC: 9.5 MG/DL (ref 8.7–10.4)
CHLORIDE SERPL-SCNC: 88 MMOL/L (ref 99–109)
CLARITY UR: ABNORMAL
CO2 SERPL-SCNC: 21 MMOL/L (ref 20–31)
COLOR UR: YELLOW
CREAT BLD-MCNC: 3.77 MG/DL (ref 0.6–1.3)
DEPRECATED RDW RBC AUTO: 40.9 FL (ref 37–54)
EOSINOPHIL # BLD AUTO: 0.03 10*3/MM3 (ref 0–0.3)
EOSINOPHIL NFR BLD AUTO: 0.3 % (ref 0–3)
ERYTHROCYTE [DISTWIDTH] IN BLOOD BY AUTOMATED COUNT: 13.7 % (ref 11.3–14.5)
GFR SERPL CREATININE-BSD FRML MDRD: 20 ML/MIN/1.73
GLOBULIN UR ELPH-MCNC: 4 GM/DL
GLUCOSE BLD-MCNC: 406 MG/DL (ref 70–100)
GLUCOSE BLDC GLUCOMTR-MCNC: 339 MG/DL (ref 70–130)
GLUCOSE BLDC GLUCOMTR-MCNC: 404 MG/DL (ref 70–130)
GLUCOSE UR STRIP-MCNC: ABNORMAL MG/DL
HCT VFR BLD AUTO: 41.3 % (ref 38.9–50.9)
HGB BLD-MCNC: 14.4 G/DL (ref 13.1–17.5)
HGB UR QL STRIP.AUTO: NEGATIVE
IMM GRANULOCYTES # BLD: 0.04 10*3/MM3 (ref 0–0.03)
IMM GRANULOCYTES NFR BLD: 0.3 % (ref 0–0.6)
KETONES UR QL STRIP: ABNORMAL
LEUKOCYTE ESTERASE UR QL STRIP.AUTO: NEGATIVE
LYMPHOCYTES # BLD AUTO: 3.35 10*3/MM3 (ref 0.6–4.8)
LYMPHOCYTES NFR BLD AUTO: 28.1 % (ref 24–44)
MAGNESIUM SERPL-MCNC: 1.6 MG/DL (ref 1.3–2.7)
MCH RBC QN AUTO: 28.7 PG (ref 27–31)
MCHC RBC AUTO-ENTMCNC: 34.9 G/DL (ref 32–36)
MCV RBC AUTO: 82.3 FL (ref 80–99)
MONOCYTES # BLD AUTO: 1.08 10*3/MM3 (ref 0–1)
MONOCYTES NFR BLD AUTO: 9.1 % (ref 0–12)
NEUTROPHILS # BLD AUTO: 7.45 10*3/MM3 (ref 1.5–8.3)
NEUTROPHILS NFR BLD AUTO: 62.3 % (ref 41–71)
NITRITE UR QL STRIP: NEGATIVE
NRBC BLD MANUAL-RTO: 0.8 /100 WBC (ref 0–0)
PH UR STRIP.AUTO: <=5 [PH] (ref 5–8)
PLATELET # BLD AUTO: 369 10*3/MM3 (ref 150–450)
PMV BLD AUTO: 9.5 FL (ref 6–12)
POTASSIUM BLD-SCNC: 4.5 MMOL/L (ref 3.5–5.5)
PROT SERPL-MCNC: 8.4 G/DL (ref 5.7–8.2)
PROT UR QL STRIP: ABNORMAL
RBC # BLD AUTO: 5.02 10*6/MM3 (ref 4.2–5.76)
SODIUM BLD-SCNC: 123 MMOL/L (ref 132–146)
SP GR UR STRIP: 1.02 (ref 1–1.03)
TROPONIN I SERPL-MCNC: 0.01 NG/ML (ref 0–0.07)
UROBILINOGEN UR QL STRIP: ABNORMAL
WBC NRBC COR # BLD: 11.93 10*3/MM3 (ref 3.5–10.8)

## 2018-10-17 PROCEDURE — 72132 CT LUMBAR SPINE W/DYE: CPT

## 2018-10-17 PROCEDURE — 0 IOPAMIDOL 41 % SOLUTION

## 2018-10-17 PROCEDURE — 63710000001 HYDROMORPHONE 2 MG TABLET: Performed by: PHYSICIAN ASSISTANT

## 2018-10-17 PROCEDURE — A9270 NON-COVERED ITEM OR SERVICE: HCPCS | Performed by: PHYSICIAN ASSISTANT

## 2018-10-17 PROCEDURE — 25010000002 HYDROMORPHONE 1 MG/ML SOLUTION: Performed by: EMERGENCY MEDICINE

## 2018-10-17 PROCEDURE — 83735 ASSAY OF MAGNESIUM: CPT | Performed by: EMERGENCY MEDICINE

## 2018-10-17 PROCEDURE — 82962 GLUCOSE BLOOD TEST: CPT

## 2018-10-17 PROCEDURE — 82010 KETONE BODYS QUAN: CPT | Performed by: EMERGENCY MEDICINE

## 2018-10-17 PROCEDURE — B01B1ZZ FLUOROSCOPY OF SPINAL CORD USING LOW OSMOLAR CONTRAST: ICD-10-PCS | Performed by: RADIOLOGY

## 2018-10-17 PROCEDURE — 63710000001 DIAZEPAM 5 MG TABLET: Performed by: NEUROLOGICAL SURGERY

## 2018-10-17 PROCEDURE — 95886 MUSC TEST DONE W/N TEST COMP: CPT

## 2018-10-17 PROCEDURE — 84484 ASSAY OF TROPONIN QUANT: CPT

## 2018-10-17 PROCEDURE — 80053 COMPREHEN METABOLIC PANEL: CPT | Performed by: EMERGENCY MEDICINE

## 2018-10-17 PROCEDURE — 85025 COMPLETE CBC W/AUTO DIFF WBC: CPT | Performed by: EMERGENCY MEDICINE

## 2018-10-17 PROCEDURE — 99283 EMERGENCY DEPT VISIT LOW MDM: CPT

## 2018-10-17 PROCEDURE — 63710000001 DIPHENHYDRAMINE PER 50 MG: Performed by: NEUROLOGICAL SURGERY

## 2018-10-17 PROCEDURE — 81001 URINALYSIS AUTO W/SCOPE: CPT | Performed by: EMERGENCY MEDICINE

## 2018-10-17 PROCEDURE — 72240 MYELOGRAPHY NECK SPINE: CPT

## 2018-10-17 PROCEDURE — 95909 NRV CNDJ TST 5-6 STUDIES: CPT

## 2018-10-17 PROCEDURE — 71045 X-RAY EXAM CHEST 1 VIEW: CPT

## 2018-10-17 PROCEDURE — A9270 NON-COVERED ITEM OR SERVICE: HCPCS | Performed by: NEUROLOGICAL SURGERY

## 2018-10-17 PROCEDURE — 62304 MYELOGRAPHY LUMBAR INJECTION: CPT

## 2018-10-17 PROCEDURE — 96372 THER/PROPH/DIAG INJ SC/IM: CPT

## 2018-10-17 PROCEDURE — 99285 EMERGENCY DEPT VISIT HI MDM: CPT

## 2018-10-17 PROCEDURE — 93005 ELECTROCARDIOGRAM TRACING: CPT | Performed by: EMERGENCY MEDICINE

## 2018-10-17 PROCEDURE — 63710000001 PROMETHAZINE PER 25 MG: Performed by: PHYSICIAN ASSISTANT

## 2018-10-17 RX ORDER — SODIUM CHLORIDE 0.9 % (FLUSH) 0.9 %
10 SYRINGE (ML) INJECTION AS NEEDED
Status: DISCONTINUED | OUTPATIENT
Start: 2018-10-17 | End: 2018-10-20 | Stop reason: HOSPADM

## 2018-10-17 RX ORDER — LIDOCAINE HYDROCHLORIDE 10 MG/ML
5 INJECTION, SOLUTION EPIDURAL; INFILTRATION; INTRACAUDAL; PERINEURAL ONCE
Status: DISCONTINUED | OUTPATIENT
Start: 2018-10-17 | End: 2018-10-18 | Stop reason: HOSPADM

## 2018-10-17 RX ORDER — PROMETHAZINE HYDROCHLORIDE 25 MG/1
25 TABLET ORAL ONCE
Status: COMPLETED | OUTPATIENT
Start: 2018-10-17 | End: 2018-10-17

## 2018-10-17 RX ORDER — HYDROMORPHONE HYDROCHLORIDE 2 MG/1
2 TABLET ORAL ONCE
Status: COMPLETED | OUTPATIENT
Start: 2018-10-17 | End: 2018-10-17

## 2018-10-17 RX ORDER — DIAZEPAM 5 MG/1
10 TABLET ORAL ONCE
Status: COMPLETED | OUTPATIENT
Start: 2018-10-17 | End: 2018-10-17

## 2018-10-17 RX ORDER — DIPHENHYDRAMINE HCL 50 MG
50 CAPSULE ORAL ONCE
Status: COMPLETED | OUTPATIENT
Start: 2018-10-17 | End: 2018-10-17

## 2018-10-17 RX ADMIN — SODIUM CHLORIDE 1000 ML: 9 INJECTION, SOLUTION INTRAVENOUS at 23:51

## 2018-10-17 RX ADMIN — PROMETHAZINE HYDROCHLORIDE 25 MG: 25 TABLET ORAL at 10:54

## 2018-10-17 RX ADMIN — SODIUM CHLORIDE 1000 ML: 9 INJECTION, SOLUTION INTRAVENOUS at 23:27

## 2018-10-17 RX ADMIN — DIPHENHYDRAMINE HYDROCHLORIDE 50 MG: 50 CAPSULE ORAL at 08:04

## 2018-10-17 RX ADMIN — DIAZEPAM 10 MG: 5 TABLET ORAL at 08:04

## 2018-10-17 RX ADMIN — HYDROMORPHONE HYDROCHLORIDE 2 MG: 2 TABLET ORAL at 10:54

## 2018-10-17 RX ADMIN — HYDROMORPHONE HYDROCHLORIDE 1 MG: 1 INJECTION, SOLUTION INTRAMUSCULAR; INTRAVENOUS; SUBCUTANEOUS at 13:01

## 2018-10-17 NOTE — POST-PROCEDURE NOTE
Radiology Procedure    Pre-procedure: procedure, risks discussed with patient. Patient indicated understanding and consented to procedure     Procedure Performed:  Lumbar myelogram    IV Sedation and/or Anesthesia:  No    Complications: none    Preliminary Findings: pending    Specimen Removed: 0      Estimated Blood Loss:  0ml    Post-Procedure Diagnosis: pending    Post-Procedure Plan: ct L spine, encourage fluids, bed rest x 2 hours    Standard Discharge Instructions Given:yes     JASMIN Law  10/17/18  1:48 PM

## 2018-10-17 NOTE — NURSING NOTE
Pt discharged post procedure, tolerated well. Band-Aid dressing to lumbar spine CDI, no bleeding, drainage, redness, and/or swelling noted. No acute distress noted. Education given on site care, activity restrictions & s/sx to notify provider with, pt & significant other verbalized understanding. Pt left via wheelchair.

## 2018-10-17 NOTE — TELEPHONE ENCOUNTER
Provider:  Marcellus  Caller: patient  Time of call:  11:29   Phone #:[phone]    Surgery: Lumbar laminectomy posterior lumbar interbody fusion  Myelogram and EMG today-Patient currently admitted  Surgery Date:  08-27-18  Last visit:   10/01/18  Next visit: none    JAZ:         Reason for call:     Patient called, I assume from the hospital.  He states that he is in terrible pain and that we have to do something!  I have not spoke with him today, however recently we explained that we cannot do anything until we get his myelogram report.

## 2018-10-17 NOTE — NURSING NOTE
Pt medicated per Md order in anticipation of myelogram. Pt states that he is ready for his procedure now & is agreeable to proceed as planned, radiology dept notified that pt is ready, verbalized understanding.

## 2018-10-17 NOTE — NURSING NOTE
Upon educating pt about procedure & plan of care pt states that he wishes to be admitted and stay in hospital, educated pt that the procedure he is scheduled to have is an outpatient procedure and that we would not admit a patient for having this procedure unless there was a complication but that he would remain with us after having the procedure for monitoring. Pt verbalized understanding.

## 2018-10-17 NOTE — TELEPHONE ENCOUNTER
Chela spoke with patient the last time he called.  She informed him that he needs to get his diagnostic studies done.  There is nothing further we can offer him until the results are back.

## 2018-10-17 NOTE — PROGRESS NOTES
"NEUROSURGICAL POST MYELOGRAM NOTE:    EMG/NCV IMPRESSION:        Peripheral neuropathy, moderate, mixed axonal-demyelinating     EMG suggests subacute/chronic L4/L5 radiculopathy, involving L4 elements moreso    Myelogram:  He has post surgical changing L4-L5 with the presence of spondylolisthesis.       He has had 2 previous surgeries at L4 5 neither of which provided any degree of comfort whatsoever.  He has postsurgical changes at this level I'm very hesitant to recommend a third operation when the first 2 were unsuccessful.  Therefore I have recommended that he consider a dorsal column stimulator.    We will make an appointment for him to see Dr. Pearl for that.    The diagnostic studies have not shown anything that would lead to progressive neurological dysfunction.  Furthermore, the studies do not explain the \"shaking\" which he has experience.  The reasons for that remain obscure.  "

## 2018-10-17 NOTE — NURSING NOTE
Pt returned post procedure, tolerated well. bandaid dressing to lumbar spine CDI, no bleeding, redness, drainage, and/or hematoma noted. No acute distress noted, no complaints voiced. CB in reach, bed low and locked, Will continue to monitor.

## 2018-10-17 NOTE — NURSING NOTE
"Pt calls out requesting assistance, upon entering room pt sitting up yelling that he \"can't stand this\" and stating that he \"needs something for pain\", pt increasingly agitated & argumentative. Attempted to assist pt with repositioning in an attempt to relieve pain, unsuccessful in making pt comfortable despite repositioning. Pt states the only thing that relieves his pain is when he is given medication for it. Will notify provider.   "

## 2018-10-17 NOTE — NURSING NOTE
Pt exited room via wheelchair with fiance stating that he is going to the ED to seek treatment for pain relief and request to be admitted for myelogram. Encouraged pt to remain and have procedure as scheduled, pt refused to remain on unit and continued to leave.

## 2018-10-18 PROBLEM — D72.829 LEUKOCYTOSIS: Status: ACTIVE | Noted: 2018-10-18

## 2018-10-18 PROBLEM — E11.10 DIABETIC KETOACIDOSIS WITHOUT COMA ASSOCIATED WITH TYPE 2 DIABETES MELLITUS: Status: ACTIVE | Noted: 2018-10-18

## 2018-10-18 PROBLEM — N17.9 ACUTE RENAL FAILURE (HCC): Status: ACTIVE | Noted: 2018-10-18

## 2018-10-18 PROBLEM — F19.10 POLYSUBSTANCE ABUSE: Status: ACTIVE | Noted: 2018-10-18

## 2018-10-18 PROBLEM — N17.9 AKI (ACUTE KIDNEY INJURY) (HCC): Status: ACTIVE | Noted: 2018-10-18

## 2018-10-18 LAB
ALBUMIN SERPL-MCNC: 4.12 G/DL (ref 3.2–4.8)
ALBUMIN/GLOB SERPL: 1.1 G/DL (ref 1.5–2.5)
ALP SERPL-CCNC: 95 U/L (ref 25–100)
ALT SERPL W P-5'-P-CCNC: 13 U/L (ref 7–40)
ANION GAP SERPL CALCULATED.3IONS-SCNC: 10 MMOL/L (ref 3–11)
ANION GAP SERPL CALCULATED.3IONS-SCNC: 9 MMOL/L (ref 3–11)
ANION GAP SERPL CALCULATED.3IONS-SCNC: 9 MMOL/L (ref 3–11)
AST SERPL-CCNC: 14 U/L (ref 0–33)
B-OH-BUTYR SERPL-SCNC: 0.62 MMOL/L
BACTERIA UR QL AUTO: ABNORMAL /HPF
BASOPHILS # BLD AUTO: 0.02 10*3/MM3 (ref 0–0.2)
BASOPHILS NFR BLD AUTO: 0.2 % (ref 0–1)
BILIRUB SERPL-MCNC: 0.7 MG/DL (ref 0.3–1.2)
BUN BLD-MCNC: 32 MG/DL (ref 9–23)
BUN BLD-MCNC: 38 MG/DL (ref 9–23)
BUN BLD-MCNC: 38 MG/DL (ref 9–23)
BUN BLD-MCNC: 40 MG/DL (ref 9–23)
BUN BLD-MCNC: 42 MG/DL (ref 9–23)
BUN/CREAT SERPL: 12.8 (ref 7–25)
BUN/CREAT SERPL: 13.5 (ref 7–25)
BUN/CREAT SERPL: 14.1 (ref 7–25)
BUN/CREAT SERPL: 15.1 (ref 7–25)
BUN/CREAT SERPL: 17 (ref 7–25)
CA-I SERPL ISE-MCNC: 1.23 MMOL/L (ref 1.12–1.32)
CA-I SERPL ISE-MCNC: 1.29 MMOL/L (ref 1.12–1.32)
CALCIUM SPEC-SCNC: 8.6 MG/DL (ref 8.7–10.4)
CALCIUM SPEC-SCNC: 8.6 MG/DL (ref 8.7–10.4)
CALCIUM SPEC-SCNC: 8.7 MG/DL (ref 8.7–10.4)
CALCIUM SPEC-SCNC: 8.8 MG/DL (ref 8.7–10.4)
CALCIUM SPEC-SCNC: 9 MG/DL (ref 8.7–10.4)
CHLORIDE SERPL-SCNC: 93 MMOL/L (ref 99–109)
CHLORIDE SERPL-SCNC: 94 MMOL/L (ref 99–109)
CHLORIDE SERPL-SCNC: 95 MMOL/L (ref 99–109)
CHLORIDE SERPL-SCNC: 96 MMOL/L (ref 99–109)
CHLORIDE SERPL-SCNC: 98 MMOL/L (ref 99–109)
CO2 SERPL-SCNC: 18 MMOL/L (ref 20–31)
CO2 SERPL-SCNC: 19 MMOL/L (ref 20–31)
CO2 SERPL-SCNC: 22 MMOL/L (ref 20–31)
CO2 SERPL-SCNC: 23 MMOL/L (ref 20–31)
CO2 SERPL-SCNC: 23 MMOL/L (ref 20–31)
CREAT BLD-MCNC: 2.35 MG/DL (ref 0.6–1.3)
CREAT BLD-MCNC: 2.37 MG/DL (ref 0.6–1.3)
CREAT BLD-MCNC: 2.51 MG/DL (ref 0.6–1.3)
CREAT BLD-MCNC: 2.98 MG/DL (ref 0.6–1.3)
CREAT BLD-MCNC: 2.98 MG/DL (ref 0.6–1.3)
D-LACTATE SERPL-SCNC: 1.6 MMOL/L (ref 0.5–2)
DEPRECATED RDW RBC AUTO: 40.4 FL (ref 37–54)
EOSINOPHIL # BLD AUTO: 0.05 10*3/MM3 (ref 0–0.3)
EOSINOPHIL NFR BLD AUTO: 0.4 % (ref 0–3)
ERYTHROCYTE [DISTWIDTH] IN BLOOD BY AUTOMATED COUNT: 13.6 % (ref 11.3–14.5)
GFR SERPL CREATININE-BSD FRML MDRD: 26 ML/MIN/1.73
GFR SERPL CREATININE-BSD FRML MDRD: 26 ML/MIN/1.73
GFR SERPL CREATININE-BSD FRML MDRD: 32 ML/MIN/1.73
GFR SERPL CREATININE-BSD FRML MDRD: 34 ML/MIN/1.73
GFR SERPL CREATININE-BSD FRML MDRD: 35 ML/MIN/1.73
GLOBULIN UR ELPH-MCNC: 3.7 GM/DL
GLUCOSE BLD-MCNC: 219 MG/DL (ref 70–100)
GLUCOSE BLD-MCNC: 260 MG/DL (ref 70–100)
GLUCOSE BLD-MCNC: 281 MG/DL (ref 70–100)
GLUCOSE BLD-MCNC: 281 MG/DL (ref 70–100)
GLUCOSE BLD-MCNC: 307 MG/DL (ref 70–100)
GLUCOSE BLDC GLUCOMTR-MCNC: 214 MG/DL (ref 70–130)
GLUCOSE BLDC GLUCOMTR-MCNC: 257 MG/DL (ref 70–130)
GLUCOSE BLDC GLUCOMTR-MCNC: 275 MG/DL (ref 70–130)
GLUCOSE BLDC GLUCOMTR-MCNC: 283 MG/DL (ref 70–130)
GLUCOSE BLDC GLUCOMTR-MCNC: 287 MG/DL (ref 70–130)
GLUCOSE BLDC GLUCOMTR-MCNC: 335 MG/DL (ref 70–130)
GLUCOSE BLDC GLUCOMTR-MCNC: 369 MG/DL (ref 70–130)
GLUCOSE BLDC GLUCOMTR-MCNC: 56 MG/DL (ref 70–130)
GLUCOSE BLDC GLUCOMTR-MCNC: 58 MG/DL (ref 70–130)
GLUCOSE BLDC GLUCOMTR-MCNC: 95 MG/DL (ref 70–130)
HBA1C MFR BLD: 8.1 % (ref 4.8–5.6)
HCT VFR BLD AUTO: 39.3 % (ref 38.9–50.9)
HGB BLD-MCNC: 13.4 G/DL (ref 13.1–17.5)
HOLD SPECIMEN: NORMAL
HOLD SPECIMEN: NORMAL
IMM GRANULOCYTES # BLD: 0.03 10*3/MM3 (ref 0–0.03)
IMM GRANULOCYTES NFR BLD: 0.2 % (ref 0–0.6)
LYMPHOCYTES # BLD AUTO: 3.99 10*3/MM3 (ref 0.6–4.8)
LYMPHOCYTES NFR BLD AUTO: 31 % (ref 24–44)
MAGNESIUM SERPL-MCNC: 1.6 MG/DL (ref 1.3–2.7)
MAGNESIUM SERPL-MCNC: 1.7 MG/DL (ref 1.3–2.7)
MAGNESIUM SERPL-MCNC: 1.7 MG/DL (ref 1.3–2.7)
MAGNESIUM SERPL-MCNC: 1.8 MG/DL (ref 1.3–2.7)
MAGNESIUM SERPL-MCNC: 1.9 MG/DL (ref 1.3–2.7)
MCH RBC QN AUTO: 28 PG (ref 27–31)
MCHC RBC AUTO-ENTMCNC: 34.1 G/DL (ref 32–36)
MCV RBC AUTO: 82 FL (ref 80–99)
MONOCYTES # BLD AUTO: 1.05 10*3/MM3 (ref 0–1)
MONOCYTES NFR BLD AUTO: 8.2 % (ref 0–12)
NEUTROPHILS # BLD AUTO: 7.76 10*3/MM3 (ref 1.5–8.3)
NEUTROPHILS NFR BLD AUTO: 60.2 % (ref 41–71)
PHOSPHATE SERPL-MCNC: 2.7 MG/DL (ref 2.4–5.1)
PHOSPHATE SERPL-MCNC: 2.8 MG/DL (ref 2.4–5.1)
PHOSPHATE SERPL-MCNC: 2.8 MG/DL (ref 2.4–5.1)
PHOSPHATE SERPL-MCNC: 3 MG/DL (ref 2.4–5.1)
PHOSPHATE SERPL-MCNC: 3.8 MG/DL (ref 2.4–5.1)
PLATELET # BLD AUTO: 321 10*3/MM3 (ref 150–450)
PMV BLD AUTO: 9.1 FL (ref 6–12)
POTASSIUM BLD-SCNC: 4.1 MMOL/L (ref 3.5–5.5)
POTASSIUM BLD-SCNC: 4.3 MMOL/L (ref 3.5–5.5)
POTASSIUM BLD-SCNC: 4.5 MMOL/L (ref 3.5–5.5)
POTASSIUM BLD-SCNC: 4.5 MMOL/L (ref 3.5–5.5)
POTASSIUM BLD-SCNC: 4.6 MMOL/L (ref 3.5–5.5)
PROT SERPL-MCNC: 7.8 G/DL (ref 5.7–8.2)
RBC # BLD AUTO: 4.79 10*6/MM3 (ref 4.2–5.76)
RBC # UR: ABNORMAL /HPF
REF LAB TEST METHOD: ABNORMAL
SODIUM BLD-SCNC: 124 MMOL/L (ref 132–146)
SODIUM BLD-SCNC: 125 MMOL/L (ref 132–146)
SODIUM BLD-SCNC: 126 MMOL/L (ref 132–146)
SODIUM BLD-SCNC: 127 MMOL/L (ref 132–146)
SODIUM BLD-SCNC: 127 MMOL/L (ref 132–146)
SQUAMOUS #/AREA URNS HPF: ABNORMAL /HPF
WBC NRBC COR # BLD: 12.87 10*3/MM3 (ref 3.5–10.8)
WBC UR QL AUTO: ABNORMAL /HPF
WHOLE BLOOD HOLD SPECIMEN: NORMAL
WHOLE BLOOD HOLD SPECIMEN: NORMAL

## 2018-10-18 PROCEDURE — 94760 N-INVAS EAR/PLS OXIMETRY 1: CPT

## 2018-10-18 PROCEDURE — 80053 COMPREHEN METABOLIC PANEL: CPT | Performed by: INTERNAL MEDICINE

## 2018-10-18 PROCEDURE — 82330 ASSAY OF CALCIUM: CPT | Performed by: INTERNAL MEDICINE

## 2018-10-18 PROCEDURE — 83605 ASSAY OF LACTIC ACID: CPT | Performed by: NURSE PRACTITIONER

## 2018-10-18 PROCEDURE — 63710000001 INSULIN DETEMIR PER 5 UNITS: Performed by: INTERNAL MEDICINE

## 2018-10-18 PROCEDURE — 82962 GLUCOSE BLOOD TEST: CPT

## 2018-10-18 PROCEDURE — 63710000001 INSULIN REGULAR HUMAN PER 5 UNITS: Performed by: EMERGENCY MEDICINE

## 2018-10-18 PROCEDURE — 63710000001 INSULIN LISPRO (HUMAN) PER 5 UNITS: Performed by: FAMILY MEDICINE

## 2018-10-18 PROCEDURE — 83735 ASSAY OF MAGNESIUM: CPT | Performed by: INTERNAL MEDICINE

## 2018-10-18 PROCEDURE — 94799 UNLISTED PULMONARY SVC/PX: CPT

## 2018-10-18 PROCEDURE — 85025 COMPLETE CBC W/AUTO DIFF WBC: CPT | Performed by: INTERNAL MEDICINE

## 2018-10-18 PROCEDURE — 84100 ASSAY OF PHOSPHORUS: CPT | Performed by: INTERNAL MEDICINE

## 2018-10-18 PROCEDURE — 25010000002 HEPARIN (PORCINE) PER 1000 UNITS: Performed by: INTERNAL MEDICINE

## 2018-10-18 PROCEDURE — 63710000001 INSULIN LISPRO (HUMAN) PER 5 UNITS: Performed by: NURSE PRACTITIONER

## 2018-10-18 PROCEDURE — 99223 1ST HOSP IP/OBS HIGH 75: CPT | Performed by: INTERNAL MEDICINE

## 2018-10-18 PROCEDURE — 83036 HEMOGLOBIN GLYCOSYLATED A1C: CPT | Performed by: INTERNAL MEDICINE

## 2018-10-18 PROCEDURE — 63710000001 INSULIN LISPRO PROTAMINE-INSULIN LISPRO (75-25) 100 UNIT/ML SUSPENSION 10 ML VIAL: Performed by: FAMILY MEDICINE

## 2018-10-18 RX ORDER — POTASSIUM CHLORIDE 750 MG/1
40 CAPSULE, EXTENDED RELEASE ORAL AS NEEDED
Status: DISCONTINUED | OUTPATIENT
Start: 2018-10-18 | End: 2018-10-20 | Stop reason: HOSPADM

## 2018-10-18 RX ORDER — POTASSIUM CHLORIDE 7.46 G/1000ML
10 INJECTION, SOLUTION INTRAVENOUS AS NEEDED
Status: DISCONTINUED | OUTPATIENT
Start: 2018-10-18 | End: 2018-10-20 | Stop reason: HOSPADM

## 2018-10-18 RX ORDER — POTASSIUM CHLORIDE 1.5 G/1.77G
20 POWDER, FOR SOLUTION ORAL AS NEEDED
Status: DISCONTINUED | OUTPATIENT
Start: 2018-10-18 | End: 2018-10-20 | Stop reason: HOSPADM

## 2018-10-18 RX ORDER — DEXTROSE MONOHYDRATE 25 G/50ML
25 INJECTION, SOLUTION INTRAVENOUS
Status: DISCONTINUED | OUTPATIENT
Start: 2018-10-18 | End: 2018-10-20 | Stop reason: HOSPADM

## 2018-10-18 RX ORDER — HYDROCHLOROTHIAZIDE 12.5 MG/1
12.5 TABLET ORAL EVERY MORNING
Status: DISCONTINUED | OUTPATIENT
Start: 2018-10-18 | End: 2018-10-20 | Stop reason: HOSPADM

## 2018-10-18 RX ORDER — DEXTROSE, SODIUM CHLORIDE, AND POTASSIUM CHLORIDE 5; .45; .15 G/100ML; G/100ML; G/100ML
150 INJECTION INTRAVENOUS CONTINUOUS PRN
Status: DISCONTINUED | OUTPATIENT
Start: 2018-10-18 | End: 2018-10-18

## 2018-10-18 RX ORDER — POTASSIUM CHLORIDE 1.5 G/1.77G
10 POWDER, FOR SOLUTION ORAL AS NEEDED
Status: DISCONTINUED | OUTPATIENT
Start: 2018-10-18 | End: 2018-10-20 | Stop reason: HOSPADM

## 2018-10-18 RX ORDER — LISINOPRIL 20 MG/1
20 TABLET ORAL EVERY MORNING
Status: DISCONTINUED | OUTPATIENT
Start: 2018-10-18 | End: 2018-10-20 | Stop reason: HOSPADM

## 2018-10-18 RX ORDER — LIDOCAINE 50 MG/G
1 PATCH TOPICAL
Status: DISCONTINUED | OUTPATIENT
Start: 2018-10-18 | End: 2018-10-20 | Stop reason: HOSPADM

## 2018-10-18 RX ORDER — OXYCODONE AND ACETAMINOPHEN 7.5; 325 MG/1; MG/1
1 TABLET ORAL EVERY 6 HOURS PRN
Status: DISCONTINUED | OUTPATIENT
Start: 2018-10-18 | End: 2018-10-20 | Stop reason: HOSPADM

## 2018-10-18 RX ORDER — POTASSIUM CHLORIDE 1.5 G/1.77G
40 POWDER, FOR SOLUTION ORAL AS NEEDED
Status: DISCONTINUED | OUTPATIENT
Start: 2018-10-18 | End: 2018-10-20 | Stop reason: HOSPADM

## 2018-10-18 RX ORDER — DEXTROSE AND SODIUM CHLORIDE 5; .45 G/100ML; G/100ML
150 INJECTION, SOLUTION INTRAVENOUS CONTINUOUS PRN
Status: DISCONTINUED | OUTPATIENT
Start: 2018-10-18 | End: 2018-10-18

## 2018-10-18 RX ORDER — ATORVASTATIN CALCIUM 20 MG/1
20 TABLET, FILM COATED ORAL EVERY MORNING
Status: DISCONTINUED | OUTPATIENT
Start: 2018-10-18 | End: 2018-10-20 | Stop reason: HOSPADM

## 2018-10-18 RX ORDER — TAMSULOSIN HYDROCHLORIDE 0.4 MG/1
0.4 CAPSULE ORAL DAILY
Status: DISCONTINUED | OUTPATIENT
Start: 2018-10-18 | End: 2018-10-20 | Stop reason: HOSPADM

## 2018-10-18 RX ORDER — NICOTINE POLACRILEX 4 MG
15 LOZENGE BUCCAL
Status: DISCONTINUED | OUTPATIENT
Start: 2018-10-18 | End: 2018-10-20 | Stop reason: HOSPADM

## 2018-10-18 RX ORDER — ACETAMINOPHEN 325 MG/1
650 TABLET ORAL EVERY 4 HOURS PRN
Status: DISCONTINUED | OUTPATIENT
Start: 2018-10-18 | End: 2018-10-20 | Stop reason: HOSPADM

## 2018-10-18 RX ORDER — CARVEDILOL 3.12 MG/1
3.12 TABLET ORAL 2 TIMES DAILY WITH MEALS
Status: DISCONTINUED | OUTPATIENT
Start: 2018-10-18 | End: 2018-10-20 | Stop reason: HOSPADM

## 2018-10-18 RX ORDER — POTASSIUM CHLORIDE 750 MG/1
10 CAPSULE, EXTENDED RELEASE ORAL AS NEEDED
Status: DISCONTINUED | OUTPATIENT
Start: 2018-10-18 | End: 2018-10-20 | Stop reason: HOSPADM

## 2018-10-18 RX ORDER — HEPARIN SODIUM 5000 [USP'U]/ML
5000 INJECTION, SOLUTION INTRAVENOUS; SUBCUTANEOUS EVERY 12 HOURS SCHEDULED
Status: DISCONTINUED | OUTPATIENT
Start: 2018-10-18 | End: 2018-10-20 | Stop reason: HOSPADM

## 2018-10-18 RX ORDER — CETIRIZINE HYDROCHLORIDE 10 MG/1
5 TABLET ORAL EVERY MORNING
Status: DISCONTINUED | OUTPATIENT
Start: 2018-10-18 | End: 2018-10-20 | Stop reason: HOSPADM

## 2018-10-18 RX ORDER — RANOLAZINE 500 MG/1
500 TABLET, EXTENDED RELEASE ORAL EVERY 12 HOURS SCHEDULED
Status: DISCONTINUED | OUTPATIENT
Start: 2018-10-18 | End: 2018-10-20 | Stop reason: HOSPADM

## 2018-10-18 RX ORDER — ALBUTEROL SULFATE 2.5 MG/3ML
2.5 SOLUTION RESPIRATORY (INHALATION) EVERY 6 HOURS PRN
Status: DISCONTINUED | OUTPATIENT
Start: 2018-10-18 | End: 2018-10-20 | Stop reason: HOSPADM

## 2018-10-18 RX ORDER — SODIUM CHLORIDE AND POTASSIUM CHLORIDE 150; 450 MG/100ML; MG/100ML
250 INJECTION, SOLUTION INTRAVENOUS CONTINUOUS PRN
Status: DISCONTINUED | OUTPATIENT
Start: 2018-10-18 | End: 2018-10-20 | Stop reason: HOSPADM

## 2018-10-18 RX ORDER — TRAMADOL HYDROCHLORIDE 50 MG/1
75 TABLET ORAL EVERY 8 HOURS PRN
Status: DISCONTINUED | OUTPATIENT
Start: 2018-10-18 | End: 2018-10-20 | Stop reason: HOSPADM

## 2018-10-18 RX ORDER — BACLOFEN 10 MG/1
20 TABLET ORAL 3 TIMES DAILY
Status: DISCONTINUED | OUTPATIENT
Start: 2018-10-18 | End: 2018-10-20 | Stop reason: HOSPADM

## 2018-10-18 RX ORDER — BUDESONIDE AND FORMOTEROL FUMARATE DIHYDRATE 160; 4.5 UG/1; UG/1
2 AEROSOL RESPIRATORY (INHALATION)
Status: DISCONTINUED | OUTPATIENT
Start: 2018-10-18 | End: 2018-10-20 | Stop reason: HOSPADM

## 2018-10-18 RX ORDER — GABAPENTIN 100 MG/1
100 CAPSULE ORAL 3 TIMES DAILY
Status: DISCONTINUED | OUTPATIENT
Start: 2018-10-18 | End: 2018-10-20 | Stop reason: HOSPADM

## 2018-10-18 RX ORDER — SODIUM CHLORIDE 450 MG/100ML
250 INJECTION, SOLUTION INTRAVENOUS CONTINUOUS
Status: DISCONTINUED | OUTPATIENT
Start: 2018-10-18 | End: 2018-10-18

## 2018-10-18 RX ORDER — POTASSIUM CHLORIDE 750 MG/1
20 CAPSULE, EXTENDED RELEASE ORAL AS NEEDED
Status: DISCONTINUED | OUTPATIENT
Start: 2018-10-18 | End: 2018-10-20 | Stop reason: HOSPADM

## 2018-10-18 RX ORDER — INSULIN ASPART 100 [IU]/ML
45 INJECTION, SUSPENSION SUBCUTANEOUS 2 TIMES DAILY WITH MEALS
Status: DISCONTINUED | OUTPATIENT
Start: 2018-10-18 | End: 2018-10-19 | Stop reason: ALTCHOICE

## 2018-10-18 RX ORDER — FERROUS SULFATE 325(65) MG
325 TABLET ORAL 2 TIMES DAILY WITH MEALS
Status: DISCONTINUED | OUTPATIENT
Start: 2018-10-18 | End: 2018-10-20 | Stop reason: HOSPADM

## 2018-10-18 RX ORDER — DEXTROSE MONOHYDRATE 25 G/50ML
12.5 INJECTION, SOLUTION INTRAVENOUS
Status: DISCONTINUED | OUTPATIENT
Start: 2018-10-18 | End: 2018-10-20 | Stop reason: HOSPADM

## 2018-10-18 RX ORDER — HYDROCODONE BITARTRATE AND ACETAMINOPHEN 5; 325 MG/1; MG/1
1 TABLET ORAL EVERY 4 HOURS PRN
Status: DISCONTINUED | OUTPATIENT
Start: 2018-10-18 | End: 2018-10-18

## 2018-10-18 RX ORDER — SODIUM CHLORIDE 9 MG/ML
125 INJECTION, SOLUTION INTRAVENOUS CONTINUOUS
Status: DISCONTINUED | OUTPATIENT
Start: 2018-10-18 | End: 2018-10-20 | Stop reason: HOSPADM

## 2018-10-18 RX ORDER — SODIUM CHLORIDE 9 MG/ML
100 INJECTION, SOLUTION INTRAVENOUS CONTINUOUS
Status: DISCONTINUED | OUTPATIENT
Start: 2018-10-18 | End: 2018-10-18

## 2018-10-18 RX ADMIN — INSULIN LISPRO 10 UNITS: 100 INJECTION, SOLUTION INTRAVENOUS; SUBCUTANEOUS at 13:57

## 2018-10-18 RX ADMIN — LISINOPRIL 20 MG: 20 TABLET ORAL at 08:45

## 2018-10-18 RX ADMIN — INSULIN LISPRO 45 UNITS: 100 INJECTION, SUSPENSION SUBCUTANEOUS at 17:08

## 2018-10-18 RX ADMIN — CARVEDILOL 3.12 MG: 3.12 TABLET, FILM COATED ORAL at 17:08

## 2018-10-18 RX ADMIN — GABAPENTIN 100 MG: 100 CAPSULE ORAL at 08:43

## 2018-10-18 RX ADMIN — LIDOCAINE 1 PATCH: 50 PATCH CUTANEOUS at 08:42

## 2018-10-18 RX ADMIN — INSULIN LISPRO 8 UNITS: 100 INJECTION, SOLUTION INTRAVENOUS; SUBCUTANEOUS at 17:08

## 2018-10-18 RX ADMIN — CARVEDILOL 3.12 MG: 3.12 TABLET, FILM COATED ORAL at 08:43

## 2018-10-18 RX ADMIN — TRAMADOL HYDROCHLORIDE 75 MG: 50 TABLET, COATED ORAL at 14:02

## 2018-10-18 RX ADMIN — TAMSULOSIN HYDROCHLORIDE 0.4 MG: 0.4 CAPSULE ORAL at 08:44

## 2018-10-18 RX ADMIN — HYDROCHLOROTHIAZIDE 12.5 MG: 12.5 TABLET ORAL at 08:43

## 2018-10-18 RX ADMIN — RANOLAZINE 500 MG: 500 TABLET, FILM COATED, EXTENDED RELEASE ORAL at 08:43

## 2018-10-18 RX ADMIN — BUDESONIDE AND FORMOTEROL FUMARATE DIHYDRATE 2 PUFF: 160; 4.5 AEROSOL RESPIRATORY (INHALATION) at 08:51

## 2018-10-18 RX ADMIN — Medication 325 MG: at 08:43

## 2018-10-18 RX ADMIN — Medication 325 MG: at 17:08

## 2018-10-18 RX ADMIN — OXYCODONE HYDROCHLORIDE AND ACETAMINOPHEN 1 TABLET: 7.5; 325 TABLET ORAL at 10:41

## 2018-10-18 RX ADMIN — HEPARIN SODIUM 5000 UNITS: 5000 INJECTION, SOLUTION INTRAVENOUS; SUBCUTANEOUS at 08:45

## 2018-10-18 RX ADMIN — INSULIN LISPRO 4 UNITS: 100 INJECTION, SOLUTION INTRAVENOUS; SUBCUTANEOUS at 08:46

## 2018-10-18 RX ADMIN — SODIUM CHLORIDE 125 ML/HR: 9 INJECTION, SOLUTION INTRAVENOUS at 12:01

## 2018-10-18 RX ADMIN — ATORVASTATIN CALCIUM 20 MG: 20 TABLET, FILM COATED ORAL at 08:43

## 2018-10-18 RX ADMIN — GABAPENTIN 100 MG: 100 CAPSULE ORAL at 21:15

## 2018-10-18 RX ADMIN — INSULIN HUMAN 8 UNITS: 100 INJECTION, SOLUTION PARENTERAL at 01:17

## 2018-10-18 RX ADMIN — INSULIN DETEMIR 10 UNITS: 100 INJECTION, SOLUTION SUBCUTANEOUS at 03:59

## 2018-10-18 RX ADMIN — GABAPENTIN 100 MG: 100 CAPSULE ORAL at 17:07

## 2018-10-18 RX ADMIN — HEPARIN SODIUM 5000 UNITS: 5000 INJECTION, SOLUTION INTRAVENOUS; SUBCUTANEOUS at 21:15

## 2018-10-18 RX ADMIN — OXYCODONE HYDROCHLORIDE AND ACETAMINOPHEN 1 TABLET: 7.5; 325 TABLET ORAL at 17:08

## 2018-10-18 RX ADMIN — BUDESONIDE AND FORMOTEROL FUMARATE DIHYDRATE 2 PUFF: 160; 4.5 AEROSOL RESPIRATORY (INHALATION) at 21:29

## 2018-10-18 RX ADMIN — BACLOFEN 20 MG: 10 TABLET ORAL at 08:43

## 2018-10-18 RX ADMIN — BACLOFEN 20 MG: 10 TABLET ORAL at 21:15

## 2018-10-18 RX ADMIN — BACLOFEN 20 MG: 10 TABLET ORAL at 17:07

## 2018-10-18 RX ADMIN — DEXTROSE MONOHYDRATE 12.5 G: 25 INJECTION, SOLUTION INTRAVENOUS at 21:22

## 2018-10-18 RX ADMIN — DEXTROSE AND SODIUM CHLORIDE 150 ML/HR: 5; 450 INJECTION, SOLUTION INTRAVENOUS at 04:15

## 2018-10-18 RX ADMIN — HYDROCODONE BITARTRATE AND ACETAMINOPHEN 1 TABLET: 5; 325 TABLET ORAL at 08:04

## 2018-10-18 RX ADMIN — RANOLAZINE 500 MG: 500 TABLET, FILM COATED, EXTENDED RELEASE ORAL at 21:14

## 2018-10-18 RX ADMIN — INSULIN LISPRO 45 UNITS: 100 INJECTION, SUSPENSION SUBCUTANEOUS at 12:01

## 2018-10-18 RX ADMIN — OXYCODONE HYDROCHLORIDE AND ACETAMINOPHEN 1 TABLET: 7.5; 325 TABLET ORAL at 03:58

## 2018-10-18 RX ADMIN — CETIRIZINE HYDROCHLORIDE 5 MG: 10 TABLET, FILM COATED ORAL at 08:44

## 2018-10-19 LAB
ANION GAP SERPL CALCULATED.3IONS-SCNC: 8 MMOL/L (ref 3–11)
BUN BLD-MCNC: 36 MG/DL (ref 9–23)
BUN/CREAT SERPL: 17.1 (ref 7–25)
CA-I SERPL ISE-MCNC: 1.31 MMOL/L (ref 1.12–1.32)
CALCIUM SPEC-SCNC: 8.7 MG/DL (ref 8.7–10.4)
CHLORIDE SERPL-SCNC: 99 MMOL/L (ref 99–109)
CO2 SERPL-SCNC: 23 MMOL/L (ref 20–31)
CREAT BLD-MCNC: 2.11 MG/DL (ref 0.6–1.3)
GFR SERPL CREATININE-BSD FRML MDRD: 39 ML/MIN/1.73
GLUCOSE BLD-MCNC: 48 MG/DL (ref 70–100)
GLUCOSE BLDC GLUCOMTR-MCNC: 104 MG/DL (ref 70–130)
GLUCOSE BLDC GLUCOMTR-MCNC: 106 MG/DL (ref 70–130)
GLUCOSE BLDC GLUCOMTR-MCNC: 146 MG/DL (ref 70–130)
GLUCOSE BLDC GLUCOMTR-MCNC: 180 MG/DL (ref 70–130)
GLUCOSE BLDC GLUCOMTR-MCNC: 97 MG/DL (ref 70–130)
MAGNESIUM SERPL-MCNC: 1.8 MG/DL (ref 1.3–2.7)
PHOSPHATE SERPL-MCNC: 2.8 MG/DL (ref 2.4–5.1)
POTASSIUM BLD-SCNC: 3.8 MMOL/L (ref 3.5–5.5)
SODIUM BLD-SCNC: 130 MMOL/L (ref 132–146)

## 2018-10-19 PROCEDURE — 82962 GLUCOSE BLOOD TEST: CPT

## 2018-10-19 PROCEDURE — G0108 DIAB MANAGE TRN  PER INDIV: HCPCS

## 2018-10-19 PROCEDURE — 25010000002 HEPARIN (PORCINE) PER 1000 UNITS: Performed by: INTERNAL MEDICINE

## 2018-10-19 PROCEDURE — 84100 ASSAY OF PHOSPHORUS: CPT | Performed by: INTERNAL MEDICINE

## 2018-10-19 PROCEDURE — 94799 UNLISTED PULMONARY SVC/PX: CPT

## 2018-10-19 PROCEDURE — 83735 ASSAY OF MAGNESIUM: CPT | Performed by: INTERNAL MEDICINE

## 2018-10-19 PROCEDURE — 82330 ASSAY OF CALCIUM: CPT | Performed by: INTERNAL MEDICINE

## 2018-10-19 PROCEDURE — 80048 BASIC METABOLIC PNL TOTAL CA: CPT | Performed by: INTERNAL MEDICINE

## 2018-10-19 PROCEDURE — 99232 SBSQ HOSP IP/OBS MODERATE 35: CPT | Performed by: INTERNAL MEDICINE

## 2018-10-19 RX ADMIN — CARVEDILOL 3.12 MG: 3.12 TABLET, FILM COATED ORAL at 09:11

## 2018-10-19 RX ADMIN — RANOLAZINE 500 MG: 500 TABLET, FILM COATED, EXTENDED RELEASE ORAL at 20:14

## 2018-10-19 RX ADMIN — ATORVASTATIN CALCIUM 20 MG: 20 TABLET, FILM COATED ORAL at 09:11

## 2018-10-19 RX ADMIN — OXYCODONE HYDROCHLORIDE AND ACETAMINOPHEN 1 TABLET: 7.5; 325 TABLET ORAL at 06:43

## 2018-10-19 RX ADMIN — Medication 325 MG: at 09:11

## 2018-10-19 RX ADMIN — GABAPENTIN 100 MG: 100 CAPSULE ORAL at 20:14

## 2018-10-19 RX ADMIN — BACLOFEN 20 MG: 10 TABLET ORAL at 16:33

## 2018-10-19 RX ADMIN — BACLOFEN 20 MG: 10 TABLET ORAL at 09:09

## 2018-10-19 RX ADMIN — TAMSULOSIN HYDROCHLORIDE 0.4 MG: 0.4 CAPSULE ORAL at 09:11

## 2018-10-19 RX ADMIN — LISINOPRIL 20 MG: 20 TABLET ORAL at 09:10

## 2018-10-19 RX ADMIN — LIDOCAINE 1 PATCH: 50 PATCH CUTANEOUS at 09:12

## 2018-10-19 RX ADMIN — TRAMADOL HYDROCHLORIDE 75 MG: 50 TABLET, COATED ORAL at 18:06

## 2018-10-19 RX ADMIN — Medication 325 MG: at 17:31

## 2018-10-19 RX ADMIN — GABAPENTIN 100 MG: 100 CAPSULE ORAL at 16:33

## 2018-10-19 RX ADMIN — SODIUM CHLORIDE 125 ML/HR: 9 INJECTION, SOLUTION INTRAVENOUS at 09:40

## 2018-10-19 RX ADMIN — INSULIN LISPRO 45 UNITS: 100 INJECTION, SUSPENSION SUBCUTANEOUS at 17:33

## 2018-10-19 RX ADMIN — OXYCODONE HYDROCHLORIDE AND ACETAMINOPHEN 1 TABLET: 7.5; 325 TABLET ORAL at 12:42

## 2018-10-19 RX ADMIN — HEPARIN SODIUM 5000 UNITS: 5000 INJECTION, SOLUTION INTRAVENOUS; SUBCUTANEOUS at 20:14

## 2018-10-19 RX ADMIN — INSULIN LISPRO 45 UNITS: 100 INJECTION, SUSPENSION SUBCUTANEOUS at 09:12

## 2018-10-19 RX ADMIN — HYDROCHLOROTHIAZIDE 12.5 MG: 12.5 TABLET ORAL at 09:10

## 2018-10-19 RX ADMIN — HEPARIN SODIUM 5000 UNITS: 5000 INJECTION, SOLUTION INTRAVENOUS; SUBCUTANEOUS at 09:20

## 2018-10-19 RX ADMIN — TRAMADOL HYDROCHLORIDE 75 MG: 50 TABLET, COATED ORAL at 01:39

## 2018-10-19 RX ADMIN — BACLOFEN 20 MG: 10 TABLET ORAL at 20:14

## 2018-10-19 RX ADMIN — SODIUM CHLORIDE 125 ML/HR: 9 INJECTION, SOLUTION INTRAVENOUS at 18:14

## 2018-10-19 RX ADMIN — OXYCODONE HYDROCHLORIDE AND ACETAMINOPHEN 1 TABLET: 7.5; 325 TABLET ORAL at 20:25

## 2018-10-19 RX ADMIN — OXYCODONE HYDROCHLORIDE AND ACETAMINOPHEN 1 TABLET: 7.5; 325 TABLET ORAL at 00:37

## 2018-10-19 RX ADMIN — INSULIN LISPRO 3 UNITS: 100 INJECTION, SOLUTION INTRAVENOUS; SUBCUTANEOUS at 13:06

## 2018-10-19 RX ADMIN — BUDESONIDE AND FORMOTEROL FUMARATE DIHYDRATE 2 PUFF: 160; 4.5 AEROSOL RESPIRATORY (INHALATION) at 20:49

## 2018-10-19 RX ADMIN — RANOLAZINE 500 MG: 500 TABLET, FILM COATED, EXTENDED RELEASE ORAL at 09:11

## 2018-10-19 RX ADMIN — GABAPENTIN 100 MG: 100 CAPSULE ORAL at 09:11

## 2018-10-19 RX ADMIN — CETIRIZINE HYDROCHLORIDE 5 MG: 10 TABLET, FILM COATED ORAL at 09:10

## 2018-10-19 RX ADMIN — TRAMADOL HYDROCHLORIDE 75 MG: 50 TABLET, COATED ORAL at 09:34

## 2018-10-20 VITALS
DIASTOLIC BLOOD PRESSURE: 91 MMHG | TEMPERATURE: 98.1 F | BODY MASS INDEX: 36.5 KG/M2 | SYSTOLIC BLOOD PRESSURE: 115 MMHG | HEART RATE: 84 BPM | RESPIRATION RATE: 18 BRPM | WEIGHT: 284.4 LBS | OXYGEN SATURATION: 95 % | HEIGHT: 74 IN

## 2018-10-20 PROBLEM — E11.10 DIABETIC KETOACIDOSIS WITHOUT COMA ASSOCIATED WITH TYPE 2 DIABETES MELLITUS: Status: RESOLVED | Noted: 2018-10-18 | Resolved: 2018-10-20

## 2018-10-20 PROBLEM — M54.9 INTRACTABLE BACK PAIN: Status: RESOLVED | Noted: 2018-08-24 | Resolved: 2018-10-20

## 2018-10-20 PROBLEM — F19.10 POLYSUBSTANCE ABUSE: Status: RESOLVED | Noted: 2018-10-18 | Resolved: 2018-10-20

## 2018-10-20 PROBLEM — N17.9 ACUTE RENAL FAILURE (HCC): Status: RESOLVED | Noted: 2018-10-18 | Resolved: 2018-10-20

## 2018-10-20 PROBLEM — E87.1 HYPONATREMIA: Status: RESOLVED | Noted: 2018-08-24 | Resolved: 2018-10-20

## 2018-10-20 PROBLEM — D72.829 LEUKOCYTOSIS: Status: RESOLVED | Noted: 2018-10-18 | Resolved: 2018-10-20

## 2018-10-20 LAB
ANION GAP SERPL CALCULATED.3IONS-SCNC: 3 MMOL/L (ref 3–11)
BUN BLD-MCNC: 14 MG/DL (ref 9–23)
BUN/CREAT SERPL: 11.2 (ref 7–25)
CALCIUM SPEC-SCNC: 8.9 MG/DL (ref 8.7–10.4)
CHLORIDE SERPL-SCNC: 109 MMOL/L (ref 99–109)
CO2 SERPL-SCNC: 26 MMOL/L (ref 20–31)
CREAT BLD-MCNC: 1.25 MG/DL (ref 0.6–1.3)
GFR SERPL CREATININE-BSD FRML MDRD: 72 ML/MIN/1.73
GLUCOSE BLD-MCNC: 87 MG/DL (ref 70–100)
GLUCOSE BLDC GLUCOMTR-MCNC: 105 MG/DL (ref 70–130)
GLUCOSE BLDC GLUCOMTR-MCNC: 136 MG/DL (ref 70–130)
GLUCOSE BLDC GLUCOMTR-MCNC: 73 MG/DL (ref 70–130)
POTASSIUM BLD-SCNC: 4.5 MMOL/L (ref 3.5–5.5)
SODIUM BLD-SCNC: 138 MMOL/L (ref 132–146)

## 2018-10-20 PROCEDURE — 99239 HOSP IP/OBS DSCHRG MGMT >30: CPT | Performed by: NURSE PRACTITIONER

## 2018-10-20 PROCEDURE — 80048 BASIC METABOLIC PNL TOTAL CA: CPT | Performed by: NURSE PRACTITIONER

## 2018-10-20 PROCEDURE — 94799 UNLISTED PULMONARY SVC/PX: CPT

## 2018-10-20 PROCEDURE — 82962 GLUCOSE BLOOD TEST: CPT

## 2018-10-20 PROCEDURE — 25010000002 HEPARIN (PORCINE) PER 1000 UNITS: Performed by: INTERNAL MEDICINE

## 2018-10-20 RX ADMIN — LISINOPRIL 20 MG: 20 TABLET ORAL at 08:18

## 2018-10-20 RX ADMIN — CETIRIZINE HYDROCHLORIDE 5 MG: 10 TABLET, FILM COATED ORAL at 08:18

## 2018-10-20 RX ADMIN — TAMSULOSIN HYDROCHLORIDE 0.4 MG: 0.4 CAPSULE ORAL at 08:18

## 2018-10-20 RX ADMIN — CARVEDILOL 3.12 MG: 3.12 TABLET, FILM COATED ORAL at 08:18

## 2018-10-20 RX ADMIN — ATORVASTATIN CALCIUM 20 MG: 20 TABLET, FILM COATED ORAL at 08:18

## 2018-10-20 RX ADMIN — SODIUM CHLORIDE 125 ML/HR: 9 INJECTION, SOLUTION INTRAVENOUS at 10:40

## 2018-10-20 RX ADMIN — HEPARIN SODIUM 5000 UNITS: 5000 INJECTION, SOLUTION INTRAVENOUS; SUBCUTANEOUS at 08:19

## 2018-10-20 RX ADMIN — Medication 325 MG: at 08:18

## 2018-10-20 RX ADMIN — OXYCODONE HYDROCHLORIDE AND ACETAMINOPHEN 1 TABLET: 7.5; 325 TABLET ORAL at 02:42

## 2018-10-20 RX ADMIN — OXYCODONE HYDROCHLORIDE AND ACETAMINOPHEN 1 TABLET: 7.5; 325 TABLET ORAL at 13:54

## 2018-10-20 RX ADMIN — RANOLAZINE 500 MG: 500 TABLET, FILM COATED, EXTENDED RELEASE ORAL at 08:18

## 2018-10-20 RX ADMIN — LIDOCAINE 1 PATCH: 50 PATCH CUTANEOUS at 08:17

## 2018-10-20 RX ADMIN — BUDESONIDE AND FORMOTEROL FUMARATE DIHYDRATE 2 PUFF: 160; 4.5 AEROSOL RESPIRATORY (INHALATION) at 09:14

## 2018-10-20 RX ADMIN — GABAPENTIN 100 MG: 100 CAPSULE ORAL at 08:19

## 2018-10-20 RX ADMIN — BACLOFEN 20 MG: 10 TABLET ORAL at 08:18

## 2018-10-20 RX ADMIN — OXYCODONE HYDROCHLORIDE AND ACETAMINOPHEN 1 TABLET: 7.5; 325 TABLET ORAL at 08:18

## 2018-10-20 RX ADMIN — GABAPENTIN 100 MG: 100 CAPSULE ORAL at 15:00

## 2018-10-20 RX ADMIN — BACLOFEN 20 MG: 10 TABLET ORAL at 15:00

## 2018-10-20 RX ADMIN — INSULIN LISPRO 45 UNITS: 100 INJECTION, SUSPENSION SUBCUTANEOUS at 08:22

## 2018-10-20 RX ADMIN — HYDROCHLOROTHIAZIDE 12.5 MG: 12.5 TABLET ORAL at 08:18

## 2018-10-21 ENCOUNTER — READMISSION MANAGEMENT (OUTPATIENT)
Dept: CALL CENTER | Facility: HOSPITAL | Age: 58
End: 2018-10-21

## 2018-10-21 NOTE — OUTREACH NOTE
Prep Survey      Responses   Facility patient discharged from?  [facility]   Is patient eligible?  Yes   Discharge diagnosis  Acute renal failure,    Diabetic ketoacidosis    Does the patient have one of the following disease processes/diagnoses(primary or secondary)?  Other   Does the patient have Home health ordered?  No   Is there a DME ordered?  No   General alerts for this patient  His urine drug screen was positive for cocaine and methamphetamine.  Due to this the pain medication he requested was not prescribed.    Prep survey completed?  Yes          Milvia Fernandez RN

## 2018-10-23 ENCOUNTER — READMISSION MANAGEMENT (OUTPATIENT)
Dept: CALL CENTER | Facility: HOSPITAL | Age: 58
End: 2018-10-23

## 2018-10-23 ENCOUNTER — HOSPITAL ENCOUNTER (EMERGENCY)
Facility: HOSPITAL | Age: 58
Discharge: HOME | End: 2018-10-24
Attending: EMERGENCY MEDICINE | Admitting: EMERGENCY MEDICINE

## 2018-10-23 DIAGNOSIS — G89.29 CHRONIC RIGHT-SIDED LOW BACK PAIN WITH RIGHT-SIDED SCIATICA: ICD-10-CM

## 2018-10-23 DIAGNOSIS — M54.41 CHRONIC RIGHT-SIDED LOW BACK PAIN WITH RIGHT-SIDED SCIATICA: ICD-10-CM

## 2018-10-23 DIAGNOSIS — E86.0 DEHYDRATION: ICD-10-CM

## 2018-10-23 DIAGNOSIS — N28.9 ACUTE RENAL INSUFFICIENCY: Primary | ICD-10-CM

## 2018-10-23 PROCEDURE — 80306 DRUG TEST PRSMV INSTRMNT: CPT | Performed by: EMERGENCY MEDICINE

## 2018-10-23 PROCEDURE — 99284 EMERGENCY DEPT VISIT MOD MDM: CPT

## 2018-10-23 RX ORDER — SODIUM CHLORIDE 0.9 % (FLUSH) 0.9 %
10 SYRINGE (ML) INJECTION AS NEEDED
Status: DISCONTINUED | OUTPATIENT
Start: 2018-10-23 | End: 2018-10-24 | Stop reason: HOSPADM

## 2018-10-23 NOTE — OUTREACH NOTE
Medical Week 1 Survey      Responses   Facility patient discharged from?  [place]   Does the patient have one of the following disease processes/diagnoses(primary or secondary)?  Other   Is there a successful TCM telephone encounter documented?  No   Week 1 attempt successful?  Yes   Call start time  1105   Call end time  1111   Person spoke with today (if not patient) and relationship  Angelica Figgs   significant other   Meds reviewed with patient/caregiver?  Yes   Is the patient having any side effects they believe may be caused by any medication additions or changes?  No   Does the patient have all medications ordered at discharge?  Yes   Is the patient taking all medications as directed (includes completed medication regime)?  Yes   Does the patient have a primary care provider?   Yes   Does the patient have an appointment with their PCP within 7 days of discharge?  Yes   Has the patient kept scheduled appointments due by today?  Yes   Comments  -- [pt waiting on call from pain clinic to be seen]   What is the Home health agency?   . [.]   Has home health visited the patient within 72 hours of discharge?  N/A   Psychosocial issues?  No   Did the patient receive a copy of their discharge instructions?  Yes   What is the patient's perception of their health status since discharge?  Same   Is the patient/caregiver able to teach back signs and symptoms related to disease process for when to call PCP?  Yes   Is the patient/caregiver able to teach back signs and symptoms related to disease process for when to call 911?  Yes   Is the patient/caregiver able to teach back the hierarchy of who to call/visit for symptoms/problems? PCP, Specialist, Home health nurse, Urgent Care, ED, 911  Yes   Week 1 call completed?  Yes          Rashmi Brown RN

## 2018-10-24 VITALS
HEIGHT: 74 IN | HEART RATE: 90 BPM | DIASTOLIC BLOOD PRESSURE: 83 MMHG | OXYGEN SATURATION: 98 % | TEMPERATURE: 98.2 F | SYSTOLIC BLOOD PRESSURE: 116 MMHG | RESPIRATION RATE: 22 BRPM | BODY MASS INDEX: 35.94 KG/M2 | WEIGHT: 280 LBS

## 2018-10-24 LAB
AMPHET+METHAMPHET UR QL: NEGATIVE
AMPHETAMINES UR QL: NEGATIVE
BARBITURATES UR QL SCN: NEGATIVE
BENZODIAZ UR QL SCN: POSITIVE
BUN BLDA-MCNC: 19 MG/DL (ref 8–26)
BUPRENORPHINE SERPL-MCNC: NEGATIVE NG/ML
CA-I BLDA-SCNC: 1.24 MMOL/L (ref 1.2–1.32)
CANNABINOIDS SERPL QL: NEGATIVE
CHLORIDE BLDA-SCNC: 97 MMOL/L (ref 98–109)
CO2 BLDA-SCNC: 25 MMOL/L (ref 24–29)
COCAINE UR QL: NEGATIVE
CREAT BLDA-MCNC: 1.9 MG/DL (ref 0.6–1.3)
GLUCOSE BLDC GLUCOMTR-MCNC: 65 MG/DL (ref 70–130)
HCT VFR BLDA CALC: 35 % (ref 38–51)
HGB BLDA-MCNC: 11.9 G/DL (ref 12–17)
METHADONE UR QL SCN: NEGATIVE
OPIATES UR QL: POSITIVE
OXYCODONE UR QL SCN: NEGATIVE
PCP UR QL SCN: NEGATIVE
POTASSIUM BLDA-SCNC: 4.2 MMOL/L (ref 3.5–4.9)
PROPOXYPH UR QL: NEGATIVE
SODIUM BLDA-SCNC: 136 MMOL/L (ref 138–146)
TRICYCLICS UR QL SCN: NEGATIVE

## 2018-10-24 PROCEDURE — 85014 HEMATOCRIT: CPT

## 2018-10-24 PROCEDURE — 80047 BASIC METABLC PNL IONIZED CA: CPT

## 2018-10-24 RX ADMIN — SODIUM CHLORIDE 1000 ML: 9 INJECTION, SOLUTION INTRAVENOUS at 00:16

## 2018-10-24 NOTE — ED PROVIDER NOTES
"Subjective   Amadeo Kong is a 57 y.o.male with a history of poorly controlled diabetes mellitus with recent admission for DKA and THOMAS who presents to the emergency department with complaints of chronic back pain. He underwent L4-L5 fusion on 7/27 with lumbar laminectomy on 8/27 and states that since the procedure he continues to have right-sided lower back and right leg pain. He had run out of his gabapentin and pain medications during his last visit to the emergency department on 10/17 and noted to have a drug screen positive for cocaine and methamphetamine. He is worried that his blood sugar has been elevated and when he checked it at home his monitor read 241 this morning and 155 yesterday. He also complains of fatigue and loss of appetite.     The patient reports that he had a positive urine drug screen on his last visit. He believes that someone injected him with these drugs to include cocaine and methamphetamine but he cannot tell who this was or why someone would do this to him. He does report using \"hard\" roughly 5 years ago.  \"Hard\" is a street reference to crack cocaine per the patient. He states that he did not enjoy the high and has not used since.    There are no other acute complaints at this time.        History provided by:  Patient and spouse  Back Pain   Location:  Lumbar spine  Quality:  Aching  Radiates to: right hip.  Timing:  Constant  Progression:  Unchanged  Chronicity:  Chronic  Relieved by:  None tried  Worsened by:  Nothing  Ineffective treatments:  None tried  Associated symptoms: leg pain    Associated symptoms: no abdominal pain, no bladder incontinence, no bowel incontinence, no chest pain, no dysuria, no numbness, no perianal numbness and no weakness    Risk factors: obesity and recent surgery        Review of Systems   Constitutional: Positive for appetite change and fatigue.   Respiratory: Negative for chest tightness and shortness of breath.    Cardiovascular: Negative for chest " pain.   Gastrointestinal: Negative for abdominal pain and bowel incontinence.   Genitourinary: Negative for bladder incontinence, dysuria and hematuria.   Musculoskeletal: Positive for back pain.   Neurological: Negative for weakness and numbness.   All other systems reviewed and are negative.      Past Medical History:   Diagnosis Date   • Arthritis    • Asthma     inhaler daily    • Diabetes mellitus (CMS/[facility]) 2000    insulin daily; checks blood sugars on occasion-run 150-180   • Elevated cholesterol    • History of sleep apnea     years ago diagnosed but never used a machine at home    • History of staph infection 2010    wound infection after left knee replacement -saw infectious disease MD    • Hypertension    • Wears glasses        Allergies   Allergen Reactions   • Bactrim [Sulfamethoxazole-Trimethoprim] Other (See Comments)     Patient developed blisters on his hands.       Past Surgical History:   Procedure Laterality Date   • COLONOSCOPY     • HIP SURGERY Right 08/11/2014    [facility]   • INCISION AND DRAINAGE OF WOUND Left 2010    x2 of total knee replacement wound -iv antibiotics   • KNEE SURGERY Bilateral     Right knee- 2008, left knee- 2010- [facility]   • LUMBAR LAMINECTOMY WITH FUSION N/A 7/27/2018    Procedure: TLIF, OSTEOTOMY L4-5 , POST FUSION L4-5;  Surgeon: Yo Bryson MD;  Location:  [facility];  Service: Neurosurgery   • LUMBAR LAMINECTOMY WITH FUSION N/A 8/27/2018    Procedure: LUMBAR LAMINECTOMY POSTERIOR LUMBAR INTERBODY FUSION;  Surgeon: Yo Bryson MD;  Location:  [facility];  Service: Neurosurgery   • ROTATOR CUFF REPAIR Right    • TONSILLECTOMY         Family History   Problem Relation Age of Onset   • Hypertension Mother        Social History     Social History   • Marital status: Single     Social History Main Topics   • Smoking status: Never Smoker   • Smokeless tobacco: Never Used   • Alcohol use No   • Drug use: No   • Sexual activity: Defer     Other Topics Concern    • Not on file         Objective   Physical Exam   Constitutional: He is oriented to person, place, and time. He appears well-developed and well-nourished. No distress.   Appears in no acute distress.   HENT:   Head: Normocephalic and atraumatic.   Eyes: Conjunctivae are normal. No scleral icterus.   Neck: Normal range of motion. Neck supple.   Cardiovascular: Normal rate, regular rhythm and normal heart sounds.    No murmur heard.  Pulmonary/Chest: Effort normal and breath sounds normal. No respiratory distress.   Abdominal: Soft. Bowel sounds are normal. There is no tenderness. There is no rebound and no guarding.   Musculoskeletal: He exhibits tenderness. He exhibits no edema.   Tenderness in the right lower back area even with palpation of adipose tissue. Deeper palpation also ellicits discomfort. Positive straight leg raise on the right, negative on the left. Quadriceps reflex bilaterally symmetric. Fine touch sensation is normal in the lower extremiteis.   Neurological: He is alert and oriented to person, place, and time.   Skin: Skin is warm and dry. No erythema.   Psychiatric: He has a normal mood and affect. His behavior is normal.   Nursing note and vitals reviewed.      Procedures         ED Course  ED Course as of Oct 24 0150   Wed Oct 24, 2018   0050 Drug screen is positive for benzodiazepines by do not see a benzodiazepine on the patient's medication list.  [MS]   0050 Is somewhat hypoglycemic and I have ordered food for him.  His creatinine has bumped upward and we are hydrating him to counter this.  [MS]   0114 I asked the patient about benzodiazepine use.  He states that he is not on any benzodiazepines that he is aware of.  I listed off several different ones and he does not recognize any of these.  Is received a liter of normal saline.  He is feeling much improved.  He understands need for close outpatient follow-up.  He does not request any pain medications for his chronic back pain.  [MS]     "  ED Course User Index  [MS] Jose Guadalupe Lechuga MD     No results found for this or any previous visit (from the past 24 hour(s)).  Note: In addition to lab results from this visit, the labs listed above may include labs taken at another facility or during a different encounter within the last 24 hours. Please correlate lab times with ED admission and discharge times for further clarification of the services performed during this visit.    No orders to display     Vitals:    10/23/18 2230   BP: 109/61   BP Location: Left arm   Patient Position: Sitting   Pulse: 90   Resp: 22   Temp: 98.2 °F (36.8 °C)   TempSrc: Oral   SpO2: 99%   Weight: 127 kg (280 lb)   Height: 188 cm (74\")     Medications   sodium chloride 0.9 % flush 10 mL (not administered)   sodium chloride 0.9 % bolus 1,000 mL (not administered)     ECG/EMG Results (last 24 hours)     ** No results found for the last 24 hours. **                     MDM  Number of Diagnoses or Management Options  Acute renal insufficiency:   Chronic right-sided low back pain with right-sided sciatica:   Dehydration:      Amount and/or Complexity of Data Reviewed  Clinical lab tests: reviewed  Decide to obtain previous medical records or to obtain history from someone other than the patient: yes        Final diagnoses:   Acute renal insufficiency   Chronic right-sided low back pain with right-sided sciatica   Dehydration       Documentation assistance provided by frank Monge.  Information recorded by the scribe was done at my direction and has been verified and validated by me.     Adina Monge  10/23/18 9437       Jose Guadalupe Lechuga MD  10/24/18 0156    "

## 2018-10-24 NOTE — DISCHARGE INSTRUCTIONS
Your urine drug screen is positive for benzodiazepines and opiates.  You are on an opiate but I see no evidence that you are on a benzodiazepine.  Please check with your primary care provider to ensure that this is correct.    I recommend repeat check of your kidney function before the weekend.  Please follow-up with your primary care provider about this.    If your symptoms worsen, return to the emergency department.    Please review the medications you are supposed to be taking according to prior physician recommendations. I have not changed your home medications during this visit. If your discharge instructions indicate that I have changed your home medications, this is not the case, and you should disregard. If you have any questions about the medication you should be taking at home, please call your physician.

## 2018-10-25 ENCOUNTER — TELEPHONE (OUTPATIENT)
Dept: NEUROSURGERY | Facility: CLINIC | Age: 58
End: 2018-10-25

## 2018-10-25 NOTE — TELEPHONE ENCOUNTER
Patient reminded of his apt with the pain specialist for this coming Tuesday.  He asked that I call him on Monday to remind him.  I have a note to remind myself to call him.

## 2018-10-25 NOTE — TELEPHONE ENCOUNTER
OK. I have spoke with Pili and it looks like he has an apt with them on the 30th of this month.  She will call and confirm before I call Mr. Kong back..

## 2018-10-25 NOTE — TELEPHONE ENCOUNTER
Provider:  [facility]  Caller: patient  Time of call:  1:42   Phone #:  [phone]  Surgery:  Lumbar laminectomy posterior interbody fusion  Surgery Date:  08/27/18  Last visit:   10/01/18  Next visit: None    JAZ:     09/13/2018 Oxycodone/Acetaminophen  325MG/7.5MG  1960 60 15 KIMBERLI LANTIGUA  [place] [id number] [place] [address]  09/24/2018 Gabapentin 300MG 1960 30 30 MARCELLUS KUSHAL EMANI [place] [id number] [place]  09/24/2018 Hydrocodone/Acetaminophen  325MG/10MG  1960 120 30 KYLE LICONA [place] [id number] [place] [address]  10/01/2018 Gabapentin 100MG 1960 30 30 BROWNING, KUSHAL JOAQUIN [place] [facility] [place]  10/01/2018 Oxycodone/Acetaminophen  325MG/7.5MG  1960 45 15 KUSHAL BROWNING [place] [id number] [place] [address]  10/10/2018 Gabapentin 100MG 1960 45 15 JYOTHI PEDRAZA [place] [id number] [place] [address]    Reason for call:    Patient called and said he is in terrible pain.  He said someone did not do something right.  He complains of pain in his right thigh, right hip and leg.  He is not able to relax.  He is having difficultly resting.  He said he is tired of running from one ED to the next.  He asked that we give him a call back today, that something needs to be done.  He states that no one is managing his pain.

## 2018-10-25 NOTE — TELEPHONE ENCOUNTER
See that a phone call for a referral to Dr. Spencer for evaluation of a dorsal column stimulator, make sure you get an appointment and call the patient and tell him.

## 2018-10-26 ENCOUNTER — HOSPITAL ENCOUNTER (EMERGENCY)
Facility: HOSPITAL | Age: 58
Discharge: HOME | End: 2018-10-26
Attending: EMERGENCY MEDICINE | Admitting: NURSE PRACTITIONER

## 2018-10-26 VITALS
RESPIRATION RATE: 20 BRPM | OXYGEN SATURATION: 96 % | HEART RATE: 75 BPM | HEIGHT: 74 IN | WEIGHT: 299 LBS | DIASTOLIC BLOOD PRESSURE: 91 MMHG | BODY MASS INDEX: 38.37 KG/M2 | TEMPERATURE: 97.7 F | SYSTOLIC BLOOD PRESSURE: 123 MMHG

## 2018-10-26 DIAGNOSIS — M54.16 CHRONIC LUMBAR RADICULOPATHY: Primary | ICD-10-CM

## 2018-10-26 PROCEDURE — 99283 EMERGENCY DEPT VISIT LOW MDM: CPT

## 2018-10-26 PROCEDURE — 96372 THER/PROPH/DIAG INJ SC/IM: CPT

## 2018-10-26 PROCEDURE — 25010000002 HYDROMORPHONE 1 MG/ML SOLUTION: Performed by: EMERGENCY MEDICINE

## 2018-10-26 RX ORDER — BACLOFEN 10 MG/1
20 TABLET ORAL 3 TIMES DAILY
Qty: 30 TABLET | Refills: 0 | OUTPATIENT
Start: 2018-10-26 | End: 2022-04-11

## 2018-10-26 RX ORDER — CYCLOBENZAPRINE HCL 10 MG
10 TABLET ORAL ONCE
Status: COMPLETED | OUTPATIENT
Start: 2018-10-26 | End: 2018-10-26

## 2018-10-26 RX ADMIN — HYDROMORPHONE HYDROCHLORIDE 1 MG: 1 INJECTION, SOLUTION INTRAMUSCULAR; INTRAVENOUS; SUBCUTANEOUS at 11:31

## 2018-10-26 RX ADMIN — CYCLOBENZAPRINE HYDROCHLORIDE 10 MG: 10 TABLET, FILM COATED ORAL at 11:31
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
## 2018-10-28 ENCOUNTER — HOSPITAL ENCOUNTER (EMERGENCY)
Facility: HOSPITAL | Age: 58
Discharge: LEFT BEFORE DISCHARGE | End: 2018-10-28
Attending: EMERGENCY MEDICINE | Admitting: EMERGENCY MEDICINE

## 2018-10-28 VITALS
DIASTOLIC BLOOD PRESSURE: 88 MMHG | HEIGHT: 72 IN | SYSTOLIC BLOOD PRESSURE: 130 MMHG | WEIGHT: 299 LBS | TEMPERATURE: 98.7 F | BODY MASS INDEX: 40.5 KG/M2 | OXYGEN SATURATION: 97 % | RESPIRATION RATE: 18 BRPM | HEART RATE: 90 BPM

## 2018-10-28 DIAGNOSIS — G89.29 CHRONIC RIGHT-SIDED LOW BACK PAIN WITH RIGHT-SIDED SCIATICA: ICD-10-CM

## 2018-10-28 DIAGNOSIS — F22 PARANOIA (HCC): ICD-10-CM

## 2018-10-28 DIAGNOSIS — F19.10 POLYSUBSTANCE ABUSE (HCC): ICD-10-CM

## 2018-10-28 DIAGNOSIS — E11.9 TYPE 2 DIABETES MELLITUS WITHOUT COMPLICATION, WITH LONG-TERM CURRENT USE OF INSULIN (HCC): ICD-10-CM

## 2018-10-28 DIAGNOSIS — I10 ESSENTIAL HYPERTENSION: ICD-10-CM

## 2018-10-28 DIAGNOSIS — R46.89 AGGRESSIVE BEHAVIOR: ICD-10-CM

## 2018-10-28 DIAGNOSIS — F29 PSYCHOSIS, UNSPECIFIED PSYCHOSIS TYPE (HCC): Primary | ICD-10-CM

## 2018-10-28 DIAGNOSIS — M54.41 CHRONIC RIGHT-SIDED LOW BACK PAIN WITH RIGHT-SIDED SCIATICA: ICD-10-CM

## 2018-10-28 DIAGNOSIS — Z79.4 TYPE 2 DIABETES MELLITUS WITHOUT COMPLICATION, WITH LONG-TERM CURRENT USE OF INSULIN (HCC): ICD-10-CM

## 2018-10-28 LAB
ALBUMIN SERPL-MCNC: 3.94 G/DL (ref 3.2–4.8)
ALBUMIN/GLOB SERPL: 1.1 G/DL (ref 1.5–2.5)
ALP SERPL-CCNC: 111 U/L (ref 25–100)
ALT SERPL W P-5'-P-CCNC: 13 U/L (ref 7–40)
AMPHET+METHAMPHET UR QL: NEGATIVE
AMPHETAMINES UR QL: NEGATIVE
ANION GAP SERPL CALCULATED.3IONS-SCNC: 7 MMOL/L (ref 3–11)
APAP SERPL-MCNC: <10 MCG/ML (ref 0–30)
AST SERPL-CCNC: 18 U/L (ref 0–33)
BARBITURATES UR QL SCN: NEGATIVE
BASOPHILS # BLD AUTO: 0.02 10*3/MM3 (ref 0–0.2)
BASOPHILS NFR BLD AUTO: 0.3 % (ref 0–1)
BENZODIAZ UR QL SCN: POSITIVE
BILIRUB SERPL-MCNC: 0.4 MG/DL (ref 0.3–1.2)
BILIRUB UR QL STRIP: NEGATIVE
BUN BLD-MCNC: 8 MG/DL (ref 9–23)
BUN/CREAT SERPL: 6.7 (ref 7–25)
BUPRENORPHINE SERPL-MCNC: NEGATIVE NG/ML
CALCIUM SPEC-SCNC: 9.4 MG/DL (ref 8.7–10.4)
CANNABINOIDS SERPL QL: NEGATIVE
CHLORIDE SERPL-SCNC: 103 MMOL/L (ref 99–109)
CLARITY UR: CLEAR
CO2 SERPL-SCNC: 28 MMOL/L (ref 20–31)
COCAINE UR QL: NEGATIVE
COLOR UR: YELLOW
CREAT BLD-MCNC: 1.2 MG/DL (ref 0.6–1.3)
DEPRECATED RDW RBC AUTO: 42 FL (ref 37–54)
EOSINOPHIL # BLD AUTO: 0.12 10*3/MM3 (ref 0–0.3)
EOSINOPHIL NFR BLD AUTO: 1.5 % (ref 0–3)
ERYTHROCYTE [DISTWIDTH] IN BLOOD BY AUTOMATED COUNT: 13.8 % (ref 11.3–14.5)
ETHANOL BLD-MCNC: <10 MG/DL (ref 0–10)
GFR SERPL CREATININE-BSD FRML MDRD: 75 ML/MIN/1.73
GLOBULIN UR ELPH-MCNC: 3.6 GM/DL
GLUCOSE BLD-MCNC: 168 MG/DL (ref 70–100)
GLUCOSE UR STRIP-MCNC: NEGATIVE MG/DL
HCT VFR BLD AUTO: 39.4 % (ref 38.9–50.9)
HGB BLD-MCNC: 13.2 G/DL (ref 13.1–17.5)
HGB UR QL STRIP.AUTO: NEGATIVE
IMM GRANULOCYTES # BLD: 0.01 10*3/MM3 (ref 0–0.03)
IMM GRANULOCYTES NFR BLD: 0.1 % (ref 0–0.6)
KETONES UR QL STRIP: NEGATIVE
LEUKOCYTE ESTERASE UR QL STRIP.AUTO: NEGATIVE
LYMPHOCYTES # BLD AUTO: 2.42 10*3/MM3 (ref 0.6–4.8)
LYMPHOCYTES NFR BLD AUTO: 30.4 % (ref 24–44)
MCH RBC QN AUTO: 28.3 PG (ref 27–31)
MCHC RBC AUTO-ENTMCNC: 33.5 G/DL (ref 32–36)
MCV RBC AUTO: 84.4 FL (ref 80–99)
METHADONE UR QL SCN: NEGATIVE
MONOCYTES # BLD AUTO: 0.69 10*3/MM3 (ref 0–1)
MONOCYTES NFR BLD AUTO: 8.7 % (ref 0–12)
NEUTROPHILS # BLD AUTO: 4.7 10*3/MM3 (ref 1.5–8.3)
NEUTROPHILS NFR BLD AUTO: 59 % (ref 41–71)
NITRITE UR QL STRIP: NEGATIVE
OPIATES UR QL: NEGATIVE
OXYCODONE UR QL SCN: NEGATIVE
PCP UR QL SCN: NEGATIVE
PH UR STRIP.AUTO: 6.5 [PH] (ref 5–8)
PLATELET # BLD AUTO: 250 10*3/MM3 (ref 150–450)
PMV BLD AUTO: 8.9 FL (ref 6–12)
POTASSIUM BLD-SCNC: 4.2 MMOL/L (ref 3.5–5.5)
PROPOXYPH UR QL: NEGATIVE
PROT SERPL-MCNC: 7.5 G/DL (ref 5.7–8.2)
PROT UR QL STRIP: NEGATIVE
RBC # BLD AUTO: 4.67 10*6/MM3 (ref 4.2–5.76)
SALICYLATES SERPL-MCNC: <1 MG/DL (ref 0–29)
SODIUM BLD-SCNC: 138 MMOL/L (ref 132–146)
SP GR UR STRIP: 1.02 (ref 1–1.03)
TRICYCLICS UR QL SCN: NEGATIVE
UROBILINOGEN UR QL STRIP: NORMAL
WBC NRBC COR # BLD: 7.96 10*3/MM3 (ref 3.5–10.8)

## 2018-10-28 PROCEDURE — 80053 COMPREHEN METABOLIC PANEL: CPT | Performed by: PHYSICIAN ASSISTANT

## 2018-10-28 PROCEDURE — 80307 DRUG TEST PRSMV CHEM ANLYZR: CPT | Performed by: PHYSICIAN ASSISTANT

## 2018-10-28 PROCEDURE — 81003 URINALYSIS AUTO W/O SCOPE: CPT | Performed by: PHYSICIAN ASSISTANT

## 2018-10-28 PROCEDURE — 99284 EMERGENCY DEPT VISIT MOD MDM: CPT

## 2018-10-28 PROCEDURE — 80306 DRUG TEST PRSMV INSTRMNT: CPT | Performed by: PHYSICIAN ASSISTANT

## 2018-10-28 PROCEDURE — 85025 COMPLETE CBC W/AUTO DIFF WBC: CPT | Performed by: PHYSICIAN ASSISTANT

## 2018-10-28 RX ORDER — SODIUM CHLORIDE 0.9 % (FLUSH) 0.9 %
10 SYRINGE (ML) INJECTION AS NEEDED
Status: DISCONTINUED | OUTPATIENT
Start: 2018-10-28 | End: 2018-10-28 | Stop reason: HOSPADM

## 2018-10-28 RX ORDER — ACETAMINOPHEN 500 MG
1000 TABLET ORAL ONCE
Status: DISCONTINUED | OUTPATIENT
Start: 2018-10-28 | End: 2018-10-28 | Stop reason: HOSPADM

## 2018-10-31 ENCOUNTER — READMISSION MANAGEMENT (OUTPATIENT)
Dept: CALL CENTER | Facility: HOSPITAL | Age: 58
End: 2018-10-31

## 2018-10-31 NOTE — OUTREACH NOTE
Medical Week 2 Survey      Responses   Facility patient discharged from?  [place]   Does the patient have one of the following disease processes/diagnoses(primary or secondary)?  Other   Week 2 attempt successful?  No   Call start time  0803   Unsuccessful attempts  Attempt 1   Revoke  Decline to participate   Call end time  0809 [To ED x 3 since D/C signed out AMA 10/28 from ED]          Rashmi Hester RN

## 2018-11-26 ENCOUNTER — TRANSCRIBE ORDERS (OUTPATIENT)
Dept: ADMINISTRATIVE | Facility: HOSPITAL | Age: 58
End: 2018-11-26

## 2018-11-26 DIAGNOSIS — M54.5 LOW BACK PAIN, UNSPECIFIED BACK PAIN LATERALITY, UNSPECIFIED CHRONICITY, WITH SCIATICA PRESENCE UNSPECIFIED: Primary | ICD-10-CM

## 2018-11-29 ENCOUNTER — HOSPITAL ENCOUNTER (OUTPATIENT)
Dept: CT IMAGING | Facility: HOSPITAL | Age: 58
Discharge: HOME | End: 2018-11-29
Attending: ORTHOPAEDIC SURGERY | Admitting: ORTHOPAEDIC SURGERY

## 2018-11-29 DIAGNOSIS — M54.5 LOW BACK PAIN, UNSPECIFIED BACK PAIN LATERALITY, UNSPECIFIED CHRONICITY, WITH SCIATICA PRESENCE UNSPECIFIED: ICD-10-CM

## 2018-11-29 PROCEDURE — 72131 CT LUMBAR SPINE W/O DYE: CPT

## 2019-01-16 DIAGNOSIS — M54.16 CHRONIC LUMBAR RADICULOPATHY: Primary | ICD-10-CM

## 2019-01-16 RX ORDER — BACLOFEN 10 MG/1
20 TABLET ORAL 3 TIMES DAILY
Qty: 40 TABLET | Refills: 0 | OUTPATIENT
Start: 2019-01-16 | End: 2022-04-11

## 2019-01-16 NOTE — TELEPHONE ENCOUNTER
Provider:  [facility]  Caller: fax-pharmacy  Time of call:   9:44  Phone #:  [phone]  Surgery:  Lumbar laminectomy posterior lumbar interbody fusion  Surgery Date:  08/27/18  Last visit:   10/01/18  Next visit: None    JAZ:         Reason for call:     Patient requests refill on Baclofen 10 mg.

## 2019-03-18 ENCOUNTER — TELEPHONE (OUTPATIENT)
Dept: NEUROSURGERY | Facility: CLINIC | Age: 59
End: 2019-03-18

## 2019-03-18 NOTE — TELEPHONE ENCOUNTER
"Right after I spoke with patient's girlfriend, patient left a message and asked for the same information, however he said another physician has asked for this.  He actually said \"call me back or I'll come out there!\"  "

## 2019-03-18 NOTE — TELEPHONE ENCOUNTER
Provider:  [facility]  Caller: patient's girlfriend   Time of call:  1:45   Phone #:  [phone]  Surgery:  Lumbar laminectomy posterior interbody fusion  Surgery Date:08/27/18    Last visit: 10/01/18   Next visit: None    JAZ:         Reason for call:    Patient had his girlfriend call to see what manufacter was used in his surgery.  (Screws and bolts)  When I asked her why, she did not answer me.  I asked her again and she said patient has contacted an .

## 2019-03-18 NOTE — TELEPHONE ENCOUNTER
PROCEDURES PERFORMED:   1. L4-L5 Smith-Ivan osteotomy.   2. Right L4-L5 transforaminal lumbar interbody fusion with a 12 x 26 mm Capstone cage and morselized bone graft.   3. L4-L5 posterolateral fusion with morselized bone graft and TSRH-3D pedicle screw fixation.   4. Reduction of L4-L5 spondylolisthesis.   5. Local bone autograft harvesting.     All from [place]

## 2019-03-28 ENCOUNTER — TELEPHONE (OUTPATIENT)
Dept: NEUROSURGERY | Facility: CLINIC | Age: 59
End: 2019-03-28

## 2019-03-28 NOTE — TELEPHONE ENCOUNTER
Letter provided to the patient for the water company has been faxed to the number provided from the patient

## 2022-04-11 ENCOUNTER — APPOINTMENT (OUTPATIENT)
Dept: CT IMAGING | Facility: HOSPITAL | Age: 62
End: 2022-04-11

## 2022-04-11 ENCOUNTER — HOSPITAL ENCOUNTER (EMERGENCY)
Facility: HOSPITAL | Age: 62
Discharge: HOME | End: 2022-04-11
Attending: EMERGENCY MEDICINE | Admitting: EMERGENCY MEDICINE

## 2022-04-11 VITALS
WEIGHT: 306 LBS | SYSTOLIC BLOOD PRESSURE: 105 MMHG | OXYGEN SATURATION: 97 % | DIASTOLIC BLOOD PRESSURE: 68 MMHG | HEART RATE: 90 BPM | RESPIRATION RATE: 18 BRPM | BODY MASS INDEX: 39.27 KG/M2 | HEIGHT: 74 IN | TEMPERATURE: 98 F

## 2022-04-11 DIAGNOSIS — M54.10 RADICULAR LEG PAIN: ICD-10-CM

## 2022-04-11 DIAGNOSIS — R26.2 IMPAIRED AMBULATION: ICD-10-CM

## 2022-04-11 DIAGNOSIS — Z86.39 HISTORY OF DIABETES MELLITUS: ICD-10-CM

## 2022-04-11 DIAGNOSIS — M51.36 DEGENERATIVE DISC DISEASE, LUMBAR: ICD-10-CM

## 2022-04-11 DIAGNOSIS — Z86.79 HISTORY OF HYPERTENSION: ICD-10-CM

## 2022-04-11 DIAGNOSIS — M54.41 CHRONIC RIGHT-SIDED LOW BACK PAIN WITH RIGHT-SIDED SCIATICA: Primary | ICD-10-CM

## 2022-04-11 DIAGNOSIS — G89.29 CHRONIC RIGHT-SIDED LOW BACK PAIN WITH RIGHT-SIDED SCIATICA: Primary | ICD-10-CM

## 2022-04-11 DIAGNOSIS — Z98.1 HISTORY OF LUMBAR FUSION: ICD-10-CM

## 2022-04-11 PROCEDURE — 96372 THER/PROPH/DIAG INJ SC/IM: CPT

## 2022-04-11 PROCEDURE — 99283 EMERGENCY DEPT VISIT LOW MDM: CPT

## 2022-04-11 PROCEDURE — 25010000002 KETOROLAC TROMETHAMINE PER 15 MG: Performed by: PHYSICIAN ASSISTANT

## 2022-04-11 PROCEDURE — 72131 CT LUMBAR SPINE W/O DYE: CPT

## 2022-04-11 RX ORDER — METAXALONE 800 MG/1
800 TABLET ORAL 3 TIMES DAILY PRN
Qty: 21 TABLET | Refills: 0 | Status: SHIPPED | OUTPATIENT
Start: 2022-04-11 | End: 2022-06-06

## 2022-04-11 RX ORDER — KETOROLAC TROMETHAMINE 30 MG/ML
60 INJECTION, SOLUTION INTRAMUSCULAR; INTRAVENOUS ONCE
Status: COMPLETED | OUTPATIENT
Start: 2022-04-11 | End: 2022-04-11

## 2022-04-11 RX ORDER — METAXALONE 800 MG/1
800 TABLET ORAL ONCE
Status: COMPLETED | OUTPATIENT
Start: 2022-04-11 | End: 2022-04-11

## 2022-04-11 RX ADMIN — KETOROLAC TROMETHAMINE 60 MG: 30 INJECTION, SOLUTION INTRAMUSCULAR at 21:00

## 2022-04-11 RX ADMIN — METAXALONE 800 MG: 800 TABLET ORAL at 21:01

## 2022-04-12 NOTE — ED PROVIDER NOTES
"Subjective   This is a 61-year-old male that presents the ER with recurrent, chronic low back pain that has been ongoing for the last 4 months.  Patient has had lumbar fusion to L4-5 per Dr. Teixeira at [facility].  Patient said that most recent back surgery was November, 2019.  Patient says that he is concerned that they \"did something wrong\".  He continues to have almost daily low back pain with radiation to the right anterior thigh.  He denies any numbness or tingling to the legs.  Pain is improved with lying flat and rest to the back.  Pain is worsened with standing and ambulation.  Patient does not take any chronic narcotics and says that tramadol has not helped in the past.  He only takes gabapentin 300 mg by mouth 3 times daily for neuropathic pain.  He reports a recent fall due to increased back pain last Saturday on 4/9/2022.  Patient landed on his left knee and has an abrasion but he denies any significant left knee pain.  Patient has contacted Dr. Teixeira's office a few times and I have reviewed phone messages in epic.  It appears as though they are trying to work on referral for chronic pain management.  Patient denies any urinary or bowel changes or incontinence.  He had a normal bowel movement yesterday.  Past medical history is also significant for hypertension, type 2 diabetes mellitus, insulin managed, chronic low back pain with previous lumbar fusion.      History provided by:  Patient  Back Pain  Location:  Lumbar spine  Radiates to: Right anterior thigh.  Duration:  4 months  Timing:  Constant  Chronicity:  Chronic  Context: falling    Context comment:  Patient reports chronic low back pain with radicular symptoms to the right lower extremity.  Most recent back surgery was 11, 2019 through Dr. Teixeira at [facility].  Patient reports continued low back pain with radicular symptoms and painful walking.  Relieved by:  Bed rest (Lying supine)  Worsened by:  Ambulation " (Standing)  Ineffective treatments: Neurontin 300 mg 3 times daily.  Associated symptoms: leg pain (Pain to right anterior thigh)    Associated symptoms: no abdominal pain, no abdominal swelling, no bladder incontinence, no bowel incontinence, no chest pain, no dysuria, no fever, no numbness, no paresthesias, no pelvic pain, no perianal numbness, no tingling and no weakness    Associated symptoms comment:  Recent fall with abrasion to the left knee.      Review of Systems   Constitutional: Negative.  Negative for activity change, appetite change, chills and fever.   Respiratory: Negative.  Negative for cough and shortness of breath.    Cardiovascular: Negative.  Negative for chest pain, palpitations and leg swelling.   Gastrointestinal: Negative.  Negative for abdominal pain, bowel incontinence, constipation (Normal BM yesterday.), diarrhea, nausea and vomiting.        No bowel incontinence   Genitourinary: Negative.  Negative for bladder incontinence, dysuria, enuresis, flank pain, frequency, pelvic pain and urgency.        No urinary incontinence.   Musculoskeletal: Positive for back pain (Mid lower lumbar spine pain with radiation to right anterior thigh) and gait problem.        History of lumbar fusion to L4-5.  Patient followed by  at [facility]/.    Neurological: Negative for tingling, weakness, numbness and paresthesias.   All other systems reviewed and are negative.      Past Medical History:   Diagnosis Date   • Arthritis    • Asthma     inhaler daily    • Diabetes mellitus (CMS/[facility]) 2000    insulin daily; checks blood sugars on occasion-run 150-180   • Elevated cholesterol    • History of sleep apnea     years ago diagnosed but never used a machine at home    • History of staph infection 2010    wound infection after left knee replacement -saw infectious disease MD    • Hypertension    • Wears glasses        Allergies   Allergen Reactions   • Bactrim [Sulfamethoxazole-Trimethoprim] Other  (See Comments)     Patient developed blisters on his hands.       Past Surgical History:   Procedure Laterality Date   • COLONOSCOPY     • HIP SURGERY Right 08/11/2014    [facility]   • INCISION AND DRAINAGE OF WOUND Left 2010    x2 of total knee replacement wound -iv antibiotics   • KNEE SURGERY Bilateral     Right knee- 2008, left knee- 2010- [facility]   • LUMBAR LAMINECTOMY WITH FUSION N/A 7/27/2018    Procedure: TLIF, OSTEOTOMY L4-5 , POST FUSION L4-5;  Surgeon: Yo Bryson MD;  Location: [facility] OR;  Service: Neurosurgery   • LUMBAR LAMINECTOMY WITH FUSION N/A 8/27/2018    Procedure: LUMBAR LAMINECTOMY POSTERIOR LUMBAR INTERBODY FUSION;  Surgeon: Yo Bryson MD;  Location:  [facility] OR;  Service: Neurosurgery   • ROTATOR CUFF REPAIR Right    • TONSILLECTOMY         Family History   Problem Relation Age of Onset   • Hypertension Mother        Social History     Socioeconomic History   • Marital status: Single   Tobacco Use   • Smoking status: Never Smoker   • Smokeless tobacco: Never Used   Substance and Sexual Activity   • Alcohol use: No   • Drug use: No   • Sexual activity: Defer           Objective   Physical Exam  Vitals and nursing note reviewed.   Constitutional:       Appearance: Normal appearance.   HENT:      Head: Normocephalic and atraumatic.      Right Ear: Tympanic membrane normal.      Left Ear: Tympanic membrane normal.      Nose: Nose normal.      Mouth/Throat:      Mouth: Mucous membranes are moist.      Pharynx: Oropharynx is clear.   Eyes:      Extraocular Movements: Extraocular movements intact.      Conjunctiva/sclera: Conjunctivae normal.      Pupils: Pupils are equal, round, and reactive to light.   Cardiovascular:      Rate and Rhythm: Normal rate and regular rhythm.      Pulses: Normal pulses.           Dorsalis pedis pulses are 2+ on the right side and 2+ on the left side.        Posterior tibial pulses are 2+ on the right side and 2+ on the left side.      Heart sounds:  Normal heart sounds.      Comments: Regular rate and rhythm.  No ectopy.  No pedal edema to lower extremities.  Pulmonary:      Effort: Pulmonary effort is normal.      Breath sounds: Normal breath sounds.      Comments: Lungs are clear to auscultation bilaterally  Abdominal:      General: Bowel sounds are normal. There is no distension.      Palpations: Abdomen is soft.      Tenderness: There is no abdominal tenderness. There is no right CVA tenderness, left CVA tenderness, guarding or rebound.      Comments: Abdomen soft and nontender.  No flank or CVA tenderness.   Musculoskeletal:      Cervical back: Normal range of motion and neck supple.      Lumbar back: Tenderness present. No swelling, edema, deformity, signs of trauma or spasms. Decreased range of motion. Negative right straight leg raise test and negative left straight leg raise test.        Back:       Right lower leg: No edema.      Left lower leg: No edema.      Comments: Tenderness to the mid lower lumbar spine and right buttocks/right hip.  Painful flexion of the back and painful ambulation.  Negative straight leg raise bilaterally.  1+ patellar reflexes bilaterally.  Limited dorsiflexion on the right secondary to pain.  Normal plantar flexion bilaterally.   Skin:     General: Skin is warm and dry.   Neurological:      General: No focal deficit present.      Mental Status: He is alert.      Cranial Nerves: Cranial nerves are intact.      Sensory: Sensation is intact.      Motor: Motor function is intact.      Coordination: Coordination is intact.      Deep Tendon Reflexes:      Reflex Scores:       Patellar reflexes are 1+ on the right side and 1+ on the left side.        Procedures           ED Course  ED Course as of 04/11/22 2151 Mon Apr 11, 2022 2138 CT of the lumbar spine without contrast reveals interval revision of patient's previous L4-5 fusion to include S1-2.  Advanced degenerative changes noted at L4-5.  No evidence of bony trauma or  "new abnormality of alignment or new worsening lumbar canal stenosis appreciated.  Discussed the case with Dr. Clarke, ER attending physician.  We will not be prescribing any narcotics.  After reviewing epic, patient has been in discussion with Dr. Teixeira, his routine orthopedic surgeon, and the plan is for referral to pain management.  They are in the process of working on this referral.  Patient requests a second opinion, so I will give him follow-up information for neurosurgery, Dr. Maldonado, for their input on the case.  Patient given Toradol injection and Skelaxin.  We will prescribe diclofenac on discharge and Skelaxin.  Return if any worsening symptoms. [FC]      ED Course User Index  [FC] Lorie Arreguin, CELIO                 No results found for this or any previous visit (from the past 24 hour(s)).  Note: In addition to lab results from this visit, the labs listed above may include labs taken at another facility or during a different encounter within the last 24 hours. Please correlate lab times with ED admission and discharge times for further clarification of the services performed during this visit.    CT Lumbar Spine Without Contrast   Final Result   Interval revision of patient's previous L4-5 fusion, to include S1-2.   Advanced degenerative changes again noted at the L4-5 level. No clearly   acute bony trauma, new abnormality of alignment, or new or worsening   lumbar canal stenosis is appreciated.       This report was finalized on 4/11/2022 8:40 PM by Dr. Quentin Amaya MD.            Vitals:    04/11/22 1632   BP: 105/68   BP Location: Left arm   Patient Position: Sitting   Pulse: 90   Resp: 18   Temp: 98 °F (36.7 °C)   TempSrc: Oral   SpO2: 97%   Weight: (!) 139 kg (306 lb)   Height: 188 cm (74\")     Medications   ketorolac (TORADOL) injection 60 mg (60 mg Intramuscular Given 4/11/22 2100)   metaxalone (SKELAXIN) tablet 800 mg (800 mg Oral Given 4/11/22 2101)     ECG/EMG Results (last 24 hours)     ** " No results found for the last 24 hours. **        No orders to display                                          MDM    Final diagnoses:   Chronic right-sided low back pain with right-sided sciatica   Radicular leg pain   History of lumbar fusion   Degenerative disc disease, lumbar   Impaired ambulation   History of hypertension   History of diabetes mellitus       ED Disposition  ED Disposition     ED Disposition   Discharge    Condition   Stable    Comment   --             Patric Maldonado MD  [address]  [place] [address]  [phone]    Call in 1 day  Call in the morning for first available follow-up for evaluation and treatment plan options    [facility] Emergency Department  [address] [id number] [phone]    If symptoms worsen         Medication List      New Prescriptions    diclofenac 50 MG EC tablet  Commonly known as: VOLTAREN  Take 1 tablet by mouth 3 (Three) Times a Day.     metaxalone 800 MG tablet  Commonly known as: SKELAXIN  Take 1 tablet by mouth 3 (Three) Times a Day As Needed for Muscle Spasms.        Stop    baclofen 10 MG tablet  Commonly known as: LIORESAL     oxyCODONE-acetaminophen 7.5-325 MG per tablet  Commonly known as: Percocet           Where to Get Your Medications      These medications were sent to [id number] - [place], [place] - [address] AT [facility] - [phone] [facility] [phone]   [address] [address]    Phone: [phone]   · diclofenac 50 MG EC tablet  · metaxalone 800 MG tablet          Lorie Arreguin PA-C  04/11/22 2153

## 2022-04-12 NOTE — DISCHARGE INSTRUCTIONS
ER evaluation reveals CT of the lumbar spine which shows advanced degenerative or arthritic changes.  There is no significant spinal canal narrowing.  There is evidence of previous fusion to L4-5.  Due to significant, persistent pain, recommend follow-up with neurosurgery, Dr. Maldonado, for second opinion.  Recommend limited weightbearing and rest to the back is much as possible.  Recommend no frequent twisting, bending, or any lifting greater than 10 pounds.  I do recommend that patient continue to obtain referral to chronic pain management.  He may benefit from epidural injections or other treatment plan options.  Rx for diclofenac and Skelaxin as directed.  Continue with Neurontin as prescribed.  Return to the ER if any worsening symptoms.

## 2022-06-06 ENCOUNTER — OFFICE VISIT (OUTPATIENT)
Dept: NEUROSURGERY | Facility: CLINIC | Age: 62
End: 2022-06-06

## 2022-06-06 VITALS
HEIGHT: 74 IN | TEMPERATURE: 97.4 F | DIASTOLIC BLOOD PRESSURE: 68 MMHG | WEIGHT: 307 LBS | SYSTOLIC BLOOD PRESSURE: 126 MMHG | BODY MASS INDEX: 39.4 KG/M2

## 2022-06-06 DIAGNOSIS — M48.061 STENOSIS OF LATERAL RECESS OF LUMBAR SPINE: ICD-10-CM

## 2022-06-06 DIAGNOSIS — M43.10 ACQUIRED SPONDYLOLISTHESIS: ICD-10-CM

## 2022-06-06 DIAGNOSIS — M54.31 SCIATICA OF RIGHT SIDE: ICD-10-CM

## 2022-06-06 DIAGNOSIS — G89.29 CHRONIC BILATERAL LOW BACK PAIN WITHOUT SCIATICA: ICD-10-CM

## 2022-06-06 DIAGNOSIS — G56.03 BILATERAL CARPAL TUNNEL SYNDROME: Primary | ICD-10-CM

## 2022-06-06 DIAGNOSIS — M79.604 ANTERIOR LEG PAIN, RIGHT: ICD-10-CM

## 2022-06-06 DIAGNOSIS — M54.50 CHRONIC BILATERAL LOW BACK PAIN WITHOUT SCIATICA: ICD-10-CM

## 2022-06-06 DIAGNOSIS — M43.16 SPONDYLOLISTHESIS OF LUMBAR REGION: ICD-10-CM

## 2022-06-06 DIAGNOSIS — M19.90 ARTHRITIS: ICD-10-CM

## 2022-06-06 PROCEDURE — 99204 OFFICE O/P NEW MOD 45 MIN: CPT | Performed by: PHYSICIAN ASSISTANT

## 2022-06-06 RX ORDER — GABAPENTIN 100 MG/1
300 CAPSULE ORAL 4 TIMES DAILY
Qty: 60 CAPSULE | Refills: 1 | Status: ON HOLD | OUTPATIENT
Start: 2022-06-06 | End: 2022-07-18

## 2022-06-06 RX ORDER — BLOOD SUGAR DIAGNOSTIC
STRIP MISCELLANEOUS
COMMUNITY
Start: 2022-01-27

## 2022-06-06 RX ORDER — MONTELUKAST SODIUM 10 MG/1
10 TABLET ORAL NIGHTLY
Status: ON HOLD | COMMUNITY
End: 2022-07-18

## 2022-06-06 RX ORDER — BRIMONIDINE TARTRATE 2 MG/ML
1 SOLUTION/ DROPS OPHTHALMIC
COMMUNITY
Start: 2022-04-26

## 2022-06-06 NOTE — PROGRESS NOTES
Patient: Amadeo Kong  : 1960  Chart #: [id number]    Date of Service: 2022    Chief Complaint   Patient presents with   • Back Pain   • Hip Pain     Right    • Leg Pain     Right Thigh        HPI  This is a 62 yo, left handed, AAM with chronic LBP. He has L4-5 fusion by Dr. Bryson in 2018 then Dr. Teixeira at  2019. Patient continues to have severe LBP, with left anterior thigh and pain into the sin and calf. He has diabetes, has peripheral neuropathy, obesity, physical deconditioning, lower extremity edema, no loss of bladder function.    Chronic Illnesses:    Past Medical History:   Diagnosis Date   • Arthritis    • Asthma     inhaler daily    • Diabetes mellitus (HCC) 2000    insulin daily; checks blood sugars on occasion-run 150-180   • Elevated cholesterol    • History of sleep apnea     years ago diagnosed but never used a machine at home    • History of staph infection     wound infection after left knee replacement -saw infectious disease MD    • Hypertension    • Wears glasses          Past Surgical History:   Procedure Laterality Date   • COLONOSCOPY     • HIP SURGERY Right 2014    [facility]   • INCISION AND DRAINAGE OF WOUND Left 2010    x2 of total knee replacement wound -iv antibiotics   • KNEE SURGERY Bilateral     Right knee- , left knee- - [facility]   • LUMBAR LAMINECTOMY WITH FUSION N/A 2018    Procedure: TLIF, OSTEOTOMY L4-5 , POST FUSION L4-5;  Surgeon: Yo Bryson MD;  [facility];  Service: Neurosurgery   • LUMBAR LAMINECTOMY WITH FUSION N/A 2018    Procedure: LUMBAR LAMINECTOMY POSTERIOR LUMBAR INTERBODY FUSION;  Surgeon: Yo Bryson MD;  Location:  [facility] OR;  Service: Neurosurgery   • ROTATOR CUFF REPAIR Right    • TONSILLECTOMY         Allergies   Allergen Reactions   • Alimentum Swelling     CARROTS ONLY   tongue swelling   • Shellfish Allergy Swelling and Unknown (See Comments)     tongue swelling   •  Strawberry Swelling     tongue swelling   • Banana Unknown (See Comments)   • Sulfamethoxazole-Trimethoprim Other (See Comments)     Patient developed blisters on his hands.  Patient developed blisters on his hands.   • Vancomycin Rash         Current Outpatient Medications:   •  albuterol sulfate  (90 Base) MCG/ACT inhaler, Inhale 2 puffs 4 (Four) Times a Day As Needed for Shortness of Air., Disp: , Rfl:   •  atorvastatin (LIPITOR) 20 MG tablet, Take 40 mg by mouth Every Morning., Disp: , Rfl:   •  brimonidine (ALPHAGAN) 0.2 % ophthalmic solution, Apply 1 drop to eye(s) as directed by provider., Disp: , Rfl:   •  budesonide-formoterol (SYMBICORT) 160-4.5 MCG/ACT inhaler, Inhale 2 puffs 2 (Two) Times a Day., Disp: , Rfl:   •  carvedilol (COREG) 3.125 MG tablet, Take 3.125 mg by mouth 2 (Two) Times a Day With Meals., Disp: , Rfl:   •  Cholecalciferol 50 MCG (2000 UT) capsule, Take 2,000 Units by mouth Daily., Disp: , Rfl:   •  diclofenac (VOLTAREN) 50 MG EC tablet, Take 1 tablet by mouth 3 (Three) Times a Day., Disp: 21 tablet, Rfl: 0  •  ferrous sulfate 325 (65 FE) MG tablet, Take 1 tablet by mouth 2 (Two) Times a Day With Meals., Disp: 60 tablet, Rfl: 2  •  gabapentin (NEURONTIN) 100 MG capsule, Take 3 capsules by mouth 4 (Four) Times a Day., Disp: 60 capsule, Rfl: 1  •  glucose blood (OneTouch Ultra) test strip, TEST BLOOD SUGAR THREE TIMES DAILY AS DIRECTED, Disp: , Rfl:   •  HUMULIN 70/30 (70-30) 100 UNIT/ML injection, Inject 45 Units under the skin into the appropriate area as directed 2 (Two) Times a Day With Meals., Disp: , Rfl:   •  hydrochlorothiazide (HYDRODIURIL) 12.5 MG tablet, Take 12.5 mg by mouth Every Morning., Disp: , Rfl:   •  lisinopril (PRINIVIL,ZESTRIL) 20 MG tablet, Take 20 mg by mouth Every Morning., Disp: , Rfl:   •  montelukast (SINGULAIR) 10 MG tablet, Take 10 mg by mouth Every Night., Disp: , Rfl:   •  gabapentin (NEURONTIN) 300 MG capsule, Take 1 capsule by mouth Daily for 7 days.,  "Disp: 7 capsule, Rfl: 0    Social History     Socioeconomic History   • Marital status: Single   Tobacco Use   • Smoking status: Never Smoker   • Smokeless tobacco: Never Used   Substance and Sexual Activity   • Alcohol use: No   • Drug use: No   • Sexual activity: Defer       Family History   Problem Relation Age of Onset   • Hypertension Mother        Class 2 Severe Obesity (BMI >=35 and <=39.9). Obesity-related health conditions include the following: osteoarthritis. Obesity is unchanged. BMI is is above average; BMI management plan is completed. We discussed portion control and increasing exercise.       Social History    Tobacco Use      Smoking status: Never Smoker      Smokeless tobacco: Never Used       Physical examination:  Blood pressure 126/68, temperature 97.4 °F (36.3 °C), temperature source Infrared, height 188 cm (74\"), weight (!) 139 kg (307 lb).  HEENT- normocephalic, atraumatic, sclera clear  Lungs-normal expansion, no wheezing  Heart-regular rate and rhythm  Extremities-positive pulses, no edema    Neurologic Exam  WDWNAAM  A/A/C, speech clear, attention normal, conversant, answers questions appropriately, good historian.  Cranial nerves II through XII are intact.  Motor examination does not reveal weakness in the , upper or lower extremities.   Sensation is intact.  Gait is Abnormal, balance is stable  No tremors are noted.  Reflexes are intact in upper extremities, absent in the legs.   Herrera is negative. Clonus is negative.   Palpation of the L/S is +    Radiographic Imaging:  For my review is a lumbar CT that reveals collapse of the L4-5 fusion mass, intact at L5-S1. In comparision of the scan from 2018 there is a significant change.    Medical Decision Making  Diagnoses and all orders for this visit:    1. Bilateral carpal tunnel syndrome (Primary)  -     EMG & Nerve Conduction Test; Future    2. Stenosis of lateral recess of lumbar spine  -     gabapentin (NEURONTIN) 100 MG capsule; " Take 3 capsules by mouth 4 (Four) Times a Day.  Dispense: 60 capsule; Refill: 1  -     Case Request Cath Lab: IR myelogram lumbar with fluoroscopy  -     EMG & Nerve Conduction Test; Future  -     Ambulatory Referral to Pain Management  -     XR Spine Lumbar AP & Lateral With Flex & Ext; Future  -     Ambulatory Referral to Physical Therapy Evaluate and treat    3. Anterior leg pain, right  -     gabapentin (NEURONTIN) 100 MG capsule; Take 3 capsules by mouth 4 (Four) Times a Day.  Dispense: 60 capsule; Refill: 1  -     Case Request Cath Lab: IR myelogram lumbar with fluoroscopy  -     EMG & Nerve Conduction Test; Future  -     Ambulatory Referral to Pain Management  -     XR Spine Lumbar AP & Lateral With Flex & Ext; Future  -     Ambulatory Referral to Physical Therapy Evaluate and treat    4. Acquired spondylolisthesis  -     gabapentin (NEURONTIN) 100 MG capsule; Take 3 capsules by mouth 4 (Four) Times a Day.  Dispense: 60 capsule; Refill: 1  -     Case Request Cath Lab: IR myelogram lumbar with fluoroscopy  -     EMG & Nerve Conduction Test; Future  -     Ambulatory Referral to Pain Management  -     XR Spine Lumbar AP & Lateral With Flex & Ext; Future  -     Ambulatory Referral to Physical Therapy Evaluate and treat    5. Spondylolisthesis of lumbar region  -     gabapentin (NEURONTIN) 100 MG capsule; Take 3 capsules by mouth 4 (Four) Times a Day.  Dispense: 60 capsule; Refill: 1  -     Case Request Cath Lab: IR myelogram lumbar with fluoroscopy  -     EMG & Nerve Conduction Test; Future  -     Ambulatory Referral to Pain Management  -     XR Spine Lumbar AP & Lateral With Flex & Ext; Future  -     Ambulatory Referral to Physical Therapy Evaluate and treat    6. Chronic bilateral low back pain without sciatica  -     gabapentin (NEURONTIN) 100 MG capsule; Take 3 capsules by mouth 4 (Four) Times a Day.  Dispense: 60 capsule; Refill: 1  -     Case Request Cath Lab: IR myelogram lumbar with fluoroscopy  -     EMG  & Nerve Conduction Test; Future  -     Ambulatory Referral to Pain Management  -     XR Spine Lumbar AP & Lateral With Flex & Ext; Future  -     Ambulatory Referral to Physical Therapy Evaluate and treat    7. Arthritis  -     gabapentin (NEURONTIN) 100 MG capsule; Take 3 capsules by mouth 4 (Four) Times a Day.  Dispense: 60 capsule; Refill: 1  -     Case Request Cath Lab: IR myelogram lumbar with fluoroscopy  -     EMG & Nerve Conduction Test; Future  -     Ambulatory Referral to Pain Management  -     XR Spine Lumbar AP & Lateral With Flex & Ext; Future  -     Ambulatory Referral to Physical Therapy Evaluate and treat    8. Sciatica of right side  -     gabapentin (NEURONTIN) 100 MG capsule; Take 3 capsules by mouth 4 (Four) Times a Day.  Dispense: 60 capsule; Refill: 1  -     Case Request Cath Lab: IR myelogram lumbar with fluoroscopy  -     EMG & Nerve Conduction Test; Future  -     Ambulatory Referral to Pain Management  -     XR Spine Lumbar AP & Lateral With Flex & Ext; Future  -     Ambulatory Referral to Physical Therapy Evaluate and treat         TAE Duenas    Patient Care Team:  Kimber Roland MD as PCP - General (Family Medicine)  Ileana Page MD as Consulting Physician (Interventional Cardiology)  Randell Farfan MD as Consulting Physician (Orthopedic Surgery)

## 2022-06-15 ENCOUNTER — TELEPHONE (OUTPATIENT)
Dept: NEUROSURGERY | Facility: CLINIC | Age: 62
End: 2022-06-15

## 2022-06-15 RX ORDER — TIZANIDINE 4 MG/1
4 TABLET ORAL EVERY 8 HOURS PRN
Qty: 30 TABLET | Refills: 1 | Status: ON HOLD | OUTPATIENT
Start: 2022-06-15 | End: 2022-07-18

## 2022-06-15 NOTE — TELEPHONE ENCOUNTER
I called for Amadeo Kong, he did not answer therefore I left a message.  I really do not want to send him to the emergency room for back pain because it has been so busy over there and he just have to sit and wait for a shot I think he would be even more miserable.  I told him I would call him in a muscle relaxer and to continue with ice and heat on his back.  He is scheduled to get a myelogram later this month.

## 2022-06-15 NOTE — TELEPHONE ENCOUNTER
Provider:  JASMIN Vergara  Surgery:  LUMBAR LAMINECTOMY POSTERIOR LUMBAR INTERBODY FUSION  Surgery Date:  08/27/2018  Last visit:   Office Visit with Maya Brock PA-C (06/06/2022)    Next visit:     JAZ:         Reason for call:       Patient called stating he fell on Thursday and landed on his back. He is in pain so all he is doing is laying in the bed and sleeping.The pain is constant and sharp. He has tried ice and heat along with rest and his medications but nothing is helping at all.   The patient wants to know what he can do to help get some kind of relief so he can at least move around better  Please advise

## 2022-06-20 DIAGNOSIS — M79.604 ANTERIOR LEG PAIN, RIGHT: ICD-10-CM

## 2022-06-20 DIAGNOSIS — M43.10 ACQUIRED SPONDYLOLISTHESIS: ICD-10-CM

## 2022-06-20 DIAGNOSIS — G56.03 BILATERAL CARPAL TUNNEL SYNDROME: ICD-10-CM

## 2022-06-20 DIAGNOSIS — M19.90 ARTHRITIS: ICD-10-CM

## 2022-06-20 DIAGNOSIS — M54.50 CHRONIC BILATERAL LOW BACK PAIN WITHOUT SCIATICA: ICD-10-CM

## 2022-06-20 DIAGNOSIS — G89.29 CHRONIC BILATERAL LOW BACK PAIN WITHOUT SCIATICA: ICD-10-CM

## 2022-06-20 DIAGNOSIS — M54.31 SCIATICA OF RIGHT SIDE: ICD-10-CM

## 2022-06-20 DIAGNOSIS — M43.16 SPONDYLOLISTHESIS OF LUMBAR REGION: ICD-10-CM

## 2022-06-20 DIAGNOSIS — M48.061 STENOSIS OF LATERAL RECESS OF LUMBAR SPINE: Primary | ICD-10-CM

## 2022-06-22 DIAGNOSIS — M19.90 ARTHRITIS: ICD-10-CM

## 2022-06-22 DIAGNOSIS — G89.29 CHRONIC BILATERAL LOW BACK PAIN WITHOUT SCIATICA: ICD-10-CM

## 2022-06-22 DIAGNOSIS — M43.10 ACQUIRED SPONDYLOLISTHESIS: Primary | ICD-10-CM

## 2022-06-22 DIAGNOSIS — M79.604 ANTERIOR LEG PAIN, RIGHT: ICD-10-CM

## 2022-06-22 DIAGNOSIS — M48.061 STENOSIS OF LATERAL RECESS OF LUMBAR SPINE: ICD-10-CM

## 2022-06-22 DIAGNOSIS — M54.50 CHRONIC BILATERAL LOW BACK PAIN WITHOUT SCIATICA: ICD-10-CM

## 2022-06-23 ENCOUNTER — TELEPHONE (OUTPATIENT)
Dept: NEUROSURGERY | Facility: CLINIC | Age: 62
End: 2022-06-23

## 2022-06-23 NOTE — TELEPHONE ENCOUNTER
Caller: JUICE    Relationship: Payer    Best call back number: [phone] ([employer])    What test/procedure requested: LUMBAR MYELOGRAM    When is it needed: SCHEDULED FOR TOMORROW, 6/24/22 (SENDING HIGH PRIORITY)    Where is the test/procedure going to be performed:  [facility]    Additional information or concerns: JUICE IS CALLING BECAUSE THE INSURANCE AUTH FOR MYELOGRAM IS STILL PENDING, WHICH USUALLY MEANS [facility] [facility] [facility] [facility]) NEED ADDITIONAL INFORMATION. JUICE STATES THAT CALLING [phone] TO MAKE CERTAIN THEY HAVE EVERYTHING NEEDED WILL EXPEDITE PROCESS.

## 2022-06-23 NOTE — TELEPHONE ENCOUNTER
I spoke with Demi at insurance yesterday. She states the insurance requires 6 wks of PT prior to approving his myelogram.   I cancelled the procedure for 6/24/22. Sent message to JASMIN Martinez to order PT. The pt is aware to try PT and call me back once completed.

## 2022-07-05 ENCOUNTER — HOSPITAL ENCOUNTER (OUTPATIENT)
Dept: PHYSICAL THERAPY | Facility: HOSPITAL | Age: 62
Setting detail: THERAPIES SERIES
Discharge: HOME | End: 2022-07-05

## 2022-07-05 DIAGNOSIS — G89.29 CHRONIC RIGHT-SIDED LOW BACK PAIN WITH RIGHT-SIDED SCIATICA: Primary | ICD-10-CM

## 2022-07-05 DIAGNOSIS — M54.41 CHRONIC RIGHT-SIDED LOW BACK PAIN WITH RIGHT-SIDED SCIATICA: Primary | ICD-10-CM

## 2022-07-05 PROCEDURE — 97161 PT EVAL LOW COMPLEX 20 MIN: CPT

## 2022-07-05 NOTE — THERAPY EVALUATION
Outpatient Physical Therapy Ortho Initial Evaluation  EYAD Nathan     Patient Name: Amadeo Kong  : 1960  MRN: [id number]  Today's Date: 2022      Visit Date: 2022    Patient Active Problem List   Diagnosis   • Hypertension   • Diabetes mellitus (HCC)   • Arthritis   • Chronic bilateral low back pain without sciatica   • Spondylolisthesis of lumbar region   • Acquired spondylolisthesis   • Anemia   • Sciatica of right side   • Stenosis of lateral recess of lumbar spine   • Anterior leg pain, right   • THOMAS (acute kidney injury) ([facility])        Past Medical History:   Diagnosis Date   • Arthritis    • Asthma     inhaler daily    • Diabetes mellitus ([facility]) 2000    insulin daily; checks blood sugars on occasion-run 150-180   • Elevated cholesterol    • History of sleep apnea     years ago diagnosed but never used a machine at home    • History of staph infection     wound infection after left knee replacement -saw infectious disease MD    • Hypertension    • Wears glasses         Past Surgical History:   Procedure Laterality Date   • COLONOSCOPY     • HIP SURGERY Right 2014    [facility]   • INCISION AND DRAINAGE OF WOUND Left 2010    x2 of total knee replacement wound -iv antibiotics   • KNEE SURGERY Bilateral     Right knee- , left knee- - [place] [facility]   • LUMBAR LAMINECTOMY WITH FUSION N/A 2018    Procedure: TLIF, OSTEOTOMY L4-5 , POST FUSION L4-5;  Surgeon: Yo Bryson MD;  Location: [facility] OR;  Service: Neurosurgery   • LUMBAR LAMINECTOMY WITH FUSION N/A 2018    Procedure: LUMBAR LAMINECTOMY POSTERIOR LUMBAR INTERBODY FUSION;  Surgeon: Yo Bryson MD;  Location:  [facility] OR;  Service: Neurosurgery   • ROTATOR CUFF REPAIR Right    • TONSILLECTOMY         Visit Dx:     ICD-10-CM ICD-9-CM   1. Chronic right-sided low back pain with right-sided sciatica  M54.41 724.2    G89.29 724.3     338.29        Patient History     Row Name 22 1325              History    Chief Complaint Difficulty with daily activities;Difficulty Walking;Falls/history of falls;Pain;Muscle tenderness;Muscle weakness;Tightness  -MM      Type of Pain Back pain;Hip pain  -MM      Date Current Problem(s) Began 06/09/22  -MM      Brief Description of Current Complaint Client has been experiencing problems with back pain. He had back surgery November 2018 and continued to have quite a bit of pain at times. he fell June 9, 2022 and pain was very severe after that. he often walks with a cane.  -MM      Patient/Caregiver Goals Relieve pain;Improve mobility  -MM      Occupation/sports/leisure activities unemployed, enjoys being outdoors  -MM      What clinical tests have you had for this problem? CT scan  -MM      Results of Clinical Tests 4/11/22: posterior lumbar fusion to S1. broken screws at L5 and collapse of L4/L5. mild SI DJD  -MM              Pain     Pain Location Back;Hip  -MM      Pain at Present 10  -MM      Pain at Best 9  -MM      Pain at Worst 10  -MM      Pain Frequency Constant/continuous  -MM      Pain Description Aching;Discomfort;Sharp  -MM      Pain Comments spends most of time in bed due to severe pain.  -MM      Difficulties with ADL's? difficulty with all activity: sitting, walking, transfers  -MM              Fall Risk Assessment    Any falls in the past year: Yes  -MM      Number of falls reported in the last 12 months 12  -MM      Factors that contributed to the fall: Lost balance  neuropathy  -MM      Does patient have a fear of falling Yes (comment)  -MM              Daily Activities    Primary Language English  -MM      Barriers to learning None  -MM      Pt Participated in POC and Goals Yes  -MM            User Key  (r) = Recorded By, (t) = Taken By, (c) = Cosigned By    Initials Name Provider Type    MM Javi Ellison, PT Physical Therapist                 PT Ortho     Row Name 07/05/22 2341       Precautions and Contraindications    Precautions multiple  commorbidities: bilateral TKA, Right SYLVESTER, back surgery 2018 Lumbar spine, diabetes, obesity, peripheral neuropathy  -MM       Posture/Observations    Posture/Observations Comments client presents with forward flexed posture 13 degrees in standing. Client has scar central lumbar spine L1-S1. Palpation: moderate tenderness bilateral lumbar paravertebrals.  -MM       General ROM    Head/Neck/Trunk Trunk Extension;Trunk Flexion;Trunk Lt Lateral Flexion;Trunk Lt Rotation;Trunk Rt Lateral Flexion;Trunk Rt Rotation  -MM    GENERAL ROM COMMENTS able to tolerate prone and prone to elbows for a few reps  -MM       Head/Neck/Trunk    Trunk Extension AROM -7 with pain  -MM    Trunk Flexion AROM full with difficulty  -MM    Trunk Lt Lateral Flexion AROM moderate loss  -MM    Trunk Rt Lateral Flexion AROM min loss  -MM    Trunk Lt Rotation AROM mod loss  -MM    Trunk Rt Rotation AROM mod loss  -MM       MMT (Manual Muscle Testing)    Rt Lower Ext Rt Hip Flexion;Rt Hip Extension;Rt Hip ABduction;Rt Hip ADduction;Rt Knee Extension;Rt Knee Flexion;Rt Ankle Plantarflexion;Rt Ankle Dorsiflexion  -MM    Lt Lower Ext Lt Hip Flexion;Lt Hip ABduction;Lt Hip ADduction;Lt Hip Extension;Lt Knee Extension;Lt Knee Flexion;Lt Ankle Plantarflexion;Lt Ankle Dorsiflexion  -MM       MMT Right Lower Ext    Rt Hip Flexion MMT, Gross Movement (4+/5) good plus  -MM    Rt Hip ABduction MMT, Gross Movement (5/5) normal  -MM    Rt Hip ADduction MMT, Gross Movement (5/5) normal  -MM    Rt Knee Extension MMT, Gross Movement (5/5) normal  -MM    Rt Knee Flexion MMT, Gross Movement (4+/5) good plus  -MM    Rt Ankle Plantarflexion MMT, Gross Movement (4+/5) good plus  -MM    Rt Ankle Dorsiflexion MMT, Gross Movement (3+/5) fair plus  -MM       MMT Left Lower Ext    Lt Hip Flexion MMT, Gross Movement (4+/5) good plus  -MM    Lt Hip ABduction MMT, Gross Movement (5/5) normal  -MM    Lt Hip ADduction MMT, Gross Movement (5/5) normal  -MM    Lt Knee Extension MMT,  Gross Movement (5/5) normal  -MM    Lt Knee Flexion MMT, Gross Movement (5/5) normal  -MM    Lt Ankle Plantarflexion MMT, Gross Movement (5/5) normal  -MM    Lt Ankle Dorsiflexion MMT, Gross Movement (5/5) normal  -MM       Sensation    Additional Comments numbness/abnormal sensation reported both legs thighs to feet.  -MM       Transfers    Comment, (Transfers) marked difficulty with transfers sit to stand from low chair, min assist needed.  -MM       Gait/Stairs (Locomotion)    Comment, (Gait/Stairs) marked antalgic gait with flexed posture. decreased step length. He frequently uses a standard cane.  -MM          User Key  (r) = Recorded By, (t) = Taken By, (c) = Cosigned By    Initials Name Provider Type    Javi Mccallum, PT Physical Therapist              Therapy Education  Education Details: HEP: prone positioning, prone to elbows  Given: HEP  Program: New  How Provided: Verbal  Provided to: Patient  Level of Understanding: Teach back education performed      PT OP Goals     Row Name 07/05/22 1325          PT Short Term Goals    STG Date to Achieve 07/26/22  -MM     STG 1 Client will report 50% improvement in back pain with daily activity.  -MM     STG 1 Progress New  -MM     STG 2 Client will be indepenent with home program to minimize pain.  -MM     STG 2 Progress New  -MM     STG 3 Client will improve to standing upright without forward flexed posture.  -MM     STG 3 Progress New  -MM     STG 4 Client will transfer sit/stand without difficulty.  -MM     STG 4 Progress New  -MM            Long Term Goals    LTG Date to Achieve 08/16/22  -MM     LTG 1 LEFS score will improve to 40/80.  -MM     LTG 1 Progress New  -MM     LTG 2 Client will improve to walking with normal step length.  -MM     LTG 2 Progress New  -MM     LTG 3 Client will improve to picking up light items from the floor without difficulty.  -MM     LTG 3 Progress New  -MM            Time Calculation    PT Goal Re-Cert Due Date 10/03/22   -MM           User Key  (r) = Recorded By, (t) = Taken By, (c) = Cosigned By    Initials Name Provider Type    Javi Mccallum, PT Physical Therapist                 PT Assessment/Plan     Row Name 07/05/22 1325          PT Assessment    Functional Limitations Impaired gait;Performance in leisure activities;Performance in self-care ADL;Limitation in home management  -MM     Impairments Muscle strength;Pain;Range of motion  -MM     Assessment Comments Client presents with long history of back pain. He had lumbar surgery August 27, 2018. He has continued with quite a bit of pain at times. He had a CT scan 4/11/22 which was reportedly consistent with posterior lumbar fusion with L5 screws broken and collapse of L4L5. Client experienced an exacerbation of low back pain after falling on his back June 8, 2022. Overall tolerance to activity is quite limited and pain is severe. Skilled care is required to minimize pain and improve overall function.  -MM     Please refer to paper survey for additional self-reported information Yes  -MM     Rehab Potential Good  -MM     Patient/caregiver participated in establishment of treatment plan and goals Yes  -MM     Patient would benefit from skilled therapy intervention Yes  -MM            PT Plan    PT Frequency 1x/week  -MM     Predicted Duration of Therapy Intervention (PT) 5-6 weeks  -MM     Planned CPT's? PT EVAL LOW COMPLEXITY: 66683;PT THER PROC EA 15 MIN: 87441;PT THER ACT EA 15 MIN: 20893;PT MANUAL THERAPY EA 15 MIN: 24418  -MM     PT Plan Comments Continue per plan of treatment. Include Neo protocol as able. Consider ASTYM.  -MM           User Key  (r) = Recorded By, (t) = Taken By, (c) = Cosigned By    Initials Name Provider Type    Javi Mccallum, PT Physical Therapist              LEFS: 18/80    Time Calculation:     Start Time: 1325  Untimed Charges  PT Eval/Re-eval Minutes: 60  Total Minutes  Untimed Charges Total Minutes: 60   Total Minutes: 60      Therapy Charges for Today     Code Description Service Date Service Provider Modifiers Qty    77106847645 HC PT EVAL LOW COMPLEXITY 4 7/5/2022 Javi Ellison, PT GP 1          Javi Ellison, PT  7/5/2022

## 2022-07-14 ENCOUNTER — APPOINTMENT (OUTPATIENT)
Dept: PHYSICAL THERAPY | Facility: HOSPITAL | Age: 62
End: 2022-07-14

## 2022-07-18 ENCOUNTER — APPOINTMENT (OUTPATIENT)
Dept: CT IMAGING | Facility: HOSPITAL | Age: 62
End: 2022-07-18

## 2022-07-18 ENCOUNTER — APPOINTMENT (OUTPATIENT)
Dept: CARDIOLOGY | Facility: HOSPITAL | Age: 62
End: 2022-07-18

## 2022-07-18 ENCOUNTER — APPOINTMENT (OUTPATIENT)
Dept: GENERAL RADIOLOGY | Facility: HOSPITAL | Age: 62
End: 2022-07-18

## 2022-07-18 ENCOUNTER — HOSPITAL ENCOUNTER (INPATIENT)
Facility: HOSPITAL | Age: 62
LOS: 4 days | Discharge: HOME | End: 2022-07-22
Attending: STUDENT IN AN ORGANIZED HEALTH CARE EDUCATION/TRAINING PROGRAM | Admitting: FAMILY MEDICINE

## 2022-07-18 DIAGNOSIS — A41.9 SEPSIS WITHOUT ACUTE ORGAN DYSFUNCTION, DUE TO UNSPECIFIED ORGANISM: Primary | ICD-10-CM

## 2022-07-18 DIAGNOSIS — K52.9 ENTERITIS: ICD-10-CM

## 2022-07-18 DIAGNOSIS — E11.65 TYPE 2 DIABETES MELLITUS WITH HYPERGLYCEMIA, WITH LONG-TERM CURRENT USE OF INSULIN: ICD-10-CM

## 2022-07-18 DIAGNOSIS — Z79.4 TYPE 2 DIABETES MELLITUS WITH HYPERGLYCEMIA, WITH LONG-TERM CURRENT USE OF INSULIN: ICD-10-CM

## 2022-07-18 DIAGNOSIS — N39.0 ACUTE UTI: ICD-10-CM

## 2022-07-18 DIAGNOSIS — N17.9 AKI (ACUTE KIDNEY INJURY): ICD-10-CM

## 2022-07-18 PROBLEM — R65.21 SEPTIC SHOCK: Status: ACTIVE | Noted: 2022-07-18

## 2022-07-18 PROBLEM — G62.9 PERIPHERAL NEUROPATHY: Status: ACTIVE | Noted: 2022-07-18

## 2022-07-18 PROBLEM — E78.5 HLD (HYPERLIPIDEMIA): Status: ACTIVE | Noted: 2022-07-18

## 2022-07-18 PROBLEM — N30.01 ACUTE CYSTITIS WITH HEMATURIA: Status: ACTIVE | Noted: 2022-07-18

## 2022-07-18 LAB
ALBUMIN SERPL-MCNC: 3.4 G/DL (ref 3.5–5.2)
ALBUMIN/GLOB SERPL: 0.9 G/DL
ALP SERPL-CCNC: 112 U/L (ref 39–117)
ALT SERPL W P-5'-P-CCNC: 15 U/L (ref 1–41)
AMMONIA BLD-SCNC: 24 UMOL/L (ref 16–60)
AMPHET+METHAMPHET UR QL: NEGATIVE
AMPHETAMINES UR QL: NEGATIVE
ANION GAP SERPL CALCULATED.3IONS-SCNC: 13 MMOL/L (ref 5–15)
APAP SERPL-MCNC: <5 MCG/ML (ref 0–30)
ARTERIAL PATENCY WRIST A: POSITIVE
AST SERPL-CCNC: 26 U/L (ref 1–40)
ATMOSPHERIC PRESS: ABNORMAL MM[HG]
B PARAPERT DNA SPEC QL NAA+PROBE: NOT DETECTED
B PERT DNA SPEC QL NAA+PROBE: NOT DETECTED
BACTERIA UR QL AUTO: ABNORMAL /HPF
BARBITURATES UR QL SCN: NEGATIVE
BASE EXCESS BLDA CALC-SCNC: -2.6 MMOL/L (ref 0–2)
BASOPHILS # BLD AUTO: 0.01 10*3/MM3 (ref 0–0.2)
BASOPHILS NFR BLD AUTO: 0.2 % (ref 0–1.5)
BDY SITE: ABNORMAL
BENZODIAZ UR QL SCN: NEGATIVE
BH CV ECHO MEAS - AO MAX PG: 10.8 MMHG
BH CV ECHO MEAS - AO MEAN PG: 6 MMHG
BH CV ECHO MEAS - AO ROOT DIAM: 3.2 CM
BH CV ECHO MEAS - AO V2 MAX: 164 CM/SEC
BH CV ECHO MEAS - AO V2 VTI: 28.6 CM
BH CV ECHO MEAS - AVA(I,D): 3.1 CM2
BH CV ECHO MEAS - EDV(CUBED): 85.2 ML
BH CV ECHO MEAS - EDV(MOD-SP2): 57.9 ML
BH CV ECHO MEAS - EDV(MOD-SP4): 54.9 ML
BH CV ECHO MEAS - EF(MOD-BP): 57.8 %
BH CV ECHO MEAS - EF(MOD-SP2): 55.3 %
BH CV ECHO MEAS - EF(MOD-SP4): 59.4 %
BH CV ECHO MEAS - ESV(CUBED): 42.9 ML
BH CV ECHO MEAS - ESV(MOD-SP2): 25.9 ML
BH CV ECHO MEAS - ESV(MOD-SP4): 22.3 ML
BH CV ECHO MEAS - FS: 20.5 %
BH CV ECHO MEAS - IVS/LVPW: 1 CM
BH CV ECHO MEAS - IVSD: 1.2 CM
BH CV ECHO MEAS - LA DIMENSION: 4 CM
BH CV ECHO MEAS - LAT PEAK E' VEL: 9.7 CM/SEC
BH CV ECHO MEAS - LV DIASTOLIC VOL/BSA (35-75): 21.1 CM2
BH CV ECHO MEAS - LV MASS(C)D: 191.3 GRAMS
BH CV ECHO MEAS - LV MAX PG: 10.5 MMHG
BH CV ECHO MEAS - LV MEAN PG: 5 MMHG
BH CV ECHO MEAS - LV SYSTOLIC VOL/BSA (12-30): 8.6 CM2
BH CV ECHO MEAS - LV V1 MAX: 162 CM/SEC
BH CV ECHO MEAS - LV V1 VTI: 28.4 CM
BH CV ECHO MEAS - LVIDD: 4.4 CM
BH CV ECHO MEAS - LVIDS: 3.5 CM
BH CV ECHO MEAS - LVOT AREA: 3.1 CM2
BH CV ECHO MEAS - LVOT DIAM: 2 CM
BH CV ECHO MEAS - LVPWD: 1.2 CM
BH CV ECHO MEAS - MED PEAK E' VEL: 6.3 CM/SEC
BH CV ECHO MEAS - MV A MAX VEL: 119 CM/SEC
BH CV ECHO MEAS - MV DEC SLOPE: 549 CM/SEC2
BH CV ECHO MEAS - MV DEC TIME: 0.2 MSEC
BH CV ECHO MEAS - MV E MAX VEL: 119 CM/SEC
BH CV ECHO MEAS - MV E/A: 1
BH CV ECHO MEAS - MV MAX PG: 7.5 MMHG
BH CV ECHO MEAS - MV MEAN PG: 4 MMHG
BH CV ECHO MEAS - MV P1/2T: 72 MSEC
BH CV ECHO MEAS - MV V2 VTI: 36.5 CM
BH CV ECHO MEAS - MVA(P1/2T): 3.1 CM2
BH CV ECHO MEAS - MVA(VTI): 2.44 CM2
BH CV ECHO MEAS - PA ACC TIME: 0.07 SEC
BH CV ECHO MEAS - PA PR(ACCEL): 48.8 MMHG
BH CV ECHO MEAS - SI(MOD-SP2): 12.3 ML/M2
BH CV ECHO MEAS - SI(MOD-SP4): 12.5 ML/M2
BH CV ECHO MEAS - SV(LVOT): 89.2 ML
BH CV ECHO MEAS - SV(MOD-SP2): 32 ML
BH CV ECHO MEAS - SV(MOD-SP4): 32.6 ML
BH CV ECHO MEAS - TAPSE (>1.6): 2.09 CM
BH CV ECHO MEASUREMENTS AVERAGE E/E' RATIO: 14.88
BH CV VAS BP RIGHT ARM: NORMAL MMHG
BH CV XLRA - RV BASE: 3.4 CM
BH CV XLRA - RV LENGTH: 6.5 CM
BH CV XLRA - RV MID: 2.7 CM
BH CV XLRA - TDI S': 13.2 CM/SEC
BILIRUB SERPL-MCNC: 1.8 MG/DL (ref 0–1.2)
BILIRUB UR QL STRIP: NEGATIVE
BODY TEMPERATURE: 37 C
BUN SERPL-MCNC: 29 MG/DL (ref 8–23)
BUN/CREAT SERPL: 16.3 (ref 7–25)
BUPRENORPHINE SERPL-MCNC: NEGATIVE NG/ML
C PNEUM DNA NPH QL NAA+NON-PROBE: NOT DETECTED
CALCIUM SPEC-SCNC: 8.6 MG/DL (ref 8.6–10.5)
CANNABINOIDS SERPL QL: NEGATIVE
CHLORIDE SERPL-SCNC: 102 MMOL/L (ref 98–107)
CK SERPL-CCNC: 175 U/L (ref 20–200)
CLARITY UR: CLEAR
CO2 BLDA-SCNC: 20.3 MMOL/L (ref 22–33)
CO2 SERPL-SCNC: 18 MMOL/L (ref 22–29)
COCAINE UR QL: NEGATIVE
COHGB MFR BLD: 1.2 % (ref 0–2)
COLOR UR: YELLOW
CREAT SERPL-MCNC: 1.78 MG/DL (ref 0.76–1.27)
CRP SERPL-MCNC: 18.07 MG/DL (ref 0–0.5)
D-LACTATE SERPL-SCNC: 2.2 MMOL/L (ref 0.5–2)
D-LACTATE SERPL-SCNC: 2.7 MMOL/L (ref 0.5–2)
DEPRECATED RDW RBC AUTO: 45.1 FL (ref 37–54)
EGFRCR SERPLBLD CKD-EPI 2021: 42.6 ML/MIN/1.73
EOSINOPHIL # BLD AUTO: 0.01 10*3/MM3 (ref 0–0.4)
EOSINOPHIL NFR BLD AUTO: 0.2 % (ref 0.3–6.2)
EPAP: 0
ERYTHROCYTE [DISTWIDTH] IN BLOOD BY AUTOMATED COUNT: 13.2 % (ref 12.3–15.4)
ETHANOL BLD-MCNC: <10 MG/DL (ref 0–10)
FLUAV SUBTYP SPEC NAA+PROBE: NOT DETECTED
FLUBV RNA ISLT QL NAA+PROBE: NOT DETECTED
GLOBULIN UR ELPH-MCNC: 3.9 GM/DL
GLUCOSE BLDC GLUCOMTR-MCNC: 148 MG/DL (ref 70–130)
GLUCOSE BLDC GLUCOMTR-MCNC: 174 MG/DL (ref 70–130)
GLUCOSE BLDC GLUCOMTR-MCNC: 219 MG/DL (ref 70–130)
GLUCOSE SERPL-MCNC: 121 MG/DL (ref 65–99)
GLUCOSE UR STRIP-MCNC: NEGATIVE MG/DL
HADV DNA SPEC NAA+PROBE: NOT DETECTED
HCO3 BLDA-SCNC: 19.5 MMOL/L (ref 20–26)
HCOV 229E RNA SPEC QL NAA+PROBE: NOT DETECTED
HCOV HKU1 RNA SPEC QL NAA+PROBE: NOT DETECTED
HCOV NL63 RNA SPEC QL NAA+PROBE: NOT DETECTED
HCOV OC43 RNA SPEC QL NAA+PROBE: NOT DETECTED
HCT VFR BLD AUTO: 38.7 % (ref 37.5–51)
HCT VFR BLD CALC: 40.3 % (ref 38–51)
HGB BLD-MCNC: 12.9 G/DL (ref 13–17.7)
HGB BLDA-MCNC: 13.2 G/DL (ref 13.5–17.5)
HGB UR QL STRIP.AUTO: ABNORMAL
HMPV RNA NPH QL NAA+NON-PROBE: NOT DETECTED
HPIV1 RNA ISLT QL NAA+PROBE: NOT DETECTED
HPIV2 RNA SPEC QL NAA+PROBE: NOT DETECTED
HPIV3 RNA NPH QL NAA+PROBE: NOT DETECTED
HPIV4 P GENE NPH QL NAA+PROBE: NOT DETECTED
HYALINE CASTS UR QL AUTO: ABNORMAL /LPF
IMM GRANULOCYTES # BLD AUTO: 0.03 10*3/MM3 (ref 0–0.05)
IMM GRANULOCYTES NFR BLD AUTO: 0.6 % (ref 0–0.5)
INHALED O2 CONCENTRATION: 21 %
IPAP: 0
IVRT: 92 MSEC
KETONES UR QL STRIP: ABNORMAL
LEFT ATRIUM VOLUME INDEX: 15.5 ML/M2
LEUKOCYTE ESTERASE UR QL STRIP.AUTO: ABNORMAL
LYMPHOCYTES # BLD AUTO: 0.57 10*3/MM3 (ref 0.7–3.1)
LYMPHOCYTES NFR BLD AUTO: 11.5 % (ref 19.6–45.3)
M PNEUMO IGG SER IA-ACNC: NOT DETECTED
MAGNESIUM SERPL-MCNC: 1.5 MG/DL (ref 1.6–2.4)
MAXIMAL PREDICTED HEART RATE: 158 BPM
MCH RBC QN AUTO: 31.4 PG (ref 26.6–33)
MCHC RBC AUTO-ENTMCNC: 33.3 G/DL (ref 31.5–35.7)
MCV RBC AUTO: 94.2 FL (ref 79–97)
METHADONE UR QL SCN: NEGATIVE
METHGB BLD QL: 0.5 % (ref 0–1.5)
MODALITY: ABNORMAL
MONOCYTES # BLD AUTO: 0.11 10*3/MM3 (ref 0.1–0.9)
MONOCYTES NFR BLD AUTO: 2.2 % (ref 5–12)
NEUTROPHILS NFR BLD AUTO: 4.22 10*3/MM3 (ref 1.7–7)
NEUTROPHILS NFR BLD AUTO: 85.3 % (ref 42.7–76)
NITRITE UR QL STRIP: NEGATIVE
NOTE: ABNORMAL
NRBC BLD AUTO-RTO: 0 /100 WBC (ref 0–0.2)
NT-PROBNP SERPL-MCNC: 148 PG/ML (ref 0–900)
OPIATES UR QL: NEGATIVE
OXYCODONE UR QL SCN: NEGATIVE
OXYHGB MFR BLDV: 94.8 % (ref 94–99)
PAW @ PEAK INSP FLOW SETTING VENT: 0 CMH2O
PCO2 BLDA: 26.1 MM HG (ref 35–45)
PCO2 TEMP ADJ BLD: 26.1 MM HG (ref 35–48)
PCP UR QL SCN: NEGATIVE
PH BLDA: 7.48 PH UNITS (ref 7.35–7.45)
PH UR STRIP.AUTO: 7 [PH] (ref 5–8)
PH, TEMP CORRECTED: 7.48 PH UNITS
PHOSPHATE SERPL-MCNC: 1.3 MG/DL (ref 2.5–4.5)
PLATELET # BLD AUTO: 131 10*3/MM3 (ref 140–450)
PMV BLD AUTO: 9 FL (ref 6–12)
PO2 BLDA: 71.6 MM HG (ref 83–108)
PO2 TEMP ADJ BLD: 71.6 MM HG (ref 83–108)
POTASSIUM SERPL-SCNC: 4.8 MMOL/L (ref 3.5–5.2)
PROCALCITONIN SERPL-MCNC: 1.38 NG/ML (ref 0–0.25)
PROPOXYPH UR QL: NEGATIVE
PROT SERPL-MCNC: 7.3 G/DL (ref 6–8.5)
PROT UR QL STRIP: ABNORMAL
RBC # BLD AUTO: 4.11 10*6/MM3 (ref 4.14–5.8)
RBC # UR STRIP: ABNORMAL /HPF
REF LAB TEST METHOD: ABNORMAL
RHINOVIRUS RNA SPEC NAA+PROBE: NOT DETECTED
RSV RNA NPH QL NAA+NON-PROBE: NOT DETECTED
SALICYLATES SERPL-MCNC: <0.3 MG/DL
SARS-COV-2 RNA NPH QL NAA+NON-PROBE: NOT DETECTED
SODIUM SERPL-SCNC: 133 MMOL/L (ref 136–145)
SP GR UR STRIP: 1.01 (ref 1–1.03)
SQUAMOUS #/AREA URNS HPF: ABNORMAL /HPF
STRESS TARGET HR: 134 BPM
T4 FREE SERPL-MCNC: 1.56 NG/DL (ref 0.93–1.7)
TOTAL RATE: 0 BREATHS/MINUTE
TRICYCLICS UR QL SCN: NEGATIVE
TROPONIN T SERPL-MCNC: 0.03 NG/ML (ref 0–0.03)
TSH SERPL DL<=0.05 MIU/L-ACNC: 0.07 UIU/ML (ref 0.27–4.2)
UROBILINOGEN UR QL STRIP: ABNORMAL
WBC # UR STRIP: ABNORMAL /HPF
WBC NRBC COR # BLD: 4.95 10*3/MM3 (ref 3.4–10.8)

## 2022-07-18 PROCEDURE — 82077 ASSAY SPEC XCP UR&BREATH IA: CPT | Performed by: STUDENT IN AN ORGANIZED HEALTH CARE EDUCATION/TRAINING PROGRAM

## 2022-07-18 PROCEDURE — 63710000001 INSULIN LISPRO (HUMAN) PER 5 UNITS: Performed by: INTERNAL MEDICINE

## 2022-07-18 PROCEDURE — 80306 DRUG TEST PRSMV INSTRMNT: CPT | Performed by: STUDENT IN AN ORGANIZED HEALTH CARE EDUCATION/TRAINING PROGRAM

## 2022-07-18 PROCEDURE — 0202U NFCT DS 22 TRGT SARS-COV-2: CPT | Performed by: STUDENT IN AN ORGANIZED HEALTH CARE EDUCATION/TRAINING PROGRAM

## 2022-07-18 PROCEDURE — 93306 TTE W/DOPPLER COMPLETE: CPT

## 2022-07-18 PROCEDURE — 99291 CRITICAL CARE FIRST HOUR: CPT | Performed by: INTERNAL MEDICINE

## 2022-07-18 PROCEDURE — 84484 ASSAY OF TROPONIN QUANT: CPT | Performed by: STUDENT IN AN ORGANIZED HEALTH CARE EDUCATION/TRAINING PROGRAM

## 2022-07-18 PROCEDURE — 84439 ASSAY OF FREE THYROXINE: CPT | Performed by: STUDENT IN AN ORGANIZED HEALTH CARE EDUCATION/TRAINING PROGRAM

## 2022-07-18 PROCEDURE — 80143 DRUG ASSAY ACETAMINOPHEN: CPT | Performed by: STUDENT IN AN ORGANIZED HEALTH CARE EDUCATION/TRAINING PROGRAM

## 2022-07-18 PROCEDURE — 84145 PROCALCITONIN (PCT): CPT | Performed by: STUDENT IN AN ORGANIZED HEALTH CARE EDUCATION/TRAINING PROGRAM

## 2022-07-18 PROCEDURE — 83880 ASSAY OF NATRIURETIC PEPTIDE: CPT | Performed by: STUDENT IN AN ORGANIZED HEALTH CARE EDUCATION/TRAINING PROGRAM

## 2022-07-18 PROCEDURE — 94640 AIRWAY INHALATION TREATMENT: CPT

## 2022-07-18 PROCEDURE — 85025 COMPLETE CBC W/AUTO DIFF WBC: CPT | Performed by: STUDENT IN AN ORGANIZED HEALTH CARE EDUCATION/TRAINING PROGRAM

## 2022-07-18 PROCEDURE — 82962 GLUCOSE BLOOD TEST: CPT

## 2022-07-18 PROCEDURE — 25010000002 DIPHENHYDRAMINE PER 50 MG: Performed by: STUDENT IN AN ORGANIZED HEALTH CARE EDUCATION/TRAINING PROGRAM

## 2022-07-18 PROCEDURE — 87040 BLOOD CULTURE FOR BACTERIA: CPT | Performed by: STUDENT IN AN ORGANIZED HEALTH CARE EDUCATION/TRAINING PROGRAM

## 2022-07-18 PROCEDURE — 99285 EMERGENCY DEPT VISIT HI MDM: CPT

## 2022-07-18 PROCEDURE — 25010000002 ENOXAPARIN PER 10 MG: Performed by: INTERNAL MEDICINE

## 2022-07-18 PROCEDURE — 82550 ASSAY OF CK (CPK): CPT | Performed by: STUDENT IN AN ORGANIZED HEALTH CARE EDUCATION/TRAINING PROGRAM

## 2022-07-18 PROCEDURE — 82805 BLOOD GASES W/O2 SATURATION: CPT

## 2022-07-18 PROCEDURE — 84100 ASSAY OF PHOSPHORUS: CPT | Performed by: STUDENT IN AN ORGANIZED HEALTH CARE EDUCATION/TRAINING PROGRAM

## 2022-07-18 PROCEDURE — 25010000002 PIPERACILLIN SOD-TAZOBACTAM PER 1 G: Performed by: INTERNAL MEDICINE

## 2022-07-18 PROCEDURE — 82375 ASSAY CARBOXYHB QUANT: CPT

## 2022-07-18 PROCEDURE — 87186 SC STD MICRODIL/AGAR DIL: CPT | Performed by: STUDENT IN AN ORGANIZED HEALTH CARE EDUCATION/TRAINING PROGRAM

## 2022-07-18 PROCEDURE — 25010000002 MAGNESIUM SULFATE IN D5W 1G/100ML (PREMIX) 1-5 GM/100ML-% SOLUTION: Performed by: STUDENT IN AN ORGANIZED HEALTH CARE EDUCATION/TRAINING PROGRAM

## 2022-07-18 PROCEDURE — 25010000002 VANCOMYCIN 10 G RECONSTITUTED SOLUTION: Performed by: STUDENT IN AN ORGANIZED HEALTH CARE EDUCATION/TRAINING PROGRAM

## 2022-07-18 PROCEDURE — 93005 ELECTROCARDIOGRAM TRACING: CPT | Performed by: STUDENT IN AN ORGANIZED HEALTH CARE EDUCATION/TRAINING PROGRAM

## 2022-07-18 PROCEDURE — 87086 URINE CULTURE/COLONY COUNT: CPT | Performed by: STUDENT IN AN ORGANIZED HEALTH CARE EDUCATION/TRAINING PROGRAM

## 2022-07-18 PROCEDURE — 80179 DRUG ASSAY SALICYLATE: CPT | Performed by: STUDENT IN AN ORGANIZED HEALTH CARE EDUCATION/TRAINING PROGRAM

## 2022-07-18 PROCEDURE — 36600 WITHDRAWAL OF ARTERIAL BLOOD: CPT

## 2022-07-18 PROCEDURE — 86140 C-REACTIVE PROTEIN: CPT | Performed by: STUDENT IN AN ORGANIZED HEALTH CARE EDUCATION/TRAINING PROGRAM

## 2022-07-18 PROCEDURE — 25010000002 THIAMINE PER 100 MG: Performed by: INTERNAL MEDICINE

## 2022-07-18 PROCEDURE — P9612 CATHETERIZE FOR URINE SPEC: HCPCS

## 2022-07-18 PROCEDURE — 83735 ASSAY OF MAGNESIUM: CPT | Performed by: STUDENT IN AN ORGANIZED HEALTH CARE EDUCATION/TRAINING PROGRAM

## 2022-07-18 PROCEDURE — 25010000002 CEFEPIME PER 500 MG: Performed by: STUDENT IN AN ORGANIZED HEALTH CARE EDUCATION/TRAINING PROGRAM

## 2022-07-18 PROCEDURE — 74177 CT ABD & PELVIS W/CONTRAST: CPT

## 2022-07-18 PROCEDURE — 71045 X-RAY EXAM CHEST 1 VIEW: CPT

## 2022-07-18 PROCEDURE — 81001 URINALYSIS AUTO W/SCOPE: CPT | Performed by: STUDENT IN AN ORGANIZED HEALTH CARE EDUCATION/TRAINING PROGRAM

## 2022-07-18 PROCEDURE — 80053 COMPREHEN METABOLIC PANEL: CPT | Performed by: STUDENT IN AN ORGANIZED HEALTH CARE EDUCATION/TRAINING PROGRAM

## 2022-07-18 PROCEDURE — 83050 HGB METHEMOGLOBIN QUAN: CPT

## 2022-07-18 PROCEDURE — 63710000001 INSULIN DETEMIR PER 5 UNITS: Performed by: INTERNAL MEDICINE

## 2022-07-18 PROCEDURE — 87077 CULTURE AEROBIC IDENTIFY: CPT | Performed by: STUDENT IN AN ORGANIZED HEALTH CARE EDUCATION/TRAINING PROGRAM

## 2022-07-18 PROCEDURE — 25010000002 IOPAMIDOL 61 % SOLUTION: Performed by: STUDENT IN AN ORGANIZED HEALTH CARE EDUCATION/TRAINING PROGRAM

## 2022-07-18 PROCEDURE — 84443 ASSAY THYROID STIM HORMONE: CPT | Performed by: STUDENT IN AN ORGANIZED HEALTH CARE EDUCATION/TRAINING PROGRAM

## 2022-07-18 PROCEDURE — 82140 ASSAY OF AMMONIA: CPT | Performed by: STUDENT IN AN ORGANIZED HEALTH CARE EDUCATION/TRAINING PROGRAM

## 2022-07-18 PROCEDURE — 87150 DNA/RNA AMPLIFIED PROBE: CPT | Performed by: STUDENT IN AN ORGANIZED HEALTH CARE EDUCATION/TRAINING PROGRAM

## 2022-07-18 PROCEDURE — 93306 TTE W/DOPPLER COMPLETE: CPT | Performed by: INTERNAL MEDICINE

## 2022-07-18 PROCEDURE — 70450 CT HEAD/BRAIN W/O DYE: CPT

## 2022-07-18 PROCEDURE — 83605 ASSAY OF LACTIC ACID: CPT | Performed by: STUDENT IN AN ORGANIZED HEALTH CARE EDUCATION/TRAINING PROGRAM

## 2022-07-18 RX ORDER — INSULIN LISPRO 100 [IU]/ML
0-14 INJECTION, SOLUTION INTRAVENOUS; SUBCUTANEOUS
Status: DISCONTINUED | OUTPATIENT
Start: 2022-07-18 | End: 2022-07-18

## 2022-07-18 RX ORDER — SODIUM CHLORIDE 9 MG/ML
125 INJECTION, SOLUTION INTRAVENOUS CONTINUOUS
Status: DISCONTINUED | OUTPATIENT
Start: 2022-07-18 | End: 2022-07-18

## 2022-07-18 RX ORDER — ACETAMINOPHEN 650 MG/1
650 SUPPOSITORY RECTAL EVERY 4 HOURS PRN
Status: DISCONTINUED | OUTPATIENT
Start: 2022-07-18 | End: 2022-07-18

## 2022-07-18 RX ORDER — FUROSEMIDE 40 MG/1
40 TABLET ORAL 2 TIMES DAILY
COMMUNITY
End: 2022-07-22 | Stop reason: HOSPADM

## 2022-07-18 RX ORDER — PANTOPRAZOLE SODIUM 40 MG/1
40 TABLET, DELAYED RELEASE ORAL
Status: DISCONTINUED | OUTPATIENT
Start: 2022-07-18 | End: 2022-07-22 | Stop reason: HOSPADM

## 2022-07-18 RX ORDER — DEXTROSE MONOHYDRATE 25 G/50ML
25 INJECTION, SOLUTION INTRAVENOUS
Status: DISCONTINUED | OUTPATIENT
Start: 2022-07-18 | End: 2022-07-18

## 2022-07-18 RX ORDER — BUDESONIDE AND FORMOTEROL FUMARATE DIHYDRATE 160; 4.5 UG/1; UG/1
2 AEROSOL RESPIRATORY (INHALATION)
Status: DISCONTINUED | OUTPATIENT
Start: 2022-07-18 | End: 2022-07-22 | Stop reason: HOSPADM

## 2022-07-18 RX ORDER — MAGNESIUM SULFATE 1 G/100ML
1 INJECTION INTRAVENOUS ONCE
Status: COMPLETED | OUTPATIENT
Start: 2022-07-18 | End: 2022-07-18

## 2022-07-18 RX ORDER — MAGNESIUM SULFATE HEPTAHYDRATE 40 MG/ML
6 INJECTION, SOLUTION INTRAVENOUS AS NEEDED
Status: DISCONTINUED | OUTPATIENT
Start: 2022-07-18 | End: 2022-07-22 | Stop reason: HOSPADM

## 2022-07-18 RX ORDER — NOREPINEPHRINE BIT/0.9 % NACL 8 MG/250ML
.02-.3 INFUSION BOTTLE (ML) INTRAVENOUS
Status: DISCONTINUED | OUTPATIENT
Start: 2022-07-18 | End: 2022-07-18

## 2022-07-18 RX ORDER — GABAPENTIN 300 MG/1
300 CAPSULE ORAL EVERY 8 HOURS SCHEDULED
Status: DISCONTINUED | OUTPATIENT
Start: 2022-07-18 | End: 2022-07-22 | Stop reason: HOSPADM

## 2022-07-18 RX ORDER — ASPIRIN 81 MG/1
324 TABLET, CHEWABLE ORAL ONCE
Status: COMPLETED | OUTPATIENT
Start: 2022-07-18 | End: 2022-07-18

## 2022-07-18 RX ORDER — ACETAMINOPHEN 500 MG
1000 TABLET ORAL ONCE
Status: COMPLETED | OUTPATIENT
Start: 2022-07-18 | End: 2022-07-18

## 2022-07-18 RX ORDER — CETIRIZINE HYDROCHLORIDE 10 MG/1
10 TABLET ORAL DAILY
COMMUNITY

## 2022-07-18 RX ORDER — INSULIN LISPRO 100 [IU]/ML
0-14 INJECTION, SOLUTION INTRAVENOUS; SUBCUTANEOUS
Status: DISCONTINUED | OUTPATIENT
Start: 2022-07-18 | End: 2022-07-19

## 2022-07-18 RX ORDER — MONTELUKAST SODIUM 10 MG/1
10 TABLET ORAL NIGHTLY
Status: DISCONTINUED | OUTPATIENT
Start: 2022-07-18 | End: 2022-07-18

## 2022-07-18 RX ORDER — GABAPENTIN 300 MG/1
300 CAPSULE ORAL 4 TIMES DAILY
Status: DISCONTINUED | OUTPATIENT
Start: 2022-07-18 | End: 2022-07-18

## 2022-07-18 RX ORDER — THIAMINE HYDROCHLORIDE 100 MG/ML
200 INJECTION, SOLUTION INTRAMUSCULAR; INTRAVENOUS EVERY 8 HOURS
Status: COMPLETED | OUTPATIENT
Start: 2022-07-18 | End: 2022-07-19

## 2022-07-18 RX ORDER — DIPHENHYDRAMINE HYDROCHLORIDE 50 MG/ML
12.5 INJECTION INTRAMUSCULAR; INTRAVENOUS ONCE
Status: COMPLETED | OUTPATIENT
Start: 2022-07-18 | End: 2022-07-18

## 2022-07-18 RX ORDER — AMLODIPINE BESYLATE 10 MG/1
10 TABLET ORAL DAILY
COMMUNITY

## 2022-07-18 RX ORDER — NICOTINE POLACRILEX 4 MG
15 LOZENGE BUCCAL
Status: DISCONTINUED | OUTPATIENT
Start: 2022-07-18 | End: 2022-07-18

## 2022-07-18 RX ORDER — ACETAMINOPHEN 325 MG/1
650 TABLET ORAL EVERY 4 HOURS PRN
Status: DISCONTINUED | OUTPATIENT
Start: 2022-07-18 | End: 2022-07-18

## 2022-07-18 RX ORDER — INSULIN LISPRO 100 [IU]/ML
6 INJECTION, SOLUTION INTRAVENOUS; SUBCUTANEOUS
Status: DISCONTINUED | OUTPATIENT
Start: 2022-07-18 | End: 2022-07-19

## 2022-07-18 RX ORDER — SODIUM CHLORIDE 0.9 % (FLUSH) 0.9 %
10 SYRINGE (ML) INJECTION AS NEEDED
Status: DISCONTINUED | OUTPATIENT
Start: 2022-07-18 | End: 2022-07-22 | Stop reason: HOSPADM

## 2022-07-18 RX ORDER — ATORVASTATIN CALCIUM 40 MG/1
40 TABLET, FILM COATED ORAL EVERY MORNING
Status: DISCONTINUED | OUTPATIENT
Start: 2022-07-18 | End: 2022-07-22 | Stop reason: HOSPADM

## 2022-07-18 RX ORDER — SODIUM CHLORIDE, SODIUM LACTATE, POTASSIUM CHLORIDE, CALCIUM CHLORIDE 600; 310; 30; 20 MG/100ML; MG/100ML; MG/100ML; MG/100ML
100 INJECTION, SOLUTION INTRAVENOUS CONTINUOUS
Status: DISCONTINUED | OUTPATIENT
Start: 2022-07-18 | End: 2022-07-19

## 2022-07-18 RX ORDER — METRONIDAZOLE 500 MG/100ML
500 INJECTION, SOLUTION INTRAVENOUS ONCE
Status: COMPLETED | OUTPATIENT
Start: 2022-07-18 | End: 2022-07-18

## 2022-07-18 RX ORDER — LORATADINE 10 MG/1
10 TABLET ORAL DAILY
COMMUNITY
End: 2022-07-22 | Stop reason: HOSPADM

## 2022-07-18 RX ORDER — ENOXAPARIN SODIUM 100 MG/ML
40 INJECTION SUBCUTANEOUS DAILY
Status: DISCONTINUED | OUTPATIENT
Start: 2022-07-18 | End: 2022-07-22 | Stop reason: HOSPADM

## 2022-07-18 RX ORDER — SODIUM CHLORIDE 0.9 % (FLUSH) 0.9 %
10 SYRINGE (ML) INJECTION EVERY 12 HOURS SCHEDULED
Status: DISCONTINUED | OUTPATIENT
Start: 2022-07-18 | End: 2022-07-22 | Stop reason: HOSPADM

## 2022-07-18 RX ORDER — NOREPINEPHRINE BIT/0.9 % NACL 8 MG/250ML
.02-.3 INFUSION BOTTLE (ML) INTRAVENOUS
Status: DISCONTINUED | OUTPATIENT
Start: 2022-07-18 | End: 2022-07-19

## 2022-07-18 RX ORDER — ALBUTEROL SULFATE 2.5 MG/3ML
2.5 SOLUTION RESPIRATORY (INHALATION) EVERY 6 HOURS PRN
Status: DISCONTINUED | OUTPATIENT
Start: 2022-07-18 | End: 2022-07-22 | Stop reason: HOSPADM

## 2022-07-18 RX ADMIN — INSULIN LISPRO 3 UNITS: 100 INJECTION, SOLUTION INTRAVENOUS; SUBCUTANEOUS at 11:47

## 2022-07-18 RX ADMIN — ENOXAPARIN SODIUM 40 MG: 40 INJECTION SUBCUTANEOUS at 10:50

## 2022-07-18 RX ADMIN — ACETAMINOPHEN 1000 MG: 500 TABLET, FILM COATED ORAL at 05:44

## 2022-07-18 RX ADMIN — SODIUM CHLORIDE 1000 ML: 9 INJECTION, SOLUTION INTRAVENOUS at 06:20

## 2022-07-18 RX ADMIN — ALBUTEROL SULFATE 2.5 MG: 2.5 SOLUTION RESPIRATORY (INHALATION) at 21:03

## 2022-07-18 RX ADMIN — GABAPENTIN 300 MG: 300 CAPSULE ORAL at 11:46

## 2022-07-18 RX ADMIN — SODIUM PHOSPHATE, MONOBASIC, MONOHYDRATE AND SODIUM PHOSPHATE, DIBASIC, ANHYDROUS 45 MMOL: 276; 142 INJECTION, SOLUTION INTRAVENOUS at 11:49

## 2022-07-18 RX ADMIN — ATORVASTATIN CALCIUM 40 MG: 40 TABLET, FILM COATED ORAL at 11:46

## 2022-07-18 RX ADMIN — ASPIRIN 81 MG CHEWABLE TABLET 324 MG: 81 TABLET CHEWABLE at 07:01

## 2022-07-18 RX ADMIN — THIAMINE HYDROCHLORIDE 200 MG: 100 INJECTION, SOLUTION INTRAMUSCULAR; INTRAVENOUS at 13:09

## 2022-07-18 RX ADMIN — CEFEPIME 2 G: 2 INJECTION, POWDER, FOR SOLUTION INTRAVENOUS at 06:58

## 2022-07-18 RX ADMIN — IOPAMIDOL 100 ML: 612 INJECTION, SOLUTION INTRAVENOUS at 06:37

## 2022-07-18 RX ADMIN — PANTOPRAZOLE SODIUM 40 MG: 40 TABLET, DELAYED RELEASE ORAL at 10:50

## 2022-07-18 RX ADMIN — POTASSIUM PHOSPHATE, MONOBASIC AND POTASSIUM PHOSPHATE, DIBASIC 9 MMOL: 224; 236 INJECTION, SOLUTION, CONCENTRATE INTRAVENOUS at 07:40

## 2022-07-18 RX ADMIN — Medication 0.2 MCG/KG/MIN: at 08:37

## 2022-07-18 RX ADMIN — TAZOBACTAM SODIUM AND PIPERACILLIN SODIUM 3.38 G: 375; 3 INJECTION, SOLUTION INTRAVENOUS at 13:09

## 2022-07-18 RX ADMIN — SODIUM CHLORIDE, POTASSIUM CHLORIDE, SODIUM LACTATE AND CALCIUM CHLORIDE 100 ML/HR: 600; 310; 30; 20 INJECTION, SOLUTION INTRAVENOUS at 09:59

## 2022-07-18 RX ADMIN — DIPHENHYDRAMINE HYDROCHLORIDE 12.5 MG: 50 INJECTION INTRAMUSCULAR; INTRAVENOUS at 05:42

## 2022-07-18 RX ADMIN — MAGNESIUM SULFATE 1 G: 1 INJECTION INTRAVENOUS at 07:03

## 2022-07-18 RX ADMIN — SODIUM CHLORIDE 1000 ML: 9 INJECTION, SOLUTION INTRAVENOUS at 05:37

## 2022-07-18 RX ADMIN — BUDESONIDE AND FORMOTEROL FUMARATE DIHYDRATE 2 PUFF: 160; 4.5 AEROSOL RESPIRATORY (INHALATION) at 19:01

## 2022-07-18 RX ADMIN — METRONIDAZOLE 500 MG: 500 INJECTION, SOLUTION INTRAVENOUS at 05:48

## 2022-07-18 RX ADMIN — Medication 10 ML: at 20:47

## 2022-07-18 RX ADMIN — GABAPENTIN 300 MG: 300 CAPSULE ORAL at 20:47

## 2022-07-18 RX ADMIN — TAZOBACTAM SODIUM AND PIPERACILLIN SODIUM 3.38 G: 375; 3 INJECTION, SOLUTION INTRAVENOUS at 20:41

## 2022-07-18 RX ADMIN — Medication 10 ML: at 10:52

## 2022-07-18 RX ADMIN — INSULIN LISPRO 5 UNITS: 100 INJECTION, SOLUTION INTRAVENOUS; SUBCUTANEOUS at 17:06

## 2022-07-18 RX ADMIN — INSULIN LISPRO 6 UNITS: 100 INJECTION, SOLUTION INTRAVENOUS; SUBCUTANEOUS at 11:46

## 2022-07-18 RX ADMIN — THIAMINE HYDROCHLORIDE 200 MG: 100 INJECTION, SOLUTION INTRAMUSCULAR; INTRAVENOUS at 20:47

## 2022-07-18 RX ADMIN — INSULIN DETEMIR 30 UNITS: 100 INJECTION, SOLUTION SUBCUTANEOUS at 20:40

## 2022-07-18 RX ADMIN — INSULIN LISPRO 3 UNITS: 100 INJECTION, SOLUTION INTRAVENOUS; SUBCUTANEOUS at 20:40

## 2022-07-18 RX ADMIN — INSULIN LISPRO 6 UNITS: 100 INJECTION, SOLUTION INTRAVENOUS; SUBCUTANEOUS at 17:05

## 2022-07-18 RX ADMIN — SODIUM CHLORIDE 125 ML/HR: 9 INJECTION, SOLUTION INTRAVENOUS at 07:40

## 2022-07-18 RX ADMIN — VANCOMYCIN HYDROCHLORIDE 2750 MG: 10 INJECTION, POWDER, LYOPHILIZED, FOR SOLUTION INTRAVENOUS at 07:41

## 2022-07-19 PROBLEM — A41.59 SEPSIS DUE TO KLEBSIELLA (HCC): Status: ACTIVE | Noted: 2022-07-18

## 2022-07-19 LAB
ALBUMIN SERPL-MCNC: 2.6 G/DL (ref 3.5–5.2)
ALBUMIN/GLOB SERPL: 0.7 G/DL
ALP SERPL-CCNC: 92 U/L (ref 39–117)
ALT SERPL W P-5'-P-CCNC: 22 U/L (ref 1–41)
ANION GAP SERPL CALCULATED.3IONS-SCNC: 10 MMOL/L (ref 5–15)
AST SERPL-CCNC: 43 U/L (ref 1–40)
BACTERIA BLD CULT: ABNORMAL
BASOPHILS # BLD AUTO: 0.01 10*3/MM3 (ref 0–0.2)
BASOPHILS NFR BLD AUTO: 0.1 % (ref 0–1.5)
BILIRUB SERPL-MCNC: 0.7 MG/DL (ref 0–1.2)
BUN SERPL-MCNC: 26 MG/DL (ref 8–23)
BUN/CREAT SERPL: 16 (ref 7–25)
CALCIUM SPEC-SCNC: 8.1 MG/DL (ref 8.6–10.5)
CHLORIDE SERPL-SCNC: 107 MMOL/L (ref 98–107)
CO2 SERPL-SCNC: 19 MMOL/L (ref 22–29)
CREAT SERPL-MCNC: 1.63 MG/DL (ref 0.76–1.27)
D-LACTATE SERPL-SCNC: 1.2 MMOL/L (ref 0.5–2)
DEPRECATED RDW RBC AUTO: 45.4 FL (ref 37–54)
EGFRCR SERPLBLD CKD-EPI 2021: 47.3 ML/MIN/1.73
EOSINOPHIL # BLD AUTO: 0.06 10*3/MM3 (ref 0–0.4)
EOSINOPHIL NFR BLD AUTO: 0.7 % (ref 0.3–6.2)
ERYTHROCYTE [DISTWIDTH] IN BLOOD BY AUTOMATED COUNT: 13.2 % (ref 12.3–15.4)
GLOBULIN UR ELPH-MCNC: 3.8 GM/DL
GLUCOSE BLDC GLUCOMTR-MCNC: 142 MG/DL (ref 70–130)
GLUCOSE BLDC GLUCOMTR-MCNC: 142 MG/DL (ref 70–130)
GLUCOSE BLDC GLUCOMTR-MCNC: 157 MG/DL (ref 70–130)
GLUCOSE BLDC GLUCOMTR-MCNC: 95 MG/DL (ref 70–130)
GLUCOSE SERPL-MCNC: 118 MG/DL (ref 65–99)
HCT VFR BLD AUTO: 33.5 % (ref 37.5–51)
HGB BLD-MCNC: 11.5 G/DL (ref 13–17.7)
IMM GRANULOCYTES # BLD AUTO: 0.03 10*3/MM3 (ref 0–0.05)
IMM GRANULOCYTES NFR BLD AUTO: 0.4 % (ref 0–0.5)
LYMPHOCYTES # BLD AUTO: 0.49 10*3/MM3 (ref 0.7–3.1)
LYMPHOCYTES NFR BLD AUTO: 6.1 % (ref 19.6–45.3)
MAGNESIUM SERPL-MCNC: 1.7 MG/DL (ref 1.6–2.4)
MCH RBC QN AUTO: 32 PG (ref 26.6–33)
MCHC RBC AUTO-ENTMCNC: 34.3 G/DL (ref 31.5–35.7)
MCV RBC AUTO: 93.3 FL (ref 79–97)
MONOCYTES # BLD AUTO: 0.48 10*3/MM3 (ref 0.1–0.9)
MONOCYTES NFR BLD AUTO: 5.9 % (ref 5–12)
NEUTROPHILS NFR BLD AUTO: 7 10*3/MM3 (ref 1.7–7)
NEUTROPHILS NFR BLD AUTO: 86.8 % (ref 42.7–76)
NRBC BLD AUTO-RTO: 0 /100 WBC (ref 0–0.2)
PHOSPHATE SERPL-MCNC: 3 MG/DL (ref 2.5–4.5)
PLATELET # BLD AUTO: 107 10*3/MM3 (ref 140–450)
PMV BLD AUTO: 9 FL (ref 6–12)
POTASSIUM SERPL-SCNC: 4.3 MMOL/L (ref 3.5–5.2)
PROCALCITONIN SERPL-MCNC: 19.43 NG/ML (ref 0–0.25)
PROT SERPL-MCNC: 6.4 G/DL (ref 6–8.5)
RBC # BLD AUTO: 3.59 10*6/MM3 (ref 4.14–5.8)
SODIUM SERPL-SCNC: 136 MMOL/L (ref 136–145)
TROPONIN T SERPL-MCNC: 0.01 NG/ML (ref 0–0.03)
WBC NRBC COR # BLD: 8.07 10*3/MM3 (ref 3.4–10.8)

## 2022-07-19 PROCEDURE — 94799 UNLISTED PULMONARY SVC/PX: CPT

## 2022-07-19 PROCEDURE — 25010000002 THIAMINE PER 100 MG: Performed by: INTERNAL MEDICINE

## 2022-07-19 PROCEDURE — 83735 ASSAY OF MAGNESIUM: CPT | Performed by: INTERNAL MEDICINE

## 2022-07-19 PROCEDURE — 84484 ASSAY OF TROPONIN QUANT: CPT | Performed by: INTERNAL MEDICINE

## 2022-07-19 PROCEDURE — 63710000001 INSULIN LISPRO (HUMAN) PER 5 UNITS: Performed by: INTERNAL MEDICINE

## 2022-07-19 PROCEDURE — 82962 GLUCOSE BLOOD TEST: CPT

## 2022-07-19 PROCEDURE — 83605 ASSAY OF LACTIC ACID: CPT | Performed by: INTERNAL MEDICINE

## 2022-07-19 PROCEDURE — 25010000002 ENOXAPARIN PER 10 MG: Performed by: INTERNAL MEDICINE

## 2022-07-19 PROCEDURE — 94664 DEMO&/EVAL PT USE INHALER: CPT

## 2022-07-19 PROCEDURE — 84100 ASSAY OF PHOSPHORUS: CPT | Performed by: NURSE PRACTITIONER

## 2022-07-19 PROCEDURE — G0108 DIAB MANAGE TRN  PER INDIV: HCPCS | Performed by: REGISTERED NURSE

## 2022-07-19 PROCEDURE — 84145 PROCALCITONIN (PCT): CPT | Performed by: INTERNAL MEDICINE

## 2022-07-19 PROCEDURE — 85025 COMPLETE CBC W/AUTO DIFF WBC: CPT | Performed by: INTERNAL MEDICINE

## 2022-07-19 PROCEDURE — 99232 SBSQ HOSP IP/OBS MODERATE 35: CPT | Performed by: INTERNAL MEDICINE

## 2022-07-19 PROCEDURE — 80053 COMPREHEN METABOLIC PANEL: CPT | Performed by: INTERNAL MEDICINE

## 2022-07-19 PROCEDURE — 25010000002 PIPERACILLIN SOD-TAZOBACTAM PER 1 G: Performed by: INTERNAL MEDICINE

## 2022-07-19 PROCEDURE — 63710000001 INSULIN DETEMIR PER 5 UNITS: Performed by: INTERNAL MEDICINE

## 2022-07-19 RX ORDER — ACETAMINOPHEN 325 MG/1
650 TABLET ORAL EVERY 6 HOURS PRN
Status: DISCONTINUED | OUTPATIENT
Start: 2022-07-19 | End: 2022-07-22 | Stop reason: HOSPADM

## 2022-07-19 RX ORDER — MAGNESIUM SULFATE HEPTAHYDRATE 40 MG/ML
4 INJECTION, SOLUTION INTRAVENOUS AS NEEDED
Status: DISCONTINUED | OUTPATIENT
Start: 2022-07-19 | End: 2022-07-22 | Stop reason: HOSPADM

## 2022-07-19 RX ORDER — INSULIN LISPRO 100 [IU]/ML
0-9 INJECTION, SOLUTION INTRAVENOUS; SUBCUTANEOUS
Status: DISCONTINUED | OUTPATIENT
Start: 2022-07-19 | End: 2022-07-22 | Stop reason: HOSPADM

## 2022-07-19 RX ORDER — INSULIN LISPRO 100 [IU]/ML
13 INJECTION, SOLUTION INTRAVENOUS; SUBCUTANEOUS
Status: DISCONTINUED | OUTPATIENT
Start: 2022-07-19 | End: 2022-07-21

## 2022-07-19 RX ADMIN — THIAMINE HYDROCHLORIDE 200 MG: 100 INJECTION, SOLUTION INTRAMUSCULAR; INTRAVENOUS at 06:05

## 2022-07-19 RX ADMIN — INSULIN LISPRO 6 UNITS: 100 INJECTION, SOLUTION INTRAVENOUS; SUBCUTANEOUS at 11:55

## 2022-07-19 RX ADMIN — Medication 10 ML: at 09:37

## 2022-07-19 RX ADMIN — BUDESONIDE AND FORMOTEROL FUMARATE DIHYDRATE 2 PUFF: 160; 4.5 AEROSOL RESPIRATORY (INHALATION) at 09:30

## 2022-07-19 RX ADMIN — INSULIN LISPRO 3 UNITS: 100 INJECTION, SOLUTION INTRAVENOUS; SUBCUTANEOUS at 11:55

## 2022-07-19 RX ADMIN — INSULIN DETEMIR 20 UNITS: 100 INJECTION, SOLUTION SUBCUTANEOUS at 20:40

## 2022-07-19 RX ADMIN — Medication 10 ML: at 20:32

## 2022-07-19 RX ADMIN — TAZOBACTAM SODIUM AND PIPERACILLIN SODIUM 3.38 G: 375; 3 INJECTION, SOLUTION INTRAVENOUS at 20:31

## 2022-07-19 RX ADMIN — ENOXAPARIN SODIUM 40 MG: 40 INJECTION SUBCUTANEOUS at 08:48

## 2022-07-19 RX ADMIN — GABAPENTIN 300 MG: 300 CAPSULE ORAL at 20:31

## 2022-07-19 RX ADMIN — ACETAMINOPHEN 325MG 650 MG: 325 TABLET ORAL at 00:17

## 2022-07-19 RX ADMIN — PANTOPRAZOLE SODIUM 40 MG: 40 TABLET, DELAYED RELEASE ORAL at 06:05

## 2022-07-19 RX ADMIN — BUDESONIDE AND FORMOTEROL FUMARATE DIHYDRATE 2 PUFF: 160; 4.5 AEROSOL RESPIRATORY (INHALATION) at 18:53

## 2022-07-19 RX ADMIN — GABAPENTIN 300 MG: 300 CAPSULE ORAL at 06:05

## 2022-07-19 RX ADMIN — GABAPENTIN 300 MG: 300 CAPSULE ORAL at 13:20

## 2022-07-19 RX ADMIN — SODIUM CHLORIDE, POTASSIUM CHLORIDE, SODIUM LACTATE AND CALCIUM CHLORIDE 100 ML/HR: 600; 310; 30; 20 INJECTION, SOLUTION INTRAVENOUS at 06:05

## 2022-07-19 RX ADMIN — TAZOBACTAM SODIUM AND PIPERACILLIN SODIUM 3.38 G: 375; 3 INJECTION, SOLUTION INTRAVENOUS at 03:47

## 2022-07-19 RX ADMIN — ATORVASTATIN CALCIUM 40 MG: 40 TABLET, FILM COATED ORAL at 08:48

## 2022-07-19 RX ADMIN — TAZOBACTAM SODIUM AND PIPERACILLIN SODIUM 3.38 G: 375; 3 INJECTION, SOLUTION INTRAVENOUS at 11:50

## 2022-07-19 RX ADMIN — INSULIN LISPRO 6 UNITS: 100 INJECTION, SOLUTION INTRAVENOUS; SUBCUTANEOUS at 09:37

## 2022-07-20 LAB
ANION GAP SERPL CALCULATED.3IONS-SCNC: 9 MMOL/L (ref 5–15)
BACTERIA SPEC AEROBE CULT: ABNORMAL
BACTERIA SPEC AEROBE CULT: ABNORMAL
BASOPHILS # BLD AUTO: 0.02 10*3/MM3 (ref 0–0.2)
BASOPHILS NFR BLD AUTO: 0.3 % (ref 0–1.5)
BUN SERPL-MCNC: 18 MG/DL (ref 8–23)
BUN/CREAT SERPL: 12.2 (ref 7–25)
CALCIUM SPEC-SCNC: 8.2 MG/DL (ref 8.6–10.5)
CHLORIDE SERPL-SCNC: 103 MMOL/L (ref 98–107)
CO2 SERPL-SCNC: 18 MMOL/L (ref 22–29)
CREAT SERPL-MCNC: 1.48 MG/DL (ref 0.76–1.27)
DEPRECATED RDW RBC AUTO: 45.5 FL (ref 37–54)
EGFRCR SERPLBLD CKD-EPI 2021: 53.2 ML/MIN/1.73
EOSINOPHIL # BLD AUTO: 0.1 10*3/MM3 (ref 0–0.4)
EOSINOPHIL NFR BLD AUTO: 1.6 % (ref 0.3–6.2)
ERYTHROCYTE [DISTWIDTH] IN BLOOD BY AUTOMATED COUNT: 13.2 % (ref 12.3–15.4)
GLUCOSE BLDC GLUCOMTR-MCNC: 130 MG/DL (ref 70–130)
GLUCOSE BLDC GLUCOMTR-MCNC: 131 MG/DL (ref 70–130)
GLUCOSE BLDC GLUCOMTR-MCNC: 164 MG/DL (ref 70–130)
GLUCOSE BLDC GLUCOMTR-MCNC: 99 MG/DL (ref 70–130)
GLUCOSE SERPL-MCNC: 140 MG/DL (ref 65–99)
GRAM STN SPEC: ABNORMAL
HCT VFR BLD AUTO: 32.3 % (ref 37.5–51)
HGB BLD-MCNC: 11 G/DL (ref 13–17.7)
IMM GRANULOCYTES # BLD AUTO: 0.03 10*3/MM3 (ref 0–0.05)
IMM GRANULOCYTES NFR BLD AUTO: 0.5 % (ref 0–0.5)
ISOLATED FROM: ABNORMAL
LYMPHOCYTES # BLD AUTO: 0.88 10*3/MM3 (ref 0.7–3.1)
LYMPHOCYTES NFR BLD AUTO: 13.9 % (ref 19.6–45.3)
MAGNESIUM SERPL-MCNC: 1.7 MG/DL (ref 1.6–2.4)
MCH RBC QN AUTO: 32.2 PG (ref 26.6–33)
MCHC RBC AUTO-ENTMCNC: 34.1 G/DL (ref 31.5–35.7)
MCV RBC AUTO: 94.4 FL (ref 79–97)
MONOCYTES # BLD AUTO: 0.76 10*3/MM3 (ref 0.1–0.9)
MONOCYTES NFR BLD AUTO: 12 % (ref 5–12)
NEUTROPHILS NFR BLD AUTO: 4.56 10*3/MM3 (ref 1.7–7)
NEUTROPHILS NFR BLD AUTO: 71.7 % (ref 42.7–76)
NRBC BLD AUTO-RTO: 0 /100 WBC (ref 0–0.2)
PHOSPHATE SERPL-MCNC: 2.6 MG/DL (ref 2.5–4.5)
PLATELET # BLD AUTO: 107 10*3/MM3 (ref 140–450)
PMV BLD AUTO: 9.4 FL (ref 6–12)
POTASSIUM SERPL-SCNC: 4.8 MMOL/L (ref 3.5–5.2)
QT INTERVAL: 294 MS
QTC INTERVAL: 415 MS
RBC # BLD AUTO: 3.42 10*6/MM3 (ref 4.14–5.8)
SODIUM SERPL-SCNC: 130 MMOL/L (ref 136–145)
WBC NRBC COR # BLD: 6.35 10*3/MM3 (ref 3.4–10.8)

## 2022-07-20 PROCEDURE — 25010000002 ENOXAPARIN PER 10 MG: Performed by: INTERNAL MEDICINE

## 2022-07-20 PROCEDURE — 82962 GLUCOSE BLOOD TEST: CPT

## 2022-07-20 PROCEDURE — 94799 UNLISTED PULMONARY SVC/PX: CPT

## 2022-07-20 PROCEDURE — 83735 ASSAY OF MAGNESIUM: CPT | Performed by: INTERNAL MEDICINE

## 2022-07-20 PROCEDURE — 63710000001 INSULIN DETEMIR PER 5 UNITS: Performed by: INTERNAL MEDICINE

## 2022-07-20 PROCEDURE — 0 MAGNESIUM SULFATE 4 GM/100ML SOLUTION: Performed by: INTERNAL MEDICINE

## 2022-07-20 PROCEDURE — 85025 COMPLETE CBC W/AUTO DIFF WBC: CPT | Performed by: INTERNAL MEDICINE

## 2022-07-20 PROCEDURE — 25010000002 CEFTRIAXONE PER 250 MG: Performed by: INTERNAL MEDICINE

## 2022-07-20 PROCEDURE — 63710000001 INSULIN LISPRO (HUMAN) PER 5 UNITS: Performed by: INTERNAL MEDICINE

## 2022-07-20 PROCEDURE — 99232 SBSQ HOSP IP/OBS MODERATE 35: CPT | Performed by: INTERNAL MEDICINE

## 2022-07-20 PROCEDURE — 80048 BASIC METABOLIC PNL TOTAL CA: CPT | Performed by: INTERNAL MEDICINE

## 2022-07-20 PROCEDURE — 84100 ASSAY OF PHOSPHORUS: CPT | Performed by: INTERNAL MEDICINE

## 2022-07-20 PROCEDURE — 94664 DEMO&/EVAL PT USE INHALER: CPT

## 2022-07-20 PROCEDURE — 97165 OT EVAL LOW COMPLEX 30 MIN: CPT

## 2022-07-20 PROCEDURE — 25010000002 PIPERACILLIN SOD-TAZOBACTAM PER 1 G: Performed by: INTERNAL MEDICINE

## 2022-07-20 RX ADMIN — ACETAMINOPHEN 325MG 650 MG: 325 TABLET ORAL at 23:56

## 2022-07-20 RX ADMIN — GABAPENTIN 300 MG: 300 CAPSULE ORAL at 05:39

## 2022-07-20 RX ADMIN — TAZOBACTAM SODIUM AND PIPERACILLIN SODIUM 3.38 G: 375; 3 INJECTION, SOLUTION INTRAVENOUS at 03:42

## 2022-07-20 RX ADMIN — GABAPENTIN 300 MG: 300 CAPSULE ORAL at 15:22

## 2022-07-20 RX ADMIN — INSULIN LISPRO 13 UNITS: 100 INJECTION, SOLUTION INTRAVENOUS; SUBCUTANEOUS at 12:05

## 2022-07-20 RX ADMIN — BUDESONIDE AND FORMOTEROL FUMARATE DIHYDRATE 2 PUFF: 160; 4.5 AEROSOL RESPIRATORY (INHALATION) at 20:10

## 2022-07-20 RX ADMIN — INSULIN LISPRO 13 UNITS: 100 INJECTION, SOLUTION INTRAVENOUS; SUBCUTANEOUS at 16:57

## 2022-07-20 RX ADMIN — INSULIN LISPRO 13 UNITS: 100 INJECTION, SOLUTION INTRAVENOUS; SUBCUTANEOUS at 09:16

## 2022-07-20 RX ADMIN — GABAPENTIN 300 MG: 300 CAPSULE ORAL at 21:57

## 2022-07-20 RX ADMIN — BUDESONIDE AND FORMOTEROL FUMARATE DIHYDRATE 2 PUFF: 160; 4.5 AEROSOL RESPIRATORY (INHALATION) at 10:38

## 2022-07-20 RX ADMIN — INSULIN DETEMIR 20 UNITS: 100 INJECTION, SOLUTION SUBCUTANEOUS at 09:16

## 2022-07-20 RX ADMIN — MAGNESIUM SULFATE HEPTAHYDRATE 4 G: 40 INJECTION, SOLUTION INTRAVENOUS at 05:39

## 2022-07-20 RX ADMIN — CEFTRIAXONE 2 G: 2 INJECTION, POWDER, FOR SOLUTION INTRAMUSCULAR; INTRAVENOUS at 12:05

## 2022-07-20 RX ADMIN — Medication 10 ML: at 10:07

## 2022-07-20 RX ADMIN — ENOXAPARIN SODIUM 40 MG: 40 INJECTION SUBCUTANEOUS at 09:16

## 2022-07-20 RX ADMIN — Medication 10 ML: at 21:57

## 2022-07-20 RX ADMIN — INSULIN DETEMIR 20 UNITS: 100 INJECTION, SOLUTION SUBCUTANEOUS at 21:57

## 2022-07-20 RX ADMIN — PANTOPRAZOLE SODIUM 40 MG: 40 TABLET, DELAYED RELEASE ORAL at 05:39

## 2022-07-20 RX ADMIN — INSULIN LISPRO 2 UNITS: 100 INJECTION, SOLUTION INTRAVENOUS; SUBCUTANEOUS at 16:57

## 2022-07-20 RX ADMIN — ATORVASTATIN CALCIUM 40 MG: 40 TABLET, FILM COATED ORAL at 09:16

## 2022-07-21 LAB
ALBUMIN SERPL-MCNC: 3.1 G/DL (ref 3.5–5.2)
ALBUMIN/GLOB SERPL: 0.7 G/DL
ALP SERPL-CCNC: 86 U/L (ref 39–117)
ALT SERPL W P-5'-P-CCNC: 23 U/L (ref 1–41)
ANION GAP SERPL CALCULATED.3IONS-SCNC: 8 MMOL/L (ref 5–15)
AST SERPL-CCNC: 30 U/L (ref 1–40)
BASOPHILS # BLD MANUAL: 0 10*3/MM3 (ref 0–0.2)
BASOPHILS NFR BLD MANUAL: 0 % (ref 0–1.5)
BILIRUB SERPL-MCNC: 0.4 MG/DL (ref 0–1.2)
BUN SERPL-MCNC: 14 MG/DL (ref 8–23)
BUN/CREAT SERPL: 12.6 (ref 7–25)
CALCIUM SPEC-SCNC: 9.4 MG/DL (ref 8.6–10.5)
CHLORIDE SERPL-SCNC: 107 MMOL/L (ref 98–107)
CO2 SERPL-SCNC: 22 MMOL/L (ref 22–29)
CREAT SERPL-MCNC: 1.11 MG/DL (ref 0.76–1.27)
DEPRECATED RDW RBC AUTO: 45 FL (ref 37–54)
EGFRCR SERPLBLD CKD-EPI 2021: 75.1 ML/MIN/1.73
EOSINOPHIL # BLD MANUAL: 0.11 10*3/MM3 (ref 0–0.4)
EOSINOPHIL NFR BLD MANUAL: 2 % (ref 0.3–6.2)
ERYTHROCYTE [DISTWIDTH] IN BLOOD BY AUTOMATED COUNT: 13.2 % (ref 12.3–15.4)
GLOBULIN UR ELPH-MCNC: 4.5 GM/DL
GLUCOSE BLDC GLUCOMTR-MCNC: 126 MG/DL (ref 70–130)
GLUCOSE BLDC GLUCOMTR-MCNC: 143 MG/DL (ref 70–130)
GLUCOSE BLDC GLUCOMTR-MCNC: 152 MG/DL (ref 70–130)
GLUCOSE BLDC GLUCOMTR-MCNC: 70 MG/DL (ref 70–130)
GLUCOSE BLDC GLUCOMTR-MCNC: 83 MG/DL (ref 70–130)
GLUCOSE SERPL-MCNC: 76 MG/DL (ref 65–99)
HCT VFR BLD AUTO: 37.7 % (ref 37.5–51)
HGB BLD-MCNC: 12.8 G/DL (ref 13–17.7)
LYMPHOCYTES # BLD MANUAL: 1.45 10*3/MM3 (ref 0.7–3.1)
LYMPHOCYTES NFR BLD MANUAL: 5 % (ref 5–12)
MAGNESIUM SERPL-MCNC: 2 MG/DL (ref 1.6–2.4)
MCH RBC QN AUTO: 31.5 PG (ref 26.6–33)
MCHC RBC AUTO-ENTMCNC: 34 G/DL (ref 31.5–35.7)
MCV RBC AUTO: 92.9 FL (ref 79–97)
METAMYELOCYTES NFR BLD MANUAL: 1 % (ref 0–0)
MONOCYTES # BLD: 0.27 10*3/MM3 (ref 0.1–0.9)
NEUTROPHILS # BLD AUTO: 3.48 10*3/MM3 (ref 1.7–7)
NEUTROPHILS NFR BLD MANUAL: 50 % (ref 42.7–76)
NEUTS BAND NFR BLD MANUAL: 15 % (ref 0–5)
NRBC SPEC MANUAL: 0 /100 WBC (ref 0–0.2)
PLAT MORPH BLD: NORMAL
PLATELET # BLD AUTO: 159 10*3/MM3 (ref 140–450)
PMV BLD AUTO: 9 FL (ref 6–12)
POTASSIUM SERPL-SCNC: 4.6 MMOL/L (ref 3.5–5.2)
PROCALCITONIN SERPL-MCNC: 3.58 NG/ML (ref 0–0.25)
PROT SERPL-MCNC: 7.6 G/DL (ref 6–8.5)
RBC # BLD AUTO: 4.06 10*6/MM3 (ref 4.14–5.8)
RBC MORPH BLD: NORMAL
SODIUM SERPL-SCNC: 137 MMOL/L (ref 136–145)
VARIANT LYMPHS NFR BLD MANUAL: 27 % (ref 19.6–45.3)
WBC MORPH BLD: NORMAL
WBC NRBC COR # BLD: 5.36 10*3/MM3 (ref 3.4–10.8)

## 2022-07-21 PROCEDURE — 84145 PROCALCITONIN (PCT): CPT | Performed by: INTERNAL MEDICINE

## 2022-07-21 PROCEDURE — 63710000001 INSULIN LISPRO (HUMAN) PER 5 UNITS: Performed by: INTERNAL MEDICINE

## 2022-07-21 PROCEDURE — 94664 DEMO&/EVAL PT USE INHALER: CPT

## 2022-07-21 PROCEDURE — 25010000002 ENOXAPARIN PER 10 MG: Performed by: INTERNAL MEDICINE

## 2022-07-21 PROCEDURE — 63710000001 INSULIN DETEMIR PER 5 UNITS: Performed by: FAMILY MEDICINE

## 2022-07-21 PROCEDURE — 99233 SBSQ HOSP IP/OBS HIGH 50: CPT | Performed by: FAMILY MEDICINE

## 2022-07-21 PROCEDURE — 80053 COMPREHEN METABOLIC PANEL: CPT | Performed by: INTERNAL MEDICINE

## 2022-07-21 PROCEDURE — 85007 BL SMEAR W/DIFF WBC COUNT: CPT | Performed by: INTERNAL MEDICINE

## 2022-07-21 PROCEDURE — 97161 PT EVAL LOW COMPLEX 20 MIN: CPT

## 2022-07-21 PROCEDURE — 83735 ASSAY OF MAGNESIUM: CPT | Performed by: INTERNAL MEDICINE

## 2022-07-21 PROCEDURE — 63710000001 INSULIN DETEMIR PER 5 UNITS: Performed by: INTERNAL MEDICINE

## 2022-07-21 PROCEDURE — 94799 UNLISTED PULMONARY SVC/PX: CPT

## 2022-07-21 PROCEDURE — 25010000002 CEFTRIAXONE PER 250 MG: Performed by: INTERNAL MEDICINE

## 2022-07-21 PROCEDURE — 85025 COMPLETE CBC W/AUTO DIFF WBC: CPT | Performed by: INTERNAL MEDICINE

## 2022-07-21 PROCEDURE — 82962 GLUCOSE BLOOD TEST: CPT

## 2022-07-21 RX ORDER — AMLODIPINE BESYLATE 10 MG/1
10 TABLET ORAL DAILY
Status: DISCONTINUED | OUTPATIENT
Start: 2022-07-21 | End: 2022-07-22 | Stop reason: HOSPADM

## 2022-07-21 RX ORDER — ALFUZOSIN HYDROCHLORIDE 10 MG/1
10 TABLET, EXTENDED RELEASE ORAL DAILY
Status: DISCONTINUED | OUTPATIENT
Start: 2022-07-22 | End: 2022-07-22 | Stop reason: HOSPADM

## 2022-07-21 RX ORDER — FUROSEMIDE 40 MG/1
40 TABLET ORAL 2 TIMES DAILY
Status: DISCONTINUED | OUTPATIENT
Start: 2022-07-21 | End: 2022-07-21

## 2022-07-21 RX ADMIN — Medication 10 ML: at 08:04

## 2022-07-21 RX ADMIN — INSULIN DETEMIR 20 UNITS: 100 INJECTION, SOLUTION SUBCUTANEOUS at 08:06

## 2022-07-21 RX ADMIN — ENOXAPARIN SODIUM 40 MG: 40 INJECTION SUBCUTANEOUS at 08:04

## 2022-07-21 RX ADMIN — Medication 10 ML: at 21:34

## 2022-07-21 RX ADMIN — AMLODIPINE BESYLATE 10 MG: 10 TABLET ORAL at 12:09

## 2022-07-21 RX ADMIN — BUDESONIDE AND FORMOTEROL FUMARATE DIHYDRATE 2 PUFF: 160; 4.5 AEROSOL RESPIRATORY (INHALATION) at 10:19

## 2022-07-21 RX ADMIN — INSULIN LISPRO 2 UNITS: 100 INJECTION, SOLUTION INTRAVENOUS; SUBCUTANEOUS at 08:05

## 2022-07-21 RX ADMIN — PANTOPRAZOLE SODIUM 40 MG: 40 TABLET, DELAYED RELEASE ORAL at 06:42

## 2022-07-21 RX ADMIN — GABAPENTIN 300 MG: 300 CAPSULE ORAL at 21:34

## 2022-07-21 RX ADMIN — GABAPENTIN 300 MG: 300 CAPSULE ORAL at 06:42

## 2022-07-21 RX ADMIN — ATORVASTATIN CALCIUM 40 MG: 40 TABLET, FILM COATED ORAL at 08:04

## 2022-07-21 RX ADMIN — GABAPENTIN 300 MG: 300 CAPSULE ORAL at 14:04

## 2022-07-21 RX ADMIN — INSULIN DETEMIR 10 UNITS: 100 INJECTION, SOLUTION SUBCUTANEOUS at 21:34

## 2022-07-21 RX ADMIN — INSULIN LISPRO 13 UNITS: 100 INJECTION, SOLUTION INTRAVENOUS; SUBCUTANEOUS at 08:04

## 2022-07-21 RX ADMIN — CEFTRIAXONE 2 G: 2 INJECTION, POWDER, FOR SOLUTION INTRAMUSCULAR; INTRAVENOUS at 12:10

## 2022-07-21 RX ADMIN — BUDESONIDE AND FORMOTEROL FUMARATE DIHYDRATE 2 PUFF: 160; 4.5 AEROSOL RESPIRATORY (INHALATION) at 20:09

## 2022-07-22 ENCOUNTER — READMISSION MANAGEMENT (OUTPATIENT)
Dept: CALL CENTER | Facility: HOSPITAL | Age: 62
End: 2022-07-22

## 2022-07-22 VITALS
OXYGEN SATURATION: 90 % | HEIGHT: 74 IN | TEMPERATURE: 97.8 F | BODY MASS INDEX: 38.18 KG/M2 | SYSTOLIC BLOOD PRESSURE: 136 MMHG | HEART RATE: 71 BPM | DIASTOLIC BLOOD PRESSURE: 92 MMHG | WEIGHT: 297.5 LBS | RESPIRATION RATE: 18 BRPM

## 2022-07-22 LAB
ALBUMIN SERPL-MCNC: 2.7 G/DL (ref 3.5–5.2)
ALBUMIN/GLOB SERPL: 0.6 G/DL
ALP SERPL-CCNC: 78 U/L (ref 39–117)
ALT SERPL W P-5'-P-CCNC: 17 U/L (ref 1–41)
ANION GAP SERPL CALCULATED.3IONS-SCNC: 10 MMOL/L (ref 5–15)
AST SERPL-CCNC: 23 U/L (ref 1–40)
BILIRUB SERPL-MCNC: 0.4 MG/DL (ref 0–1.2)
BUN SERPL-MCNC: 13 MG/DL (ref 8–23)
BUN/CREAT SERPL: 12.3 (ref 7–25)
CALCIUM SPEC-SCNC: 9 MG/DL (ref 8.6–10.5)
CHLORIDE SERPL-SCNC: 104 MMOL/L (ref 98–107)
CO2 SERPL-SCNC: 19 MMOL/L (ref 22–29)
CREAT SERPL-MCNC: 1.06 MG/DL (ref 0.76–1.27)
DEPRECATED RDW RBC AUTO: 43.8 FL (ref 37–54)
EGFRCR SERPLBLD CKD-EPI 2021: 79.4 ML/MIN/1.73
ERYTHROCYTE [DISTWIDTH] IN BLOOD BY AUTOMATED COUNT: 13 % (ref 12.3–15.4)
GLOBULIN UR ELPH-MCNC: 4.5 GM/DL
GLUCOSE BLDC GLUCOMTR-MCNC: 130 MG/DL (ref 70–130)
GLUCOSE BLDC GLUCOMTR-MCNC: 163 MG/DL (ref 70–130)
GLUCOSE SERPL-MCNC: 157 MG/DL (ref 65–99)
HCT VFR BLD AUTO: 35.2 % (ref 37.5–51)
HGB BLD-MCNC: 11.9 G/DL (ref 13–17.7)
MCH RBC QN AUTO: 31.1 PG (ref 26.6–33)
MCHC RBC AUTO-ENTMCNC: 33.8 G/DL (ref 31.5–35.7)
MCV RBC AUTO: 91.9 FL (ref 79–97)
PLATELET # BLD AUTO: 140 10*3/MM3 (ref 140–450)
PMV BLD AUTO: 9 FL (ref 6–12)
POTASSIUM SERPL-SCNC: 5 MMOL/L (ref 3.5–5.2)
PROT SERPL-MCNC: 7.2 G/DL (ref 6–8.5)
RBC # BLD AUTO: 3.83 10*6/MM3 (ref 4.14–5.8)
SODIUM SERPL-SCNC: 133 MMOL/L (ref 136–145)
WBC NRBC COR # BLD: 5.5 10*3/MM3 (ref 3.4–10.8)

## 2022-07-22 PROCEDURE — 85027 COMPLETE CBC AUTOMATED: CPT | Performed by: INTERNAL MEDICINE

## 2022-07-22 PROCEDURE — 25010000002 CEFTRIAXONE PER 250 MG: Performed by: INTERNAL MEDICINE

## 2022-07-22 PROCEDURE — 63710000001 INSULIN LISPRO (HUMAN) PER 5 UNITS: Performed by: INTERNAL MEDICINE

## 2022-07-22 PROCEDURE — 25010000002 ENOXAPARIN PER 10 MG: Performed by: INTERNAL MEDICINE

## 2022-07-22 PROCEDURE — 82962 GLUCOSE BLOOD TEST: CPT

## 2022-07-22 PROCEDURE — 80053 COMPREHEN METABOLIC PANEL: CPT | Performed by: INTERNAL MEDICINE

## 2022-07-22 PROCEDURE — 94664 DEMO&/EVAL PT USE INHALER: CPT

## 2022-07-22 PROCEDURE — 94799 UNLISTED PULMONARY SVC/PX: CPT

## 2022-07-22 PROCEDURE — 99239 HOSP IP/OBS DSCHRG MGMT >30: CPT | Performed by: NURSE PRACTITIONER

## 2022-07-22 PROCEDURE — 63710000001 INSULIN DETEMIR PER 5 UNITS: Performed by: FAMILY MEDICINE

## 2022-07-22 RX ORDER — BUDESONIDE AND FORMOTEROL FUMARATE DIHYDRATE 160; 4.5 UG/1; UG/1
2 AEROSOL RESPIRATORY (INHALATION)
Qty: 10.2 G | Refills: 0 | Status: SHIPPED | OUTPATIENT
Start: 2022-07-22

## 2022-07-22 RX ORDER — ALFUZOSIN HYDROCHLORIDE 10 MG/1
10 TABLET, EXTENDED RELEASE ORAL DAILY
Qty: 30 TABLET | Refills: 0 | Status: SHIPPED | OUTPATIENT
Start: 2022-07-23

## 2022-07-22 RX ORDER — ACETAMINOPHEN 325 MG/1
650 TABLET ORAL EVERY 6 HOURS PRN
Start: 2022-07-22

## 2022-07-22 RX ORDER — PANTOPRAZOLE SODIUM 40 MG/1
40 TABLET, DELAYED RELEASE ORAL
Qty: 30 TABLET | Refills: 0 | Status: SHIPPED | OUTPATIENT
Start: 2022-07-23

## 2022-07-22 RX ADMIN — GABAPENTIN 300 MG: 300 CAPSULE ORAL at 13:15

## 2022-07-22 RX ADMIN — ENOXAPARIN SODIUM 40 MG: 40 INJECTION SUBCUTANEOUS at 09:12

## 2022-07-22 RX ADMIN — INSULIN LISPRO 2 UNITS: 100 INJECTION, SOLUTION INTRAVENOUS; SUBCUTANEOUS at 13:15

## 2022-07-22 RX ADMIN — BUDESONIDE AND FORMOTEROL FUMARATE DIHYDRATE 2 PUFF: 160; 4.5 AEROSOL RESPIRATORY (INHALATION) at 08:12

## 2022-07-22 RX ADMIN — CEFTRIAXONE 2 G: 2 INJECTION, POWDER, FOR SOLUTION INTRAMUSCULAR; INTRAVENOUS at 13:15

## 2022-07-22 RX ADMIN — INSULIN DETEMIR 10 UNITS: 100 INJECTION, SOLUTION SUBCUTANEOUS at 09:13

## 2022-07-22 RX ADMIN — GABAPENTIN 300 MG: 300 CAPSULE ORAL at 06:09

## 2022-07-22 RX ADMIN — Medication 10 ML: at 09:12

## 2022-07-22 RX ADMIN — PANTOPRAZOLE SODIUM 40 MG: 40 TABLET, DELAYED RELEASE ORAL at 06:09

## 2022-07-22 RX ADMIN — AMLODIPINE BESYLATE 10 MG: 10 TABLET ORAL at 09:12

## 2022-07-22 RX ADMIN — ATORVASTATIN CALCIUM 40 MG: 40 TABLET, FILM COATED ORAL at 09:12

## 2022-07-23 LAB — BACTERIA SPEC AEROBE CULT: NORMAL

## 2022-07-23 NOTE — OUTREACH NOTE
Prep Survey    Flowsheet Row Responses   [facility] facility patient discharged from? [place]   Is LACE score < 7 ? No   Emergency Room discharge w/ pulse ox? No   Eligibility Readm Mgmt   Discharge diagnosis sepsis, acute cystitis with hematuria, THOMAS, T2DM   Does the patient have one of the following disease processes/diagnoses(primary or secondary)? Sepsis   Does the patient have Home health ordered? No   Is there a DME ordered? No   Prep survey completed? Yes          ANALIA MCGHEE - Registered Nurse

## 2022-07-27 ENCOUNTER — READMISSION MANAGEMENT (OUTPATIENT)
Dept: CALL CENTER | Facility: HOSPITAL | Age: 62
End: 2022-07-27

## 2022-07-27 NOTE — OUTREACH NOTE
Sepsis Week 1 Survey    Flowsheet Row Responses   [facility] facility patient discharged from? [place]   Does the patient have one of the following disease processes/diagnoses(primary or secondary)? Sepsis   Week 1 attempt successful? No   Unsuccessful attempts Attempt 1          ENA NICHOLAS - Registered Nurse

## 2022-08-01 ENCOUNTER — READMISSION MANAGEMENT (OUTPATIENT)
Dept: CALL CENTER | Facility: HOSPITAL | Age: 62
End: 2022-08-01

## 2022-08-01 NOTE — OUTREACH NOTE
Sepsis Week 1 Survey    Flowsheet Row Responses   [facility] patient discharged from? [place]   Does the patient have one of the following disease processes/diagnoses(primary or secondary)? Sepsis   Week 1 attempt successful? Yes   Call start time [phone]   Call end time 0919   Meds reviewed with patient/caregiver? Yes   Is the patient having any side effects they believe may be caused by any medication additions or changes? No   Does the patient have all medications related to this admission filled (includes all antibiotics, inhalers, nebulizers,steroids,etc.) Yes   Is the patient taking all medications as directed (includes completed medication regime)? Yes   Medication comments IV antibiotics.   Comments regarding appointments PCP appt 08/02/22. Dr Weiss 08/22/22 (urology).    Does the patient have a primary care provider?  Yes   Does the patient have an appointment with their PCP within 7 days of discharge? Yes   Has the patient kept scheduled appointments due by today? Yes   Has home health visited the patient within 72 hours of discharge? N/A   Psychosocial issues? No   Did the patient receive a copy of their discharge instructions? Yes   Nursing interventions Reviewed instructions with patient   What is the patient's perception of their health status since discharge? Improving   Nursing interventions Nurse provided patient education   Is the patient/caregiver able to teach back Sepsis? S - Shivering,fever or very cold, E - Extreme pain or generalized discomfort (worst ever,especially abdomen), P - Pale or discolored skin, S - Sleepy, difficult to arouse,confused, I -   I feel like I might die-a feeling of hopelessness, S - Short of breath   Nursing interventions Nurse provided patient education   Is patient/caregiver able to teach back steps to recovery at home? Set small, achievable goals for return to baseline health, Exercise as tolerated, Rest and regain strength, Eat a balanced diet   Is the  patient/caregiver able to teach back signs and symptoms of worsening condition: Fever, Shortness of breath/rapid respiratory rate, Hyperthermia, Altered mental status(confusion/coma), Rapid heart rate (>90), Edema   If the patient is a current smoker, are they able to teach back resources for cessation? Not a smoker   Is the patient/caregiver able to teach back the hierarchy of who to call/visit for symptoms/problems? PCP, Specialist, Home health nurse, Urgent Care, ED, 911 Yes   Week 1 call completed? Yes   Wrap up additional comments Patient states is improving. Denies any s/s of UTI. Continues with IV antibiotics. Denies any needs today. Very appreciative of the care that he received.          LIZETTE PLATA - Registered Nurse

## 2022-08-09 ENCOUNTER — READMISSION MANAGEMENT (OUTPATIENT)
Dept: CALL CENTER | Facility: HOSPITAL | Age: 62
End: 2022-08-09

## 2022-08-09 NOTE — OUTREACH NOTE
Sepsis Week 2 Survey    Flowsheet Row Responses   [facility] facility patient discharged from? [place]   Does the patient have one of the following disease processes/diagnoses(primary or secondary)? Sepsis   Week 2 attempt successful? No   Unsuccessful attempts Attempt 1          TIMOTHY SANCHEZ - Licensed Nurse

## 2022-09-28 ENCOUNTER — DOCUMENTATION (OUTPATIENT)
Dept: PHYSICAL THERAPY | Facility: HOSPITAL | Age: 62
End: 2022-09-28

## 2022-09-28 DIAGNOSIS — G89.29 CHRONIC RIGHT-SIDED LOW BACK PAIN WITH RIGHT-SIDED SCIATICA: Primary | ICD-10-CM

## 2022-09-28 DIAGNOSIS — M54.41 CHRONIC RIGHT-SIDED LOW BACK PAIN WITH RIGHT-SIDED SCIATICA: Primary | ICD-10-CM

## 2022-09-28 NOTE — THERAPY DISCHARGE NOTE
Outpatient Physical Therapy Discharge Summary         Patient Name: Amadeo Kong  : 1960  MRN: [id number]    Today's Date: 2022    Visit Dx:    ICD-10-CM ICD-9-CM   1. Chronic right-sided low back pain with right-sided sciatica  M54.41 724.2    G89.29 724.3     338.29       Client was seen for an initial PT visit for long history of low back pain. He didn't return for treatment because he was admitted to the hospital for sepsis.     Javi Ellison, PT  2022

## 2022-12-13 ENCOUNTER — HOSPITAL ENCOUNTER (EMERGENCY)
Facility: HOSPITAL | Age: 62
Discharge: HOME | End: 2022-12-13
Attending: EMERGENCY MEDICINE | Admitting: EMERGENCY MEDICINE

## 2022-12-13 ENCOUNTER — APPOINTMENT (OUTPATIENT)
Dept: CT IMAGING | Facility: HOSPITAL | Age: 62
End: 2022-12-13

## 2022-12-13 VITALS
HEART RATE: 63 BPM | SYSTOLIC BLOOD PRESSURE: 139 MMHG | TEMPERATURE: 98.3 F | WEIGHT: 270 LBS | DIASTOLIC BLOOD PRESSURE: 91 MMHG | HEIGHT: 74 IN | BODY MASS INDEX: 34.65 KG/M2 | RESPIRATION RATE: 18 BRPM | OXYGEN SATURATION: 99 %

## 2022-12-13 DIAGNOSIS — R10.84 GENERALIZED ABDOMINAL PAIN: ICD-10-CM

## 2022-12-13 DIAGNOSIS — R19.7 DIARRHEA, UNSPECIFIED TYPE: Primary | ICD-10-CM

## 2022-12-13 LAB
ADV 40+41 DNA STL QL NAA+NON-PROBE: NOT DETECTED
ALBUMIN SERPL-MCNC: 4.1 G/DL (ref 3.5–5.2)
ALBUMIN/GLOB SERPL: 1.1 G/DL
ALP SERPL-CCNC: 80 U/L (ref 39–117)
ALT SERPL W P-5'-P-CCNC: 18 U/L (ref 1–41)
ANION GAP SERPL CALCULATED.3IONS-SCNC: 10 MMOL/L (ref 5–15)
AST SERPL-CCNC: 30 U/L (ref 1–40)
ASTRO TYP 1-8 RNA STL QL NAA+NON-PROBE: NOT DETECTED
BACTERIA UR QL AUTO: ABNORMAL /HPF
BASOPHILS # BLD AUTO: 0.02 10*3/MM3 (ref 0–0.2)
BASOPHILS NFR BLD AUTO: 0.3 % (ref 0–1.5)
BILIRUB SERPL-MCNC: 0.9 MG/DL (ref 0–1.2)
BILIRUB UR QL STRIP: NEGATIVE
BUN SERPL-MCNC: 14 MG/DL (ref 8–23)
BUN/CREAT SERPL: 11.3 (ref 7–25)
C CAYETANENSIS DNA STL QL NAA+NON-PROBE: NOT DETECTED
C COLI+JEJ+UPSA DNA STL QL NAA+NON-PROBE: NOT DETECTED
C DIFF TOX GENS STL QL NAA+PROBE: NOT DETECTED
CALCIUM SPEC-SCNC: 9.3 MG/DL (ref 8.6–10.5)
CHLORIDE SERPL-SCNC: 109 MMOL/L (ref 98–107)
CLARITY UR: ABNORMAL
CO2 SERPL-SCNC: 22 MMOL/L (ref 22–29)
COLOR UR: YELLOW
CREAT SERPL-MCNC: 1.24 MG/DL (ref 0.76–1.27)
CRYPTOSP DNA STL QL NAA+NON-PROBE: NOT DETECTED
D-LACTATE SERPL-SCNC: 0.9 MMOL/L (ref 0.5–2)
DEPRECATED RDW RBC AUTO: 45.9 FL (ref 37–54)
E HISTOLYT DNA STL QL NAA+NON-PROBE: NOT DETECTED
EAEC PAA PLAS AGGR+AATA ST NAA+NON-PRB: NOT DETECTED
EC STX1+STX2 GENES STL QL NAA+NON-PROBE: NOT DETECTED
EGFRCR SERPLBLD CKD-EPI 2021: 65.3 ML/MIN/1.73
EOSINOPHIL # BLD AUTO: 0.11 10*3/MM3 (ref 0–0.4)
EOSINOPHIL NFR BLD AUTO: 1.7 % (ref 0.3–6.2)
EPEC EAE GENE STL QL NAA+NON-PROBE: NOT DETECTED
ERYTHROCYTE [DISTWIDTH] IN BLOOD BY AUTOMATED COUNT: 13.5 % (ref 12.3–15.4)
ETEC LTA+ST1A+ST1B TOX ST NAA+NON-PROBE: NOT DETECTED
G LAMBLIA DNA STL QL NAA+NON-PROBE: NOT DETECTED
GLOBULIN UR ELPH-MCNC: 3.7 GM/DL
GLUCOSE SERPL-MCNC: 125 MG/DL (ref 65–99)
GLUCOSE UR STRIP-MCNC: NEGATIVE MG/DL
HCT VFR BLD AUTO: 39.9 % (ref 37.5–51)
HGB BLD-MCNC: 13.7 G/DL (ref 13–17.7)
HGB UR QL STRIP.AUTO: NEGATIVE
HOLD SPECIMEN: NORMAL
HYALINE CASTS UR QL AUTO: ABNORMAL /LPF
IMM GRANULOCYTES # BLD AUTO: 0.02 10*3/MM3 (ref 0–0.05)
IMM GRANULOCYTES NFR BLD AUTO: 0.3 % (ref 0–0.5)
KETONES UR QL STRIP: ABNORMAL
LEUKOCYTE ESTERASE UR QL STRIP.AUTO: NEGATIVE
LIPASE SERPL-CCNC: 27 U/L (ref 13–60)
LYMPHOCYTES # BLD AUTO: 2.25 10*3/MM3 (ref 0.7–3.1)
LYMPHOCYTES NFR BLD AUTO: 35.5 % (ref 19.6–45.3)
MCH RBC QN AUTO: 32.3 PG (ref 26.6–33)
MCHC RBC AUTO-ENTMCNC: 34.3 G/DL (ref 31.5–35.7)
MCV RBC AUTO: 94.1 FL (ref 79–97)
MONOCYTES # BLD AUTO: 0.57 10*3/MM3 (ref 0.1–0.9)
MONOCYTES NFR BLD AUTO: 9 % (ref 5–12)
NEUTROPHILS NFR BLD AUTO: 3.37 10*3/MM3 (ref 1.7–7)
NEUTROPHILS NFR BLD AUTO: 53.2 % (ref 42.7–76)
NITRITE UR QL STRIP: NEGATIVE
NOROVIRUS GI+II RNA STL QL NAA+NON-PROBE: NOT DETECTED
NRBC BLD AUTO-RTO: 0 /100 WBC (ref 0–0.2)
P SHIGELLOIDES DNA STL QL NAA+NON-PROBE: NOT DETECTED
PH UR STRIP.AUTO: 5.5 [PH] (ref 5–8)
PLATELET # BLD AUTO: 175 10*3/MM3 (ref 140–450)
PMV BLD AUTO: 9.3 FL (ref 6–12)
POTASSIUM SERPL-SCNC: 4.4 MMOL/L (ref 3.5–5.2)
PROT SERPL-MCNC: 7.8 G/DL (ref 6–8.5)
PROT UR QL STRIP: ABNORMAL
RBC # BLD AUTO: 4.24 10*6/MM3 (ref 4.14–5.8)
RBC # UR STRIP: ABNORMAL /HPF
REF LAB TEST METHOD: ABNORMAL
RVA RNA STL QL NAA+NON-PROBE: NOT DETECTED
S ENT+BONG DNA STL QL NAA+NON-PROBE: NOT DETECTED
SAPO I+II+IV+V RNA STL QL NAA+NON-PROBE: NOT DETECTED
SHIGELLA SP+EIEC IPAH ST NAA+NON-PROBE: NOT DETECTED
SODIUM SERPL-SCNC: 141 MMOL/L (ref 136–145)
SP GR UR STRIP: 1.02 (ref 1–1.03)
SQUAMOUS #/AREA URNS HPF: ABNORMAL /HPF
UROBILINOGEN UR QL STRIP: ABNORMAL
V CHOL+PARA+VUL DNA STL QL NAA+NON-PROBE: NOT DETECTED
V CHOLERAE DNA STL QL NAA+NON-PROBE: NOT DETECTED
WBC # UR STRIP: ABNORMAL /HPF
WBC NRBC COR # BLD: 6.34 10*3/MM3 (ref 3.4–10.8)
WHOLE BLOOD HOLD COAG: NORMAL
WHOLE BLOOD HOLD SPECIMEN: NORMAL
Y ENTEROCOL DNA STL QL NAA+NON-PROBE: NOT DETECTED

## 2022-12-13 PROCEDURE — 87507 IADNA-DNA/RNA PROBE TQ 12-25: CPT | Performed by: EMERGENCY MEDICINE

## 2022-12-13 PROCEDURE — 87493 C DIFF AMPLIFIED PROBE: CPT | Performed by: EMERGENCY MEDICINE

## 2022-12-13 PROCEDURE — 81001 URINALYSIS AUTO W/SCOPE: CPT | Performed by: EMERGENCY MEDICINE

## 2022-12-13 PROCEDURE — 83605 ASSAY OF LACTIC ACID: CPT | Performed by: EMERGENCY MEDICINE

## 2022-12-13 PROCEDURE — 85025 COMPLETE CBC W/AUTO DIFF WBC: CPT | Performed by: EMERGENCY MEDICINE

## 2022-12-13 PROCEDURE — 25010000002 HYDROMORPHONE PER 4 MG: Performed by: EMERGENCY MEDICINE

## 2022-12-13 PROCEDURE — 25010000002 IOPAMIDOL 61 % SOLUTION: Performed by: EMERGENCY MEDICINE

## 2022-12-13 PROCEDURE — 74177 CT ABD & PELVIS W/CONTRAST: CPT

## 2022-12-13 PROCEDURE — 99284 EMERGENCY DEPT VISIT MOD MDM: CPT

## 2022-12-13 PROCEDURE — 96375 TX/PRO/DX INJ NEW DRUG ADDON: CPT

## 2022-12-13 PROCEDURE — 83690 ASSAY OF LIPASE: CPT | Performed by: EMERGENCY MEDICINE

## 2022-12-13 PROCEDURE — 96374 THER/PROPH/DIAG INJ IV PUSH: CPT

## 2022-12-13 PROCEDURE — 80053 COMPREHEN METABOLIC PANEL: CPT | Performed by: EMERGENCY MEDICINE

## 2022-12-13 PROCEDURE — 25010000002 ONDANSETRON PER 1 MG: Performed by: EMERGENCY MEDICINE

## 2022-12-13 RX ORDER — HYDROMORPHONE HYDROCHLORIDE 1 MG/ML
0.5 INJECTION, SOLUTION INTRAMUSCULAR; INTRAVENOUS; SUBCUTANEOUS ONCE
Status: COMPLETED | OUTPATIENT
Start: 2022-12-13 | End: 2022-12-13

## 2022-12-13 RX ORDER — DICYCLOMINE HCL 20 MG
20 TABLET ORAL EVERY 8 HOURS PRN
Qty: 20 TABLET | Refills: 0 | Status: SHIPPED | OUTPATIENT
Start: 2022-12-13

## 2022-12-13 RX ORDER — DIAPER,BRIEF,INFANT-TODD,DISP
1 EACH MISCELLANEOUS 2 TIMES DAILY
Qty: 28 G | Refills: 0 | Status: SHIPPED | OUTPATIENT
Start: 2022-12-13

## 2022-12-13 RX ORDER — SODIUM CHLORIDE 9 MG/ML
10 INJECTION INTRAVENOUS AS NEEDED
Status: DISCONTINUED | OUTPATIENT
Start: 2022-12-13 | End: 2022-12-13 | Stop reason: HOSPADM

## 2022-12-13 RX ORDER — ONDANSETRON 4 MG/1
4 TABLET, FILM COATED ORAL EVERY 8 HOURS PRN
Qty: 15 TABLET | Refills: 0 | Status: SHIPPED | OUTPATIENT
Start: 2022-12-13

## 2022-12-13 RX ORDER — ONDANSETRON 2 MG/ML
4 INJECTION INTRAMUSCULAR; INTRAVENOUS ONCE
Status: COMPLETED | OUTPATIENT
Start: 2022-12-13 | End: 2022-12-13

## 2022-12-13 RX ADMIN — ONDANSETRON 4 MG: 2 INJECTION INTRAMUSCULAR; INTRAVENOUS at 12:19

## 2022-12-13 RX ADMIN — SODIUM CHLORIDE 1000 ML: 9 INJECTION, SOLUTION INTRAVENOUS at 12:18

## 2022-12-13 RX ADMIN — IOPAMIDOL 95 ML: 612 INJECTION, SOLUTION INTRAVENOUS at 13:05

## 2022-12-13 RX ADMIN — HYDROMORPHONE HYDROCHLORIDE 0.5 MG: 1 INJECTION, SOLUTION INTRAMUSCULAR; INTRAVENOUS; SUBCUTANEOUS at 12:19

## 2022-12-13 NOTE — ED PROVIDER NOTES
"Subjective   History of Present Illness  62-year-old male presents for evaluation of abdominal pain, nausea, and diarrhea for the past 2 days.  Of note, the patient states that he was treated with oral antibiotics for a UTI approximately 2 to 3 months ago.  Over the past 2 days he has been experiencing generalized abdominal pain and cramps accompanied by loose stools.  He denies any bloody stools.  No fevers, chills, or other systemic symptoms.  No unintentional weight loss.  He denies any potential dietary triggers or any known sick contacts with similar symptoms.  Given the persistent nature of his diarrhea, he was concerned and came to the ED to be evaluated.  He states that his stools are watery and \"yellowish.\"        Review of Systems   Gastrointestinal: Positive for abdominal pain, diarrhea and nausea.   All other systems reviewed and are negative.      Past Medical History:   Diagnosis Date   • Arthritis    • Asthma     inhaler daily    • Diabetes mellitus (HCC) 2000    insulin daily; checks blood sugars on occasion-run 150-180   • Elevated cholesterol    • History of sleep apnea     years ago diagnosed but never used a machine at home    • History of staph infection 2010    wound infection after left knee replacement -saw infectious disease MD    • Hypertension    • Wears glasses        Allergies   Allergen Reactions   • Carrot [Daucus Carota] Swelling   • Shellfish Allergy Swelling and Unknown (See Comments)     tongue swelling   • Strawberry Swelling     tongue swelling   • Banana Unknown (See Comments)   • Sulfamethoxazole-Trimethoprim Other (See Comments)     Patient developed blisters on his hands.  Patient developed blisters on his hands.   • Vancomycin Rash     Received vancomycin x1 dose 7/18/22       Past Surgical History:   Procedure Laterality Date   • COLONOSCOPY     • [facility]   • INCISION AND DRAINAGE OF WOUND Left 2010    x2 of total knee replacement " wound -iv antibiotics   • KNEE SURGERY Bilateral     Right knee- 2008, left knee- 2010- [facility]   • LUMBAR LAMINECTOMY WITH FUSION N/A 7/27/2018    Procedure: TLIF, OSTEOTOMY L4-5 , POST FUSION L4-5;  Surgeon: Yo Bryson MD;  Location: [facility] OR;  Service: Neurosurgery   • LUMBAR LAMINECTOMY WITH FUSION N/A 8/27/2018    Procedure: LUMBAR LAMINECTOMY POSTERIOR LUMBAR INTERBODY FUSION;  Surgeon: Yo Bryson MD;  Location: [facility] OR;  Service: Neurosurgery   • ROTATOR CUFF REPAIR Right    • TONSILLECTOMY         Family History   Problem Relation Age of Onset   • Hypertension Mother        Social History     Socioeconomic History   • Marital status: Single   Tobacco Use   • Smoking status: Never   • Smokeless tobacco: Never   Substance and Sexual Activity   • Alcohol use: No   • Drug use: No   • Sexual activity: Defer           Objective   Physical Exam  Vitals and nursing note reviewed.   Constitutional:       General: He is not in acute distress.     Appearance: He is well-developed. He is not diaphoretic.      Comments: Nontoxic-appearing male   HENT:      Head: Normocephalic and atraumatic.   Neck:      Vascular: No JVD.   Cardiovascular:      Rate and Rhythm: Normal rate and regular rhythm.      Heart sounds: Normal heart sounds. No murmur heard.    No friction rub. No gallop.   Pulmonary:      Effort: Pulmonary effort is normal. No respiratory distress.      Breath sounds: Normal breath sounds. No wheezing or rales.   Abdominal:      General: Bowel sounds are normal. There is no distension.      Palpations: Abdomen is soft. There is no mass.      Tenderness: There is abdominal tenderness. There is guarding.      Comments: Nonfocal, generalized abdominal tenderness present with guarding noted, no pain out of proportion to exam   Musculoskeletal:         General: Normal range of motion.      Cervical back: Normal range of motion.   Skin:     General: Skin is warm and dry.      Coloration: Skin is not  pale.      Findings: No erythema or rash.   Neurological:      General: No focal deficit present.      Mental Status: He is alert and oriented to person, place, and time.   Psychiatric:         Thought Content: Thought content normal.         Judgment: Judgment normal.         Procedures           ED Course  ED Course as of 12/13/22 1358   Tue Dec 13, 2022   1124 62-year-old male presents for evaluation of abdominal pain, nausea, and diarrhea for the past 2 days.  Of note, the patient was treated with oral antibiotics for UTI approximately 2 to 3 months ago.  He denies any fevers.  On arrival to the ED, the patient is nontoxic-appearing.  Exam remarkable for generalized abdominal tenderness with guarding noted.  No pain out of proportion to exam.  No rash noted.  Broad differential diagnosis.  We will obtain labs and imaging, and we will reassess following initial interventions. [DD]   1209 Labs are bland. [DD]   1333 CT of abdomen/pelvis is negative for emergent/surgical intra-abdominal process. [DD]   1345 Upon reevaluation, the patient looks and feels much improved.  He denies any abdominal pain at this time.  Doubt C. difficile.  The patient was observed in the emergency department for nearly 3 hours but was unable to provide us with a stool sample.  We will discharge him home with stool culture supplies when he is able to defecate at his convenience.  Prescription for Zofran and Bentyl.  He will follow up with his primary care physician within the next week.  Agreeable with plan and given appropriate strict return precautions. [DD]      ED Course User Index  [DD] Gutierrez Olivas MD                                       Recent Results (from the past 24 hour(s))   Urinalysis With Microscopic If Indicated (No Culture) - Urine, Clean Catch    Collection Time: 12/13/22 11:25 AM    Specimen: Urine, Clean Catch   Result Value Ref Range    Color, UA Yellow Yellow, Straw    Appearance, UA Cloudy (A) Clear    pH, UA  5.5 5.0 - 8.0    Specific Gravity, UA 1.023 1.001 - 1.030    Glucose, UA Negative Negative    Ketones, UA Trace (A) Negative    Bilirubin, UA Negative Negative    Blood, UA Negative Negative    Protein, UA Trace (A) Negative    Leuk Esterase, UA Negative Negative    Nitrite, UA Negative Negative    Urobilinogen, UA 0.2 E.U./dL 0.2 - 1.0 E.U./dL   Urinalysis, Microscopic Only - Urine, Clean Catch    Collection Time: 12/13/22 11:25 AM    Specimen: Urine, Clean Catch   Result Value Ref Range    RBC, UA 0-2 None Seen, 0-2 /HPF    WBC, UA 3-5 (A) None Seen, 0-2 /HPF    Bacteria, UA None Seen None Seen, Trace /HPF    Squamous Epithelial Cells, UA 3-6 (A) None Seen, 0-2 /HPF    Hyaline Casts, UA 7-12 0 - 6 /LPF    Methodology Automated Microscopy    Comprehensive Metabolic Panel    Collection Time: 12/13/22 11:50 AM    Specimen: Blood   Result Value Ref Range    Glucose 125 (H) 65 - 99 mg/dL    BUN 14 8 - 23 mg/dL    Creatinine 1.24 0.76 - 1.27 mg/dL    Sodium 141 136 - 145 mmol/L    Potassium 4.4 3.5 - 5.2 mmol/L    Chloride 109 (H) 98 - 107 mmol/L    CO2 22.0 22.0 - 29.0 mmol/L    Calcium 9.3 8.6 - 10.5 mg/dL    Total Protein 7.8 6.0 - 8.5 g/dL    Albumin 4.10 3.50 - 5.20 g/dL    ALT (SGPT) 18 1 - 41 U/L    AST (SGOT) 30 1 - 40 U/L    Alkaline Phosphatase 80 39 - 117 U/L    Total Bilirubin 0.9 0.0 - 1.2 mg/dL    Globulin 3.7 gm/dL    A/G Ratio 1.1 g/dL    BUN/Creatinine Ratio 11.3 7.0 - 25.0    Anion Gap 10.0 5.0 - 15.0 mmol/L    eGFR 65.3 >60.0 mL/min/1.73   Lipase    Collection Time: 12/13/22 11:50 AM    Specimen: Blood   Result Value Ref Range    Lipase 27 13 - 60 U/L   Lactic Acid, Plasma    Collection Time: 12/13/22 11:50 AM    Specimen: Blood   Result Value Ref Range    Lactate 0.9 0.5 - 2.0 mmol/L   Green Top (Gel)    Collection Time: 12/13/22 11:50 AM   Result Value Ref Range    Extra Tube Hold for add-ons.    Light Blue Top    Collection Time: 12/13/22 11:50 AM   Result Value Ref Range    Extra Tube Hold for  add-ons.    Lavender Top    Collection Time: 12/13/22 11:51 AM   Result Value Ref Range    Extra Tube hold for add-on    Gold Top - SST    Collection Time: 12/13/22 11:51 AM   Result Value Ref Range    Extra Tube Hold for add-ons.    CBC Auto Differential    Collection Time: 12/13/22 11:51 AM    Specimen: Blood   Result Value Ref Range    WBC 6.34 3.40 - 10.80 10*3/mm3    RBC 4.24 4.14 - 5.80 10*6/mm3    Hemoglobin 13.7 13.0 - 17.7 g/dL    Hematocrit 39.9 37.5 - 51.0 %    MCV 94.1 79.0 - 97.0 fL    MCH 32.3 26.6 - 33.0 pg    MCHC 34.3 31.5 - 35.7 g/dL    RDW 13.5 12.3 - 15.4 %    RDW-SD 45.9 37.0 - 54.0 fl    MPV 9.3 6.0 - 12.0 fL    Platelets 175 140 - 450 10*3/mm3    Neutrophil % 53.2 42.7 - 76.0 %    Lymphocyte % 35.5 19.6 - 45.3 %    Monocyte % 9.0 5.0 - 12.0 %    Eosinophil % 1.7 0.3 - 6.2 %    Basophil % 0.3 0.0 - 1.5 %    Immature Grans % 0.3 0.0 - 0.5 %    Neutrophils, Absolute 3.37 1.70 - 7.00 10*3/mm3    Lymphocytes, Absolute 2.25 0.70 - 3.10 10*3/mm3    Monocytes, Absolute 0.57 0.10 - 0.90 10*3/mm3    Eosinophils, Absolute 0.11 0.00 - 0.40 10*3/mm3    Basophils, Absolute 0.02 0.00 - 0.20 10*3/mm3    Immature Grans, Absolute 0.02 0.00 - 0.05 10*3/mm3    nRBC 0.0 0.0 - 0.2 /100 WBC     Note: In addition to lab results from this visit, the labs listed above may include labs taken at another facility or during a different encounter within the last 24 hours. Please correlate lab times with ED admission and discharge times for further clarification of the services performed during this visit.    CT Abdomen Pelvis With Contrast   Final Result       1. Cholelithiasis.   2. Diverticulosis without evidence of acute diverticulitis.   3. Other incidental findings as above               This report was finalized on 12/13/2022 1:22 PM by Wallace Cook.            Vitals:    12/13/22 1100 12/13/22 1230 12/13/22 1330   BP: 126/92 143/94 139/91   BP Location: Left arm Right arm Right arm   Patient Position: Sitting  "Lying Lying   Pulse: 71 65 73   Resp: 20 18 18   Temp: 98.3 °F (36.8 °C)     TempSrc: Oral     SpO2: 95% 97% 97%   Weight: 122 kg (270 lb)     Height: 188 cm (74\")       Medications   Sodium Chloride (PF) 0.9 % 10 mL (has no administration in time range)   sodium chloride 0.9 % bolus 1,000 mL (1,000 mL Intravenous New Bag 12/13/22 1218)   ondansetron (ZOFRAN) injection 4 mg (4 mg Intravenous Given 12/13/22 1219)   HYDROmorphone (DILAUDID) injection 0.5 mg (0.5 mg Intravenous Given 12/13/22 1219)   iopamidol (ISOVUE-300) 61 % injection 100 mL (95 mL Intravenous Given 12/13/22 1305)     ECG/EMG Results (last 24 hours)     ** No results found for the last 24 hours. **        No orders to display              MDM    Final diagnoses:   Diarrhea, unspecified type   Generalized abdominal pain       ED Disposition  ED Disposition     ED Disposition   Discharge    Condition   Stable    Comment   --             Kimber Roland MD  [address] [place] [facility] [phone]    In 1 week           Medication List      New Prescriptions    dicyclomine 20 MG tablet  Commonly known as: BENTYL  Take 1 tablet by mouth Every 8 (Eight) Hours As Needed (pain, cramps).     hydrocortisone 0.5 % ointment  Apply 1 application topically to the appropriate area as directed 2 (Two) Times a Day.     ondansetron 4 MG tablet  Commonly known as: ZOFRAN  Take 1 tablet by mouth Every 8 (Eight) Hours As Needed for Nausea.        Changed    gabapentin 300 MG capsule  Commonly known as: Neurontin  Take 1 capsule by mouth Daily for 7 days.  What changed: when to take this           Where to Get Your Medications      These medications were sent to [id number] - [place], [place] - [address] AT [facility] - [phone]  - [phone] FX  [address] [facility] [address]    Phone: [phone]   · dicyclomine 20 MG tablet  · hydrocortisone 0.5 % ointment  · ondansetron 4 MG tablet   "        Brendon, Gutierrez Conner MD  12/13/22 7245

## 2023-03-09 ENCOUNTER — APPOINTMENT (OUTPATIENT)
Dept: GENERAL RADIOLOGY | Facility: HOSPITAL | Age: 63
End: 2023-03-09
Payer: MEDICARE

## 2023-03-09 ENCOUNTER — HOSPITAL ENCOUNTER (EMERGENCY)
Facility: HOSPITAL | Age: 63
Discharge: HOME | End: 2023-03-09
Attending: EMERGENCY MEDICINE | Admitting: EMERGENCY MEDICINE
Payer: MEDICARE

## 2023-03-09 VITALS
WEIGHT: 287 LBS | RESPIRATION RATE: 21 BRPM | TEMPERATURE: 98 F | BODY MASS INDEX: 36.83 KG/M2 | HEART RATE: 109 BPM | HEIGHT: 74 IN | SYSTOLIC BLOOD PRESSURE: 127 MMHG | DIASTOLIC BLOOD PRESSURE: 75 MMHG | OXYGEN SATURATION: 98 %

## 2023-03-09 DIAGNOSIS — S93.601A SPRAIN OF RIGHT FOOT, INITIAL ENCOUNTER: Primary | ICD-10-CM
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
## 2023-03-09 DIAGNOSIS — Z86.39 HISTORY OF DIABETES MELLITUS: ICD-10-CM

## 2023-03-09 PROCEDURE — 96374 THER/PROPH/DIAG INJ IV PUSH: CPT

## 2023-03-09 PROCEDURE — 99282 EMERGENCY DEPT VISIT SF MDM: CPT

## 2023-03-09 PROCEDURE — 73630 X-RAY EXAM OF FOOT: CPT

## 2023-03-09 NOTE — ED PROVIDER NOTES
Subjective   History of Present Illness  Pt is a 61 yo male presenting to ED with complaints of right foot pain after an injury. PMHx signficiatn for DM (insulin), HTN,HLD, Asthma, LIZY and Obesity. Pt reports just PTA he tripped on a step going into a building. He injured right foot. He has been able to bear weight but with pain. He has some swelling but no bruising. He denies ankle, knee or hip pain. He denies falling and hitting his head or LOC. He denies neck or back pain. He does not take blood thinners. He denies tobacco, drug or ETOH use.     History provided by:  Medical records, patient and spouse      Review of Systems   Musculoskeletal: Positive for arthralgias (right foot) and joint swelling. Negative for back pain and neck pain.   Skin: Negative for wound.   Neurological: Negative for dizziness, weakness, numbness and headaches.       Past Medical History:   Diagnosis Date   • Arthritis    • Asthma     inhaler daily    • Diabetes mellitus (HCC) 2000    insulin daily; checks blood sugars on occasion-run 150-180   • Elevated cholesterol    • History of sleep apnea     years ago diagnosed but never used a machine at home    • History of staph infection 2010    wound infection after left knee replacement -saw infectious disease MD    • Hypertension    • Wears glasses        Allergies   Allergen Reactions   • Carrot [Daucus Carota] Swelling   • Shellfish Allergy Swelling and Unknown (See Comments)     tongue swelling   • Strawberry Swelling     tongue swelling   • Banana Unknown (See Comments)   • Sulfamethoxazole-Trimethoprim Other (See Comments)     Patient developed blisters on his hands.  Patient developed blisters on his hands.   • Vancomycin Rash     Received vancomycin x1 dose 7/18/22       Past Surgical History:   Procedure Laterality Date   • COLONOSCOPY     • H[facility] SURGERY Right 08/11/2014    [facility]   • INCISION AND DRAINAGE OF WOUND Left 2010    x2 of total knee replacement wound -iv  antibiotics   • KNEE SURGERY Bilateral     Right knee- 2008, left knee- 2010- [facility]   • LUMBAR LAMINECTOMY WITH FUSION N/A 7/27/2018    Procedure: TLIF, OSTEOTOMY L4-5 , POST FUSION L4-5;  Surgeon: Yo Bryson MD;  Location: [facility] OR;  Service: Neurosurgery   • LUMBAR LAMINECTOMY WITH FUSION N/A 8/27/2018    Procedure: LUMBAR LAMINECTOMY POSTERIOR LUMBAR INTERBODY FUSION;  Surgeon: Yo Bryson MD;  Location: [facility] OR;  Service: Neurosurgery   • ROTATOR CUFF REPAIR Right    • TONSILLECTOMY         Family History   Problem Relation Age of Onset   • Hypertension Mother        Social History     Socioeconomic History   • Marital status: Single   Tobacco Use   • Smoking status: Never   • Smokeless tobacco: Never   Substance and Sexual Activity   • Alcohol use: No   • Drug use: No   • Sexual activity: Defer           Objective   Physical Exam  Vitals and nursing note reviewed.   Constitutional:       General: He is not in acute distress.     Appearance: He is obese.   HENT:      Head: Atraumatic.   Eyes:      Extraocular Movements: Extraocular movements intact.      Conjunctiva/sclera: Conjunctivae normal.   Cardiovascular:      Rate and Rhythm: Normal rate.      Pulses: Normal pulses.   Pulmonary:      Effort: Pulmonary effort is normal. No respiratory distress.   Musculoskeletal:         General: Normal range of motion.      Cervical back: Normal range of motion and neck supple.      Right foot: Normal range of motion and normal capillary refill. Swelling and tenderness present. No deformity. Normal pulse.        Feet:    Skin:     General: Skin is warm.      Capillary Refill: Capillary refill takes less than 2 seconds.   Neurological:      General: No focal deficit present.      Mental Status: He is alert.   Psychiatric:         Mood and Affect: Mood normal.         Procedures           ED Course            No results found for this or any previous visit (from the past 24 hour(s)).  Note: In addition  "to lab results from this visit, the labs listed above may include labs taken at another facility or during a different encounter within the last 24 hours. Please correlate lab times with ED admission and discharge times for further clarification of the services performed during this visit.    XR Foot 3+ View Right   Final Result   1.No evidence for displaced fracture or dislocation.      Electronically Signed: Tejinder Harris     3/9/2023 12:34 PM EST     Workstation ID: [id number]        Vitals:    03/09/23 1129   BP: 127/75   BP Location: Left arm   Patient Position: Sitting   Pulse: 109   Resp: 21   Temp: 98 °F (36.7 °C)   TempSrc: Oral   SpO2: 98%   Weight: 130 kg (287 lb)   Height: 188 cm (74\")     Medications - No data to display  ECG/EMG Results (last 24 hours)     ** No results found for the last 24 hours. **        No orders to display       DISCHARGE    Patient discharged in stable condition.    Reviewed implications of results, diagnosis, meds, responsibility to follow up, warning signs and symptoms of possible worsening, potential complications and reasons to return to ER.    Patient/Family voiced understanding of above instructions.    Discussed plan for discharge, as there is no emergent indication for admission.  Pt/family is agreeable and understands need for follow up and possible repeat testing.  Pt/family is aware that discharge does not mean that nothing is wrong but that it indicates no emergency is currently present that requires admission and they must continue care with follow-up as given below or with a physician of their choice.     FOLLOW-UP  Kimber Roland MD  [address] [address] [facility] [address] [place] [address] [phone]    Schedule an appointment as soon as possible for a visit       [facility] Emergency Department  [address] [place] [id number] [phone]    If symptoms worsen         Medication List      Changed    gabapentin 300 MG capsule  Commonly " known as: Neurontin  Take 1 capsule by mouth Daily for 7 days.  What changed: when to take this                                            Medical Decision Making  Fall, initial encounter: acute illness or injury  History of diabetes mellitus: chronic illness or injury  Sprain of right foot, initial encounter: acute illness or injury  Amount and/or Complexity of Data Reviewed  Independent Historian: spouse  External Data Reviewed: notes.     Details: PCP  Radiology: ordered. Decision-making details documented in ED Course.          Final diagnoses:   Sprain of right foot, initial encounter   Fall, initial encounter   History of diabetes mellitus       ED Disposition  ED Disposition     ED Disposition   Discharge    Condition   Stable    Comment   --             Kimber Roland MD  [address] [facility]   [place] [address]  [phone]    Schedule an appointment as soon as possible for a visit       [facility] Emergency Department  [address] [facility] [address] [phone]    If symptoms worsen         Medication List      Changed    gabapentin 300 MG capsule  Commonly known as: Neurontin  Take 1 capsule by mouth Daily for 7 days.  What changed: when to take this             Maricarmen Argueta PA  03/09/23 3503

## 2023-03-14 ENCOUNTER — HOSPITAL ENCOUNTER (EMERGENCY)
Facility: HOSPITAL | Age: 63
Discharge: HOME | End: 2023-03-14
Attending: EMERGENCY MEDICINE | Admitting: EMERGENCY MEDICINE
Payer: MEDICARE

## 2023-03-14 ENCOUNTER — APPOINTMENT (OUTPATIENT)
Dept: GENERAL RADIOLOGY | Facility: HOSPITAL | Age: 63
End: 2023-03-14
Payer: MEDICARE

## 2023-03-14 VITALS
HEIGHT: 74 IN | RESPIRATION RATE: 18 BRPM | OXYGEN SATURATION: 97 % | DIASTOLIC BLOOD PRESSURE: 76 MMHG | SYSTOLIC BLOOD PRESSURE: 110 MMHG | WEIGHT: 278 LBS | TEMPERATURE: 98.3 F | HEART RATE: 101 BPM | BODY MASS INDEX: 35.68 KG/M2

## 2023-03-14 DIAGNOSIS — S70.01XA CONTUSION OF RIGHT HIP, INITIAL ENCOUNTER: Primary | ICD-10-CM
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
## 2023-03-14 PROCEDURE — 73502 X-RAY EXAM HIP UNI 2-3 VIEWS: CPT

## 2023-03-14 PROCEDURE — 99282 EMERGENCY DEPT VISIT SF MDM: CPT

## 2023-03-14 RX ORDER — LIDOCAINE 50 MG/G
1 PATCH TOPICAL EVERY 24 HOURS
Qty: 6 PATCH | Refills: 0 | Status: SHIPPED | OUTPATIENT
Start: 2023-03-14 | End: 2023-03-20

## 2023-03-14 NOTE — ED PROVIDER NOTES
Subjective   History of Present Illness  Pt is a 63 yo male presenting to ED with complaints of hip pain. PMHx significant for HTN, HLD, DM (insulin), Asthma, LIZY and Arthritis. Patient explains he fell a week ago walking into a building and initially injured right foot. He was seen in ED after the injury and had negative xrays of foot. He then noticed right hip pain the next day. He reports pain worse with walking. No numbness or weakness to legs. He denies loss of bowel or bladder. He has not f/u with PCP. He denies new injury. He denies tobacco, drug or ETOH use.     History provided by:  Patient and medical records      Review of Systems   Constitutional: Negative for fever.   Respiratory: Negative for cough and shortness of breath.    Cardiovascular: Negative for chest pain and leg swelling.   Genitourinary: Negative for difficulty urinating.   Musculoskeletal: Positive for arthralgias (right hip). Negative for back pain and joint swelling.   Skin: Negative for wound.   Neurological: Negative for dizziness, weakness, numbness and headaches.       Past Medical History:   Diagnosis Date   • Arthritis    • Asthma     inhaler daily    • Diabetes mellitus (HCC) 2000    insulin daily; checks blood sugars on occasion-run 150-180   • Elevated cholesterol    • History of sleep apnea     years ago diagnosed but never used a machine at home    • History of staph infection 2010    wound infection after left knee replacement -saw infectious disease MD    • Hypertension    • Wears glasses        Allergies   Allergen Reactions   • Carrot [Daucus Carota] Swelling   • Shellfish Allergy Swelling and Unknown (See Comments)     tongue swelling   • Strawberry Swelling     tongue swelling   • Banana Unknown (See Comments)   • Sulfamethoxazole-Trimethoprim Other (See Comments)     Patient developed blisters on his hands.  Patient developed blisters on his hands.   • Vancomycin Rash     Received vancomycin x1 dose 7/18/22       Past  Surgical History:   Procedure Laterality Date   • COLONOSCOPY     • HIP SURGERY Right 08/11/2014    [facility]   • INCISION AND DRAINAGE OF WOUND Left 2010    x2 of total knee replacement wound -iv antibiotics   • KNEE SURGERY Bilateral     Right knee- 2008, left knee- 2010- [facility]   • LUMBAR LAMINECTOMY WITH FUSION N/A 7/27/2018    Procedure: TLIF, OSTEOTOMY L4-5 , POST FUSION L4-5;  Surgeon: Yo Bryson MD;  Location: [facility] OR;  Service: Neurosurgery   • LUMBAR LAMINECTOMY WITH FUSION N/A 8/27/2018    Procedure: LUMBAR LAMINECTOMY POSTERIOR LUMBAR INTERBODY FUSION;  Surgeon: Yo Bryson MD;  Location:  [facility] OR;  Service: Neurosurgery   • ROTATOR CUFF REPAIR Right    • TONSILLECTOMY         Family History   Problem Relation Age of Onset   • Hypertension Mother        Social History     Socioeconomic History   • Marital status: Single   Tobacco Use   • Smoking status: Never   • Smokeless tobacco: Never   Substance and Sexual Activity   • Alcohol use: No   • Drug use: No   • Sexual activity: Defer           Objective   Physical Exam  Vitals and nursing note reviewed.   Constitutional:       General: He is not in acute distress.     Appearance: He is obese.   HENT:      Head: Atraumatic.   Eyes:      Extraocular Movements: Extraocular movements intact.      Conjunctiva/sclera: Conjunctivae normal.   Cardiovascular:      Rate and Rhythm: Normal rate.      Pulses: Normal pulses.   Pulmonary:      Effort: Pulmonary effort is normal. No respiratory distress.   Musculoskeletal:         General: Normal range of motion.      Cervical back: Normal range of motion and neck supple.      Thoracic back: No tenderness. Normal range of motion.      Lumbar back: No tenderness. Normal range of motion.      Right hip: Tenderness present. No deformity. Normal range of motion. Normal strength.      Right ankle: No tenderness. Normal range of motion.      Right foot: Normal range of motion. No tenderness.   Skin:    "  General: Skin is warm.   Neurological:      General: No focal deficit present.      Mental Status: He is alert.   Psychiatric:         Mood and Affect: Mood normal.         Procedures           ED Course      No results found for this or any previous visit (from the past 24 hour(s)).  Note: In addition to lab results from this visit, the labs listed above may include labs taken at another facility or during a different encounter within the last 24 hours. Please correlate lab times with ED admission and discharge times for further clarification of the services performed during this visit.    XR Hip With or Without Pelvis 2 - 3 View Right   Final Result   Impression:   1.Post surgical changes from right hip arthroplasty.   2.Some suggested narrowing left hip joint.   3.Postsurgical changes lumbar spine.      Electronically Signed: Marcelino Jorge     3/14/2023 4:59 PM EDT     Workstation ID: [id number]        Vitals:    03/14/23 1559   BP: 110/76   BP Location: Left arm   Patient Position: Sitting   Pulse: 101   Resp: 18   Temp: 98.3 °F (36.8 °C)   TempSrc: Oral   SpO2: 97%   Weight: 126 kg (278 lb)   Height: 188 cm (74\")     Medications - No data to display  ECG/EMG Results (last 24 hours)     ** No results found for the last 24 hours. **        No orders to display       DISCHARGE    Patient discharged in stable condition.    Reviewed implications of results, diagnosis, meds, responsibility to follow up, warning signs and symptoms of possible worsening, potential complications and reasons to return to ER.    Patient/Family voiced understanding of above instructions.    Discussed plan for discharge, as there is no emergent indication for admission.  Pt/family is agreeable and understands need for follow up and possible repeat testing.  Pt/family is aware that discharge does not mean that nothing is wrong but that it indicates no emergency is currently present that requires admission and they must continue care with " follow-up as given below or with a physician of their choice.     FOLLOW-UP  Kimber Roland MD  [address] [place] [facility] [phone]    Schedule an appointment as soon as possible for a visit       [facility] Emergency Department  [address] [address] [phone]    If symptoms worsen         Medication List      New Prescriptions    lidocaine 5 %  Commonly known as: LIDODERM  Place 1 patch on the skin as directed by provider Daily for 6 doses. Remove & Discard patch within 12 hours or as directed by MD        Changed    gabapentin 300 MG capsule  Commonly known as: Neurontin  Take 1 capsule by mouth Daily for 7 days.  What changed: when to take this           Where to Get Your Medications      These medications were sent to [id number] - [place], [place] - [address] AT [facility] - [phone]  - [phone]   [address] [facility] [address]    Phone: [phone]   · lidocaine 5 %                                            Medical Decision Making  Pt is a 61 yo male presenting to ED with complaints of right hip pain after a fall a week ago. Xray without emergent findings. Discussed results and tx plan. Discussed close f/u with PCP. Discussed using ice / heat / Tylenol and Lidoderm patches.     DDx  Fracture, Sprain, Contusion, Dislocation, Radiculopathy     Contusion of right hip, initial encounter: acute illness or injury  Fall, initial encounter: acute illness or injury  Amount and/or Complexity of Data Reviewed  External Data Reviewed: notes.     Details: PCP, ED  Radiology: ordered. Decision-making details documented in ED Course.      Risk  Prescription drug management.          Final diagnoses:   Contusion of right hip, initial encounter   Fall, initial encounter       ED Disposition  ED Disposition     ED Disposition   Discharge    Condition   Stable    Comment   --              Kimber Roland MD  [address] [place] [address]  [phone]    Schedule an appointment as soon as possible for a visit       [facility]  [address] [address] [phone]    If symptoms worsen         Medication List      New Prescriptions    lidocaine 5 %  Commonly known as: LIDODERM  Place 1 patch on the skin as directed by provider Daily for 6 doses. Remove & Discard patch within 12 hours or as directed by MD        Changed    gabapentin 300 MG capsule  Commonly known as: Neurontin  Take 1 capsule by mouth Daily for 7 days.  What changed: when to take this           Where to Get Your Medications      These medications were sent to [id number] - [place], [place] - [address] AT [facility] & [address] [facility] - [phone]  - [phone]  [address] [facility] [address]    Phone: [phone]   · lidocaine 5 %          Maricarmen Argueta PA  03/14/23 1736

## 2023-12-13 ENCOUNTER — APPOINTMENT (OUTPATIENT)
Dept: GENERAL RADIOLOGY | Facility: HOSPITAL | Age: 63
End: 2023-12-13
Payer: MEDICARE

## 2023-12-13 ENCOUNTER — HOSPITAL ENCOUNTER (EMERGENCY)
Facility: HOSPITAL | Age: 63
Discharge: HOME | End: 2023-12-13
Attending: STUDENT IN AN ORGANIZED HEALTH CARE EDUCATION/TRAINING PROGRAM | Admitting: STUDENT IN AN ORGANIZED HEALTH CARE EDUCATION/TRAINING PROGRAM
Payer: MEDICARE

## 2023-12-13 VITALS
RESPIRATION RATE: 16 BRPM | HEART RATE: 77 BPM | OXYGEN SATURATION: 99 % | DIASTOLIC BLOOD PRESSURE: 88 MMHG | SYSTOLIC BLOOD PRESSURE: 137 MMHG | WEIGHT: 268 LBS | TEMPERATURE: 97.5 F | HEIGHT: 74 IN | BODY MASS INDEX: 34.39 KG/M2

## 2023-12-13 DIAGNOSIS — M25.551 RIGHT HIP PAIN: ICD-10-CM
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
## 2023-12-13 DIAGNOSIS — M25.511 ACUTE PAIN OF RIGHT SHOULDER: ICD-10-CM

## 2023-12-13 PROCEDURE — 73502 X-RAY EXAM HIP UNI 2-3 VIEWS: CPT

## 2023-12-13 PROCEDURE — 99283 EMERGENCY DEPT VISIT LOW MDM: CPT

## 2023-12-13 PROCEDURE — 73030 X-RAY EXAM OF SHOULDER: CPT

## 2023-12-13 RX ORDER — HYDROCODONE BITARTRATE AND ACETAMINOPHEN 5; 325 MG/1; MG/1
1 TABLET ORAL ONCE
Status: COMPLETED | OUTPATIENT
Start: 2023-12-13 | End: 2023-12-13

## 2023-12-13 RX ADMIN — HYDROCODONE BITARTRATE AND ACETAMINOPHEN 1 TABLET: 5; 325 TABLET ORAL at 21:39

## 2023-12-14 NOTE — DISCHARGE INSTRUCTIONS
Symptomatic care is recommended with Tylenol or Ibuprofen as needed for pain. Take all medications as prescribed and instructed. Follow up with primary care as directed or return to Emergency Department with worsening of symptoms.

## 2023-12-15 NOTE — ED PROVIDER NOTES
EMERGENCY DEPARTMENT ENCOUNTER    Pt Name: Amadeo Kong  MRN: [id number]  Pt :   1960  Room Number:  RW3/R3  Date of encounter:  2023  PCP: Kimber Roland MD  ED Provider: JASMIN Quiroz    Historian: Patient    HPI:  Chief Complaint:Right shoulder pain and right hip pain following a fall    Context: Amadeo Kong is a 63 y.o. male who presents to the ED c/o pain in his right shoulder and right hip after a mechanical fall while getting  up from the table this evening. Patient reports experiencing a fall where he fell onto his right side. He did not hit his head or lose consciousness. He is not on blood thinners. No additional complaints on interview and exam.   HPI     REVIEW OF SYSTEMS  A chief complaint appropriate review of systems was completed and is negative except as noted in the HPI.     PAST MEDICAL HISTORY  Past Medical History:   Diagnosis Date    Arthritis     Asthma     inhaler daily     Diabetes mellitus 2000    insulin daily; checks blood sugars on occasion-run 150-180    Elevated cholesterol     History of sleep apnea     years ago diagnosed but never used a machine at home     History of staph infection     wound infection after left knee replacement -saw infectious disease MD     Hypertension     Wears glasses        PAST SURGICAL HISTORY  Past Surgical History:   Procedure Laterality Date    COLONOSCOPY      HIP SURGERY Right 2014    [facility]    INCISION AND DRAINAGE OF WOUND Left 2010    x2 of total knee replacement wound -iv antibiotics    KNEE SURGERY Bilateral     Right knee- , left knee- - [facility]    LUMBAR LAMINECTOMY WITH FUSION N/A 2018    Procedure: TLIF, OSTEOTOMY L4-5 , POST FUSION L4-5;  Surgeon: Yo Bryson MD;  Location:  [facility] OR;  Service: Neurosurgery    LUMBAR LAMINECTOMY WITH FUSION N/A 2018    Procedure: LUMBAR LAMINECTOMY POSTERIOR LUMBAR INTERBODY FUSION;  Surgeon: Yo Bryson MD;  Location:  [facility] OR;   Service: Neurosurgery    ROTATOR CUFF REPAIR Right     TONSILLECTOMY         FAMILY HISTORY  Family History   Problem Relation Age of Onset    Hypertension Mother        SOCIAL HISTORY  Social History     Socioeconomic History    Marital status: Single   Tobacco Use    Smoking status: Never    Smokeless tobacco: Never   Substance and Sexual Activity    Alcohol use: No    Drug use: No    Sexual activity: Defer       ALLERGIES  Carrot [daucus carota], Shellfish allergy, Strawberry, Banana, Sulfamethoxazole-trimethoprim, and Vancomycin    PHYSICAL EXAM  Physical Exam  Vitals and nursing note reviewed.   Constitutional:       General: He is not in acute distress.     Appearance: Normal appearance. He is obese. He is not ill-appearing.   HENT:      Head: Normocephalic and atraumatic.      Nose: Nose normal.      Mouth/Throat:      Mouth: Mucous membranes are moist.   Eyes:      Extraocular Movements: Extraocular movements intact.   Cardiovascular:      Rate and Rhythm: Normal rate.      Pulses: Normal pulses.   Pulmonary:      Effort: Pulmonary effort is normal.   Abdominal:      General: There is no distension.      Palpations: Abdomen is soft.      Tenderness: There is no abdominal tenderness.   Musculoskeletal:      Right shoulder: Tenderness present. No swelling, deformity, effusion, laceration or bony tenderness. Normal range of motion. Normal strength. Normal pulse.      Left shoulder: Normal.      Cervical back: Normal range of motion and neck supple.      Right hip: Tenderness present. No deformity or bony tenderness. Normal range of motion. Normal strength.      Left hip: Normal.   Skin:     General: Skin is warm and dry.   Neurological:      General: No focal deficit present.      Mental Status: He is alert.   Psychiatric:         Mood and Affect: Mood normal.         Behavior: Behavior normal.       LAB RESULTS      If labs were ordered, I independently reviewed the results and considered them in treating the  patient.    RADIOLOGY  XR Hip With or Without Pelvis 2 - 3 View Right   Final Result   Impression:   No acute displaced fracture. Intact total right hip prosthesis.         Electronically Signed: Morro Adrian MD     12/13/2023 9:49 PM EST     Workstation ID: [id number]      XR Shoulder 2+ View Right   Final Result   Impression:   No acute osseous abnormality.         Electronically Signed: Morro Adrian MD     12/13/2023 9:51 PM EST     Workstation ID: [id number]        [x] Radiologist's Report Reviewed:  I ordered and independently interpreted the above noted radiographic studies.  See radiologist's dictation for official interpretation.      PROCEDURES    Procedures    No orders to display       MEDICATIONS GIVEN IN ER    Medications   HYDROcodone-acetaminophen (NORCO) 5-325 MG per tablet 1 tablet (1 tablet Oral Given 12/13/23 2139)       MEDICAL DECISION MAKING, PROGRESS, and CONSULTS   Medical Decision Making  Problems Addressed:  Acute pain of right shoulder: complicated acute illness or injury  Fall, initial encounter: complicated acute illness or injury  Right hip pain: complicated acute illness or injury    Amount and/or Complexity of Data Reviewed  Radiology: ordered.    Risk  Prescription drug management.        All labs have been independently reviewed by me.  All radiology studies have been interpreted by me and the radiologist dictating the report.  All EKG's have been independently interpreted by me as well as and overseeing attending physician.    [] Discussed with radiology regarding test interpretation:    Discussion below represents my analysis of pertinent findings related to patient's condition, differential diagnosis, treatment plan and final disposition.    Differential diagnosis:  The differential diagnosis associated with the patient's presentation includes: Contusion, arthritis, fracture, dislocation, tendon/ligamentous injury  Additional sources  Discussed/ obtained information from  independent historians:   [x] Spouse  [] Parent  [] Family member  [] Friend  [] EMS   [] Other:  External (non-ED) record review:   [] Inpatient record:   [] Office record:   [] Outpatient record:   [] Prior Outpatient labs:   [] Prior Outpatient radiology:   [] Primary Care record:   [] Outside ED record:   [] Other:   Patient's care impacted by:   [x] Diabetes  [x] Hypertension  [x] Hyperlipidemia  [] Hypothyroidism   [] Coronary Artery Disease   [] COPD   [] Cancer   [] Obesity  [] GERD   [] Tobacco Abuse   [] Substance Abuse    [] Anxiety   [] Depression   [] Other:   Care significantly affected by Social Determinants of Health (housing and economic circumstances, unemployment)    [] Yes     [x] No   If yes, Patient's care significantly limited by  Social Determinants of Health including:   [] Inadequate housing   [] Low income   [] Alcoholism and drug addiction in family   [] Problems related to primary support group   [] Unemployment   [] Problems related to employment   [] Other Social Determinants of Health:     Shared decision making:  I had a discussion with the patient/family regarding diagnosis, diagnostic results, treatment plan, and medications.  The patient/family indicated understanding of these instructions.  I spent adequate time at the bedside preceding discharge necessary to personally discuss the aftercare instructions, giving patient education, providing explanations of the results of our evaluations/findings, and my decision making to assure that the patient/family understand the plan of care.  Time was allotted to answer questions at that time and throughout the ED course.  Emphasis was placed on timely follow-up after discharge.  I also discussed the potential for the development of an acute emergent condition requiring further evaluation, admission, or even surgical intervention. I discussed that we found nothing during the visit today indicating the need for further workup, admission, or  the presence of an unstable medical condition.  I encouraged the patient to return to the emergency department immediately for ANY concerns, worsening, new complaints, or if symptoms persist and unable to seek follow-up in a timely fashion.  The patient/family expressed understanding and agreement with this plan.  The patient will follow-up with primary care for reevaluation.      Orders placed during this visit:  Orders Placed This Encounter   Procedures    XR Hip With or Without Pelvis 2 - 3 View Right    XR Shoulder 2+ View Right       I considered prescription management  with:   [x] Pain medication: The use of prescription pain medication was considered in this patient however not implemented as risks outweigh benefits of prescription pain management and it was felt patient's symptoms could be managed with OTC anti-inflammatory medications and Tylenol. Will defer prescribed pain medication to primary care and/or pain management.   [] Antiviral  [] Antibiotic   [] Other:   Rationale:  ED Course:    ED Course as of 12/15/23 0652   Wed Dec 13, 2023   2200 In summary this is a 63 nontoxic-appearing male who presents to the ED for evaluation following a mechanical fall with complaints of right arm pain and right hip pain.  Neurovascularly intact.  No acute or emergent findings demonstrated on physical exam.  Patient observed ambulating and bearing weight without obvious difficulties.  X-ray of right shoulder and x-ray of hip with pelvis demonstrates no acute or emergent abnormalities and specifically no fractures or dislocations.  Treated symptomatically in the ED.  Patient persistently requesting pain medication throughout his stay in the ED and at discharge.  Instructed on symptomatic care with use of nonnarcotic medications.At time of discharge disposition patient is afebrile, nontoxic appearing, vital signs stable and able to maintain O2 sats of 99% on room air. Patient will be discharged home with symptomatic  care and outpatient follow up.   [JG]      ED Course User Index  [JG] Brandon Lemus PA            DIAGNOSIS  Final diagnoses:   Fall, initial encounter   Right hip pain   Acute pain of right shoulder       DISPOSITION    ED Disposition       ED Disposition   Discharge    Condition   Stable    Comment   --               Please note that portions of this document were completed with voice recognition software.        Brandon Lemus PA  12/15/23 0652

## 2024-03-12 ENCOUNTER — HOSPITAL ENCOUNTER (EMERGENCY)
Facility: HOSPITAL | Age: 64
Discharge: HOME | End: 2024-03-12
Attending: EMERGENCY MEDICINE
Payer: MEDICARE

## 2024-03-12 ENCOUNTER — APPOINTMENT (OUTPATIENT)
Dept: GENERAL RADIOLOGY | Facility: HOSPITAL | Age: 64
End: 2024-03-12
Payer: MEDICARE

## 2024-03-12 VITALS
TEMPERATURE: 98.3 F | RESPIRATION RATE: 18 BRPM | DIASTOLIC BLOOD PRESSURE: 99 MMHG | HEART RATE: 69 BPM | OXYGEN SATURATION: 98 % | WEIGHT: 278 LBS | SYSTOLIC BLOOD PRESSURE: 115 MMHG | HEIGHT: 74 IN | BODY MASS INDEX: 35.68 KG/M2

## 2024-03-12 DIAGNOSIS — E16.2 HYPOGLYCEMIA: ICD-10-CM

## 2024-03-12 DIAGNOSIS — S80.01XA CONTUSION OF RIGHT KNEE, INITIAL ENCOUNTER: ICD-10-CM
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
## 2024-03-12 DIAGNOSIS — S80.211A ABRASION OF RIGHT KNEE, INITIAL ENCOUNTER: ICD-10-CM

## 2024-03-12 LAB
ALBUMIN SERPL-MCNC: 3.7 G/DL (ref 3.5–5.2)
ALBUMIN/GLOB SERPL: 1 G/DL
ALP SERPL-CCNC: 116 U/L (ref 39–117)
ALT SERPL W P-5'-P-CCNC: 17 U/L (ref 1–41)
ANION GAP SERPL CALCULATED.3IONS-SCNC: 12 MMOL/L (ref 5–15)
AST SERPL-CCNC: 18 U/L (ref 1–40)
BASOPHILS # BLD AUTO: 0.03 10*3/MM3 (ref 0–0.2)
BASOPHILS NFR BLD AUTO: 0.3 % (ref 0–1.5)
BILIRUB SERPL-MCNC: 0.5 MG/DL (ref 0–1.2)
BILIRUB UR QL STRIP: NEGATIVE
BUN SERPL-MCNC: 17 MG/DL (ref 8–23)
BUN/CREAT SERPL: 12.8 (ref 7–25)
CALCIUM SPEC-SCNC: 8.9 MG/DL (ref 8.6–10.5)
CHLORIDE SERPL-SCNC: 101 MMOL/L (ref 98–107)
CLARITY UR: CLEAR
CO2 SERPL-SCNC: 20 MMOL/L (ref 22–29)
COLOR UR: YELLOW
CREAT SERPL-MCNC: 1.33 MG/DL (ref 0.76–1.27)
DEPRECATED RDW RBC AUTO: 45.2 FL (ref 37–54)
EGFRCR SERPLBLD CKD-EPI 2021: 59.7 ML/MIN/1.73
EOSINOPHIL # BLD AUTO: 0.06 10*3/MM3 (ref 0–0.4)
EOSINOPHIL NFR BLD AUTO: 0.7 % (ref 0.3–6.2)
ERYTHROCYTE [DISTWIDTH] IN BLOOD BY AUTOMATED COUNT: 13.4 % (ref 12.3–15.4)
GLOBULIN UR ELPH-MCNC: 3.7 GM/DL
GLUCOSE BLDC GLUCOMTR-MCNC: 64 MG/DL (ref 70–130)
GLUCOSE SERPL-MCNC: 96 MG/DL (ref 65–99)
GLUCOSE UR STRIP-MCNC: NEGATIVE MG/DL
HCT VFR BLD AUTO: 40.2 % (ref 37.5–51)
HGB BLD-MCNC: 13.6 G/DL (ref 13–17.7)
HGB UR QL STRIP.AUTO: NEGATIVE
HOLD SPECIMEN: NORMAL
IMM GRANULOCYTES # BLD AUTO: 0.04 10*3/MM3 (ref 0–0.05)
IMM GRANULOCYTES NFR BLD AUTO: 0.4 % (ref 0–0.5)
KETONES UR QL STRIP: NEGATIVE
LEUKOCYTE ESTERASE UR QL STRIP.AUTO: NEGATIVE
LYMPHOCYTES # BLD AUTO: 1.86 10*3/MM3 (ref 0.7–3.1)
LYMPHOCYTES NFR BLD AUTO: 20.8 % (ref 19.6–45.3)
MCH RBC QN AUTO: 30.8 PG (ref 26.6–33)
MCHC RBC AUTO-ENTMCNC: 33.8 G/DL (ref 31.5–35.7)
MCV RBC AUTO: 91.2 FL (ref 79–97)
MONOCYTES # BLD AUTO: 1.05 10*3/MM3 (ref 0.1–0.9)
MONOCYTES NFR BLD AUTO: 11.7 % (ref 5–12)
NEUTROPHILS NFR BLD AUTO: 5.9 10*3/MM3 (ref 1.7–7)
NEUTROPHILS NFR BLD AUTO: 66.1 % (ref 42.7–76)
NITRITE UR QL STRIP: NEGATIVE
NRBC BLD AUTO-RTO: 0 /100 WBC (ref 0–0.2)
PH UR STRIP.AUTO: <=5 [PH] (ref 5–8)
PLATELET # BLD AUTO: 181 10*3/MM3 (ref 140–450)
PMV BLD AUTO: 8.2 FL (ref 6–12)
POTASSIUM SERPL-SCNC: 4.3 MMOL/L (ref 3.5–5.2)
PROT SERPL-MCNC: 7.4 G/DL (ref 6–8.5)
PROT UR QL STRIP: NEGATIVE
RBC # BLD AUTO: 4.41 10*6/MM3 (ref 4.14–5.8)
SODIUM SERPL-SCNC: 133 MMOL/L (ref 136–145)
SP GR UR STRIP: 1.01 (ref 1–1.03)
UROBILINOGEN UR QL STRIP: NORMAL
WBC NRBC COR # BLD AUTO: 8.94 10*3/MM3 (ref 3.4–10.8)
WHOLE BLOOD HOLD COAG: NORMAL
WHOLE BLOOD HOLD SPECIMEN: NORMAL

## 2024-03-12 PROCEDURE — 80053 COMPREHEN METABOLIC PANEL: CPT | Performed by: EMERGENCY MEDICINE

## 2024-03-12 PROCEDURE — 99284 EMERGENCY DEPT VISIT MOD MDM: CPT

## 2024-03-12 PROCEDURE — 73560 X-RAY EXAM OF KNEE 1 OR 2: CPT

## 2024-03-12 PROCEDURE — 85025 COMPLETE CBC W/AUTO DIFF WBC: CPT | Performed by: EMERGENCY MEDICINE

## 2024-03-12 PROCEDURE — 99285 EMERGENCY DEPT VISIT HI MDM: CPT

## 2024-03-12 PROCEDURE — 99283 EMERGENCY DEPT VISIT LOW MDM: CPT

## 2024-03-12 PROCEDURE — 82948 REAGENT STRIP/BLOOD GLUCOSE: CPT

## 2024-03-12 PROCEDURE — 81003 URINALYSIS AUTO W/O SCOPE: CPT | Performed by: EMERGENCY MEDICINE

## 2024-03-12 PROCEDURE — 36415 COLL VENOUS BLD VENIPUNCTURE: CPT

## 2024-03-12 PROCEDURE — 71045 X-RAY EXAM CHEST 1 VIEW: CPT

## 2024-03-12 RX ORDER — ACETAMINOPHEN 500 MG
1000 TABLET ORAL ONCE
Status: COMPLETED | OUTPATIENT
Start: 2024-03-12 | End: 2024-03-12

## 2024-03-12 RX ORDER — SODIUM CHLORIDE 0.9 % (FLUSH) 0.9 %
10 SYRINGE (ML) INJECTION AS NEEDED
Status: DISCONTINUED | OUTPATIENT
Start: 2024-03-12 | End: 2024-03-12 | Stop reason: HOSPADM

## 2024-03-12 RX ADMIN — ACETAMINOPHEN 1000 MG: 500 TABLET ORAL at 18:46

## 2024-03-12 NOTE — ED PROVIDER NOTES
[place]    EMERGENCY DEPARTMENT ENCOUNTER      Pt Name: Amadeo Kong  MRN: [id number]  YOB: 1960  Date of evaluation: 3/12/2024  Provider: Randell Cordoba DO    CHIEF COMPLAINT       Chief Complaint   Patient presents with    Fall     HPI  Stated Reason for Visit: Pt states fall PTA due to weakness. CBG in triage 64 - pt states normal in 140's History Obtained From: patient     HISTORY OF PRESENT ILLNESS  (Location/Symptom, Timing/Onset, Context/Setting, Quality, Duration, Modifying Factors, Severity.)   Amadeo Kong is a 63 y.o. male who presents to the emergency department for evaluation generalized weakness which she notes started this afternoon as he was trying to get around to the store.  He notes he felt lightheaded dizzy likely about to pass out and had his glucose checked and was told in the 60s.  He does have a history of insulin-dependent diabetes, takes both long-acting and short acting sliding scale insulin.  He notes today he took his 45 units of NovoLog and 40 5 in the morning.  He did eat breakfast but he did not eat lunch.  He denies any nausea, vomiting, diarrhea, recent illness, no fevers or chills.  Normal blood sugars in the 100s pretty well-controlled.  Just transferred over to see a new diabetes clinician.  He denies any noncompliance with medications.  Denies any unilateral weakness, numbness, tingling, seizure activity, headache.  He denies any recent illness, no fevers or chills.  Has no other acute systemic complaints this time.    Nursing notes were reviewed.      PAST MEDICAL HISTORY     Past Medical History:   Diagnosis Date    Arthritis     Asthma     inhaler daily     Diabetes mellitus 2000    insulin daily; checks blood sugars on occasion-run 150-180    Elevated cholesterol     History of sleep apnea     years ago diagnosed but never used a machine at home     History of staph infection 2010    wound infection after left knee replacement -saw infectious disease MD      Hypertension     Wears glasses          SURGICAL HISTORY       Past Surgical History:   Procedure Laterality Date    COLONOSCOPY      HIP SURGERY Right 08/11/2014    [facility]    INCISION AND DRAINAGE OF WOUND Left 2010    x2 of total knee replacement wound -iv antibiotics    KNEE SURGERY Bilateral     Right knee- 2008, left knee- 2010- [facility]    LUMBAR LAMINECTOMY WITH FUSION N/A 7/27/2018    Procedure: TLIF, OSTEOTOMY L4-5 , POST FUSION L4-5;  Surgeon: Yo Bryson MD;  Location:  [facility];  Service: Neurosurgery    LUMBAR LAMINECTOMY WITH FUSION N/A 8/27/2018    Procedure: LUMBAR LAMINECTOMY POSTERIOR LUMBAR INTERBODY FUSION;  Surgeon: Yo Bryson MD;  Location:  [facility];  Service: Neurosurgery    ROTATOR CUFF REPAIR Right     TONSILLECTOMY           CURRENT MEDICATIONS       Current Facility-Administered Medications:     sodium chloride 0.9 % flush 10 mL, 10 mL, Intravenous, PRN, Randell Cordoba, DO    Current Outpatient Medications:     acetaminophen (TYLENOL) 325 MG tablet, Take 2 tablets by mouth Every 6 (Six) Hours As Needed for Fever., Disp: , Rfl:     albuterol sulfate  (90 Base) MCG/ACT inhaler, Inhale 2 puffs 4 (Four) Times a Day As Needed for Shortness of Air., Disp: , Rfl:     alfuzosin (UROXATRAL) 10 MG 24 hr tablet, Take 1 tablet by mouth Daily., Disp: 30 tablet, Rfl: 0    amLODIPine (NORVASC) 10 MG tablet, Take 10 mg by mouth Daily., Disp: , Rfl:     atorvastatin (LIPITOR) 20 MG tablet, Take 40 mg by mouth Every Morning., Disp: , Rfl:     brimonidine (ALPHAGAN) 0.2 % ophthalmic solution, Apply 1 drop to eye(s) as directed by provider., Disp: , Rfl:     budesonide-formoterol (SYMBICORT) 160-4.5 MCG/ACT inhaler, Inhale 2 puffs 2 (Two) Times a Day., Disp: 10.2 g, Rfl: 0    cetirizine (Allergy, Cetirizine,) 10 MG tablet, Take 10 mg by mouth Daily., Disp: , Rfl:     dicyclomine (BENTYL) 20 MG tablet, Take 1 tablet by mouth Every 8 (Eight) Hours As Needed (pain,  cramps)., Disp: 20 tablet, Rfl: 0    gabapentin (NEURONTIN) 300 MG capsule, Take 1 capsule by mouth Daily for 7 days. (Patient taking differently: Take 300 mg by mouth 3 (Three) Times a Day.), Disp: 7 capsule, Rfl: 0    glucose blood (OneTouch Ultra) test strip, TEST BLOOD SUGAR THREE TIMES DAILY AS DIRECTED, Disp: , Rfl:     HUMULIN 70/30 (70-30) 100 UNIT/ML injection, Inject 45 Units under the skin into the appropriate area as directed 2 (Two) Times a Day With Meals., Disp: , Rfl:     hydrocortisone 0.5 % ointment, Apply 1 application topically to the appropriate area as directed 2 (Two) Times a Day., Disp: 28 g, Rfl: 0    ondansetron (ZOFRAN) 4 MG tablet, Take 1 tablet by mouth Every 8 (Eight) Hours As Needed for Nausea., Disp: 15 tablet, Rfl: 0    pantoprazole (PROTONIX) 40 MG EC tablet, Take 1 tablet by mouth Every Morning., Disp: 30 tablet, Rfl: 0    ALLERGIES     Carrot [daucus carota], Shellfish allergy, Strawberry, Banana, Sulfamethoxazole-trimethoprim, and Vancomycin    FAMILY HISTORY       Family History   Problem Relation Age of Onset    Hypertension Mother           SOCIAL HISTORY       Social History     Socioeconomic History    Marital status: Single   Tobacco Use    Smoking status: Never    Smokeless tobacco: Never   Substance and Sexual Activity    Alcohol use: No    Drug use: No    Sexual activity: Defer         PHYSICAL EXAM    (up to 7 for level 4, 8 or more for level 5)     Vitals:    03/12/24 1730 03/12/24 1900 03/12/24 1930 03/12/24 2000   BP: 94/69 116/90 130/94 119/70   BP Location: Right arm Right arm Right arm Right arm   Patient Position: Lying Lying Lying Lying   Pulse: 71 60 69 69   Resp: 18 18 18 18   Temp:       TempSrc:       SpO2: 96% 98% 97% 100%   Weight:       Height:           Physical Exam  General : Patient is awake, alert, oriented, in no acute distress, nontoxic appearing  HEENT: Pupils are equally round, EOMI, conjunctivae clear  Neck: Neck is supple, full range of motion,  trachea midline  Cardiac: Heart regular rate, rhythm, no murmurs, rubs, or gallops  Lungs: Lungs are clear to auscultation, there is no wheezing, rhonchi, or rales. There is no use of accessory muscles  Abdomen: Abdomen is soft, nontender, nondistended. There are no firm or pulsatile masses, no rebound rigidity or guarding  Musculoskeletal:.   No focal muscle deficits are appreciated  Neuro: Motor intact, sensory intact, level of consciousness is normal, cerebellar function is normal, reflexes are grossly normal. No evidence of incontinence or loss of bowel or bladder function, no saddle anesthesia noted   Dermatology: There are superficial abrasions to the bilateral knees from his fall earlier today,  Skin is warm and dry  Psych: Mentation is grossly normal, cognition is grossly normal. Affect is appropriate      DIAGNOSTIC RESULTS     EKG:  All EKGs are interpreted by the Emergency Department Physician who either signs or Co-signs this chart in the absence of a cardiologist.    ECG 12 Lead Altered Mental Status    (Results Pending)       RADIOLOGY:     [x] Radiologist's Report Reviewed:  XR Knee 1 or 2 View Right   Final Result   Impression:   1. Right total knee prosthesis without evidence of hardware complication.   2. Anterior soft tissue swelling and small suprapatellar joint effusion.         Electronically Signed: Yo Man     3/12/2024 5:50 PM EDT     Workstation ID: [id number]      XR Knee 1 or 2 View Left   Final Result   Impression:      1. Status post total left knee arthroplasty. No hardware complication. No acute bony abnormality or joint effusion.         Electronically Signed: Braydon Self MD     3/12/2024 5:50 PM EDT     Workstation ID: [id number]      XR Chest 1 View   Final Result   Impression:   No acute cardiopulmonary abnormality.         Electronically Signed: Shara Wright MD     3/12/2024 5:50 PM EDT     Workstation ID: [id number]          I ordered and independently reviewed the above  noted radiographic studies.      I viewed images of chest x-ray which showed no acute cardiopulmonary process, no pleural effusion, no obvious rib fracture per my independent interpretation.    See radiologist's dictation for official interpretation.      ED BEDSIDE ULTRASOUND:   Performed by ED Physician - none    LABS:    I have reviewed and interpreted all of the currently available lab results from this visit (if applicable):  Results for orders placed or performed during the hospital encounter of 03/12/24   Comprehensive Metabolic Panel    Specimen: Blood   Result Value Ref Range    Glucose 96 65 - 99 mg/dL    BUN 17 8 - 23 mg/dL    Creatinine 1.33 (H) 0.76 - 1.27 mg/dL    Sodium 133 (L) 136 - 145 mmol/L    Potassium 4.3 3.5 - 5.2 mmol/L    Chloride 101 98 - 107 mmol/L    CO2 20.0 (L) 22.0 - 29.0 mmol/L    Calcium 8.9 8.6 - 10.5 mg/dL    Total Protein 7.4 6.0 - 8.5 g/dL    Albumin 3.7 3.5 - 5.2 g/dL    ALT (SGPT) 17 1 - 41 U/L    AST (SGOT) 18 1 - 40 U/L    Alkaline Phosphatase 116 39 - 117 U/L    Total Bilirubin 0.5 0.0 - 1.2 mg/dL    Globulin 3.7 gm/dL    A/G Ratio 1.0 g/dL    BUN/Creatinine Ratio 12.8 7.0 - 25.0    Anion Gap 12.0 5.0 - 15.0 mmol/L    eGFR 59.7 (L) >60.0 mL/min/1.73   Urinalysis With Microscopic If Indicated (No Culture) - Urine, Catheter    Specimen: Urine, Catheter   Result Value Ref Range    Color, UA Yellow Yellow, Straw    Appearance, UA Clear Clear    pH, UA <=5.0 5.0 - 8.0    Specific Gravity, UA 1.010 1.001 - 1.030    Glucose, UA Negative Negative    Ketones, UA Negative Negative    Bilirubin, UA Negative Negative    Blood, UA Negative Negative    Protein, UA Negative Negative    Leuk Esterase, UA Negative Negative    Nitrite, UA Negative Negative    Urobilinogen, UA 0.2 E.U./dL 0.2 - 1.0 E.U./dL   CBC Auto Differential    Specimen: Blood   Result Value Ref Range    WBC 8.94 3.40 - 10.80 10*3/mm3    RBC 4.41 4.14 - 5.80 10*6/mm3    Hemoglobin 13.6 13.0 - 17.7 g/dL    Hematocrit 40.2  37.5 - 51.0 %    MCV 91.2 79.0 - 97.0 fL    MCH 30.8 26.6 - 33.0 pg    MCHC 33.8 31.5 - 35.7 g/dL    RDW 13.4 12.3 - 15.4 %    RDW-SD 45.2 37.0 - 54.0 fl    MPV 8.2 6.0 - 12.0 fL    Platelets 181 140 - 450 10*3/mm3    Neutrophil % 66.1 42.7 - 76.0 %    Lymphocyte % 20.8 19.6 - 45.3 %    Monocyte % 11.7 5.0 - 12.0 %    Eosinophil % 0.7 0.3 - 6.2 %    Basophil % 0.3 0.0 - 1.5 %    Immature Grans % 0.4 0.0 - 0.5 %    Neutrophils, Absolute 5.90 1.70 - 7.00 10*3/mm3    Lymphocytes, Absolute 1.86 0.70 - 3.10 10*3/mm3    Monocytes, Absolute 1.05 (H) 0.10 - 0.90 10*3/mm3    Eosinophils, Absolute 0.06 0.00 - 0.40 10*3/mm3    Basophils, Absolute 0.03 0.00 - 0.20 10*3/mm3    Immature Grans, Absolute 0.04 0.00 - 0.05 10*3/mm3    nRBC 0.0 0.0 - 0.2 /100 WBC   POC Glucose Once    Specimen: Blood   Result Value Ref Range    Glucose 64 (L) 70 - 130 mg/dL   Green Top (Gel)   Result Value Ref Range    Extra Tube Hold for add-ons.    Lavender Top   Result Value Ref Range    Extra Tube hold for add-on    Gold Top - SST   Result Value Ref Range    Extra Tube Hold for add-ons.    Light Blue Top   Result Value Ref Range    Extra Tube Hold for add-ons.         If labs were ordered, I independently reviewed the results and considered them in treating the patient.      EMERGENCY DEPARTMENT COURSE and DIFFERENTIAL DIAGNOSIS/MDM:   Vitals:  AS OF 20:29 EDT    BP - 119/70  HR - 69  TEMP - 98.3 °F (36.8 °C) (Oral)  O2 SATS - 100%      Orders placed during this visit:  Orders Placed This Encounter   Procedures    XR Chest 1 View    XR Knee 1 or 2 View Left    XR Knee 1 or 2 View Right    [place] Draw    Comprehensive Metabolic Panel    Urinalysis With Microscopic If Indicated (No Culture) - Urine, Catheter    CBC Auto Differential    Continuous Pulse Oximetry    Wound Care    Oxygen Therapy- Nasal Cannula; Titrate 1-6 LPM Per SpO2; 90 - 95%    POC Glucose Once    ECG 12 Lead Altered Mental Status    Fall Precautions    CBC & Differential    Green  Top (Gel)    Lavender Top    Gold Top - SST    Gray Top    Light Blue Top       All labs have been independently reviewed by me.  All radiology studies have been reviewed by me and the radiologist dictating the report.  All EKG's have been independently viewed and interpreted by me.      Discussion below represents my analysis of pertinent findings related to patient's condition, differential diagnosis, treatment plan and final disposition.    Differential diagnosis:  The differential diagnosis associated with the patient's presentation includes: Hypoglycemia, dehydration, electrolyte abnormalities, medication side effects, medication error    Additional sources  Discussed/ obtained information from independent historians:   [] Spouse  [] Parent  [] Family member  [] Friend  [] EMS   [] Other:    External (non-ED) record review:   [] Inpatient record:   [] Office record:   [] Outpatient record:   [] Prior Outpatient labs:   [] Prior Outpatient radiology:   [] Primary Care record:   [] Outside ED record:   [] Other:     Patient's care impacted by:   [x] Diabetes  [] Hypertension  [] CHF  [] Hyperlipidemia  [] Coronary Artery Disease   [] COPD   [] Cancer   [] Tobacco Abuse   [] Substance Abuse    [] Other:     Care significantly affected by Social Determinants of Health (housing and economic circumstances, unemployment)    [] Yes     [x] No   If yes, Patient's care significantly limited by Social Determinants of Health including:   [] Inadequate housing   [] Low income   [] Alcoholism and drug addiction in family   [] Problems related to primary support group   [] Unemployment   [] Problems related to employment   [] Other Social Determinants of Health:       MEDICATIONS ADMINISTERED IN ED:  Medications   sodium chloride 0.9 % flush 10 mL (has no administration in time range)   acetaminophen (TYLENOL) tablet 1,000 mg (1,000 mg Oral Given 3/12/24 4321)              63-year-old male with a fall earlier prior to arrival  she got lightheaded and dizzy.  Was noted to have blood sugar in the 60s.  He is drinking a regular Pepsi upon my initial assessment.  Fully awake and alert does not have any focal neurological deficit examination.  He did take his 45 units of insulin earlier this morning and did not eat lunch.  He is nontoxic-appearing, stable vital signs on assessment.  Will continue with glucose replacement, IV, labs and imaging obtained with results as above.  Chest x-ray stable, bilateral knee x-rays with stable previous orthopedic interventions, no hardware loosening or.  Urinalysis stable, recheck of glucoses have also stabilized.  He is eating and drinking well in the ED, has remained stable throughout his ED stay approximate 4 hours.  Will have him continue with small meals, checking his glucose, sliding scale insulin to try and prevent any recurrent hypoglycemic episodes, following up with his PCP for medication adjustments.  Return to the ED with any worsening symptoms or further concerns in the meantime.    I had a discussion with the patient/family regarding diagnosis, diagnostic results, treatment plan, and medications.  The patient/family indicated understanding of these instructions.  I spent adequate time at the bedside preceding discharge necessary to personally discuss the aftercare instructions, giving patient education, providing explanations of the results of our evaluations/findings, and my decision making to assure that the patient/family understand the plan of care.  Time was allotted to answer questions at that time and throughout the ED course.  Emphasis was placed on timely follow-up after discharge.  I also discussed the potential for the development of an acute emergent condition requiring further evaluation, admission, or even surgical intervention. I discussed that we found nothing during the visit today indicating the need for further workup, admission, or the presence of an unstable medical  condition.  I encouraged the patient to return to the emergency department immediately for ANY concerns, worsening, new complaints, or if symptoms persist and unable to seek follow-up in a timely fashion.  The patient/family expressed understanding and agreement with this plan.  The patient will follow-up with their PCP in 1-2 days for reevaluation.     PROCEDURES:  Procedures    CRITICAL CARE TIME    Total Critical Care time was 0 minutes, excluding separately reportable procedures.   There was a high probability of clinically significant/life threatening deterioration in the patient's condition which required my urgent intervention.      FINAL IMPRESSION      1. Fall, initial encounter    2. Contusion of right knee, initial encounter    3. Abrasion of right knee, initial encounter    4. Hypoglycemia          DISPOSITION/PLAN     ED Disposition       ED Disposition   Discharge    Condition   Stable    Comment   --               PATIENT REFERRED TO:  Kimber Roland MD  [address] [facility]   [facility] [id number] [phone]    In 2 days  For reevaluation, adjustment of your insulin sliding scale    [facility] EMERGENCY DEPARTMENT  [address] [facility] [address] [address] [phone]    If symptoms worsen      DISCHARGE MEDICATIONS:     Medication List        CHANGE how you take these medications      gabapentin 300 MG capsule  Commonly known as: Neurontin  Take 1 capsule by mouth Daily for 7 days.  What changed: when to take this            CONTINUE taking these medications      acetaminophen 325 MG tablet  Commonly known as: TYLENOL  Take 2 tablets by mouth Every 6 (Six) Hours As Needed for Fever.     albuterol sulfate  (90 Base) MCG/ACT inhaler  Commonly known as: PROVENTIL HFA;VENTOLIN HFA;PROAIR HFA     alfuzosin 10 MG 24 hr tablet  Commonly known as: UROXATRAL  Take 1 tablet by mouth Daily.     Allergy (Cetirizine) 10 MG tablet  Generic drug: cetirizine     amLODIPine 10 MG  tablet  Commonly known as: NORVASC     atorvastatin 20 MG tablet  Commonly known as: LIPITOR     brimonidine 0.2 % ophthalmic solution  Commonly known as: ALPHAGAN     budesonide-formoterol 160-4.5 MCG/ACT inhaler  Commonly known as: SYMBICORT  Inhale 2 puffs 2 (Two) Times a Day.     dicyclomine 20 MG tablet  Commonly known as: BENTYL  Take 1 tablet by mouth Every 8 (Eight) Hours As Needed (pain, cramps).     HumuLIN 70/30 (70-30) 100 UNIT/ML injection  Generic drug: insulin NPH-insulin regular     hydrocortisone 0.5 % ointment  Apply 1 application topically to the appropriate area as directed 2 (Two) Times a Day.     ondansetron 4 MG tablet  Commonly known as: ZOFRAN  Take 1 tablet by mouth Every 8 (Eight) Hours As Needed for Nausea.     OneTouch Ultra test strip  Generic drug: glucose blood     pantoprazole 40 MG EC tablet  Commonly known as: PROTONIX  Take 1 tablet by mouth Every Morning.                  Comment: Please note this report has been produced using speech recognition software.      Randell Cordoba DO  Attending Emergency Physician         Randell Cordoba DO  03/12/24 2032

## 2024-04-07 ENCOUNTER — HOSPITAL ENCOUNTER (EMERGENCY)
Facility: HOSPITAL | Age: 64
Discharge: HOME | End: 2024-04-07
Attending: EMERGENCY MEDICINE | Admitting: EMERGENCY MEDICINE
Payer: MEDICARE

## 2024-04-07 VITALS
TEMPERATURE: 97.8 F | HEIGHT: 74 IN | DIASTOLIC BLOOD PRESSURE: 96 MMHG | BODY MASS INDEX: 33.62 KG/M2 | SYSTOLIC BLOOD PRESSURE: 133 MMHG | HEART RATE: 85 BPM | OXYGEN SATURATION: 99 % | WEIGHT: 262 LBS | RESPIRATION RATE: 18 BRPM

## 2024-04-07 DIAGNOSIS — J02.9 PHARYNGITIS, UNSPECIFIED ETIOLOGY: Primary | ICD-10-CM

## 2024-04-07 DIAGNOSIS — T78.40XA ALLERGY, INITIAL ENCOUNTER: ICD-10-CM

## 2024-04-07 LAB
FLUAV RNA RESP QL NAA+PROBE: NOT DETECTED
FLUBV RNA RESP QL NAA+PROBE: NOT DETECTED
S PYO AG THROAT QL: NEGATIVE
SARS-COV-2 RNA RESP QL NAA+PROBE: NOT DETECTED

## 2024-04-07 PROCEDURE — 99283 EMERGENCY DEPT VISIT LOW MDM: CPT

## 2024-04-07 PROCEDURE — 87636 SARSCOV2 & INF A&B AMP PRB: CPT | Performed by: PHYSICIAN ASSISTANT

## 2024-04-07 PROCEDURE — 87081 CULTURE SCREEN ONLY: CPT | Performed by: PHYSICIAN ASSISTANT

## 2024-04-07 PROCEDURE — 87880 STREP A ASSAY W/OPTIC: CPT | Performed by: PHYSICIAN ASSISTANT

## 2024-04-07 RX ORDER — CETIRIZINE HYDROCHLORIDE 10 MG/1
10 TABLET ORAL DAILY
Qty: 30 TABLET | Refills: 0 | Status: SHIPPED | OUTPATIENT
Start: 2024-04-07 | End: 2024-05-07

## 2024-04-07 NOTE — ED PROVIDER NOTES
Subjective   History of Present Illness  Pt is a 62 yo male presenting to ED with complaints of sore throat. PMHx significant for DM, HTN, HLD, LIZY, Asthma and Arthritis. Pt complains of sneezing, congestion and sore throat for 2 days. Denies sick contacts. Denies difficulty swallowing. Denies fever, cough, CP, SOB or N/V. No recent antibiotics. Denies tobacco, drug or ETOH use.     History provided by:  Patient and medical records      Review of Systems   Constitutional:  Negative for fever.   HENT:  Positive for congestion, sneezing and sore throat. Negative for trouble swallowing.    Respiratory:  Negative for cough and shortness of breath.    Cardiovascular:  Negative for chest pain.       Past Medical History:   Diagnosis Date    Arthritis     Asthma     inhaler daily     Diabetes mellitus 2000    insulin daily; checks blood sugars on occasion-run 150-180    Elevated cholesterol     History of sleep apnea     years ago diagnosed but never used a machine at home     History of staph infection 2010    wound infection after left knee replacement -saw infectious disease MD     Hypertension     Wears glasses        Allergies   Allergen Reactions    Carrot [Daucus Carota] Swelling    Shellfish Allergy Swelling and Unknown (See Comments)     tongue swelling    Strawberry Swelling     tongue swelling    Banana Unknown (See Comments)    Sulfamethoxazole-Trimethoprim Other (See Comments)     Patient developed blisters on his hands.  Patient developed blisters on his hands.    Vancomycin Rash     Received vancomycin x1 dose 7/18/22       Past Surgical History:   Procedure Laterality Date    COLONOSCOPY      HIP SURGERY Right 08/11/2014    [facility]    INCISION AND DRAINAGE OF WOUND Left 2010    x2 of total knee replacement wound -iv antibiotics    KNEE SURGERY Bilateral     Right knee- 2008, left knee- 2010- [facility]    LUMBAR LAMINECTOMY WITH FUSION N/A 7/27/2018    Procedure: TLIF, OSTEOTOMY L4-5 , POST  FUSION L4-5;  Surgeon: Yo Bryson MD;  Location:  [facility];  Service: Neurosurgery    LUMBAR LAMINECTOMY WITH FUSION N/A 8/27/2018    Procedure: LUMBAR LAMINECTOMY POSTERIOR LUMBAR INTERBODY FUSION;  Surgeon: Yo Bryson MD;  Location:  [facility];  Service: Neurosurgery    ROTATOR CUFF REPAIR Right     TONSILLECTOMY         Family History   Problem Relation Age of Onset    Hypertension Mother        Social History     Socioeconomic History    Marital status: Single   Tobacco Use    Smoking status: Never    Smokeless tobacco: Never   Substance and Sexual Activity    Alcohol use: No    Drug use: No    Sexual activity: Defer           Objective   Physical Exam  Vitals and nursing note reviewed.   Constitutional:       General: He is not in acute distress.  HENT:      Head: Atraumatic.      Nose: Congestion present.      Mouth/Throat:      Mouth: Mucous membranes are moist. No oral lesions.      Pharynx: No pharyngeal swelling, oropharyngeal exudate or posterior oropharyngeal erythema.      Tonsils: No tonsillar exudate.   Eyes:      Conjunctiva/sclera: Conjunctivae normal.   Cardiovascular:      Rate and Rhythm: Normal rate.   Pulmonary:      Effort: Pulmonary effort is normal.   Musculoskeletal:         General: Normal range of motion.      Cervical back: Normal range of motion and neck supple.   Skin:     General: Skin is warm.   Neurological:      Mental Status: He is alert.   Psychiatric:         Mood and Affect: Mood normal.         Behavior: Behavior normal.         Procedures           ED Course      Recent Results (from the past 24 hour(s))   Rapid Strep A Screen - Swab, Throat    Collection Time: 04/07/24 11:58 AM    Specimen: Throat; Swab   Result Value Ref Range    Strep A Ag Negative Negative   COVID-19 and FLU A/B PCR, 1 HR TAT - Swab, Nasopharynx    Collection Time: 04/07/24 11:58 AM    Specimen: Nasopharynx; Swab   Result Value Ref Range    COVID19 Not Detected Not Detected - Ref. Range     "Influenza A PCR Not Detected Not Detected    Influenza B PCR Not Detected Not Detected     Note: In addition to lab results from this visit, the labs listed above may include labs taken at another facility or during a different encounter within the last 24 hours. Please correlate lab times with ED admission and discharge times for further clarification of the services performed during this visit.    No orders to display     Vitals:    04/07/24 1141 04/07/24 1257   BP: 133/96    BP Location: Left arm    Patient Position: Sitting    Pulse: 85    Resp: 18    Temp:  97.8 °F (36.6 °C)   SpO2: 99%    Weight: 119 kg (262 lb)    Height: 188 cm (74\")      Medications - No data to display  ECG/EMG Results (last 24 hours)       ** No results found for the last 24 hours. **          No orders to display       DISCHARGE    Patient discharged in stable condition.    Reviewed implications of results, diagnosis, meds, responsibility to follow up, warning signs and symptoms of possible worsening, potential complications and reasons to return to ER.    Patient/Family voiced understanding of above instructions.    Discussed plan for discharge, as there is no emergent indication for admission.  Pt/family is agreeable and understands need for follow up and possible repeat testing.  Pt/family is aware that discharge does not mean that nothing is wrong but that it indicates no emergency is currently present that requires admission and they must continue care with follow-up as given below or with a physician of their choice.     FOLLOW-UP  Kimber Roland MD  [address] [facility]   [place] [address] [phone]    Schedule an appointment as soon as possible for a visit       [facility] EMERGENCY DEPARTMENT  [address] [place] [address] [phone]    If symptoms worsen         Medication List        Changed      gabapentin 300 MG capsule  Commonly known as: Neurontin  Take 1 capsule by mouth Daily " for 7 days.  What changed: when to take this               Where to Get Your Medications        These medications were sent to [facility] #[id number] - [place], [place] - [address] [place] [address] AT [facility] & [address] [facility] - [phone]  - [phone] FX  [address] [place] [address], [facility] [address]      Phone: [phone]   cetirizine 10 MG tablet                                              Medical Decision Making  Pt is a 64 yo male presenting to ED with complaints of sore throat. Covid, Flu and Strep negative. He also has congestion and sneezing. Discussed taking allergy meds which he is already taking. Discussed symptomatic tx. He is agreeable with plan.     DDx  Covid, Flu, Strep, URI, Allergies, Tonsillar abscess     Problems Addressed:  Allergy, initial encounter: acute illness or injury  Pharyngitis, unspecified etiology: acute illness or injury    Amount and/or Complexity of Data Reviewed  External Data Reviewed: notes.     Details: Reviewed previous non ED visits including prior labs, imaging, available notes, medications, allergies and surgical hx.     Labs: ordered. Decision-making details documented in ED Course.    Risk  OTC drugs.        Final diagnoses:   Pharyngitis, unspecified etiology   Allergy, initial encounter       ED Disposition  ED Disposition       ED Disposition   Discharge    Condition   Stable    Comment   --               Kimber Roland MD  [address] [facility]   [place] [id number] [phone]    Schedule an appointment as soon as possible for a visit       [facility] EMERGENCY DEPARTMENT  [address] [facility] [address] [phone]    If symptoms worsen         Medication List        Changed      gabapentin 300 MG capsule  Commonly known as: Neurontin  Take 1 capsule by mouth Daily for 7 days.  What changed: when to take this               Where to Get Your Medications        These medications were sent to [facility]  #[id number] - [place], [place] - [facility] [address] AT [facility] - [phone]  - [phone] FX  [address], [facility] [address]      Phone: [phone]   cetirizine 10 MG tablet            Maricarmen Argueta PA  04/07/24 2015

## 2024-04-09 LAB — BACTERIA SPEC AEROBE CULT: NORMAL

## 2024-05-22 ENCOUNTER — APPOINTMENT (OUTPATIENT)
Dept: CT IMAGING | Facility: HOSPITAL | Age: 64
End: 2024-05-22
Payer: MEDICARE

## 2024-05-22 ENCOUNTER — HOSPITAL ENCOUNTER (OUTPATIENT)
Facility: HOSPITAL | Age: 64
Setting detail: OBSERVATION
Discharge: HOME | End: 2024-05-24
Attending: EMERGENCY MEDICINE | Admitting: FAMILY MEDICINE
Payer: MEDICARE

## 2024-05-22 ENCOUNTER — APPOINTMENT (OUTPATIENT)
Dept: GENERAL RADIOLOGY | Facility: HOSPITAL | Age: 64
End: 2024-05-22
Payer: MEDICARE

## 2024-05-22 DIAGNOSIS — M54.31 SCIATICA OF RIGHT SIDE: ICD-10-CM

## 2024-05-22 DIAGNOSIS — M43.16 SPONDYLOLISTHESIS OF LUMBAR REGION: ICD-10-CM

## 2024-05-22 DIAGNOSIS — I65.21 CAROTID STENOSIS, RIGHT: ICD-10-CM

## 2024-05-22 DIAGNOSIS — M19.90 ARTHRITIS: ICD-10-CM

## 2024-05-22 DIAGNOSIS — M43.10 ACQUIRED SPONDYLOLISTHESIS: ICD-10-CM

## 2024-05-22 DIAGNOSIS — R47.01 APHASIA: Primary | ICD-10-CM

## 2024-05-22 DIAGNOSIS — M48.061 STENOSIS OF LATERAL RECESS OF LUMBAR SPINE: ICD-10-CM

## 2024-05-22 DIAGNOSIS — R41.841 COGNITIVE COMMUNICATION DEFICIT: ICD-10-CM

## 2024-05-22 DIAGNOSIS — M54.50 CHRONIC BILATERAL LOW BACK PAIN WITHOUT SCIATICA: ICD-10-CM

## 2024-05-22 DIAGNOSIS — G89.29 CHRONIC BILATERAL LOW BACK PAIN WITHOUT SCIATICA: ICD-10-CM

## 2024-05-22 DIAGNOSIS — M79.604 ANTERIOR LEG PAIN, RIGHT: ICD-10-CM

## 2024-05-22 LAB
ALBUMIN SERPL-MCNC: 3.7 G/DL (ref 3.5–5.2)
ALBUMIN/GLOB SERPL: 1.1 G/DL
ALP SERPL-CCNC: 129 U/L (ref 39–117)
ALT SERPL W P-5'-P-CCNC: 17 U/L (ref 1–41)
ANION GAP SERPL CALCULATED.3IONS-SCNC: 11 MMOL/L (ref 5–15)
AST SERPL-CCNC: 23 U/L (ref 1–40)
BASOPHILS # BLD AUTO: 0.03 10*3/MM3 (ref 0–0.2)
BASOPHILS NFR BLD AUTO: 0.4 % (ref 0–1.5)
BILIRUB SERPL-MCNC: 0.7 MG/DL (ref 0–1.2)
BILIRUB UR QL STRIP: NEGATIVE
BUN SERPL-MCNC: 12 MG/DL (ref 8–23)
BUN/CREAT SERPL: 9.8 (ref 7–25)
CALCIUM SPEC-SCNC: 8.9 MG/DL (ref 8.6–10.5)
CHLORIDE SERPL-SCNC: 94 MMOL/L (ref 98–107)
CLARITY UR: CLEAR
CO2 SERPL-SCNC: 24 MMOL/L (ref 22–29)
COLOR UR: YELLOW
CREAT SERPL-MCNC: 1.23 MG/DL (ref 0.76–1.27)
DEPRECATED RDW RBC AUTO: 43.9 FL (ref 37–54)
EGFRCR SERPLBLD CKD-EPI 2021: 65.6 ML/MIN/1.73
EOSINOPHIL # BLD AUTO: 0.06 10*3/MM3 (ref 0–0.4)
EOSINOPHIL NFR BLD AUTO: 0.7 % (ref 0.3–6.2)
ERYTHROCYTE [DISTWIDTH] IN BLOOD BY AUTOMATED COUNT: 13.4 % (ref 12.3–15.4)
GLOBULIN UR ELPH-MCNC: 3.5 GM/DL
GLUCOSE SERPL-MCNC: 101 MG/DL (ref 65–99)
GLUCOSE UR STRIP-MCNC: NEGATIVE MG/DL
HCT VFR BLD AUTO: 37.6 % (ref 37.5–51)
HGB BLD-MCNC: 13.2 G/DL (ref 13–17.7)
HGB UR QL STRIP.AUTO: NEGATIVE
HOLD SPECIMEN: NORMAL
IMM GRANULOCYTES # BLD AUTO: 0.03 10*3/MM3 (ref 0–0.05)
IMM GRANULOCYTES NFR BLD AUTO: 0.4 % (ref 0–0.5)
KETONES UR QL STRIP: NEGATIVE
LEUKOCYTE ESTERASE UR QL STRIP.AUTO: NEGATIVE
LYMPHOCYTES # BLD AUTO: 2.23 10*3/MM3 (ref 0.7–3.1)
LYMPHOCYTES NFR BLD AUTO: 27.4 % (ref 19.6–45.3)
MAGNESIUM SERPL-MCNC: 1.5 MG/DL (ref 1.6–2.4)
MCH RBC QN AUTO: 31.7 PG (ref 26.6–33)
MCHC RBC AUTO-ENTMCNC: 35.1 G/DL (ref 31.5–35.7)
MCV RBC AUTO: 90.2 FL (ref 79–97)
MONOCYTES # BLD AUTO: 0.97 10*3/MM3 (ref 0.1–0.9)
MONOCYTES NFR BLD AUTO: 11.9 % (ref 5–12)
NEUTROPHILS NFR BLD AUTO: 4.83 10*3/MM3 (ref 1.7–7)
NEUTROPHILS NFR BLD AUTO: 59.2 % (ref 42.7–76)
NITRITE UR QL STRIP: NEGATIVE
NRBC BLD AUTO-RTO: 0 /100 WBC (ref 0–0.2)
PH UR STRIP.AUTO: 6 [PH] (ref 5–8)
PLATELET # BLD AUTO: 192 10*3/MM3 (ref 140–450)
PMV BLD AUTO: 8.4 FL (ref 6–12)
POTASSIUM SERPL-SCNC: 4 MMOL/L (ref 3.5–5.2)
PROT SERPL-MCNC: 7.2 G/DL (ref 6–8.5)
PROT UR QL STRIP: NEGATIVE
QT INTERVAL: 358 MS
QTC INTERVAL: 426 MS
RBC # BLD AUTO: 4.17 10*6/MM3 (ref 4.14–5.8)
SODIUM SERPL-SCNC: 129 MMOL/L (ref 136–145)
SP GR UR STRIP: 1.01 (ref 1–1.03)
TROPONIN T SERPL HS-MCNC: 39 NG/L
UROBILINOGEN UR QL STRIP: NORMAL
WBC NRBC COR # BLD AUTO: 8.15 10*3/MM3 (ref 3.4–10.8)
WHOLE BLOOD HOLD COAG: NORMAL
WHOLE BLOOD HOLD SPECIMEN: NORMAL

## 2024-05-22 PROCEDURE — G0378 HOSPITAL OBSERVATION PER HR: HCPCS

## 2024-05-22 PROCEDURE — 70496 CT ANGIOGRAPHY HEAD: CPT

## 2024-05-22 PROCEDURE — 71045 X-RAY EXAM CHEST 1 VIEW: CPT

## 2024-05-22 PROCEDURE — 84484 ASSAY OF TROPONIN QUANT: CPT

## 2024-05-22 PROCEDURE — 36415 COLL VENOUS BLD VENIPUNCTURE: CPT

## 2024-05-22 PROCEDURE — 99285 EMERGENCY DEPT VISIT HI MDM: CPT

## 2024-05-22 PROCEDURE — 70498 CT ANGIOGRAPHY NECK: CPT

## 2024-05-22 PROCEDURE — 99223 1ST HOSP IP/OBS HIGH 75: CPT | Performed by: INTERNAL MEDICINE

## 2024-05-22 PROCEDURE — 85025 COMPLETE CBC W/AUTO DIFF WBC: CPT

## 2024-05-22 PROCEDURE — 25510000001 IOPAMIDOL PER 1 ML: Performed by: EMERGENCY MEDICINE

## 2024-05-22 PROCEDURE — 80053 COMPREHEN METABOLIC PANEL: CPT

## 2024-05-22 PROCEDURE — 70450 CT HEAD/BRAIN W/O DYE: CPT

## 2024-05-22 PROCEDURE — 80307 DRUG TEST PRSMV CHEM ANLYZR: CPT | Performed by: INTERNAL MEDICINE

## 2024-05-22 PROCEDURE — 81003 URINALYSIS AUTO W/O SCOPE: CPT

## 2024-05-22 PROCEDURE — 99214 OFFICE O/P EST MOD 30 MIN: CPT

## 2024-05-22 PROCEDURE — 83735 ASSAY OF MAGNESIUM: CPT

## 2024-05-22 PROCEDURE — 93005 ELECTROCARDIOGRAM TRACING: CPT

## 2024-05-22 RX ORDER — CLOPIDOGREL BISULFATE 75 MG/1
75 TABLET ORAL DAILY
Status: DISCONTINUED | OUTPATIENT
Start: 2024-05-23 | End: 2024-05-24 | Stop reason: HOSPADM

## 2024-05-22 RX ORDER — SODIUM CHLORIDE 0.9 % (FLUSH) 0.9 %
10 SYRINGE (ML) INJECTION AS NEEDED
Status: DISCONTINUED | OUTPATIENT
Start: 2024-05-22 | End: 2024-05-24 | Stop reason: HOSPADM

## 2024-05-22 RX ORDER — ASPIRIN 81 MG/1
81 TABLET, CHEWABLE ORAL DAILY
Status: DISCONTINUED | OUTPATIENT
Start: 2024-05-22 | End: 2024-05-24 | Stop reason: HOSPADM

## 2024-05-22 RX ORDER — ASPIRIN 300 MG/1
300 SUPPOSITORY RECTAL DAILY
Status: DISCONTINUED | OUTPATIENT
Start: 2024-05-22 | End: 2024-05-24 | Stop reason: HOSPADM

## 2024-05-22 RX ORDER — CLOPIDOGREL BISULFATE 75 MG/1
300 TABLET ORAL ONCE
Qty: 4 TABLET | Refills: 0 | Status: COMPLETED | OUTPATIENT
Start: 2024-05-22 | End: 2024-05-23

## 2024-05-22 RX ADMIN — IOPAMIDOL 75 ML: 755 INJECTION, SOLUTION INTRAVENOUS at 21:27

## 2024-05-22 NOTE — Clinical Note
Level of Care: Telemetry [5]   Diagnosis: Aphasia [784.3.ICD-9-CM]   Admitting Physician: SHANELL CONWAY [[id number]]   Attending Physician: SHANELL CONWAY [[id number]]

## 2024-05-23 ENCOUNTER — APPOINTMENT (OUTPATIENT)
Dept: CARDIOLOGY | Facility: HOSPITAL | Age: 64
End: 2024-05-23
Payer: MEDICARE

## 2024-05-23 ENCOUNTER — APPOINTMENT (OUTPATIENT)
Dept: MRI IMAGING | Facility: HOSPITAL | Age: 64
End: 2024-05-23
Payer: MEDICARE

## 2024-05-23 PROBLEM — E83.42 HYPOMAGNESEMIA: Status: ACTIVE | Noted: 2024-05-23

## 2024-05-23 LAB
AMPHET+METHAMPHET UR QL: NEGATIVE
AMPHETAMINES UR QL: NEGATIVE
ANION GAP SERPL CALCULATED.3IONS-SCNC: 11 MMOL/L (ref 5–15)
BARBITURATES UR QL SCN: NEGATIVE
BASOPHILS # BLD AUTO: 0.03 10*3/MM3 (ref 0–0.2)
BASOPHILS NFR BLD AUTO: 0.4 % (ref 0–1.5)
BENZODIAZ UR QL SCN: NEGATIVE
BH CV ECHO MEAS - AO MAX PG: 5.2 MMHG
BH CV ECHO MEAS - AO MEAN PG: 3 MMHG
BH CV ECHO MEAS - AO ROOT DIAM: 3 CM
BH CV ECHO MEAS - AO V2 MAX: 114 CM/SEC
BH CV ECHO MEAS - AO V2 VTI: 19.5 CM
BH CV ECHO MEAS - AVA(I,D): 2.3 CM2
BH CV ECHO MEAS - EDV(CUBED): 68.9 ML
BH CV ECHO MEAS - EDV(MOD-SP2): 57.3 ML
BH CV ECHO MEAS - EDV(MOD-SP4): 93 ML
BH CV ECHO MEAS - EF(MOD-BP): 56.2 %
BH CV ECHO MEAS - EF(MOD-SP2): 54.5 %
BH CV ECHO MEAS - EF(MOD-SP4): 61 %
BH CV ECHO MEAS - ESV(CUBED): 22 ML
BH CV ECHO MEAS - ESV(MOD-SP2): 26.1 ML
BH CV ECHO MEAS - ESV(MOD-SP4): 36.3 ML
BH CV ECHO MEAS - FS: 31.7 %
BH CV ECHO MEAS - IVS/LVPW: 1.1 CM
BH CV ECHO MEAS - IVSD: 1.1 CM
BH CV ECHO MEAS - LA DIMENSION: 3.6 CM
BH CV ECHO MEAS - LAT PEAK E' VEL: 8.8 CM/SEC
BH CV ECHO MEAS - LV MASS(C)D: 141.5 GRAMS
BH CV ECHO MEAS - LV MAX PG: 2.8 MMHG
BH CV ECHO MEAS - LV MEAN PG: 1 MMHG
BH CV ECHO MEAS - LV V1 MAX: 83.2 CM/SEC
BH CV ECHO MEAS - LV V1 VTI: 14.3 CM
BH CV ECHO MEAS - LVIDD: 4.1 CM
BH CV ECHO MEAS - LVIDS: 2.8 CM
BH CV ECHO MEAS - LVOT AREA: 3.1 CM2
BH CV ECHO MEAS - LVOT DIAM: 2 CM
BH CV ECHO MEAS - LVPWD: 1 CM
BH CV ECHO MEAS - MED PEAK E' VEL: 7.5 CM/SEC
BH CV ECHO MEAS - MV A MAX VEL: 143 CM/SEC
BH CV ECHO MEAS - MV DEC SLOPE: 425 CM/SEC2
BH CV ECHO MEAS - MV DEC TIME: 0.19 SEC
BH CV ECHO MEAS - MV E MAX VEL: 93.6 CM/SEC
BH CV ECHO MEAS - MV E/A: 0.65
BH CV ECHO MEAS - MV MAX PG: 10.1 MMHG
BH CV ECHO MEAS - MV MEAN PG: 5 MMHG
BH CV ECHO MEAS - MV P1/2T: 72.4 MSEC
BH CV ECHO MEAS - MV V2 VTI: 23.8 CM
BH CV ECHO MEAS - MVA(P1/2T): 3 CM2
BH CV ECHO MEAS - MVA(VTI): 1.89 CM2
BH CV ECHO MEAS - PA ACC TIME: 0.11 SEC
BH CV ECHO MEAS - SV(LVOT): 44.9 ML
BH CV ECHO MEAS - SV(MOD-SP2): 31.2 ML
BH CV ECHO MEAS - SV(MOD-SP4): 56.7 ML
BH CV ECHO MEASUREMENTS AVERAGE E/E' RATIO: 11.48
BH CV ECHO SHUNT ASSESSMENT PERFORMED (HIDDEN SCRIPTING): 1
BH CV XLRA - RV BASE: 3.5 CM
BH CV XLRA - RV LENGTH: 5.9 CM
BH CV XLRA - RV MID: 2.6 CM
BH CV XLRA - TDI S': 9.4 CM/SEC
BH CV XLRA MEAS LEFT DIST CCA EDV: 16.7 CM/SEC
BH CV XLRA MEAS LEFT DIST CCA PSV: 62.3 CM/SEC
BH CV XLRA MEAS LEFT DIST ICA EDV: 22.8 CM/SEC
BH CV XLRA MEAS LEFT DIST ICA PSV: 57.5 CM/SEC
BH CV XLRA MEAS LEFT ICA/CCA RATIO: 0.98
BH CV XLRA MEAS LEFT MID CCA EDV: 28.6 CM/SEC
BH CV XLRA MEAS LEFT MID CCA PSV: 90.1 CM/SEC
BH CV XLRA MEAS LEFT MID ICA EDV: 23.3 CM/SEC
BH CV XLRA MEAS LEFT MID ICA PSV: 61 CM/SEC
BH CV XLRA MEAS LEFT PROX CCA EDV: 24.2 CM/SEC
BH CV XLRA MEAS LEFT PROX CCA PSV: 99.4 CM/SEC
BH CV XLRA MEAS LEFT PROX ECA PSV: 75.5 CM/SEC
BH CV XLRA MEAS LEFT PROX ICA EDV: 22.8 CM/SEC
BH CV XLRA MEAS LEFT PROX ICA PSV: 51.8 CM/SEC
BH CV XLRA MEAS LEFT PROX SCLA PSV: 100 CM/SEC
BH CV XLRA MEAS LEFT VERTEBRAL A EDV: 13.3 CM/SEC
BH CV XLRA MEAS LEFT VERTEBRAL A PSV: 36.4 CM/SEC
BH CV XLRA MEAS RIGHT DIST CCA EDV: 16.7 CM/SEC
BH CV XLRA MEAS RIGHT DIST CCA PSV: 65.9 CM/SEC
BH CV XLRA MEAS RIGHT DIST ICA EDV: 28.1 CM/SEC
BH CV XLRA MEAS RIGHT DIST ICA PSV: 62.8 CM/SEC
BH CV XLRA MEAS RIGHT ICA/CCA RATIO: 0.95
BH CV XLRA MEAS RIGHT MID CCA EDV: 18.6 CM/SEC
BH CV XLRA MEAS RIGHT MID CCA PSV: 83.2 CM/SEC
BH CV XLRA MEAS RIGHT MID ICA EDV: 25.9 CM/SEC
BH CV XLRA MEAS RIGHT MID ICA PSV: 61.5 CM/SEC
BH CV XLRA MEAS RIGHT PROX CCA EDV: 20.5 CM/SEC
BH CV XLRA MEAS RIGHT PROX CCA PSV: 106 CM/SEC
BH CV XLRA MEAS RIGHT PROX ECA PSV: 67.6 CM/SEC
BH CV XLRA MEAS RIGHT PROX ICA EDV: 18 CM/SEC
BH CV XLRA MEAS RIGHT PROX ICA PSV: 43.5 CM/SEC
BH CV XLRA MEAS RIGHT PROX SCLA PSV: 82 CM/SEC
BH CV XLRA MEAS RIGHT VERTEBRAL A EDV: 16.7 CM/SEC
BH CV XLRA MEAS RIGHT VERTEBRAL A PSV: 41.7 CM/SEC
BUN SERPL-MCNC: 11 MG/DL (ref 8–23)
BUN/CREAT SERPL: 10.9 (ref 7–25)
BUPRENORPHINE SERPL-MCNC: NEGATIVE NG/ML
CALCIUM SPEC-SCNC: 8.9 MG/DL (ref 8.6–10.5)
CANNABINOIDS SERPL QL: NEGATIVE
CHLORIDE SERPL-SCNC: 95 MMOL/L (ref 98–107)
CHOLEST SERPL-MCNC: 133 MG/DL (ref 0–200)
CO2 SERPL-SCNC: 25 MMOL/L (ref 22–29)
COCAINE UR QL: NEGATIVE
CREAT SERPL-MCNC: 1.01 MG/DL (ref 0.76–1.27)
DEPRECATED RDW RBC AUTO: 44 FL (ref 37–54)
EGFRCR SERPLBLD CKD-EPI 2021: 83 ML/MIN/1.73
EOSINOPHIL # BLD AUTO: 0.05 10*3/MM3 (ref 0–0.4)
EOSINOPHIL NFR BLD AUTO: 0.7 % (ref 0.3–6.2)
ERYTHROCYTE [DISTWIDTH] IN BLOOD BY AUTOMATED COUNT: 13.3 % (ref 12.3–15.4)
FENTANYL UR-MCNC: NEGATIVE NG/ML
GLUCOSE BLDC GLUCOMTR-MCNC: 162 MG/DL (ref 70–130)
GLUCOSE BLDC GLUCOMTR-MCNC: 162 MG/DL (ref 70–130)
GLUCOSE BLDC GLUCOMTR-MCNC: 173 MG/DL (ref 70–130)
GLUCOSE BLDC GLUCOMTR-MCNC: 192 MG/DL (ref 70–130)
GLUCOSE BLDC GLUCOMTR-MCNC: 192 MG/DL (ref 70–130)
GLUCOSE BLDC GLUCOMTR-MCNC: 194 MG/DL (ref 70–130)
GLUCOSE BLDC GLUCOMTR-MCNC: 89 MG/DL (ref 70–130)
GLUCOSE SERPL-MCNC: 143 MG/DL (ref 65–99)
HBA1C MFR BLD: 8.3 % (ref 4.8–5.6)
HCT VFR BLD AUTO: 36.6 % (ref 37.5–51)
HDLC SERPL-MCNC: 60 MG/DL (ref 40–60)
HGB BLD-MCNC: 12.5 G/DL (ref 13–17.7)
IMM GRANULOCYTES # BLD AUTO: 0.03 10*3/MM3 (ref 0–0.05)
IMM GRANULOCYTES NFR BLD AUTO: 0.4 % (ref 0–0.5)
LDLC SERPL CALC-MCNC: 60 MG/DL (ref 0–100)
LDLC/HDLC SERPL: 1 {RATIO}
LEFT ARM BP: NORMAL MMHG
LEFT ATRIUM VOLUME INDEX: 15.5 ML/M2
LYMPHOCYTES # BLD AUTO: 2.23 10*3/MM3 (ref 0.7–3.1)
LYMPHOCYTES NFR BLD AUTO: 32.2 % (ref 19.6–45.3)
MCH RBC QN AUTO: 30.6 PG (ref 26.6–33)
MCHC RBC AUTO-ENTMCNC: 34.2 G/DL (ref 31.5–35.7)
MCV RBC AUTO: 89.7 FL (ref 79–97)
METHADONE UR QL SCN: NEGATIVE
MONOCYTES # BLD AUTO: 0.81 10*3/MM3 (ref 0.1–0.9)
MONOCYTES NFR BLD AUTO: 11.7 % (ref 5–12)
NEUTROPHILS NFR BLD AUTO: 3.77 10*3/MM3 (ref 1.7–7)
NEUTROPHILS NFR BLD AUTO: 54.6 % (ref 42.7–76)
NRBC BLD AUTO-RTO: 0 /100 WBC (ref 0–0.2)
OPIATES UR QL: NEGATIVE
OXYCODONE UR QL SCN: NEGATIVE
PCP UR QL SCN: NEGATIVE
PLATELET # BLD AUTO: 169 10*3/MM3 (ref 140–450)
PMV BLD AUTO: 8.3 FL (ref 6–12)
POTASSIUM SERPL-SCNC: 4 MMOL/L (ref 3.5–5.2)
RBC # BLD AUTO: 4.08 10*6/MM3 (ref 4.14–5.8)
SODIUM SERPL-SCNC: 131 MMOL/L (ref 136–145)
TRICYCLICS UR QL SCN: NEGATIVE
TRIGL SERPL-MCNC: 65 MG/DL (ref 0–150)
TSH SERPL DL<=0.05 MIU/L-ACNC: 0.32 UIU/ML (ref 0.27–4.2)
VLDLC SERPL-MCNC: 13 MG/DL (ref 5–40)
WBC NRBC COR # BLD AUTO: 6.92 10*3/MM3 (ref 3.4–10.8)

## 2024-05-23 PROCEDURE — 84443 ASSAY THYROID STIM HORMONE: CPT | Performed by: INTERNAL MEDICINE

## 2024-05-23 PROCEDURE — 96366 THER/PROPH/DIAG IV INF ADDON: CPT

## 2024-05-23 PROCEDURE — G0378 HOSPITAL OBSERVATION PER HR: HCPCS

## 2024-05-23 PROCEDURE — 63710000001 INSULIN LISPRO (HUMAN) PER 5 UNITS: Performed by: INTERNAL MEDICINE

## 2024-05-23 PROCEDURE — 99232 SBSQ HOSP IP/OBS MODERATE 35: CPT | Performed by: FAMILY MEDICINE

## 2024-05-23 PROCEDURE — 97161 PT EVAL LOW COMPLEX 20 MIN: CPT

## 2024-05-23 PROCEDURE — 99214 OFFICE O/P EST MOD 30 MIN: CPT | Performed by: STUDENT IN AN ORGANIZED HEALTH CARE EDUCATION/TRAINING PROGRAM

## 2024-05-23 PROCEDURE — 82948 REAGENT STRIP/BLOOD GLUCOSE: CPT

## 2024-05-23 PROCEDURE — 70551 MRI BRAIN STEM W/O DYE: CPT

## 2024-05-23 PROCEDURE — 94664 DEMO&/EVAL PT USE INHALER: CPT

## 2024-05-23 PROCEDURE — 93880 EXTRACRANIAL BILAT STUDY: CPT

## 2024-05-23 PROCEDURE — 80061 LIPID PANEL: CPT | Performed by: INTERNAL MEDICINE

## 2024-05-23 PROCEDURE — 93306 TTE W/DOPPLER COMPLETE: CPT | Performed by: INTERNAL MEDICINE

## 2024-05-23 PROCEDURE — 96365 THER/PROPH/DIAG IV INF INIT: CPT

## 2024-05-23 PROCEDURE — 80048 BASIC METABOLIC PNL TOTAL CA: CPT | Performed by: INTERNAL MEDICINE

## 2024-05-23 PROCEDURE — 25010000002 MAGNESIUM SULFATE 2 GM/50ML SOLUTION: Performed by: INTERNAL MEDICINE

## 2024-05-23 PROCEDURE — 94640 AIRWAY INHALATION TREATMENT: CPT

## 2024-05-23 PROCEDURE — 97530 THERAPEUTIC ACTIVITIES: CPT

## 2024-05-23 PROCEDURE — 93306 TTE W/DOPPLER COMPLETE: CPT

## 2024-05-23 PROCEDURE — 97165 OT EVAL LOW COMPLEX 30 MIN: CPT

## 2024-05-23 PROCEDURE — 63710000001 INSULIN GLARGINE PER 5 UNITS: Performed by: INTERNAL MEDICINE

## 2024-05-23 PROCEDURE — 93880 EXTRACRANIAL BILAT STUDY: CPT | Performed by: INTERNAL MEDICINE

## 2024-05-23 PROCEDURE — 83036 HEMOGLOBIN GLYCOSYLATED A1C: CPT | Performed by: INTERNAL MEDICINE

## 2024-05-23 PROCEDURE — 85025 COMPLETE CBC W/AUTO DIFF WBC: CPT | Performed by: INTERNAL MEDICINE

## 2024-05-23 PROCEDURE — 92523 SPEECH SOUND LANG COMPREHEN: CPT

## 2024-05-23 RX ORDER — SODIUM CHLORIDE 0.9 % (FLUSH) 0.9 %
10 SYRINGE (ML) INJECTION AS NEEDED
Status: DISCONTINUED | OUTPATIENT
Start: 2024-05-23 | End: 2024-05-24 | Stop reason: HOSPADM

## 2024-05-23 RX ORDER — DICYCLOMINE HCL 20 MG
20 TABLET ORAL EVERY 8 HOURS PRN
Status: DISCONTINUED | OUTPATIENT
Start: 2024-05-23 | End: 2024-05-24 | Stop reason: HOSPADM

## 2024-05-23 RX ORDER — TAMSULOSIN HYDROCHLORIDE 0.4 MG/1
0.4 CAPSULE ORAL NIGHTLY
Status: DISCONTINUED | OUTPATIENT
Start: 2024-05-23 | End: 2024-05-24 | Stop reason: HOSPADM

## 2024-05-23 RX ORDER — SODIUM CHLORIDE 9 MG/ML
40 INJECTION, SOLUTION INTRAVENOUS AS NEEDED
Status: DISCONTINUED | OUTPATIENT
Start: 2024-05-23 | End: 2024-05-24 | Stop reason: HOSPADM

## 2024-05-23 RX ORDER — SODIUM CHLORIDE 0.9 % (FLUSH) 0.9 %
10 SYRINGE (ML) INJECTION EVERY 12 HOURS SCHEDULED
Status: DISCONTINUED | OUTPATIENT
Start: 2024-05-23 | End: 2024-05-24 | Stop reason: HOSPADM

## 2024-05-23 RX ORDER — BISACODYL 5 MG/1
5 TABLET, DELAYED RELEASE ORAL DAILY PRN
Status: DISCONTINUED | OUTPATIENT
Start: 2024-05-23 | End: 2024-05-24 | Stop reason: HOSPADM

## 2024-05-23 RX ORDER — DEXTROSE MONOHYDRATE 25 G/50ML
10-50 INJECTION, SOLUTION INTRAVENOUS
Status: DISCONTINUED | OUTPATIENT
Start: 2024-05-23 | End: 2024-05-24 | Stop reason: HOSPADM

## 2024-05-23 RX ORDER — PANTOPRAZOLE SODIUM 40 MG/1
40 TABLET, DELAYED RELEASE ORAL
Status: DISCONTINUED | OUTPATIENT
Start: 2024-05-23 | End: 2024-05-24 | Stop reason: HOSPADM

## 2024-05-23 RX ORDER — BUDESONIDE AND FORMOTEROL FUMARATE DIHYDRATE 160; 4.5 UG/1; UG/1
2 AEROSOL RESPIRATORY (INHALATION)
Status: DISCONTINUED | OUTPATIENT
Start: 2024-05-23 | End: 2024-05-24 | Stop reason: HOSPADM

## 2024-05-23 RX ORDER — GABAPENTIN 300 MG/1
300 CAPSULE ORAL 3 TIMES DAILY
Status: DISCONTINUED | OUTPATIENT
Start: 2024-05-23 | End: 2024-05-24 | Stop reason: HOSPADM

## 2024-05-23 RX ORDER — ACETAMINOPHEN 325 MG/1
650 TABLET ORAL EVERY 6 HOURS PRN
Status: DISCONTINUED | OUTPATIENT
Start: 2024-05-23 | End: 2024-05-24 | Stop reason: HOSPADM

## 2024-05-23 RX ORDER — BISACODYL 10 MG
10 SUPPOSITORY, RECTAL RECTAL DAILY PRN
Status: DISCONTINUED | OUTPATIENT
Start: 2024-05-23 | End: 2024-05-24 | Stop reason: HOSPADM

## 2024-05-23 RX ORDER — CETIRIZINE HYDROCHLORIDE 10 MG/1
10 TABLET ORAL DAILY
Status: DISCONTINUED | OUTPATIENT
Start: 2024-05-23 | End: 2024-05-24 | Stop reason: HOSPADM

## 2024-05-23 RX ORDER — IBUPROFEN 600 MG/1
1 TABLET ORAL
Status: DISCONTINUED | OUTPATIENT
Start: 2024-05-23 | End: 2024-05-24 | Stop reason: HOSPADM

## 2024-05-23 RX ORDER — INSULIN LISPRO 100 [IU]/ML
1-200 INJECTION, SOLUTION INTRAVENOUS; SUBCUTANEOUS
Status: DISCONTINUED | OUTPATIENT
Start: 2024-05-23 | End: 2024-05-24 | Stop reason: HOSPADM

## 2024-05-23 RX ORDER — ONDANSETRON 2 MG/ML
4 INJECTION INTRAMUSCULAR; INTRAVENOUS EVERY 6 HOURS PRN
Status: DISCONTINUED | OUTPATIENT
Start: 2024-05-23 | End: 2024-05-24 | Stop reason: HOSPADM

## 2024-05-23 RX ORDER — NICOTINE POLACRILEX 4 MG
15 LOZENGE BUCCAL
Status: DISCONTINUED | OUTPATIENT
Start: 2024-05-23 | End: 2024-05-24 | Stop reason: HOSPADM

## 2024-05-23 RX ORDER — LISINOPRIL 10 MG/1
10 TABLET ORAL
Status: DISCONTINUED | OUTPATIENT
Start: 2024-05-23 | End: 2024-05-24 | Stop reason: HOSPADM

## 2024-05-23 RX ORDER — BRIMONIDINE TARTRATE 2 MG/ML
1 SOLUTION/ DROPS OPHTHALMIC 3 TIMES DAILY
Status: DISCONTINUED | OUTPATIENT
Start: 2024-05-23 | End: 2024-05-24 | Stop reason: HOSPADM

## 2024-05-23 RX ORDER — ATORVASTATIN CALCIUM 40 MG/1
80 TABLET, FILM COATED ORAL NIGHTLY
Status: DISCONTINUED | OUTPATIENT
Start: 2024-05-23 | End: 2024-05-24 | Stop reason: HOSPADM

## 2024-05-23 RX ORDER — INSULIN LISPRO 100 [IU]/ML
1-200 INJECTION, SOLUTION INTRAVENOUS; SUBCUTANEOUS AS NEEDED
Status: DISCONTINUED | OUTPATIENT
Start: 2024-05-23 | End: 2024-05-24 | Stop reason: HOSPADM

## 2024-05-23 RX ORDER — ONDANSETRON 4 MG/1
4 TABLET, ORALLY DISINTEGRATING ORAL EVERY 6 HOURS PRN
Status: DISCONTINUED | OUTPATIENT
Start: 2024-05-23 | End: 2024-05-24 | Stop reason: HOSPADM

## 2024-05-23 RX ORDER — POLYETHYLENE GLYCOL 3350 17 G/17G
17 POWDER, FOR SOLUTION ORAL DAILY PRN
Status: DISCONTINUED | OUTPATIENT
Start: 2024-05-23 | End: 2024-05-24 | Stop reason: HOSPADM

## 2024-05-23 RX ORDER — AMOXICILLIN 250 MG
2 CAPSULE ORAL 2 TIMES DAILY PRN
Status: DISCONTINUED | OUTPATIENT
Start: 2024-05-23 | End: 2024-05-24 | Stop reason: HOSPADM

## 2024-05-23 RX ORDER — MAGNESIUM SULFATE HEPTAHYDRATE 40 MG/ML
2 INJECTION, SOLUTION INTRAVENOUS
Status: COMPLETED | OUTPATIENT
Start: 2024-05-23 | End: 2024-05-23

## 2024-05-23 RX ADMIN — PANTOPRAZOLE SODIUM 40 MG: 40 TABLET, DELAYED RELEASE ORAL at 06:07

## 2024-05-23 RX ADMIN — Medication 10 ML: at 01:27

## 2024-05-23 RX ADMIN — LISINOPRIL 10 MG: 10 TABLET ORAL at 18:23

## 2024-05-23 RX ADMIN — GABAPENTIN 300 MG: 300 CAPSULE ORAL at 16:10

## 2024-05-23 RX ADMIN — BUDESONIDE AND FORMOTEROL FUMARATE DIHYDRATE 2 PUFF: 160; 4.5 AEROSOL RESPIRATORY (INHALATION) at 20:11

## 2024-05-23 RX ADMIN — ATORVASTATIN CALCIUM 80 MG: 40 TABLET, FILM COATED ORAL at 20:16

## 2024-05-23 RX ADMIN — TAMSULOSIN HYDROCHLORIDE 0.4 MG: 0.4 CAPSULE ORAL at 01:27

## 2024-05-23 RX ADMIN — CLOPIDOGREL BISULFATE 75 MG: 75 TABLET ORAL at 12:55

## 2024-05-23 RX ADMIN — BRIMONIDINE TARTRATE 1 DROP: 2 SOLUTION/ DROPS OPHTHALMIC at 20:20

## 2024-05-23 RX ADMIN — Medication 10 ML: at 09:43

## 2024-05-23 RX ADMIN — Medication 10 ML: at 20:16

## 2024-05-23 RX ADMIN — BRIMONIDINE TARTRATE 1 DROP: 2 SOLUTION/ DROPS OPHTHALMIC at 16:10

## 2024-05-23 RX ADMIN — CETIRIZINE HYDROCHLORIDE 10 MG: 10 TABLET, FILM COATED ORAL at 09:42

## 2024-05-23 RX ADMIN — MAGNESIUM SULFATE HEPTAHYDRATE 2 G: 2 INJECTION, SOLUTION INTRAVENOUS at 05:04

## 2024-05-23 RX ADMIN — INSULIN GLARGINE 32 UNITS: 100 INJECTION, SOLUTION SUBCUTANEOUS at 20:16

## 2024-05-23 RX ADMIN — INSULIN LISPRO 3 UNITS: 100 INJECTION, SOLUTION INTRAVENOUS; SUBCUTANEOUS at 09:42

## 2024-05-23 RX ADMIN — INSULIN LISPRO 1 UNITS: 100 INJECTION, SOLUTION INTRAVENOUS; SUBCUTANEOUS at 02:21

## 2024-05-23 RX ADMIN — GABAPENTIN 300 MG: 300 CAPSULE ORAL at 09:42

## 2024-05-23 RX ADMIN — INSULIN LISPRO 4 UNITS: 100 INJECTION, SOLUTION INTRAVENOUS; SUBCUTANEOUS at 18:23

## 2024-05-23 RX ADMIN — INSULIN LISPRO 2 UNITS: 100 INJECTION, SOLUTION INTRAVENOUS; SUBCUTANEOUS at 20:16

## 2024-05-23 RX ADMIN — CLOPIDOGREL BISULFATE 300 MG: 75 TABLET ORAL at 00:03

## 2024-05-23 RX ADMIN — ATORVASTATIN CALCIUM 80 MG: 40 TABLET, FILM COATED ORAL at 01:27

## 2024-05-23 RX ADMIN — GABAPENTIN 300 MG: 300 CAPSULE ORAL at 20:16

## 2024-05-23 RX ADMIN — MAGNESIUM SULFATE HEPTAHYDRATE 2 G: 2 INJECTION, SOLUTION INTRAVENOUS at 08:50

## 2024-05-23 RX ADMIN — MAGNESIUM SULFATE HEPTAHYDRATE 2 G: 2 INJECTION, SOLUTION INTRAVENOUS at 02:13

## 2024-05-23 RX ADMIN — ASPIRIN 81 MG CHEWABLE TABLET 81 MG: 81 TABLET CHEWABLE at 00:03

## 2024-05-23 RX ADMIN — TAMSULOSIN HYDROCHLORIDE 0.4 MG: 0.4 CAPSULE ORAL at 20:16

## 2024-05-23 RX ADMIN — INSULIN LISPRO 4 UNITS: 100 INJECTION, SOLUTION INTRAVENOUS; SUBCUTANEOUS at 12:54

## 2024-05-23 RX ADMIN — BRIMONIDINE TARTRATE 1 DROP: 2 SOLUTION/ DROPS OPHTHALMIC at 09:43

## 2024-05-23 NOTE — ED NOTES
Amadeo Kong    Nursing Report ED to Floor:  Mental status: alert and oriented x 4  Ambulatory status: up ad harrison  Oxygen Therapy:  room air  Cardiac Rhythm: nsr  Admitted from: ed  Safety Concerns:  none  Social Issues: none  ED Room #:  29    ED Nurse Phone Extension - [phone] or may call [phone].      HPI:   Chief Complaint   Patient presents with    Speech Problem       Past Medical History:  Past Medical History:   Diagnosis Date    Arthritis     Asthma     inhaler daily     Diabetes mellitus 2000    insulin daily; checks blood sugars on occasion-run 150-180    Elevated cholesterol     History of sleep apnea     years ago diagnosed but never used a machine at home     History of staph infection 2010    wound infection after left knee replacement -saw infectious disease MD     Hypertension     Wears glasses         Past Surgical History:  Past Surgical History:   Procedure Laterality Date    COLONOSCOPY      HIP SURGERY Right 08/11/2014    [facility]    INCISION AND DRAINAGE OF WOUND Left 2010    x2 of total knee replacement wound -iv antibiotics    KNEE SURGERY Bilateral     Right knee- 2008, left knee- 2010- [facility]    LUMBAR LAMINECTOMY WITH FUSION N/A 7/27/2018    Procedure: TLIF, OSTEOTOMY L4-5 , POST FUSION L4-5;  Surgeon: Yo Bryson MD;  Location:  [facility];  Service: Neurosurgery    LUMBAR LAMINECTOMY WITH FUSION N/A 8/27/2018    Procedure: LUMBAR LAMINECTOMY POSTERIOR LUMBAR INTERBODY FUSION;  Surgeon: Yo Bryson MD;  Location: [facility] OR;  Service: Neurosurgery    ROTATOR CUFF REPAIR Right     TONSILLECTOMY          Admitting Doctor:   Radha Steven DO    Consulting Provider(s):  Consults       No orders found from 4/23/2024 to 5/23/2024.             Admitting Diagnosis:   The primary encounter diagnosis was Aphasia. A diagnosis of Carotid stenosis, right was also pertinent to this visit.    Most Recent Vitals:   Vitals:    05/22/24 1944 05/22/24 2049 05/22/24 2200 05/22/24 2300   BP:  "147/96  132/82 (!) 147/104   BP Location: Left arm      Patient Position: Sitting      Pulse:  80 73 68   Resp: 16      Temp: 97.9 °F (36.6 °C)      TempSrc: Oral      SpO2: 95%  97% 95%   Weight: 118 kg (260 lb)      Height: 188 cm (74\")          Active LDAs/IV Access:   Lines, Drains & Airways       Active LDAs       Name Placement date Placement time Site Days    Peripheral IV 05/22/24 2102 Right Antecubital 05/22/24 2102  Antecubital  less than 1                    Labs (abnormal labs have a star):   Labs Reviewed   COMPREHENSIVE METABOLIC PANEL - Abnormal; Notable for the following components:       Result Value    Glucose 101 (*)     Sodium 129 (*)     Chloride 94 (*)     Alkaline Phosphatase 129 (*)     All other components within normal limits    Narrative:     GFR Normal >60  Chronic Kidney Disease <60  Kidney Failure <15     SINGLE HS TROPONIN T - Abnormal; Notable for the following components:    HS Troponin T 39 (*)     All other components within normal limits    Narrative:     High Sensitive Troponin T Reference Range:  <14.0 ng/L- Negative Female for AMI  <22.0 ng/L- Negative Male for AMI  >=14 - Abnormal Female indicating possible myocardial injury.  >=22 - Abnormal Male indicating possible myocardial injury.   Clinicians would have to utilize clinical acumen, EKG, Troponin, and serial changes to determine if it is an Acute Myocardial Infarction or myocardial injury due to an underlying chronic condition.        MAGNESIUM - Abnormal; Notable for the following components:    Magnesium 1.5 (*)     All other components within normal limits   CBC WITH AUTO DIFFERENTIAL - Abnormal; Notable for the following components:    Monocytes, Absolute 0.97 (*)     All other components within normal limits   URINALYSIS W/ MICROSCOPIC IF INDICATED (NO CULTURE) - Normal    Narrative:     Urine microscopic not indicated.   RAINBOW DRAW    Narrative:     The following orders were created for panel order [facility] " Draw.  Procedure                               Abnormality         Status                     ---------                               -----------         ------                     Green Top (Gel)[[id number]]                                  Final result               Lavender Top[[id number]]                                     Final result               Gold Top - SST[[id number]]                                   Final result               Diaz Top[[id number]]                                         Final result               Light Blue Top[[id number]]                                   Final result                 Please view results for these tests on the individual orders.   URINE DRUG SCREEN   POCT GLUCOSE FINGERSTICK   CBC AND DIFFERENTIAL    Narrative:     The following orders were created for panel order CBC & Differential.  Procedure                               Abnormality         Status                     ---------                               -----------         ------                     CBC Auto Differential[[id number]]        Abnormal            Final result                 Please view results for these tests on the individual orders.   GREEN TOP   LAVENDER TOP   GOLD TOP - SST   GRAY TOP   LIGHT BLUE TOP       Meds Given in ED:   Medications   sodium chloride 0.9 % flush 10 mL (has no administration in time range)   iopamidol (ISOVUE-370) 76 % injection 100 mL (75 mL Intravenous Given 5/22/24 2127)           Last NIH score:  Interval: baseline  1a. Level of Consciousness: 0-->Alert, keenly responsive  1b. LOC Questions: 0-->Answers both questions correctly  1c. LOC Commands: 0-->Performs both tasks correctly  2. Best Gaze: 0-->Normal  3. Visual: 1-->Partial hemianopia  4. Facial Palsy: 0-->Normal symmetrical movements  5a. Motor Arm, Left: 0-->No drift, limb holds 90 (or 45) degrees for full 10 secs  5b. Motor Arm, Right: 0-->No drift, limb holds 90 (or 45) degrees for full 10 secs  6a. Motor Leg, Left:  0-->No drift, leg holds 30 degree position for full 5 secs  6b. Motor Leg, Right: 0-->No drift, leg holds 30 degree position for full 5 secs  7. Limb Ataxia: 0-->Absent  8. Sensory: 0-->Normal, no sensory loss  9. Best Language: 1-->Mild-to-moderate aphasia, some obvious loss of fluency or facility of comprehension, without significant limitation on ideas expressed or form of expression. Reduction of speech and/or comprehension, however, makes conversation. . . (see row details)  10. Dysarthria: 1-->Mild-to-moderate dysarthria, patient slurs at least some words and, at worst, can be understood with some difficulty  11. Extinction and Inattention (formerly Neglect): 0-->No abnormality    Total (NIH Stroke Scale): 3     Dysphagia screening results:  Patient Factors Component (Dysphagia:Stroke or Rule-out)  Best Eye Response: 4-->(E4) spontaneous (05/22/24 2321)  Best Motor Response: 6-->(M6) obeys commands (05/22/24 2321)  Best Verbal Response: 5-->(V5) oriented (05/22/24 2321)  Levar Coma Scale Score: 15 (05/22/24 2321)  Is there Facial Asymmetry/Weakness?: No (05/22/24 2321)  Is there Tongue Asymmetry/Weakness?: No (05/22/24 2321)  Is there Palatal Asymmetry/Weakness?: No (05/22/24 2321)  Patient Assessment Result: Pass - Proceed to Water Test (05/22/24 2321)     [place] Coma Scale:  No data recorded     CIWA:        Restraint Type:            Isolation Status:  No active isolations

## 2024-05-23 NOTE — NURSING NOTE
"Reason for Wound, Ostomy and Continence (WOC) Nursing Consultation: \"R Leg, Knee. Sees Podiatrist Outpatient, Has Leg Wrap\"    Patient sitting up in bed.  Family/support person at bedside.     Wounds noted by WOC:R and L knee old abrasions    Wound Assessment  Wound Type: Abrasion  Location: R and L knees  Wound Bed: black eschar, gray, and pink  Wound Edges: Open  Periwound Skin:  hyperpigmented    Drainage Characteristics/Odor: none  Drainage Amount: none  Pain: No   Care provided: Vashe soak for 5 minutes. Applied multilayer xeroform to each wound bed. Covered with optifoam.  Notes: Pt states that he tripped on a sidewalk a while ago and abrasions have not healed well. He also has a leg wrap to his RLE below knee, discussed protocol to remove and assess skin. He politely declined, stating that it was just placed yesterday and he will most likely be going home today. Knee abrasions obviously have delayed wound healing, and have been open to air. Wound bedis dry.  Discussed benefit of vashe soaks to cleanse wound and decrease bioburden. Xeroform to for optimal healing environment providing moisture and bacteriostatic properties.   Wound Image: R KNEE    L KNEE:      Recommendation(s) for management of wound:   -Refer to wound care orders for specific instructions on how to treat/manage wound.  -Apply vashe soaked 4x4 for 5 minutes. Gently scrub. Apply multilayer xeroform gauze, secure with optifoam or 4x4 and kerlix Q 3days.   -Practice pressure injury prevention protocol.    Most recent Skyler Scale score:  Sensory Perception: 4-->no impairment  Moisture: 4-->rarely moist  Activity: 3-->walks occasionally  Mobility: 3-->slightly limited  Nutrition: 3-->adequate  Friction and Shear: 3-->no apparent problem  Skyler Score: 20 (05/23/24 0800)     Specialty support surface: Foam Mattress with Waffle Overlay       Pressure Injury Prevention Protocol (initiate for Skyler Score of 18 or less):   *Keep skin dry, turn q 2 " hr, keep heels elevated and offloaded with offloading heel boots.    *Apply z-guard to bottom and bony prominences daily and as needed with incontinence episodes.  *Follow C.A.R.E protocol if medical devices (Bipap, blackburn, Ng tube, etc) are being used.  *Reduce layers under patient (one sheet as drawsheet and two incontinence pads) to allow waffle or JACINTA to improve microclimate   *Clean skin gently with no-rinse PH-balanced cleanser and soft, disposable cloth (barrier wipes-blue pack).   *Raise knee-gatch before elevating HOB to reduce shearing      WOC Team will continue to follow.  Please re-consult if the wound(s) worsens.

## 2024-05-23 NOTE — THERAPY DISCHARGE NOTE
Patient Name: Amadeo Kong  : 1960    MRN: [id number]                              Today's Date: 2024       Admit Date: 2024    Visit Dx:     ICD-10-CM ICD-9-CM   1. Aphasia  R47.01 784.3   2. Carotid stenosis, right  I65.21 433.10     Patient Active Problem List   Diagnosis    Hypertension    Type 2 diabetes mellitus, with long-term current use of insulin    Arthritis    Chronic bilateral low back pain without sciatica    Spondylolisthesis of lumbar region    Acquired spondylolisthesis    Hyponatremia    Anemia    Sciatica of right side    Stenosis of lateral recess of lumbar spine    Anterior leg pain, right    THOMAS (acute kidney injury)    Sepsis due to Klebsiella    HLD (hyperlipidemia)    Peripheral neuropathy    Acute cystitis with hematuria    Aphasia    Hypomagnesemia     Past Medical History:   Diagnosis Date    Arthritis     Asthma     inhaler daily     Diabetes mellitus 2000    insulin daily; checks blood sugars on occasion-run 150-180    Elevated cholesterol     History of sleep apnea     years ago diagnosed but never used a machine at home     History of staph infection     wound infection after left knee replacement -saw infectious disease MD     Hypertension     Wears glasses      Past Surgical History:   Procedure Laterality Date    COLONOSCOPY      HIP SURGERY Right 2014    [facility]    INCISION AND DRAINAGE OF WOUND Left 2010    x2 of total knee replacement wound -iv antibiotics    KNEE SURGERY Bilateral     Right knee- , left knee- - [facility]    LUMBAR LAMINECTOMY WITH FUSION N/A 2018    Procedure: TLIF, OSTEOTOMY L4-5 , POST FUSION L4-5;  Surgeon: Yo Bryson MD;  Location:  [facility];  Service: Neurosurgery    LUMBAR LAMINECTOMY WITH FUSION N/A 2018    Procedure: LUMBAR LAMINECTOMY POSTERIOR LUMBAR INTERBODY FUSION;  Surgeon: Yo Bryson MD;  Location:  [facility];  Service: Neurosurgery    ROTATOR CUFF REPAIR Right     TONSILLECTOMY         General Information       Row Name 05/23/24 0927          Physical Therapy Time and Intention    Document Type discharge evaluation/summary  -CD     Mode of Treatment physical therapy  -CD       Row Name 05/23/24 0927          General Information    Patient Profile Reviewed yes  -CD     Prior Level of Function independent:;all household mobility;community mobility;gait;transfer;bed mobility;ADL's  PT REPORTS TO USE A CANE PRN. HAS A ROLLATOR AND REG R WALKER IF NEEDED. USES STEP OVER TUB WITHOUT DIFFICULTY. ENDORSES 2 FALLS IN THE LAST YEAR.  -CD     Existing Precautions/Restrictions fall  CHRONIC LBP WITH PAIN PUMP, LIMITED DF B, NEUROPATHY.  -CD     Barriers to Rehab medically complex;previous functional deficit  -CD       Row Name 05/23/24 0927          Living Environment    People in Home significant other  -CD       Row Name 05/23/24 0927          Home Main Entrance    Number of Stairs, Main Entrance three  -CD     Stair Railings, Main Entrance railings safe and in good condition;railing on left side (ascending)  -CD       Row Name 05/23/24 0927          Stairs Within Home, Primary    Number of Stairs, Within Home, Primary none  -CD       Row Name 05/23/24 0927          Cognition    Orientation Status (Cognition) oriented x 3  -CD       Row Name 05/23/24 0927          Safety Issues, Functional Mobility    Safety Issues Affecting Function (Mobility) insight into deficits/self-awareness  -CD     Impairments Affecting Function (Mobility) balance;endurance/activity tolerance  -CD               User Key  (r) = Recorded By, (t) = Taken By, (c) = Cosigned By      Initials Name Provider Type    CD Laura Caro, PT Physical Therapist                   Mobility       Row Name 05/23/24 0935          Bed Mobility    Bed Mobility supine-sit  -CD     Supine-Sit [place] (Bed Mobility) independent  -CD       Row Name 05/23/24 0935          Sit-Stand Transfer    Sit-Stand [place] (Transfers) standby assist   -CD     Assistive Device (Sit-Stand Transfers) walker, front-wheeled  -CD       Row Name 05/23/24 0935          Gait/Stairs (Locomotion)    [place] Level (Gait) standby assist  -CD     Assistive Device (Gait) walker, front-wheeled  -CD     Distance in Feet (Gait) 300  -CD     Deviations/Abnormal Patterns (Gait) base of support, wide;stride length decreased;sofy decreased  -CD     Bilateral Gait Deviations forward flexed posture;heel strike decreased  -CD     Comment, (Gait/Stairs) WIFE AND PT REPORT GAIT IS AT BASELINE. AMBULATED 150 FEET WITHOUT A.D.  FEET WITH R WALKER. PT REPORTS RADICULAR PAIN RLE WITH GAIT AND H/O OCCASIONALLY CATCHING R TOE.  -CD               User Key  (r) = Recorded By, (t) = Taken By, (c) = Cosigned By      Initials Name Provider Type    CD Laura Caro, PT Physical Therapist                   Obj/Interventions       Row Name 05/23/24 0941          Range of Motion Comprehensive    General Range of Motion bilateral lower extremity ROM WFL  -CD     Comment, General Range of Motion B LE WFL'S, LIMITED DF R>L- HAS PERIPHERAL NEUROPATHY B.  -CD       Row Name 05/23/24 0941          Strength Comprehensive (MMT)    General Manual Muscle Testing (MMT) Assessment lower extremity strength deficits identified  -CD     Comment, General Manual Muscle Testing (MMT) Assessment B LE GROSSLY 5/5 EXCEPT RDF 1/5, L DF 2/5- BASELINE PER PT.  -CD       Row Name 05/23/24 0941          Motor Skills    Motor Skills coordination;functional endurance  -CD     Coordination bilateral;lower extremity;heel to shin;minimal impairment  LIMITED BY BACK PAIN.,  -CD     Functional Endurance O2 SATS STABLE ON RA.  -CD       Row Name 05/23/24 0941          Balance    Balance Assessment sitting static balance;sitting dynamic balance;sit to stand dynamic balance;standing static balance;standing dynamic balance  -CD     Static Sitting Balance independent  -CD     Dynamic Sitting Balance independent  -CD     Sit  to Stand Dynamic Balance standby assist  -CD     Static Standing Balance standby assist  -CD     Dynamic Standing Balance contact guard  -CD     Position/Device Used, Standing Balance unsupported  SBA WITH R WALKER.  -CD     Balance Interventions sitting;standing;sit to stand;supported;static;dynamic  -CD     Comment, Balance SEE GAIT NOTE. STOPPED IN BATHROOM FOR TOILETING IN STANDING AND TO WASH HANDS AT SINK. NO LOB.  -CD       Row Name 05/23/24 0910          Sensory Assessment (Somatosensory)    Sensory Assessment (Somatosensory) bilateral LE  -CD     Bilateral LE Sensory Assessment impaired;light touch awareness  N&T AT BASELINE, NEUROPATHY  -CD               User Key  (r) = Recorded By, (t) = Taken By, (c) = Cosigned By      Initials Name Provider Type    CD Laura Caro, PT Physical Therapist                   Goals/Plan    No documentation.                  Clinical Impression       Row Name 05/23/24 0985          Pain    Pretreatment Pain Rating 0/10 - no pain  -CD     Posttreatment Pain Rating 0/10 - no pain  -CD       Row Name 05/23/24 0935          Plan of Care Review    Plan of Care Reviewed With patient;spouse  -CD     Outcome Evaluation GOALS NOT ESTABLISHED AS PT IS AT BASELINE WITH FUNCTIONAL MOBILITY. AMBULATED 150 FEET WITHOUT A.D. WITH CGA  FEET WITH R WALKER WITH SBA. NO OVERT LOB. PT DEMONSTRATES WEAK AND LIMITED DF B FROM NEUROPATHY, R >L.  PT IS A&O AND FOLLOWS ALL COMMANDS. PT /WIFE REPORT SPEECH IS BACK TO BASELINE. RECOMMEND REFERRAL TO [facility] FOR POSSIBLE AFO R LE TO DECREASE RISK OF FALLS. CURRENTLY RECEIVING WOUND CARE R LE FOR EDEMA.  -CD       Row Name 05/23/24 0963          Therapy Assessment/Plan (PT)    Patient/Family Therapy Goals Statement (PT) TO GO HOME.  -CD     Criteria for Skilled Interventions Met (PT) no;no problems identified which require skilled intervention  -CD     Therapy Frequency (PT) evaluation only  -CD       Row Name 05/23/24 0959          Vital  Signs    Pre Systolic BP Rehab 145  -CD     Pre Treatment Diastolic BP 98  -CD     Post Systolic BP Rehab 151  -CD     Post Treatment Diastolic   -CD     Posttreatment Heart Rate (beats/min) 94  -CD     Pre SpO2 (%) 97  -CD     O2 Delivery Pre Treatment room air  -CD     O2 Delivery Intra Treatment room air  -CD     Post SpO2 (%) 97  -CD     O2 Delivery Post Treatment room air  -CD     Pre Patient Position Supine  -CD     Intra Patient Position Standing  -CD     Post Patient Position Sitting  -CD       Row Name 05/23/24 0946          Positioning and Restraints    Pre-Treatment Position in bed  -CD     Post Treatment Position chair  -CD     In Chair reclined;call light within reach;encouraged to call for assist;exit alarm on;legs elevated;notified nsg;with family/caregiver  -CD               User Key  (r) = Recorded By, (t) = Taken By, (c) = Cosigned By      Initials Name Provider Type    CD Laura Caro, PT Physical Therapist                   Outcome Measures       Row Name 05/23/24 0955 05/23/24 0043       How much help from another person do you currently need...    Turning from your back to your side while in flat bed without using bedrails? 4  -CD 4  -LH    Moving from lying on back to sitting on the side of a flat bed without bedrails? 4  -CD 4  -LH    Moving to and from a bed to a chair (including a wheelchair)? 4  -CD 4  -LH    Standing up from a chair using your arms (e.g., wheelchair, bedside chair)? 4  -CD 4  -LH    Climbing 3-5 steps with a railing? 3  -CD 3  -LH    To walk in hospital room? 3  -CD 4  -LH    AM-PAC 6 Clicks Score (PT) 22  -CD 23  -LH    Highest Level of Mobility Goal 7 --> Walk 25 feet or more  -CD 7 --> Walk 25 feet or more  -LH      Row Name 05/23/24 0028          How much help from another person do you currently need...    Turning from your back to your side while in flat bed without using bedrails? 4  -LH     Moving from lying on back to sitting on the side of a flat bed  without bedrails? 4  -LH     Moving to and from a bed to a chair (including a wheelchair)? 4  -LH     Standing up from a chair using your arms (e.g., wheelchair, bedside chair)? 4  -LH     Climbing 3-5 steps with a railing? 3  -LH     To walk in hospital room? 4  -     AM-PAC 6 Clicks Score (PT) 23  -     Highest Level of Mobility Goal 7 --> Walk 25 feet or more  -       Row Name 05/23/24 0955          Modified Sutter Scale    Modified Sutter Scale 2 - Slight disability.  Unable to carry out all previous activities but able to look after own affairs without assistance.  -CD       Row Name 05/23/24 0955          Functional Assessment    Outcome Measure Options AM-PAC 6 Clicks Basic Mobility (PT);Modified [place]  -CD               User Key  (r) = Recorded By, (t) = Taken By, (c) = Cosigned By      Initials Name Provider Type    CD Laura Caro, PT Physical Therapist     Milka Greene, RN Registered Nurse                  Physical Therapy Education       Title: PT OT SLP Therapies (Done)       Topic: Physical Therapy (Done)       Point: Precautions (Done)       Learning Progress Summary             Patient Acceptance, E, VU by CD at 5/23/2024 0956    Comment: S&S CVA, F.A.S.T. BENEFITS OF AFO TO INCREASE SAFETY WITH GAIT. RECOMEND OP REFERRAL TO [facility].   Significant Other Acceptance, E, VU by CD at 5/23/2024 0956    Comment: S&S CVA, F.A.S.T. BENEFITS OF AFO TO INCREASE SAFETY WITH GAIT. RECOMEND OP REFERRAL TO [facility]".                                         User Key       Initials Effective Dates Name Provider Type Discipline    CD 02/03/23 -  Laura Caro, PT Physical Therapist PT                  PT Recommendation and Plan     Plan of Care Reviewed With: patient, spouse  Outcome Evaluation: GOALS NOT ESTABLISHED AS PT IS AT BASELINE WITH FUNCTIONAL MOBILITY. AMBULATED 150 FEET WITHOUT A.D. WITH CGA  FEET WITH R WALKER WITH SBA. NO OVERT LOB. PT DEMONSTRATES WEAK AND LIMITED DF  B FROM NEUROPATHY, R >L.  PT IS A&O AND FOLLOWS ALL COMMANDS. PT /WIFE REPORT SPEECH IS BACK TO BASELINE. RECOMMEND REFERRAL TO OP [facility] FOR POSSIBLE AFO R LE TO DECREASE RISK OF FALLS. CURRENTLY RECEIVING WOUND CARE R LE FOR EDEMA.     Time Calculation:   PT Evaluation Complexity  History, PT Evaluation Complexity: 3 or more personal factors and/or comorbidities  Examination of Body Systems (PT Eval Complexity): total of 4 or more elements  Clinical Presentation (PT Evaluation Complexity): stable  Clinical Decision Making (PT Evaluation Complexity): low complexity  Overall Complexity (PT Evaluation Complexity): low complexity     PT Charges       Row Name 05/23/24 0958             Time Calculation    Start Time 0831  -CD      PT Received On 05/23/24  -CD         Time Calculation- PT    Total Timed Code Minutes- PT 20 minute(s)  -CD         Timed Charges    57848 - PT Therapeutic Activity Minutes 20  -CD         Total Minutes    Timed Charges Total Minutes 20  -CD       Total Minutes 20  -CD                User Key  (r) = Recorded By, (t) = Taken By, (c) = Cosigned By      Initials Name Provider Type     Laura Caro, PT Physical Therapist                  Therapy Charges for Today       Code Description Service Date Service Provider Modifiers Qty    [id number]  PT THERAPEUTIC ACT EA 15 MIN 5/23/2024 Laura Caro, PT GP 1    [id number] HC PT EVAL LOW COMPLEXITY 4 5/23/2024 Laura Caro, PT GP 1            PT G-Codes  Outcome Measure Options: AM-PAC 6 Clicks Basic Mobility (PT), Modified [place]  AM-PAC 6 Clicks Score (PT): 22  Modified [place] Scale: 2 - Slight disability.  Unable to carry out all previous activities but able to look after own affairs without assistance.    PT Discharge Summary  Anticipated Discharge Disposition (PT): home, other (see comments)    Laura Caro, PT  5/23/2024

## 2024-05-23 NOTE — PLAN OF CARE
Goal Outcome Evaluation:                     Anticipated Discharge Disposition (SLP): home with OP services, anticipate therapy at next level of care    SLP Diagnosis: mild, aphasia, cognitive-linguistic disorder (05/23/24 1000)

## 2024-05-23 NOTE — CONSULTS
Diabetes Education    Patient Name:  Amadeo Kong  YOB: 1960  MRN: [id number]  Admit Date:  5/22/2024        Consult received for diabetes education per stroke protocol. Met with patient and family at bedside. Patient is taking 70/30, 45 units twice per day. A1c was 8.3%. He is not checking his blood but has a meter and supplies at home. He has a gym membership but has not been in awhile. We discussed lifestyle changes such as healthy healthy eating and physical activity to reduce risk of future complications, such as stroke.We discussed target blood sugar per ADA. Symptoms and treatment of hypoglycemia and hyperglycemia were reviewed. He is not consistent on eating or taking his insulin. Discussed how to take 70/30 consistently before breakfast and dinner, ideally about 12 hours apart, to help the medication work best in his body.   All questions addressed. Patient requested to have the  see him prior to discharge.    Patient has been scheduled for outpatient diabetes education follow up visit on 6/20/24 at 130pm via  phone. Outpatient staff will provide reminder call prior to appointment. Patient was given reminder card with date and time of appointment and our contact information.     Electronically signed by:  Yaneth Hoffman RN  05/23/24 15:59 EDT

## 2024-05-23 NOTE — THERAPY DISCHARGE NOTE
Acute Care - Occupational Therapy Discharge  [facility]    Patient Name: Amadeo Kong  : 1960    MRN: [id number]                              Today's Date: 2024       Admit Date: 2024    Visit Dx:     ICD-10-CM ICD-9-CM   1. Aphasia  R47.01 784.3   2. Carotid stenosis, right  I65.21 433.10   3. Cognitive communication deficit  R41.841 799.52     Patient Active Problem List   Diagnosis    Hypertension    Type 2 diabetes mellitus, with long-term current use of insulin    Arthritis    Chronic bilateral low back pain without sciatica    Spondylolisthesis of lumbar region    Acquired spondylolisthesis    Hyponatremia    Anemia    Sciatica of right side    Stenosis of lateral recess of lumbar spine    Anterior leg pain, right    THOMAS (acute kidney injury)    Sepsis due to Klebsiella    HLD (hyperlipidemia)    Peripheral neuropathy    Acute cystitis with hematuria    Aphasia    Hypomagnesemia     Past Medical History:   Diagnosis Date    Arthritis     Asthma     inhaler daily     Diabetes mellitus 2000    insulin daily; checks blood sugars on occasion-run 150-180    Elevated cholesterol     History of sleep apnea     years ago diagnosed but never used a machine at home     History of staph infection 2010    wound infection after left knee replacement -saw infectious disease MD     Hypertension     Wears glasses      Past Surgical History:   Procedure Laterality Date    COLONOSCOPY      HIP SURGERY Right 2014    [facility]    INCISION AND DRAINAGE OF WOUND Left 2010    x2 of total knee replacement wound -iv antibiotics    KNEE SURGERY Bilateral     Right knee- , left knee- - [facility]    LUMBAR LAMINECTOMY WITH FUSION N/A 2018    Procedure: TLIF, OSTEOTOMY L4-5 , POST FUSION L4-5;  Surgeon: Yo Bryson MD;  Location: [facility];  Service: Neurosurgery    LUMBAR LAMINECTOMY WITH FUSION N/A 2018    Procedure: LUMBAR LAMINECTOMY POSTERIOR LUMBAR INTERBODY FUSION;   Surgeon: Yo Bryson MD;  Location: [facility];  Service: Neurosurgery    ROTATOR CUFF REPAIR Right     TONSILLECTOMY        General Information       Row Name 05/23/24 1455          OT Time and Intention    Document Type discharge evaluation/summary  -CS     Mode of Treatment occupational therapy  -CS       Row Name 05/23/24 1450          General Information    Patient Profile Reviewed yes  -CS     Prior Level of Function independent:;all household mobility;ADL's;driving  Pt reports use of SPC or RW when needed  -CS     Existing Precautions/Restrictions fall;other (see comments)  BLE neuropathy, chronic LBP  -CS     Barriers to Rehab medically complex;previous functional deficit  -CS       Row Name 05/23/24 1455          Living Environment    People in Home significant other  -CS       Row Name 05/23/24 1455          Home Main Entrance    Number of Stairs, Main Entrance three  -CS     Stair Railings, Main Entrance railing on left side (ascending)  -CS       Row Name 05/23/24 1455          Stairs Within Home, Primary    Number of Stairs, Within Home, Primary none  -CS       Row Name 05/23/24 1453          Cognition    Orientation Status (Cognition) oriented x 3  -CS       Row Name 05/23/24 1454          Safety Issues, Functional Mobility    Safety Issues Affecting Function (Mobility) insight into deficits/self-awareness  -CS     Impairments Affecting Function (Mobility) endurance/activity tolerance;balance  -CS     Comment, Safety Issues/Impairments (Mobility) dynamic standing balance, improved with AD use  -CS               User Key  (r) = Recorded By, (t) = Taken By, (c) = Cosigned By      Initials Name Provider Type    CS Michael Corral OT Occupational Therapist                   Mobility/ADL's       Row Name 05/23/24 1500          Transfers    Transfers toilet transfer;stand-sit transfer  -CS     Comment, (Transfers) d  -CS       Row Name 05/23/24 1500          Stand-Sit Transfer    Stand-Sit [place]  (Transfers) supervision  -       Row Name 05/23/24 1500          Toilet Transfer    Type (Toilet Transfer) sit-stand  -     [place] Level (Toilet Transfer) supervision  -       Row Name 05/23/24 1500          Functional Mobility    Functional Mobility- Comment defer to PT for specifics, SBA for in-room distance from toilet to bedside, no AD or LOB  -CS     Patient was able to Ambulate yes  -       Row Name 05/23/24 1500          Activities of Daily Living    BADL Assessment/Intervention lower body dressing;toileting;feeding  -       Row Name 05/23/24 1500          Lower Body Dressing Assessment/Training    [place] Level (Lower Body Dressing) doff;socks;standby assist  -     Position (Lower Body Dressing) edge of bed sitting  -       Row Name 05/23/24 1500          Toileting Assessment/Training    [place] Level (Toileting) toileting skills;adjust/manage clothing;perform perineal hygiene;independent  -       Row Name 05/23/24 1500          Self-Feeding Assessment/Training    [place] Level (Feeding) feeding skills;independent  -CS     Position (Self-Feeding) sitting up in bed  -               User Key  (r) = Recorded By, (t) = Taken By, (c) = Cosigned By      Initials Name Provider Type    CS Michael Corral, OT Occupational Therapist                   Obj/Interventions       Row Name 05/23/24 1505          Sensory Assessment (Somatosensory)    Sensory Assessment (Somatosensory) UE sensation intact  -       Row Name 05/23/24 1505          Vision Assessment/Intervention    Visual Impairment/Limitations WFL;corrective lenses full-time  -       Row Name 05/23/24 1505          Range of Motion Comprehensive    General Range of Motion bilateral upper extremity ROM WFL  -       Row Name 05/23/24 1505          Strength Comprehensive (MMT)    General Manual Muscle Testing (MMT) Assessment upper extremity strength deficits identified  -     Comment, General Manual Muscle Testing  (MMT) Assessment BUE grossly 5/5  -       Row Name 05/23/24 1502          Motor Skills    Motor Skills coordination;functional endurance  -     Coordination WFL  -CS     Functional Endurance O2 sats stable on RA  -       Row Name 05/23/24 1504          Balance    Balance Assessment sitting static balance;sitting dynamic balance;standing static balance;standing dynamic balance  -CS     Static Sitting Balance independent  -CS     Dynamic Sitting Balance independent  -CS     Position, Sitting Balance unsupported;sitting edge of bed  -CS     Static Standing Balance independent  -CS     Dynamic Standing Balance standby assist  -CS     Position/Device Used, Standing Balance unsupported  -CS     Balance Interventions sitting;sit to stand;standing;occupation based/functional task  -               User Key  (r) = Recorded By, (t) = Taken By, (c) = Cosigned By      Initials Name Provider Type    CS Michael Corral, OT Occupational Therapist                   Goals/Plan    No documentation.                  Clinical Impression       Row Name 05/23/24 1548          Pain Assessment    Additional Documentation Pain Scale: FACES Pre/Post-Treatment (Group)  -[facility] Name 05/23/24 1545          Pain Scale: FACES Pre/Post-Treatment    Pain: FACES Scale, Pretreatment 0-->no hurt  -CS     Posttreatment Pain Rating 0-->no hurt  -CS       Row Name 05/23/24 1548          Plan of Care Review    Plan of Care Reviewed With patient  -CS     Progress no change  -     Outcome Evaluation Pt presents at baseline for ADL completion and related transfers with symmetrical BUE strength and coordination intact. OT signing off, please reconsult if needed. Rec d/c to home when medically appropriate.  -       Row Name 05/23/24 7555          Therapy Assessment/Plan (OT)    Criteria for Skilled Therapeutic Interventions Met (OT) no;does not meet criteria for skilled intervention  -     Therapy Frequency (OT) evaluation only  -        Row Name 05/23/24 1543          Therapy Plan Review/Discharge Plan (OT)    Anticipated Discharge Disposition (OT) home;home with assist  -CS       Row Name 05/23/24 1543          Vital Signs    Pre Systolic BP Rehab --  RN cleared for eval  -CS     O2 Delivery Pre Treatment room air  -CS     O2 Delivery Intra Treatment room air  -CS     O2 Delivery Post Treatment room air  -CS     Pre Patient Position Sitting  -CS     Intra Patient Position Standing  -CS     Post Patient Position Sitting  -CS       Row Name 05/23/24 1543          Positioning and Restraints    Pre-Treatment Position bathroom  -CS     Post Treatment Position bed  -CS     In Bed sitting EOB;with family/caregiver  no alarm per RN  -CS               User Key  (r) = Recorded By, (t) = Taken By, (c) = Cosigned By      Initials Name Provider Type    CS Michael Corral, OT Occupational Therapist                   Outcome Measures       Row Name 05/23/24 1544          How much help from another is currently needed...    Putting on and taking off regular lower body clothing? 3  -CS     Bathing (including washing, rinsing, and drying) 4  -CS     Toileting (which includes using toilet bed pan or urinal) 4  -CS     Putting on and taking off regular upper body clothing 4  -CS     Taking care of personal grooming (such as brushing teeth) 4  -CS     Eating meals 4  -CS     AM-PAC 6 Clicks Score (OT) 23  -CS       Row Name 05/23/24 0955 05/23/24 0800       How much help from another person do you currently need...    Turning from your back to your side while in flat bed without using bedrails? 4  -CD 4  -ME    Moving from lying on back to sitting on the side of a flat bed without bedrails? 4  -CD 4  -ME    Moving to and from a bed to a chair (including a wheelchair)? 4  -CD 4  -ME    Standing up from a chair using your arms (e.g., wheelchair, bedside chair)? 4  -CD 4  -ME    Climbing 3-5 steps with a railing? 3  -CD 3  -ME    To walk in hospital room? 3  -CD 3  -ME     AM-PAC 6 Clicks Score (PT) 22  - 22  -ME    Highest Level of Mobility Goal 7 --> Walk 25 feet or more  -CD 7 --> Walk 25 feet or more  -ME      Row Name 05/23/24 1544 05/23/24 0955       Modified Jojo Scale    Modified Tyrrell Scale 1 - No significant disability despite symptoms.  Able to carry out all usual duties and activities.  - 2 - Slight disability.  Unable to carry out all previous activities but able to look after own affairs without assistance.  -      Row Name 05/23/24 1544 05/23/24 0955       Functional Assessment    Outcome Measure Options AM-PAC 6 Clicks Daily Activity (OT);Modified Tyrrell  -CS AM-PAC 6 Clicks Basic Mobility (PT);Modified Tyrrell  -CD              User Key  (r) = Recorded By, (t) = Taken By, (c) = Cosigned By      Initials Name Provider Type    CD Laura Caro, PT Physical Therapist     Michael Corral, OT Occupational Therapist    Chelly Hinds, RN Registered Nurse                  Occupational Therapy Education       Title: PT OT SLP Therapies (Done)       Topic: Occupational Therapy (Done)       Point: Precautions (Done)       Description:   Instruct learner(s) on prescribed precautions during self-care and functional transfers.                  Learning Progress Summary             Patient Acceptance, E,D, VU,DU by  at 5/23/2024 1545                                         User Key       Initials Effective Dates Name Provider Type Discipline     06/16/21 -  Michael Corral, OT Occupational Therapist OT                  OT Recommendation and Plan  Therapy Frequency (OT): evaluation only  Plan of Care Review  Plan of Care Reviewed With: patient  Progress: no change  Outcome Evaluation: Pt presents at baseline for ADL completion and related transfers with symmetrical BUE strength and coordination intact. OT signing off, please reconsult if needed. Rec d/c to home when medically appropriate.  Plan of Care Reviewed With: patient  Outcome Evaluation: Pt presents  at baseline for ADL completion and related transfers with symmetrical BUE strength and coordination intact. OT signing off, please reconsult if needed. Rec d/c to home when medically appropriate.     Time Calculation:   Evaluation Complexity (OT)  Review Occupational Profile/Medical/Therapy History Complexity: brief/low complexity  Assessment, Occupational Performance/Identification of Deficit Complexity: 1-3 performance deficits  Clinical Decision Making Complexity (OT): problem focused assessment/low complexity  Overall Complexity of Evaluation (OT): low complexity     Time Calculation- OT       Row Name 05/23/24 1545             Time Calculation- OT    OT Start Time 1415  -CS      OT Received On 05/23/24  -CS         Untimed Charges    OT Eval/Re-eval Minutes 47  -CS         Total Minutes    Untimed Charges Total Minutes 47  -CS       Total Minutes 47  -CS                User Key  (r) = Recorded By, (t) = Taken By, (c) = Cosigned By      Initials Name Provider Type    CS Michael Corral OT Occupational Therapist                  Therapy Charges for Today       Code Description Service Date Service Provider Modifiers Qty    [id number]  OT EVAL LOW COMPLEXITY 4 5/23/2024 Michael Corral OT GO 1               OT Discharge Summary  Anticipated Discharge Disposition (OT): home, home with assist    Michael Corral OT  5/23/2024

## 2024-05-23 NOTE — PROGRESS NOTES
Stroke Progress Note       Chief Complaint:  speech difficulty     Subjective    Subjective     Subjective:    Doing, no issues   Wants to go home       Objective      Temp:  [97.6 °F (36.4 °C)-98.5 °F (36.9 °C)] 97.6 °F (36.4 °C)  Heart Rate:  [64-84] 84  Resp:  [16-18] 18  BP: (126-161)/() 126/93    NEURO    MENTAL STATUS: AAOx3, memory intact, fund of knowledge appropriate    LANG/SPEECH: Naming and repetition intact, fluent, follows 3-step commands    CRANIAL NERVES:    Pupils equal and reactive, EOMI intact, no gaze deviation, no nystagmus  No facial droop, cough and gag intact, shoulder shrug intact, tongue midline     MOTOR:  Moves all extremities equally    SENSORY: Normal to light touch all throughout     COORDINATION: Normal finger to nose and heel to shin, no tremor, no dysmetria    STATION: Not assessed due to patient condition    GAIT: Not assessed due to patient condition     Results Review:    I reviewed the patient's new clinical results.    Lab Results (last 24 hours)       Procedure Component Value Units Date/Time    POC Glucose Once [[id number]]  (Abnormal) Collected: 05/23/24 0740    Specimen: Blood Updated: 05/23/24 0741     Glucose 162 mg/dL     TSH [[id number]]  (Normal) Collected: 05/23/24 0513    Specimen: Blood Updated: 05/23/24 0611     TSH 0.318 uIU/mL     POC Glucose Once [[id number]]  (Abnormal) Collected: 05/23/24 0609    Specimen: Blood Updated: 05/23/24 0610     Glucose 162 mg/dL     Lipid Panel [[id number]] Collected: 05/23/24 0513    Specimen: Blood Updated: 05/23/24 0607     Total Cholesterol 133 mg/dL      Triglycerides 65 mg/dL      HDL Cholesterol 60 mg/dL      LDL Cholesterol  60 mg/dL      VLDL Cholesterol 13 mg/dL      LDL/HDL Ratio 1.00    Narrative:      Cholesterol Reference Ranges  (U.S[place] Department of Health and Human Services ATP III Classifications)    Desirable          <200 mg/dL  Borderline High    200-239 mg/dL  High Risk          >240 mg/dL      Triglyceride  Reference Ranges  (U.S. Department of Health and Human Services ATP III Classifications)    Normal           <150 mg/dL  Borderline High  150-199 mg/dL  High             200-499 mg/dL  Very High        >500 mg/dL    HDL Reference Ranges  (U.S. Department of Health and Human Services ATP III Classifications)    Low     <40 mg/dl (major risk factor for CHD)  High    >60 mg/dl ('negative' risk factor for CHD)        LDL Reference Ranges  (U.S. Department of Health and Human Services ATP III Classifications)    Optimal          <100 mg/dL  Near Optimal     100-129 mg/dL  Borderline High  130-159 mg/dL  High             160-189 mg/dL  Very High        >189 mg/dL    Basic Metabolic Panel [[id number]]  (Abnormal) Collected: 05/23/24 0513    Specimen: Blood Updated: 05/23/24 0607     Glucose 143 mg/dL      BUN 11 mg/dL      Creatinine 1.01 mg/dL      Sodium 131 mmol/L      Potassium 4.0 mmol/L      Chloride 95 mmol/L      CO2 25.0 mmol/L      Calcium 8.9 mg/dL      BUN/Creatinine Ratio 10.9     Anion Gap 11.0 mmol/L      eGFR 83.0 mL/min/1.73     Narrative:      GFR Normal >60  Chronic Kidney Disease <60  Kidney Failure <15      CBC Auto Differential [[id number]]  (Abnormal) Collected: 05/23/24 0513    Specimen: Blood Updated: 05/23/24 0550     WBC 6.92 10*3/mm3      RBC 4.08 10*6/mm3      Hemoglobin 12.5 g/dL      Hematocrit 36.6 %      MCV 89.7 fL      MCH 30.6 pg      MCHC 34.2 g/dL      RDW 13.3 %      RDW-SD 44.0 fl      MPV 8.3 fL      Platelets 169 10*3/mm3      Neutrophil % 54.6 %      Lymphocyte % 32.2 %      Monocyte % 11.7 %      Eosinophil % 0.7 %      Basophil % 0.4 %      Immature Grans % 0.4 %      Neutrophils, Absolute 3.77 10*3/mm3      Lymphocytes, Absolute 2.23 10*3/mm3      Monocytes, Absolute 0.81 10*3/mm3      Eosinophils, Absolute 0.05 10*3/mm3      Basophils, Absolute 0.03 10*3/mm3      Immature Grans, Absolute 0.03 10*3/mm3      nRBC 0.0 /100 WBC     Hemoglobin A1c [[id number]]  (Abnormal) Collected:  05/23/24 0513    Specimen: Blood Updated: 05/23/24 0550     Hemoglobin A1C 8.30 %     Narrative:      Hemoglobin A1C Ranges:    Increased Risk for Diabetes  5.7% to 6.4%  Diabetes                     >= 6.5%  Diabetic Goal                < 7.0%    Fentanyl, Urine - Urine, Clean Catch [[id number]]  (Normal) Collected: 05/22/24 1956    Specimen: Urine, Clean Catch Updated: 05/23/24 0252     Fentanyl, Urine Negative    Narrative:      Negative Threshold:      Fentanyl 5 ng/mL     The normal value for the drug tested is negative. This report includes final unconfirmed screening results to be used for medical treatment purposes only. Unconfirmed results must not be used for non-medical purposes such as employment or legal testing. Clinical consideration should be applied to any drug of abuse test, particularly when unconfirmed results are used.           Urine Drug Screen - Urine, Clean Catch [[id number]]  (Normal) Collected: 05/22/24 1956    Specimen: Urine, Clean Catch Updated: 05/23/24 0232     THC, Screen, Urine Negative     Phencyclidine (PCP), Urine Negative     Cocaine Screen, Urine Negative     Methamphetamine, Ur Negative     Opiate Screen Negative     Amphetamine Screen, Urine Negative     Benzodiazepine Screen, Urine Negative     Tricyclic Antidepressants Screen Negative     Methadone Screen, Urine Negative     Barbiturates Screen, Urine Negative     Oxycodone Screen, Urine Negative     Buprenorphine, Screen, Urine Negative    Narrative:      Cutoff For Drugs Screened:    Amphetamines               500 ng/ml  Barbiturates               200 ng/ml  Benzodiazepines            150 ng/ml  Cocaine                    150 ng/ml  Methadone                  200 ng/ml  Opiates                    100 ng/ml  Phencyclidine               25 ng/ml  THC                         50 ng/ml  Methamphetamine            500 ng/ml  Tricyclic Antidepressants  300 ng/ml  Oxycodone                  100 ng/ml  Buprenorphine               10  ng/ml    The normal value for all drugs tested is negative. This report includes unconfirmed screening results, with the cutoff values listed, to be used for medical treatment purposes only.  Unconfirmed results must not be used for non-medical purposes such as employment or legal testing.  Clinical consideration should be applied to any drug of abuse test, particularly when unconfirmed results are used.      POC Glucose Once [[id number]]  (Abnormal) Collected: 05/23/24 0215    Specimen: Blood Updated: 05/23/24 0216     Glucose 192 mg/dL     POC Glucose Once [[id number]]  (Normal) Collected: 05/23/24 0032    Specimen: Blood Updated: 05/23/24 0034     Glucose 89 mg/dL     Magnesium [[id number]]  (Abnormal) Collected: 05/22/24 1954    Specimen: Blood Updated: 05/22/24 2035     Magnesium 1.5 mg/dL     Comprehensive Metabolic Panel [[id number]]  (Abnormal) Collected: 05/22/24 1954    Specimen: Blood Updated: 05/22/24 2035     Glucose 101 mg/dL      BUN 12 mg/dL      Creatinine 1.23 mg/dL      Sodium 129 mmol/L      Potassium 4.0 mmol/L      Comment: Slight hemolysis detected by analyzer. Result may be falsely elevated.        Chloride 94 mmol/L      CO2 24.0 mmol/L      Calcium 8.9 mg/dL      Total Protein 7.2 g/dL      Albumin 3.7 g/dL      ALT (SGPT) 17 U/L      AST (SGOT) 23 U/L      Alkaline Phosphatase 129 U/L      Total Bilirubin 0.7 mg/dL      Globulin 3.5 gm/dL      Comment: Calculated Result        A/G Ratio 1.1 g/dL      BUN/Creatinine Ratio 9.8     Anion Gap 11.0 mmol/L      eGFR 65.6 mL/min/1.73     Narrative:      GFR Normal >60  Chronic Kidney Disease <60  Kidney Failure <15      Single High Sensitivity Troponin T [[id number]]  (Abnormal) Collected: 05/22/24 1954    Specimen: Blood Updated: 05/22/24 2031     HS Troponin T 39 ng/L     Narrative:      High Sensitive Troponin T Reference Range:  <14.0 ng/L- Negative Female for AMI  <22.0 ng/L- Negative Male for AMI  >=14 - Abnormal Female indicating possible  myocardial injury.  >=22 - Abnormal Male indicating possible myocardial injury.   Clinicians would have to utilize clinical acumen, EKG, Troponin, and serial changes to determine if it is an Acute Myocardial Infarction or myocardial injury due to an underlying chronic condition.         Urinalysis With Microscopic If Indicated (No Culture) - Urine, Clean Catch [[id number]]  (Normal) Collected: 05/22/24 1956    Specimen: Urine, Clean Catch Updated: 05/22/24 2015     Color, UA Yellow     Appearance, UA Clear     pH, UA 6.0     Specific Gravity, UA 1.007     Glucose, UA Negative     Ketones, UA Negative     Bilirubin, UA Negative     Blood, UA Negative     Protein, UA Negative     Leuk Esterase, UA Negative     Nitrite, UA Negative     Urobilinogen, UA 1.0 E.U./dL    Narrative:      Urine microscopic not indicated.    [place] Draw [[id number]] Collected: 05/22/24 1954    Specimen: Blood Updated: 05/22/24 2015    Narrative:      The following orders were created for panel order [place] Draw.  Procedure                               Abnormality         Status                     ---------                               -----------         ------                     Green Top (Gel)[[id number]]                                  Final result               Lavender Top[[id number]]                                     Final result               Gold Top - SST[[id number]]                                   Final result               Diaz Top[[id number]]                                         Final result               Light Blue Top[[id number]]                                   Final result                 Please view results for these tests on the individual orders.    Green Top (Gel) [[id number]] Collected: 05/22/24 1954    Specimen: Blood Updated: 05/22/24 2015     Extra Tube Hold for add-ons.     Comment: Auto resulted.       Lavender Top [[id number]] Collected: 05/22/24 1954    Specimen: Blood Updated: 05/22/24 2015     Extra Tube  hold for add-on     Comment: Auto resulted       Gold Top - SST [[id number]] Collected: 05/22/24 1954    Specimen: Blood Updated: 05/22/24 2015     Extra Tube Hold for add-ons.     Comment: Auto resulted.       Diaz Top [[id number]] Collected: 05/22/24 1954    Specimen: Blood Updated: 05/22/24 2015     Extra Tube Hold for add-ons.     Comment: Auto resulted.       Light Blue Top [[id number]] Collected: 05/22/24 1954    Specimen: Blood Updated: 05/22/24 2015     Extra Tube Hold for add-ons.     Comment: Auto resulted       CBC & Differential [[id number]]  (Abnormal) Collected: 05/22/24 1954    Specimen: Blood Updated: 05/22/24 2012    Narrative:      The following orders were created for panel order CBC & Differential.  Procedure                               Abnormality         Status                     ---------                               -----------         ------                     CBC Auto Differential[[id number]]        Abnormal            Final result                 Please view results for these tests on the individual orders.    CBC Auto Differential [[id number]]  (Abnormal) Collected: 05/22/24 1954    Specimen: Blood Updated: 05/22/24 2012     WBC 8.15 10*3/mm3      RBC 4.17 10*6/mm3      Hemoglobin 13.2 g/dL      Hematocrit 37.6 %      MCV 90.2 fL      MCH 31.7 pg      MCHC 35.1 g/dL      RDW 13.4 %      RDW-SD 43.9 fl      MPV 8.4 fL      Platelets 192 10*3/mm3      Neutrophil % 59.2 %      Lymphocyte % 27.4 %      Monocyte % 11.9 %      Eosinophil % 0.7 %      Basophil % 0.4 %      Immature Grans % 0.4 %      Neutrophils, Absolute 4.83 10*3/mm3      Lymphocytes, Absolute 2.23 10*3/mm3      Monocytes, Absolute 0.97 10*3/mm3      Eosinophils, Absolute 0.06 10*3/mm3      Basophils, Absolute 0.03 10*3/mm3      Immature Grans, Absolute 0.03 10*3/mm3      nRBC 0.0 /100 WBC           MRI Brain Without Contrast    Result Date: 5/23/2024  1.Findings compatible with chronic microvascular ischemic change. 2.No  diffusion restriction is identified to suggest acute infarct. Electronically Signed: Chris Dorsey MD  5/23/2024 7:07 AM EDT  Workstation ID: [id number]    CT Angiogram Neck    Result Date: 5/22/2024  No evidence of hemodynamically significant carotid or vertebral artery stenosis in the neck. CTA HEAD: There appears to be approximately 65% % stenosis of the distal intracranial right ICA at the junction of the cavernous and supraclinoid segments, axial image numbers 329 and 330 of series 9. No other significant ICA stenosis is appreciated. Anterior and middle cerebral arteries, distal vertebral artery, basilar artery and posterior cerebral arteries show no evidence of significant stenosis. Posterior circulation vessels are noted to be relatively small in diameter. No evidence of aneurysm or other vascular malformation is seen. There is grossly normal contrast opacification of the major dural venous sinuses.. Impression: Approximately 65% stenosis of the distal intracranial right ICA by NASCET criteria. No evidence of hemodynamically significant intracranial vascular stenosis is seen elsewhere. Electronically Signed: Quentin Amaya MD  5/22/2024 10:32 PM EDT  Workstation ID: [id number]    CT Angiogram Head w AI Analysis of LVO    Result Date: 5/22/2024  No evidence of hemodynamically significant carotid or vertebral artery stenosis in the neck. CTA HEAD: There appears to be approximately 65% % stenosis of the distal intracranial right ICA at the junction of the cavernous and supraclinoid segments, axial image numbers 329 and 330 of series 9. No other significant ICA stenosis is appreciated. Anterior and middle cerebral arteries, distal vertebral artery, basilar artery and posterior cerebral arteries show no evidence of significant stenosis. Posterior circulation vessels are noted to be relatively small in diameter. No evidence of aneurysm or other vascular malformation is seen. There is grossly normal contrast opacification  of the major dural venous sinuses.. Impression: Approximately 65% stenosis of the distal intracranial right ICA by NASCET criteria. No evidence of hemodynamically significant intracranial vascular stenosis is seen elsewhere. Electronically Signed: Quentin Amaya MD  5/22/2024 10:32 PM EDT  Workstation ID: [id number]    CT Head Without Contrast    Result Date: 5/22/2024  Impression: Age-appropriate generalized cerebral atrophy. No evidence of acute intracranial disease is seen. Electronically Signed: Quentin Amaya MD  5/22/2024 10:20 PM EDT  Workstation ID: [id number]    XR Chest 1 View    Result Date: 5/22/2024  Impression: No acute process. Electronically Signed: Chandan Brown MD  5/22/2024 8:59 PM EDT  Workstation ID: [id number]   Results for orders placed during the hospital encounter of 07/18/22    Adult Transthoracic Echo Complete W/ Cont if Necessary Per Protocol    Interpretation Summary  · Left ventricular ejection fraction appears to be 56 - 60%. Left ventricular systolic function is normal.  · Left ventricular wall thickness is consistent with mild concentric hypertrophy.  · Left ventricular diastolic function was indeterminate.            Assessment/Plan     Assessment/Plan:      63 male  with vascular risk factors, presented with multiple episodes of speech difficulty ongoing for one month. CTA-documented atherosclerosis  in the distal right ICA. Continue aspirin and Plavix.  It is unlikely patient symptoms are related to stroke. If this was a transient symptom with headache, it could be migraine related..  Needs outpatient neurology follow up. Recommend magnesium glycinate 400-500 and riboflavin 400 daily.    No further inpatient neurology work up is indicated.             Donnell Valerio MD  05/23/24  11:45 EDT

## 2024-05-23 NOTE — H&P
"    [facility] Medicine Services  HISTORY AND PHYSICAL    Patient Name: Amadeo Kong  : 1960  MRN: [id number]  Primary Care Physician: Kimber Roland MD  Date of admission: 2024      Subjective   Subjective     Chief Complaint:  Difficulty speaking    HPI:  Amadeo Kong is a 63 y.o. male with a PMH significant for insulin-dependent diabetes mellitus, HTN, HLD, peripheral neuropathy who comes to the ED due to difficulty speaking.  Patient complains of difficulty speaking today.  He says he knew what he wanted to say, but was speaking and saying words that did not make sense.  He says he \"just did not feel like myself\".  He complains of mild headache.  He denies chest pain, paresthesias, weakness.      Personal History     Past Medical History:   Diagnosis Date    Arthritis     Asthma     inhaler daily     Diabetes mellitus 2000    insulin daily; checks blood sugars on occasion-run 150-180    Elevated cholesterol     History of sleep apnea     years ago diagnosed but never used a machine at home     History of staph infection     wound infection after left knee replacement -saw infectious disease MD     Hypertension     Wears glasses            Past Surgical History:   Procedure Laterality Date    COLONOSCOPY      HIP SURGERY Right 2014    [facility]    INCISION AND DRAINAGE OF WOUND Left 2010    x2 of total knee replacement wound -iv antibiotics    KNEE SURGERY Bilateral     Right knee- , left knee- - [facility]    LUMBAR LAMINECTOMY WITH FUSION N/A 2018    Procedure: TLIF, OSTEOTOMY L4-5 , POST FUSION L4-5;  Surgeon: Yo Bryson MD;  Location:  [facility];  Service: Neurosurgery    LUMBAR LAMINECTOMY WITH FUSION N/A 2018    Procedure: LUMBAR LAMINECTOMY POSTERIOR LUMBAR INTERBODY FUSION;  Surgeon: Yo Bryson MD;  Location:  [facility];  Service: Neurosurgery    ROTATOR CUFF REPAIR Right     TONSILLECTOMY         Family History: family " history includes Hypertension in his mother.     Social History:  reports that he has never smoked. He has never used smokeless tobacco. He reports that he does not drink alcohol and does not use drugs.  Social History     Social History Narrative    Not on file       Medications:  Available home medication information reviewed.  acetaminophen, albuterol sulfate HFA, alfuzosin, amLODIPine, atorvastatin, brimonidine, budesonide-formoterol, cetirizine, dicyclomine, gabapentin, glucose blood, hydrocortisone, insulin NPH-insulin regular, ondansetron, and pantoprazole    Allergies   Allergen Reactions    Carrot [Daucus Carota] Swelling    Shellfish Allergy Swelling and Unknown (See Comments)     tongue swelling    Strawberry Swelling     tongue swelling    Banana Unknown (See Comments)    Sulfamethoxazole-Trimethoprim Other (See Comments)     Patient developed blisters on his hands.  Patient developed blisters on his hands.    Vancomycin Rash     Received vancomycin x1 dose 7/18/22       Objective   Objective     Vital Signs:   Temp:  [97.9 °F (36.6 °C)] 97.9 °F (36.6 °C)  Heart Rate:  [64-80] 64  Resp:  [16] 16  BP: (129-147)/() 129/93  Total (NIH Stroke Scale): 3    Physical Exam   Constitutional: Awake, alert  Eyes: PERRLA, sclerae anicteric, no conjunctival injection  HENT: NCAT, mucous membranes moist  Neck: Supple, no thyromegaly, no lymphadenopathy, trachea midline  Respiratory: Clear to auscultation bilaterally, nonlabored respirations   Cardiovascular: RRR, no murmurs, rubs, or gallops, palpable pedal pulses bilaterally  Gastrointestinal: Positive bowel sounds, soft, nontender, nondistended  Musculoskeletal: No bilateral ankle edema, no clubbing or cyanosis to extremities  Psychiatric: Appropriate affect, cooperative  Neurologic: Oriented x 3, strength symmetric in all extremities, Cranial Nerves grossly intact to confrontation, speech clear  Skin: No rashes      Result Review:  I have personally reviewed  the results from the time of this admission to 5/23/2024 00:16 EDT and agree with these findings:  [x]  Laboratory list / accordion  []  Microbiology  [x]  Radiology  [x]  EKG/Telemetry   []  Cardiology/Vascular   []  Pathology  [x]  Old records      LAB RESULTS:      Lab 05/22/24 1954   WBC 8.15   HEMOGLOBIN 13.2   HEMATOCRIT 37.6   PLATELETS 192   NEUTROS ABS 4.83   IMMATURE GRANS (ABS) 0.03   LYMPHS ABS 2.23   MONOS ABS 0.97*   EOS ABS 0.06   MCV 90.2         Lab 05/22/24 1954   SODIUM 129*   POTASSIUM 4.0   CHLORIDE 94*   CO2 24.0   ANION GAP 11.0   BUN 12   CREATININE 1.23   EGFR 65.6   GLUCOSE 101*   CALCIUM 8.9   MAGNESIUM 1.5*         Lab 05/22/24 1954   TOTAL PROTEIN 7.2   ALBUMIN 3.7   GLOBULIN 3.5   ALT (SGPT) 17   AST (SGOT) 23   BILIRUBIN 0.7   ALK PHOS 129*         Lab 05/22/24 1954   HSTROP T 39*                 UA          8/28/2023    16:02 3/12/2024    19:39 5/22/2024    19:56   Urinalysis   Specific Gravity, UA 1.010     1.010  1.007    Ketones, UA  Negative  Negative    Blood, UA Negative     Negative  Negative    Leukocytes, UA Negative     Negative  Negative    Nitrite, UA  Negative  Negative       Details          This result is from an external source.               Microbiology Results (last 10 days)       ** No results found for the last 240 hours. **            CT Angiogram Neck    Result Date: 5/22/2024  CT ANGIOGRAM NECK, CT ANGIOGRAM HEAD W AI ANALYSIS OF LVO Date of Exam: 5/22/2024 9:17 PM EDT Indication: aphasia. Comparison: None available. Technique: CTA of the head and neck was performed before and after the uneventful intravenous administration of 100 mL Isovue 370. Reconstructed coronal and sagittal images were also obtained. In addition, a 3-D volume rendered image was created for interpretation. Automated exposure control and iterative reconstruction methods were used. Findings: History of present illness describes speech problems, approximately 1 month of intermittent  episodes of inability to speak properly, lasting seconds at a time. No associated symptoms. CTA NECK: Axial scan images and reconstruction images show mild left and moderate right carotid bulb calcification but no evidence of hemodynamically significant common internal or external carotid artery stenosis in the neck. Vertebral arteries are seen  in a limited fashion proximally, and are symmetric, small-moderate in size, otherwise normal in appearance with no evidence of hemodynamically significant stenosis to the level of the skull base. No significant incidental soft tissue neck pathology is appreciated. There appears to be moderate peripheral scarring of the included lung apices.     Impression: No evidence of hemodynamically significant carotid or vertebral artery stenosis in the neck. CTA HEAD: There appears to be approximately 65% % stenosis of the distal intracranial right ICA at the junction of the cavernous and supraclinoid segments, axial image numbers 329 and 330 of series 9. No other significant ICA stenosis is appreciated. Anterior and middle cerebral arteries, distal vertebral artery, basilar artery and posterior cerebral arteries show no evidence of significant stenosis. Posterior circulation vessels are noted to be relatively small in diameter. No evidence of aneurysm or other vascular malformation is seen. There is grossly normal contrast opacification of the major dural venous sinuses.. Impression: Approximately 65% stenosis of the distal intracranial right ICA by NASCET criteria. No evidence of hemodynamically significant intracranial vascular stenosis is seen elsewhere. Electronically Signed: Quentin Amaya MD  5/22/2024 10:32 PM EDT  Workstation ID: [id number]    CT Angiogram Head w AI Analysis of LVO    Result Date: 5/22/2024  CT ANGIOGRAM NECK, CT ANGIOGRAM HEAD W AI ANALYSIS OF LVO Date of Exam: 5/22/2024 9:17 PM EDT Indication: aphasia. Comparison: None available. Technique: CTA of the head and  neck was performed before and after the uneventful intravenous administration of 100 mL Isovue 370. Reconstructed coronal and sagittal images were also obtained. In addition, a 3-D volume rendered image was created for interpretation. Automated exposure control and iterative reconstruction methods were used. Findings: History of present illness describes speech problems, approximately 1 month of intermittent episodes of inability to speak properly, lasting seconds at a time. No associated symptoms. CTA NECK: Axial scan images and reconstruction images show mild left and moderate right carotid bulb calcification but no evidence of hemodynamically significant common internal or external carotid artery stenosis in the neck. Vertebral arteries are seen  in a limited fashion proximally, and are symmetric, small-moderate in size, otherwise normal in appearance with no evidence of hemodynamically significant stenosis to the level of the skull base. No significant incidental soft tissue neck pathology is appreciated. There appears to be moderate peripheral scarring of the included lung apices.     Impression: No evidence of hemodynamically significant carotid or vertebral artery stenosis in the neck. CTA HEAD: There appears to be approximately 65% % stenosis of the distal intracranial right ICA at the junction of the cavernous and supraclinoid segments, axial image numbers 329 and 330 of series 9. No other significant ICA stenosis is appreciated. Anterior and middle cerebral arteries, distal vertebral artery, basilar artery and posterior cerebral arteries show no evidence of significant stenosis. Posterior circulation vessels are noted to be relatively small in diameter. No evidence of aneurysm or other vascular malformation is seen. There is grossly normal contrast opacification of the major dural venous sinuses.. Impression: Approximately 65% stenosis of the distal intracranial right ICA by NASCET criteria. No evidence of  hemodynamically significant intracranial vascular stenosis is seen elsewhere. Electronically Signed: Quentin Amaya MD  5/22/2024 10:32 PM EDT  Workstation ID: [id number]    CT Head Without Contrast    Result Date: 5/22/2024  CT HEAD WO CONTRAST Date of Exam: 5/22/2024 9:17 PM EDT Indication: aphasia. Comparison: 7/19/2022 head CT scan Technique: Axial CT images were obtained of the head without contrast administration.  Automated exposure control and iterative construction methods were used. Findings: History indicates aphasia, study is not ordered as code-19 protocol. 7/18/2022 exam report describes no acute intracranial abnormality. Today's study shows the calvarium to appear intact. Included paranasal sinuses and mastoids appear clear. No gross abnormalities are seen of the included orbits. There is age-appropriate generalized cerebral atrophy. There is no no evidence of intracranial hemorrhage, contusion, or edema, no evidence of mass mass effect, acute or old infarct, hydrocephalus, or abnormal extra-axial collection.     Impression: Impression: Age-appropriate generalized cerebral atrophy. No evidence of acute intracranial disease is seen. Electronically Signed: Quentin Amaya MD  5/22/2024 10:20 PM EDT  Workstation ID: [id number]    XR Chest 1 View    Result Date: 5/22/2024  XR CHEST 1 VW Date of Exam: 5/22/2024 7:53 PM EDT Indication: Weak/Dizzy/AMS triage protocol Comparison: 3/12/2024 Findings: Heart size normal. Mild streaky bilateral perihilar opacities appear stable from the prior study. No superimposed new consolidation. No pneumothorax or pleural effusion. The osseous structures are grossly intact.     Impression: Impression: No acute process. Electronically Signed: Chandan Brown MD  5/22/2024 8:59 PM EDT  Workstation ID: [id number]     Results for orders placed during the hospital encounter of 07/18/22    Adult Transthoracic Echo Complete W/ Cont if Necessary Per Protocol    Interpretation Summary  · Left  ventricular ejection fraction appears to be 56 - 60%. Left ventricular systolic function is normal.  · Left ventricular wall thickness is consistent with mild concentric hypertrophy.  · Left ventricular diastolic function was indeterminate.      Assessment & Plan   Assessment & Plan       Aphasia    Hypertension    Type 2 diabetes mellitus, with long-term current use of insulin    Hyponatremia    HLD (hyperlipidemia)    Peripheral neuropathy    Hypomagnesemia    Amadeo Kong is a 63 y.o. male with a PMH significant for insulin-dependent diabetes mellitus, HTN, HLD, peripheral neuropathy who comes to the ED due to difficulty speaking.      Aphasia  --stroke sofya, neurology consulted  --serial NIH/neurochecks  --aspirin 324 given  --continue aspirin 81mg oral/ 300 rectal daily  --high intensity statin  --lipid panel, a1c, tsh  --echo with bubble  --CT head, CTA head and neck above  --MRI pending  --PT/OT/SLP  --Telemetry  --Permissive hypertension pending MRI results    Hypertension  - Hold home antihypertensives pending MRI    Hyponatremia, mild  - Chronic  - A.m. labs    HLD  - Atorvastatin    Diabetes mellitus type 2  - SSI with Glucomander protocol  - Hemoglobin A1c for a.m.    Peripheral neuropathy  - Continue gabapentin    Hypomagnesemia  - Replace per protocol  - A.m. lab    DVT prophylaxis:  Mechanical DVT prophylaxis orders are signed and held.            CODE STATUS:    Code Status and Medical Interventions:   Ordered at: 05/23/24 0015     Level Of Support Discussed With:    Patient    Next of Kin (If No Surrogate)     Code Status (Patient has no pulse and is not breathing):    CPR (Attempt to Resuscitate)     Medical Interventions (Patient has pulse or is breathing):    Full Support       Expected Discharge   Expected discharge date/ time has not been documented.     Radha Steven, DO  05/23/24

## 2024-05-23 NOTE — PROGRESS NOTES
[facility] Medicine Services  PROGRESS NOTE    Patient Name: Amadeo Kong  : 1960  MRN: [id number]    Date of Admission: 2024  Primary Care Physician: Kimber Roland MD    Subjective   Subjective     CC:  Difficulty speaking     HPI:  No further difficulty with speaking.  Denies N/V/F/C      Objective   Objective     Vital Signs:   Temp:  [97.8 °F (36.6 °C)-98.5 °F (36.9 °C)] 97.8 °F (36.6 °C)  Heart Rate:  [64-80] 70  Resp:  [16-18] 18  BP: (129-161)/() 145/98     Physical Exam:  Constitutional: -American male no acute distress, awake, alert  HENT: NCAT, mucous membranes moist  Respiratory: Clear to auscultation bilaterally, respiratory effort normal   Cardiovascular: RRR, no murmurs, rubs, or gallops  Gastrointestinal: Positive bowel sounds, soft, nontender, nondistended  Musculoskeletal: No bilateral ankle edema  Psychiatric: Appropriate affect, cooperative  Neurologic: Oriented x 3 speech clear  Skin: No rashes    Results Reviewed:  LAB RESULTS:      Lab 24  0513 24   WBC 6.92 8.15   HEMOGLOBIN 12.5* 13.2   HEMATOCRIT 36.6* 37.6   PLATELETS 169 192   NEUTROS ABS 3.77 4.83   IMMATURE GRANS (ABS) 0.03 0.03   LYMPHS ABS 2.23 2.23   MONOS ABS 0.81 0.97*   EOS ABS 0.05 0.06   MCV 89.7 90.2         Lab 24  0513 24   SODIUM 131* 129*   POTASSIUM 4.0 4.0   CHLORIDE 95* 94*   CO2 25.0 24.0   ANION GAP 11.0 11.0   BUN 11 12   CREATININE 1.01 1.23   EGFR 83.0 65.6   GLUCOSE 143* 101*   CALCIUM 8.9 8.9   MAGNESIUM  --  1.5*   HEMOGLOBIN A1C 8.30*  --    TSH 0.318  --          Lab 24   TOTAL PROTEIN 7.2   ALBUMIN 3.7   GLOBULIN 3.5   ALT (SGPT) 17   AST (SGOT) 23   BILIRUBIN 0.7   ALK PHOS 129*         Lab 24   HSTROP T 39*         Lab 24  0513   CHOLESTEROL 133   LDL CHOL 60   HDL CHOL 60   TRIGLYCERIDES 65             Brief Urine Lab Results  (Last result in the past 365 days)        Color   Clarity    "Blood   Leuk Est   Nitrite   Protein   CREAT   Urine HCG        05/22/24 1956 Yellow   Clear   Negative   Negative   Negative   Negative                   Cultures:  No results found for: \"BLOODCX\", \"URINECX\", \"WOUNDCX\", \"MRSACX\", \"RESPCX\", \"STOOLCX\"    Microbiology Results Abnormal       None            MRI Brain Without Contrast    Result Date: 5/23/2024  MRI BRAIN WO CONTRAST Date of Exam: 5/23/2024 1:45 AM EDT Indication: Stroke, follow up Speech difficulty.  Comparison: None available. Technique:  Routine multiplanar/multisequence sequence images of the brain were obtained without contrast administration. FINDINGS: Foci of T2/FLAIR signal hyperintensity are seen within the bilateral periventricular and subcortical white matter. Midline structures appear unremarkable. No significant mass effect, intracranial hemorrhage, or hydrocephalus is identified. Diffusion-weighted sequences demonstrate no acute infarct.The visualized intracranial flow-voids appear unremarkable. The paranasal sinuses and mastoid air cells show no fluid signal. The orbits, globes, retrobulbar soft tissues appear unremarkable. The visualized superficial soft tissues and cervical spine demonstrate no significant abnormality.     Impression: 1.Findings compatible with chronic microvascular ischemic change. 2.No diffusion restriction is identified to suggest acute infarct. Electronically Signed: Chris Dorsey MD  5/23/2024 7:07 AM EDT  Workstation ID: [id number]    CT Angiogram Neck    Result Date: 5/22/2024  CT ANGIOGRAM NECK, CT ANGIOGRAM HEAD W AI ANALYSIS OF LVO Date of Exam: 5/22/2024 9:17 PM EDT Indication: aphasia. Comparison: None available. Technique: CTA of the head and neck was performed before and after the uneventful intravenous administration of 100 mL Isovue 370. Reconstructed coronal and sagittal images were also obtained. In addition, a 3-D volume rendered image was created for interpretation. Automated exposure control and " iterative reconstruction methods were used. Findings: History of present illness describes speech problems, approximately 1 month of intermittent episodes of inability to speak properly, lasting seconds at a time. No associated symptoms. CTA NECK: Axial scan images and reconstruction images show mild left and moderate right carotid bulb calcification but no evidence of hemodynamically significant common internal or external carotid artery stenosis in the neck. Vertebral arteries are seen  in a limited fashion proximally, and are symmetric, small-moderate in size, otherwise normal in appearance with no evidence of hemodynamically significant stenosis to the level of the skull base. No significant incidental soft tissue neck pathology is appreciated. There appears to be moderate peripheral scarring of the included lung apices.     Impression: No evidence of hemodynamically significant carotid or vertebral artery stenosis in the neck. CTA HEAD: There appears to be approximately 65% % stenosis of the distal intracranial right ICA at the junction of the cavernous and supraclinoid segments, axial image numbers 329 and 330 of series 9. No other significant ICA stenosis is appreciated. Anterior and middle cerebral arteries, distal vertebral artery, basilar artery and posterior cerebral arteries show no evidence of significant stenosis. Posterior circulation vessels are noted to be relatively small in diameter. No evidence of aneurysm or other vascular malformation is seen. There is grossly normal contrast opacification of the major dural venous sinuses.. Impression: Approximately 65% stenosis of the distal intracranial right ICA by NASCET criteria. No evidence of hemodynamically significant intracranial vascular stenosis is seen elsewhere. Electronically Signed: Quentin Amaya MD  5/22/2024 10:32 PM EDT  Workstation ID: [id number]    CT Angiogram Head w AI Analysis of LVO    Result Date: 5/22/2024  CT ANGIOGRAM NECK, CT ANGIOGRAM  HEAD W AI ANALYSIS OF LVO Date of Exam: 5/22/2024 9:17 PM EDT Indication: aphasia. Comparison: None available. Technique: CTA of the head and neck was performed before and after the uneventful intravenous administration of 100 mL Isovue 370. Reconstructed coronal and sagittal images were also obtained. In addition, a 3-D volume rendered image was created for interpretation. Automated exposure control and iterative reconstruction methods were used. Findings: History of present illness describes speech problems, approximately 1 month of intermittent episodes of inability to speak properly, lasting seconds at a time. No associated symptoms. CTA NECK: Axial scan images and reconstruction images show mild left and moderate right carotid bulb calcification but no evidence of hemodynamically significant common internal or external carotid artery stenosis in the neck. Vertebral arteries are seen  in a limited fashion proximally, and are symmetric, small-moderate in size, otherwise normal in appearance with no evidence of hemodynamically significant stenosis to the level of the skull base. No significant incidental soft tissue neck pathology is appreciated. There appears to be moderate peripheral scarring of the included lung apices.     Impression: No evidence of hemodynamically significant carotid or vertebral artery stenosis in the neck. CTA HEAD: There appears to be approximately 65% % stenosis of the distal intracranial right ICA at the junction of the cavernous and supraclinoid segments, axial image numbers 329 and 330 of series 9. No other significant ICA stenosis is appreciated. Anterior and middle cerebral arteries, distal vertebral artery, basilar artery and posterior cerebral arteries show no evidence of significant stenosis. Posterior circulation vessels are noted to be relatively small in diameter. No evidence of aneurysm or other vascular malformation is seen. There is grossly normal contrast opacification of  the major dural venous sinuses.. Impression: Approximately 65% stenosis of the distal intracranial right ICA by NASCET criteria. No evidence of hemodynamically significant intracranial vascular stenosis is seen elsewhere. Electronically Signed: Quentin Amaya MD  5/22/2024 10:32 PM EDT  Workstation ID: [id number]    CT Head Without Contrast    Result Date: 5/22/2024  CT HEAD WO CONTRAST Date of Exam: 5/22/2024 9:17 PM EDT Indication: aphasia. Comparison: 7/19/2022 head CT scan Technique: Axial CT images were obtained of the head without contrast administration.  Automated exposure control and iterative construction methods were used. Findings: History indicates aphasia, study is not ordered as code-19 protocol. 7/18/2022 exam report describes no acute intracranial abnormality. Today's study shows the calvarium to appear intact. Included paranasal sinuses and mastoids appear clear. No gross abnormalities are seen of the included orbits. There is age-appropriate generalized cerebral atrophy. There is no no evidence of intracranial hemorrhage, contusion, or edema, no evidence of mass mass effect, acute or old infarct, hydrocephalus, or abnormal extra-axial collection.     Impression: Impression: Age-appropriate generalized cerebral atrophy. No evidence of acute intracranial disease is seen. Electronically Signed: Quentin Amaya MD  5/22/2024 10:20 PM EDT  Workstation ID: [id number]    XR Chest 1 View    Result Date: 5/22/2024  XR CHEST 1 VW Date of Exam: 5/22/2024 7:53 PM EDT Indication: Weak/Dizzy/AMS triage protocol Comparison: 3/12/2024 Findings: Heart size normal. Mild streaky bilateral perihilar opacities appear stable from the prior study. No superimposed new consolidation. No pneumothorax or pleural effusion. The osseous structures are grossly intact.     Impression: Impression: No acute process. Electronically Signed: Chandan Brown MD  5/22/2024 8:59 PM EDT  Workstation ID: [id number]     Results for orders placed during the  hospital encounter of 07/18/22    Adult Transthoracic Echo Complete W/ Cont if Necessary Per Protocol    Interpretation Summary  · Left ventricular ejection fraction appears to be 56 - 60%. Left ventricular systolic function is normal.  · Left ventricular wall thickness is consistent with mild concentric hypertrophy.  · Left ventricular diastolic function was indeterminate.      Current medications:  Scheduled Meds:aspirin, 81 mg, Oral, Daily   Or  aspirin, 300 mg, Rectal, Daily  atorvastatin, 80 mg, Oral, Nightly  brimonidine, 1 drop, Both Eyes, TID  budesonide-formoterol, 2 puff, Inhalation, BID - RT  cetirizine, 10 mg, Oral, Daily  clopidogrel, 75 mg, Oral, Daily  gabapentin, 300 mg, Oral, TID  insulin glargine, 1-200 Units, Subcutaneous, Nightly - Glucommander  insulin lispro, 1-200 Units, Subcutaneous, With Meals, HS, AND Midsleep  magnesium sulfate, 2 g, Intravenous, Q2H  pantoprazole, 40 mg, Oral, Q AM  sodium chloride, 10 mL, Intravenous, Q12H  sodium chloride, 10 mL, Intravenous, Q12H  tamsulosin, 0.4 mg, Oral, Nightly      Continuous Infusions:   PRN Meds:.  acetaminophen    senna-docusate sodium **AND** polyethylene glycol **AND** bisacodyl **AND** bisacodyl    Calcium Replacement - Follow Nurse / BPA Driven Protocol    dextrose    dextrose    dicyclomine    glucagon (human recombinant)    insulin lispro    Magnesium Standard Dose Replacement - Follow Nurse / BPA Driven Protocol    ondansetron ODT **OR** ondansetron    Phosphorus Replacement - Follow Nurse / BPA Driven Protocol    Potassium Replacement - Follow Nurse / BPA Driven Protocol    sodium chloride    sodium chloride    sodium chloride    sodium chloride    sodium chloride    Assessment & Plan   Assessment & Plan     Active Hospital Problems    Diagnosis  POA    **Aphasia [R47.01]  Yes    Hypomagnesemia [E83.42]  Unknown    Peripheral neuropathy [G62.9]  Yes    HLD (hyperlipidemia) [E78.5]  Yes    Hyponatremia [E87.1]  Yes    Type 2 diabetes  mellitus, with long-term current use of insulin [E11.9, Z79.4]  Not Applicable    Hypertension [I10]  Yes      Resolved Hospital Problems   No resolved problems to display.        Brief Hospital Course to date:  Aphasia    Hypertension    Type 2 diabetes mellitus, with long-term current use of insulin    Hyponatremia    HLD (hyperlipidemia)    Peripheral neuropathy    Hypomagnesemia     Amadeo Kong is a 63 y.o. male with a PMH significant for insulin-dependent diabetes mellitus, HTN, HLD, peripheral neuropathy who comes to the ED due to difficulty speaking.       Aphasia  --stroke sofya, neurology consulted  --serial NIH/neurochecks  --aspirin 324 given  --continue aspirin 81mg oral/ 300 rectal daily  --high intensity statin  --lipid panel and tsh reviewed   --echo with bubble: Saline test results are negative  --CT head, CTA head and neck: Approximately 65% stenosis of the distal intracranial right ICA by NASCET criteria. No evidence of hemodynamically significant intracranial vascular stenosis is seen elsewhere   --MRI Brain: Findings compatible with chronic microvascular ischemic change. .No diffusion restriction is identified to suggest acute infarct.  --PT/OT/SLP  --Telemetry     Hypertension  - Restart Lisinopril 10 mg p.o. QD     Hyponatremia, mild  - Chronic  - A.m. labs     HLD  - Atorvastatin     Diabetes mellitus type 2  - SSI with Glucomander protocol  - Hemoglobin A1c 8.3%     Peripheral neuropathy  - Continue gabapentin     Hypomagnesemia  - Replace per protocol  - A.m. lab    Expected Discharge Location and Transportation: home   Expected Discharge 05/24/2024  Expected discharge date/ time has not been documented.     DVT prophylaxis:  Mechanical DVT prophylaxis orders are present.         AM-PAC 6 Clicks Score (PT): 23 (05/23/24 0043)    CODE STATUS:   Code Status and Medical Interventions:   Ordered at: 05/23/24 0015     Level Of Support Discussed With:    Patient    Next of Kin (If No Surrogate)      Code Status (Patient has no pulse and is not breathing):    CPR (Attempt to Resuscitate)     Medical Interventions (Patient has pulse or is breathing):    Full Support       Zoe Kennedy MD  05/23/24

## 2024-05-23 NOTE — CONSULTS
Stroke Consult Note    Patient Name: Amadeo Kong   MRN: [id number]  Age: 63 y.o.  Sex: male  : 1960    Primary Care Physician: Kimber Roland MD  Referring Physician: Dr. Reagan    TIME STROKE TEAM CALLED: 0 EST     TIME PATIENT SEEN:  EST    Handedness: Left  Race: -American    Chief Complaint/Reason for Consultation: Speech difficulty    HPI: Mr. Kong is a 63-year-old male with PMH of arthritis, asthma, HTN, HLD, T2DM, glaucoma and neuropathy.  He presents to [facility] ED with transient episodes of speech difficulty for the past month.  Patient reports having difficulty getting his words out and also slurring his words.  Family brought patient in to [facility] ED for further evaluation of presenting symptoms.    Initial NIH 3.  Blood pressure 161/109.  On exam patient is able answer questions appropriately and follow two-step commands.  Denies any sensory deficits.  Patient does complain of a mild headache.  Family reports patient fell 2 months ago in the street but did not hit his head. Blind in left eye.  Mild expressive aphasia with mild dysarthria noted during conversation.  Strength in all extremities 5/5.  Uses a rolling walker for mobility at baseline.  Non-smoker and no EtOH use.  Takes prescribed medications routinely.    Last Known Normal Date/Time: 1 month ago EST     Review of Systems   Constitutional:  Negative for fever.   HENT:  Negative for trouble swallowing and voice change.    Eyes:  Positive for visual disturbance (Blind left eye).   Respiratory:  Negative for shortness of breath.    Cardiovascular:  Negative for chest pain.   Gastrointestinal:  Positive for nausea. Negative for abdominal pain.   Neurological:  Positive for speech difficulty and headaches. Negative for dizziness, facial asymmetry, weakness, light-headedness and numbness.   Psychiatric/Behavioral:  Negative for confusion.       Past Medical History:   Diagnosis Date    Arthritis     Asthma     inhaler daily      Diabetes mellitus 2000    insulin daily; checks blood sugars on occasion-run 150-180    Elevated cholesterol     History of sleep apnea     years ago diagnosed but never used a machine at home     History of staph infection 2010    wound infection after left knee replacement -saw infectious disease MD     Hypertension     Wears glasses      Past Surgical History:   Procedure Laterality Date    COLONOSCOPY      HIP SURGERY Right 08/11/2014    [facility]    INCISION AND DRAINAGE OF WOUND Left 2010    x2 of total knee replacement wound -iv antibiotics    KNEE SURGERY Bilateral     Right knee- 2008, left knee- 2010- [facility]    LUMBAR LAMINECTOMY WITH FUSION N/A 7/27/2018    Procedure: TLIF, OSTEOTOMY L4-5 , POST FUSION L4-5;  Surgeon: Yo Bryson MD;  Location:  [facility];  Service: Neurosurgery    LUMBAR LAMINECTOMY WITH FUSION N/A 8/27/2018    Procedure: LUMBAR LAMINECTOMY POSTERIOR LUMBAR INTERBODY FUSION;  Surgeon: Yo Bryson MD;  Location:  [facility];  Service: Neurosurgery    ROTATOR CUFF REPAIR Right     TONSILLECTOMY       Family History   Problem Relation Age of Onset    Hypertension Mother      Social History     Socioeconomic History    Marital status: Single   Tobacco Use    Smoking status: Never    Smokeless tobacco: Never   Substance and Sexual Activity    Alcohol use: No    Drug use: No    Sexual activity: Defer     Allergies   Allergen Reactions    Carrot [Daucus Carota] Swelling    Shellfish Allergy Swelling and Unknown (See Comments)     tongue swelling    Strawberry Swelling     tongue swelling    Banana Unknown (See Comments)    Sulfamethoxazole-Trimethoprim Other (See Comments)     Patient developed blisters on his hands.  Patient developed blisters on his hands.    Vancomycin Rash     Received vancomycin x1 dose 7/18/22     Prior to Admission medications    Medication Sig Start Date End Date Taking? Authorizing Provider   acetaminophen (TYLENOL) 325 MG tablet Take 2 tablets by  mouth Every 6 (Six) Hours As Needed for Fever. 7/22/22   Murtaza Craig APRN   albuterol sulfate  (90 Base) MCG/ACT inhaler Inhale 2 puffs 4 (Four) Times a Day As Needed for Shortness of Air.    Shayna Cortez MD   alfuzosin (UROXATRAL) 10 MG 24 hr tablet Take 1 tablet by mouth Daily. 7/23/22   Murtaza Craig APRN   amLODIPine (NORVASC) 10 MG tablet Take 10 mg by mouth Daily.    Shayna Cortez MD   atorvastatin (LIPITOR) 20 MG tablet Take 40 mg by mouth Every Morning. 2/7/18   Shayna Cortez MD   brimonidine (ALPHAGAN) 0.2 % ophthalmic solution Apply 1 drop to eye(s) as directed by provider. 4/26/22   Shayna Cortez MD   budesonide-formoterol (SYMBICORT) 160-4.5 MCG/ACT inhaler Inhale 2 puffs 2 (Two) Times a Day. 7/22/22   Murtaza Craig APRN   cetirizine (Allergy, Cetirizine,) 10 MG tablet Take 1 tablet by mouth Daily for 30 days. 4/7/24 5/7/24  Maricarmen Argueta PA   dicyclomine (BENTYL) 20 MG tablet Take 1 tablet by mouth Every 8 (Eight) Hours As Needed (pain, cramps). 12/13/22   Gutierrez Olivas MD   gabapentin (NEURONTIN) 300 MG capsule Take 1 capsule by mouth Daily for 7 days.  Patient taking differently: Take 300 mg by mouth 3 (Three) Times a Day. 9/7/18 6/6/23  Tatum Albright MD   glucose blood (OneTouch Ultra) test strip TEST BLOOD SUGAR THREE TIMES DAILY AS DIRECTED 1/27/22   Shayna Cortez MD   HUMULIN 70/30 (70-30) 100 UNIT/ML injection Inject 45 Units under the skin into the appropriate area as directed 2 (Two) Times a Day With Meals. 2/8/18   Shayna Cortez MD   hydrocortisone 0.5 % ointment Apply 1 application topically to the appropriate area as directed 2 (Two) Times a Day. 12/13/22   Gutierrez Olivas MD   ondansetron (ZOFRAN) 4 MG tablet Take 1 tablet by mouth Every 8 (Eight) Hours As Needed for Nausea. 12/13/22   Gutierrez Olivas MD   pantoprazole (PROTONIX) 40 MG EC tablet Take 1 tablet by mouth Every Morning. 7/23/22    Murtaza Craig APRN         Temp:  [97.9 °F (36.6 °C)] 97.9 °F (36.6 °C)  Heart Rate:  [73-80] 73  Resp:  [16] 16  BP: (132-147)/(82-96) 132/82  Neurological Exam  Mental Status  Awake, alert and oriented to person, place and time. Oriented to person, place and time. Oriented to person, place, and time. Mild dysarthria present. Expressive aphasia present.    Cranial Nerves  CN II: Vision test: PERRLA right eye only, blind in left eye. Right visual acuity: Normal. Left visual acuity: No light perception. Visual fields full to confrontation. Blind left eye.  CN III, IV, VI: Extraocular movements intact bilaterally. Pupils equal round and reactive to light bilaterally. PERRLA right eye only.  CN V: Facial sensation is normal.  CN VII: Full and symmetric facial movement.  CN VIII: Hearing intact.  CN XI: Shoulder shrug strength is normal.  CN XII: Tongue midline without atrophy or fasciculations.    Motor  Normal muscle bulk throughout. Normal muscle tone. Strength is 5/5 throughout all four extremities.    Sensory  Light touch is normal in upper and lower extremities.     Coordination  Right: Finger-to-nose normal.Left: Finger-to-nose normal.    Gait    Unable to assess.      Physical Exam  Constitutional:       General: He is not in acute distress.     Appearance: Normal appearance.   HENT:      Head: Normocephalic and atraumatic.      Nose: Nose normal.      Mouth/Throat:      Mouth: Mucous membranes are dry.   Eyes:      Extraocular Movements: Extraocular movements intact.      Pupils: Pupils are equal, round, and reactive to light.      Comments: PERRLA right eye only, blind in left eye   Cardiovascular:      Pulses: Normal pulses.   Pulmonary:      Effort: Pulmonary effort is normal.   Abdominal:      General: Abdomen is flat.   Musculoskeletal:         General: Normal range of motion.      Cervical back: Normal range of motion.   Skin:     General: Skin is warm and dry.   Neurological:      Mental Status: He is  oriented to person, place, and time.      Cranial Nerves: Dysarthria present. No cranial nerve deficit.      Sensory: No sensory deficit.      Motor: Motor strength is normal.No weakness.      Coordination: Coordination normal.   Psychiatric:         Mood and Affect: Mood normal.         Behavior: Behavior normal.         Acute Stroke Data    Thrombolytic Inclusion / Exclusion Criteria    Time: 22:59 EDT  Person Administering Scale: REID Segundo    Inclusion Criteria  [x]   18 years of age or greater   []   Onset of symptoms < 4.5 hours before beginning treatment (stroke onset = time patient was last seen well or without symptoms).   []   Diagnosis of acute ischemic stroke causing measurable disabling deficit (Complete Hemianopia, Any Aphasia, Visual or Sensory Extinction, Any weakness limiting sustained effort against gravity)   []   Any remaining deficit considered potentially disabling in view of patient and practitioner   Exclusion criteria (Do not proceed with Alteplase if any are checked under exclusion criteria)  [x]   Onset unknown or GREATER than 4.5 hours   []   ICH on CT/MRI   []   CT demonstrates hypodensity representing acute or subacute infarct   []   Significant head trauma or prior stroke in the previous 3 months   []   Symptoms suggestive of subarachnoid hemorrhage   []   History of un-ruptured intracranial aneurysm GREATER than 10 mm   []   Recent intracranial or intraspinal surgery within the last 3 months   []   Arterial puncture at a non-compressible site in the previous 7 days   []   Active internal bleeding   []   Acute bleeding tendency   []   Platelet count LESS than 100,000 for known hematological diseases such as leukemia, thrombocytopenia or chronic cirrhosis   []   Current use of anticoagulant with INR GREATER than 1.7 or PT GREATER than 15 seconds, aPTT GREATER than 40 seconds   []   Heparin received within 48 hours, resulting in abnormally elevated aPTT GREATER than upper limit  of normal   []   Current use of direct thrombin inhibitors or direct factor Xa inhibitors in the past 48 hours   []   Elevated blood pressure refractory to treatment (systolic GREATER than 185 mm/Hg or diastolic  GREATER than 110 mm/Hg   []   Suspected infective endocarditis and aortic arch dissection   []   Current use of therapeutic treatment dose of low-molecular-weight heparin (LMWH) within the previous 24 hours   []   Structural GI malignancy or bleed   Relative exclusion for all patients  []   Only minor non-disabling symptoms   []   Pregnancy   []   Seizure at onset with postictal residual neurological impairments   []   Major surgery or previous trauma within past 14 days   []   History of previous spontaneous ICH, intracranial neoplasm, or AV malformation   []   Postpartum (within previous 14 days)   []   Recent GI or urinary tract hemorrhage (within previous 21 days)   []   Recent acute MI (within previous 3 months)   []   History of un-ruptured intracranial aneurysm LESS than 10 mm   []   History of ruptured intracranial aneurysm   []   Blood glucose LESS than 50 mg/dL (2.7 mmol/L)   []   Dural puncture within the last 7 days   []   Known GREATER than 10 cerebral microbleeds   Additional exclusions for patients with symptoms onset between 3 and 4.5 hours.  []   Age > 80.   []   On any anticoagulants regardless of INR  >>> Warfarin (Coumadin), Heparin, Enoxaparin (Lovenox), fondaparinux (Arixtra), bivalirudin (Angiomax), Argatroban, dabigatran (Pradaxa), rivaroxaban (Xarelto), or apixaban (Eliquis)   []   Severe stroke (NIHSS > 25).   []   History of BOTH diabetes and previous ischemic stroke.   []   The risks and benefits have been discussed with the patient or family related to the administration of IV thrombolytic therapy for stroke symptoms.   []   I have discussed and reviewed the patient's case and imaging with the attending prior to IV thrombolytic therapy.   NA Time IV thrombolytic administered        Hospital Meds:  Scheduled-    Infusions-     PRNs-   sodium chloride    Functional Status Prior to Current Stroke/Jojo Score:   MODIFIED JOJO SCALE (to be assessed for each patient having history of stroke) []Stroke history but not assessed  []0: No symptoms at all  [x]1: No significant disability despite symptoms  []2: Slight disability  []3: Moderate disability  []4: Moderately severe disability  []5: Severe disability  []6: Death        NIH Stroke Scale  Time: 22:59 EDT  Person Administering Scale: REID Segundo    1a  Level of consciousness: 0=alert; keenly responsive   1b. LOC questions:  0=Answers both questions correctly   1c. LOC commands: 0=Performs both tasks correctly   2.  Best Gaze: 0=normal   3.  Visual: 1=Partial hemianopia   4. Facial Palsy: 0=Normal symmetric movement   5a.  Motor left arm: 0=No drift, limb holds 90 (or 45) degrees for full 10 seconds   5b.  Motor right arm: 0=No drift, limb holds 90 (or 45) degrees for full 10 seconds   6a. motor left le=No drift, limb holds 90 (or 45) degrees for full 10 seconds   6b  Motor right le=No drift, limb holds 90 (or 45) degrees for full 10 seconds   7. Limb Ataxia: 0=Absent   8.  Sensory: 0=Normal; no sensory loss   9. Best Language:  1=Mild to moderate aphasia; some obvious loss of fluency or facility of comprehension without significant limitation on ideas expressed or form of expression.   10. Dysarthria: 1=Mild to moderate, patient slurs at least some words and at worst, can be understood with some difficulty   11. Extinction and Inattention: 0=No abnormality    Total:   3       Results Reviewed:  I have personally reviewed current lab, radiology, and data and agree with results.    Results for orders placed during the hospital encounter of 22    Adult Transthoracic Echo Complete W/ Cont if Necessary Per Protocol    Interpretation Summary  · Left ventricular ejection fraction appears to be 56 - 60%. Left ventricular  systolic function is normal.  · Left ventricular wall thickness is consistent with mild concentric hypertrophy.  · Left ventricular diastolic function was indeterminate.     WBC   Date Value Ref Range Status   05/22/2024 8.15 3.40 - 10.80 10*3/mm3 Final     RBC   Date Value Ref Range Status   05/22/2024 4.17 4.14 - 5.80 10*6/mm3 Final     Hemoglobin   Date Value Ref Range Status   05/22/2024 13.2 13.0 - 17.7 g/dL Final     Hematocrit   Date Value Ref Range Status   05/22/2024 37.6 37.5 - 51.0 % Final     MCV   Date Value Ref Range Status   05/22/2024 90.2 79.0 - 97.0 fL Final     MCH   Date Value Ref Range Status   05/22/2024 31.7 26.6 - 33.0 pg Final     MCHC   Date Value Ref Range Status   05/22/2024 35.1 31.5 - 35.7 g/dL Final     RDW   Date Value Ref Range Status   05/22/2024 13.4 12.3 - 15.4 % Final     RDW-SD   Date Value Ref Range Status   05/22/2024 43.9 37.0 - 54.0 fl Final     MPV   Date Value Ref Range Status   05/22/2024 8.4 6.0 - 12.0 fL Final     Platelets   Date Value Ref Range Status   05/22/2024 192 140 - 450 10*3/mm3 Final     Neutrophil %   Date Value Ref Range Status   05/22/2024 59.2 42.7 - 76.0 % Final     Lymphocyte %   Date Value Ref Range Status   05/22/2024 27.4 19.6 - 45.3 % Final     Monocyte %   Date Value Ref Range Status   05/22/2024 11.9 5.0 - 12.0 % Final     Eosinophil %   Date Value Ref Range Status   05/22/2024 0.7 0.3 - 6.2 % Final     Basophil %   Date Value Ref Range Status   05/22/2024 0.4 0.0 - 1.5 % Final     Immature Grans %   Date Value Ref Range Status   05/22/2024 0.4 0.0 - 0.5 % Final     Neutrophils, Absolute   Date Value Ref Range Status   05/22/2024 4.83 1.70 - 7.00 10*3/mm3 Final     Lymphocytes, Absolute   Date Value Ref Range Status   05/22/2024 2.23 0.70 - 3.10 10*3/mm3 Final     Monocytes, Absolute   Date Value Ref Range Status   05/22/2024 0.97 (H) 0.10 - 0.90 10*3/mm3 Final     Eosinophils, Absolute   Date Value Ref Range Status   05/22/2024 0.06 0.00 - 0.40  10*3/mm3 Final     Basophils, Absolute   Date Value Ref Range Status   05/22/2024 0.03 0.00 - 0.20 10*3/mm3 Final     Immature Grans, Absolute   Date Value Ref Range Status   05/22/2024 0.03 0.00 - 0.05 10*3/mm3 Final     nRBC   Date Value Ref Range Status   05/22/2024 0.0 0.0 - 0.2 /100 WBC Final      Lab Results   Component Value Date    GLUCOSE 101 (H) 05/22/2024    BUN 12 05/22/2024    CREATININE 1.23 05/22/2024    EGFR 65.6 05/22/2024    BCR 9.8 05/22/2024    K 4.0 05/22/2024    CO2 24.0 05/22/2024    CALCIUM 8.9 05/22/2024    ALBUMIN 3.7 05/22/2024    BILITOT 0.7 05/22/2024    AST 23 05/22/2024    ALT 17 05/22/2024    CT Angiogram Neck    Result Date: 5/22/2024  No evidence of hemodynamically significant carotid or vertebral artery stenosis in the neck. CTA HEAD: There appears to be approximately 65% % stenosis of the distal intracranial right ICA at the junction of the cavernous and supraclinoid segments, axial image numbers 329 and 330 of series 9. No other significant ICA stenosis is appreciated. Anterior and middle cerebral arteries, distal vertebral artery, basilar artery and posterior cerebral arteries show no evidence of significant stenosis. Posterior circulation vessels are noted to be relatively small in diameter. No evidence of aneurysm or other vascular malformation is seen. There is grossly normal contrast opacification of the major dural venous sinuses.. Impression: Approximately 65% stenosis of the distal intracranial right ICA by NASCET criteria. No evidence of hemodynamically significant intracranial vascular stenosis is seen elsewhere. Electronically Signed: Quentin Amaya MD  5/22/2024 10:32 PM EDT  Workstation ID: [id number]    CT Angiogram Head w AI Analysis of LVO    Result Date: 5/22/2024  No evidence of hemodynamically significant carotid or vertebral artery stenosis in the neck. CTA HEAD: There appears to be approximately 65% % stenosis of the distal intracranial right ICA at the junction of  the cavernous and supraclinoid segments, axial image numbers 329 and 330 of series 9. No other significant ICA stenosis is appreciated. Anterior and middle cerebral arteries, distal vertebral artery, basilar artery and posterior cerebral arteries show no evidence of significant stenosis. Posterior circulation vessels are noted to be relatively small in diameter. No evidence of aneurysm or other vascular malformation is seen. There is grossly normal contrast opacification of the major dural venous sinuses.. Impression: Approximately 65% stenosis of the distal intracranial right ICA by NASCET criteria. No evidence of hemodynamically significant intracranial vascular stenosis is seen elsewhere. Electronically Signed: Quentin Amaya MD  5/22/2024 10:32 PM EDT  Workstation ID: [id number]    CT Head Without Contrast    Result Date: 5/22/2024  Impression: Age-appropriate generalized cerebral atrophy. No evidence of acute intracranial disease is seen. Electronically Signed: Quentin Amaya MD  5/22/2024 10:20 PM EDT  Workstation ID: [id number]    XR Chest 1 View    Result Date: 5/22/2024  Impression: No acute process. Electronically Signed: Chandan Brown MD  5/22/2024 8:59 PM EDT  Workstation ID: [id number]      Assessment/Plan:  Mr. Kong is a 63-year-old male with PMH of arthritis, asthma, HTN, HLD, T2DM, glaucoma and neuropathy.  He presents to [facility] ED with transient episodes of speech difficulty for the past month.  Patient reports having difficulty getting his words out and also slurring his words.  Family brought patient in to [facility] ED for further evaluation of presenting symptoms. Patient is not a candidate for IV thrombolytic therapy or endovascular therapy due to last known well greater than 24 hours.    Antiplatelet PTA: Aspirin  Anticoagulant PTA: None      Speech difficulty  Differentials to include TIA, CVA  Initiate TIA/CVA without IV thrombolytic therapy order set  N.p.o. until bedside dysphagia screening complete by  RN  Activity as tolerated  Aspirin 81 mg p.o. daily  Plavix 300 mg p.o. x 1 now and then Plavix 75 mg p.o. daily  Atorvastatin 80 mg p.o. nightly  MRI brain without contrast routine  TTE routine  A1c, lipid panel routine  Blood pressure goals, SBP less than 200  Diabetes educator to see if appropriate  PT/OT/SLP eval and treat  Case management to follow    Plan of care discussed with Dr. Reagan, patient, family at bedside and primary RN.  Stroke neurology will continue to follow.  Thank you for this consult.  Call with any questions or concerns.    Adolph Edmonds, REID  May 22, 2024  22:59 EDT

## 2024-05-23 NOTE — PLAN OF CARE
Goal Outcome Evaluation:  Plan of Care Reviewed With: patient        Progress: no change  Outcome Evaluation: Pt presents at baseline for ADL completion and related transfers with symmetrical BUE strength and coordination intact. OT signing off, please reconsult if needed. Rec d/c to home when medically appropriate.

## 2024-05-23 NOTE — PLAN OF CARE
Goal Outcome Evaluation:  Plan of Care Reviewed With: patient, spouse           Outcome Evaluation: GOALS NOT ESTABLISHED AS PT IS AT BASELINE WITH FUNCTIONAL MOBILITY. AMBULATED 150 FEET WITHOUT A.D. WITH CGA  FEET WITH R WALKER WITH SBA. NO OVERT LOB. PT DEMONSTRATES WEAK AND LIMITED DF B FROM NEUROPATHY, R >L.  PT IS A&O AND FOLLOWS ALL COMMANDS. PT /WIFE REPORT SPEECH IS BACK TO BASELINE. RECOMMEND REFERRAL TO OP [facility] FOR POSSIBLE AFO R LE TO DECREASE RISK OF FALLS. CURRENTLY RECEIVING WOUND CARE R LE FOR EDEMA.      Anticipated Discharge Disposition (PT): home, other (see comments)

## 2024-05-23 NOTE — CASE MANAGEMENT/SOCIAL WORK
Discharge Planning Assessment  [facility]     Patient Name: Amadeo Kong  MRN: [id number]  Today's Date: 5/23/2024    Admit Date: 5/22/2024    Plan: Home   Discharge Needs Assessment       Row Name 05/23/24 1133       Living Environment    People in Home significant other    Current Living Arrangements home    Primary Care Provided by self    Provides Primary Care For no one    Family Caregiver if Needed significant other    Quality of Family Relationships supportive    Able to Return to Prior Arrangements yes       Transition Planning    Patient/Family Anticipates Transition to home with family    Patient/Family Anticipated Services at Transition none    Transportation Anticipated family or friend will provide       Discharge Needs Assessment    Readmission Within the Last 30 Days no previous admission in last 30 days    Equipment Currently Used at Home cane, straight;rollator                   Discharge Plan       Row Name 05/23/24 1137       Plan    Plan Home    Patient/Family in Agreement with Plan yes    Plan Comments Spoke with Mr. Kong and Significant other at the bedside. He lives with his Significant other in [facility]. He is independent with ADL's. He has a cane and rollator. He does not use oxygen or home health. He does not have advance directives. His PCP is Bianka Ann and he has [employer] Medicare. Discharge plan is home. CM will continue to follow for discharge needs.    Final Discharge Disposition Code 01 - home or self-care                  Continued Care and Services - Admitted Since 5/22/2024    No active coordination exists for this encounter.       Expected Discharge Date and Time       Expected Discharge Date Expected Discharge Time    May 27, 2024            Demographic Summary       Row Name 05/23/24 1133       General Information    Admission Type observation    Arrived From home    Referral Source admission list    Reason for Consult discharge planning    Preferred Language  English       Contact Information    Permission Granted to Share Info With                    Functional Status       Row Name 05/23/24 1133       Functional Status, IADL    Medications independent    Meal Preparation independent    Housekeeping independent    Laundry independent    Shopping independent       Mental Status    General Appearance WDL WDL                   Psychosocial    No documentation.                  Abuse/Neglect    No documentation.                  Legal    No documentation.                  Substance Abuse    No documentation.                  Patient Forms    No documentation.                     Zaida Miguel RN

## 2024-05-23 NOTE — ED PROVIDER NOTES
Subjective   History of Present Illness    Pt presents with speech problems.  For about a month he has intermittent episodes of inability to speak properly.  These last seconds to minutes at a time.  No associated weakness or numbness.  No headaches.  He did not seek evaluation for this until tonight.    PMH HTN, DM, HL.  No recent med changes.    History provided by:  Patient      Review of Systems   Constitutional:  Negative for fever.   Eyes:  Negative for visual disturbance.   Gastrointestinal:  Negative for vomiting.   Neurological:  Positive for speech difficulty. Negative for weakness, numbness and headaches.   All other systems reviewed and are negative.      Past Medical History:   Diagnosis Date    Arthritis     Asthma     inhaler daily     Diabetes mellitus 2000    insulin daily; checks blood sugars on occasion-run 150-180    Elevated cholesterol     History of sleep apnea     years ago diagnosed but never used a machine at home     History of staph infection 2010    wound infection after left knee replacement -saw infectious disease MD     Hypertension     Wears glasses        Allergies   Allergen Reactions    Carrot [Daucus Carota] Swelling    Shellfish Allergy Swelling and Unknown (See Comments)     tongue swelling    Strawberry Swelling     tongue swelling    Banana Unknown (See Comments)    Sulfamethoxazole-Trimethoprim Other (See Comments)     Patient developed blisters on his hands.  Patient developed blisters on his hands.    Vancomycin Rash     Received vancomycin x1 dose 7/18/22       Past Surgical History:   Procedure Laterality Date    COLONOSCOPY      HIP SURGERY Right 08/11/2014    [facility]    INCISION AND DRAINAGE OF WOUND Left 2010    x2 of total knee replacement wound -iv antibiotics    KNEE SURGERY Bilateral     Right knee- 2008, left knee- 2010- [facility]    LUMBAR LAMINECTOMY WITH FUSION N/A 7/27/2018    Procedure: TLIF, OSTEOTOMY L4-5 , POST FUSION L4-5;  Surgeon: Yo GUZMAN  MD Braydon;  Location: [facility] OR;  Service: Neurosurgery    LUMBAR LAMINECTOMY WITH FUSION N/A 8/27/2018    Procedure: LUMBAR LAMINECTOMY POSTERIOR LUMBAR INTERBODY FUSION;  Surgeon: Yo Bryson MD;  Location: [facility] OR;  Service: Neurosurgery    ROTATOR CUFF REPAIR Right     TONSILLECTOMY         Family History   Problem Relation Age of Onset    Hypertension Mother        Social History     Socioeconomic History    Marital status: Single   Tobacco Use    Smoking status: Never    Smokeless tobacco: Never   Substance and Sexual Activity    Alcohol use: No    Drug use: No    Sexual activity: Defer           Objective   Physical Exam  Vitals and nursing note reviewed.   Constitutional:       General: He is not in acute distress.     Appearance: Normal appearance. He is not ill-appearing.   HENT:      Head: Normocephalic and atraumatic.      Mouth/Throat:      Mouth: Mucous membranes are moist.   Eyes:      General: No scleral icterus.        Right eye: No discharge.         Left eye: No discharge.      Conjunctiva/sclera: Conjunctivae normal.   Cardiovascular:      Rate and Rhythm: Normal rate and regular rhythm.      Heart sounds: No murmur heard.  Pulmonary:      Effort: Pulmonary effort is normal. No respiratory distress.      Breath sounds: Normal breath sounds. No wheezing.   Abdominal:      General: Bowel sounds are normal. There is no distension.      Palpations: Abdomen is soft.      Tenderness: There is no abdominal tenderness. There is no guarding or rebound.   Musculoskeletal:         General: No swelling. Normal range of motion.      Cervical back: Normal range of motion and neck supple.   Skin:     General: Skin is warm and dry.      Findings: No rash.   Neurological:      General: No focal deficit present.      Mental Status: He is alert and oriented to person, place, and time. Mental status is at baseline.      Comments: Speech fluent and articulate.  No facial droop.  5/5 strength in arms and legs.   Normal .  Mentation normal.   Psychiatric:         Mood and Affect: Mood normal.         Behavior: Behavior normal.         Thought Content: Thought content normal.         Procedures           ED Course    CT/CTAs ordered along with workup.  Stroke neuro consulted.    EKG NSR.  CXR NAD.  CT head NAD.  CTAs show a R ICA stenosis.  Labs benign.    Pt seen by stroke team, apparently had some dysarthria for them after being normal for me.  Given that plus his CTA findings, will admit for further workup and mgmt.    Patient stable on serial rechecks.  Discussed exam findings, test results so far and concerns in detail at the bedside.  Discussed need for admission for further evaluation and treatment.  I discussed the patient's presentation, test results and need for admission with the hospitalist.                        Total (NIH Stroke Scale): 3                  Medical Decision Making  Problems Addressed:  Aphasia: complicated acute illness or injury that poses a threat to life or bodily functions  Carotid stenosis, right: complicated acute illness or injury    Amount and/or Complexity of Data Reviewed  Labs: ordered. Decision-making details documented in ED Course.  Radiology: ordered and independent interpretation performed. Decision-making details documented in ED Course.     Details: CXR NAD read by me  CT head NAD read by me  ECG/medicine tests: ordered and independent interpretation performed. Decision-making details documented in ED Course.     Details: EKG NSR read by me    Risk  Prescription drug management.  Decision regarding hospitalization.        Final diagnoses:   Aphasia   Carotid stenosis, right       ED Disposition  ED Disposition       ED Disposition   Decision to Admit    Condition   --    Comment   --               No follow-up provider specified.       Medication List      No changes were made to your prescriptions during this visit.            Adan Reagan MD  05/22/24 0546

## 2024-05-23 NOTE — THERAPY EVALUATION
Acute Care - Speech Language Pathology   Initial Evaluation  [facility]  Cognitive-Communication Evaluation       Patient Name: Amadeo Kong  : 1960  MRN: [id number]  Today's Date: 2024               Admit Date: 2024     Visit Dx:    ICD-10-CM ICD-9-CM   1. Aphasia  R47.01 784.3   2. Carotid stenosis, right  I65.21 433.10   3. Cognitive communication deficit  R41.841 799.52     Patient Active Problem List   Diagnosis    Hypertension    Type 2 diabetes mellitus, with long-term current use of insulin    Arthritis    Chronic bilateral low back pain without sciatica    Spondylolisthesis of lumbar region    Acquired spondylolisthesis    Hyponatremia    Anemia    Sciatica of right side    Stenosis of lateral recess of lumbar spine    Anterior leg pain, right    THOMAS (acute kidney injury)    Sepsis due to Klebsiella    HLD (hyperlipidemia)    Peripheral neuropathy    Acute cystitis with hematuria    Aphasia    Hypomagnesemia     Past Medical History:   Diagnosis Date    Arthritis     Asthma     inhaler daily     Diabetes mellitus 2000    insulin daily; checks blood sugars on occasion-run 150-180    Elevated cholesterol     History of sleep apnea     years ago diagnosed but never used a machine at home     History of staph infection     wound infection after left knee replacement -saw infectious disease MD     Hypertension     Wears glasses      Past Surgical History:   Procedure Laterality Date    COLONOSCOPY      HIP SURGERY Right 2014    [facility]    INCISION AND DRAINAGE OF WOUND Left 2010    x2 of total knee replacement wound -iv antibiotics    KNEE SURGERY Bilateral     Right knee- , left knee- - [facility]    LUMBAR LAMINECTOMY WITH FUSION N/A 2018    Procedure: TLIF, OSTEOTOMY L4-5 , POST FUSION L4-5;  Surgeon: Yo Bryson MD;  Location: [facility];  Service: Neurosurgery    LUMBAR LAMINECTOMY WITH FUSION N/A 2018    Procedure: LUMBAR LAMINECTOMY POSTERIOR  LUMBAR INTERBODY FUSION;  Surgeon: Yo Bryson MD;  Location: [facility];  Service: Neurosurgery    ROTATOR CUFF REPAIR Right     TONSILLECTOMY         SLP Recommendation and Plan  SLP Diagnosis: mild, aphasia, cognitive-linguistic disorder (05/23/24 1000)              [facility] Criteria for Skilled Therapy Interventions Met: yes (05/23/24 1000)  Anticipated Discharge Disposition (SLP): home with OP services, anticipate therapy at next level of care (05/23/24 1000)        Therapy Frequency (SLP SLC): 5 days per week (05/23/24 1000)  Predicted Duration Therapy Intervention (Days): 2 weeks (05/23/24 1000)                                    SLP EVALUATION (Last 72 Hours)       SLP SLC Evaluation       Row Name 05/23/24 1000                   Communication Assessment/Intervention    Document Type evaluation  -        Subjective Information no complaints  -        Patient Observations alert;cooperative;agree to therapy  -        Patient/Family/Caregiver Comments/Observations spouse present  -        Patient Effort excellent  -        Symptoms Noted During/After Treatment none  -           General Information    Patient Profile Reviewed yes  -        Pertinent History Of Current Problem Admitted w aphasia, dysarthria, AMS. MRI: no acute findings, chronic microvascular ischemic change. NIH 2-visual deficits. Blind left eye.  -        Precautions/Limitations, Vision WFL;for purposes of eval  -        Precautions/Limitations, Hearing WFL;for purposes of eval  -        Prior Level of Function-Communication WFL  -        Plans/Goals Discussed with patient;spouse/S.O.;agreed upon  -        Barriers to Rehab none identified  -        Patient's Goals for Discharge return to home;return to all previous roles/activities  -        Family Goals for Discharge family did not state  -           Pain    Additional Documentation Pain Scale: FACES Pre/Post-Treatment (Group)  -           Pain Scale: Numbers  Pre/Post-Treatment    Pretreatment Pain Rating 0/10 - no pain  -CH        Posttreatment Pain Rating 0/10 - no pain  -CH           Pain Scale: FACES Pre/Post-Treatment    Pain: FACES Scale, Pretreatment 0-->no hurt  -CH        Posttreatment Pain Rating 0-->no hurt  -CH           Comprehension Assessment/Intervention    Comprehension Assessment/Intervention Auditory Comprehension;Reading Comprehension  -           Auditory Comprehension Assessment/Intervention    Auditory Comprehension (Communication) mild impairment  -CH        Able to Identify Objects/Pictures (Communication) familiar objects;mild impairment  -CH        Answers Questions (Communication) yes/no;wh questions;personal;simple;concrete;WFL;abstract;mild impairment  -CH        Able to Follow Commands (Communication) 3-step;WFL  -CH        Narrative Discourse conversational level;mild impairment  -CH        Successful Auditory Strategies (Communication) repetition  -           Reading Comprehension Assessment/Intervention    Reading Comprehension (Communication) WNL  -CH           Expression Assessment/Intervention    Expression Assessment/Intervention verbal expression  -CH           Verbal Expression Assessment/Intervention    Verbal Expression mild impairment  -CH        Automatic Speech (Communication) response to greeting;days of week;WNL  -        Repetition sentences;WFL  -CH        Phrase Completion unpredictable;mild impairment  -CH        Responsive Naming complex;mild impairment  -CH        Confrontational Naming low frequency;WFL  -CH        Spontaneous/Functional Words complex;WFL  -CH        Sentence Formulation complex;mild impairment  -CH        Conversational Discourse/Fluency mild impairment  -CH           Oral Motor Structure and Function    Oral Motor Structure and Function WNL  -           Oral Musculature and Cranial Nerve Assessment    Oral Motor General Assessment WFL  -           Motor Speech Assessment/Intervention     Motor Speech Function WNL  -           Cursory Voice Assessment/Intervention    Quality and Resonance (Voice) WNL  -           Cognitive Assessment Intervention- SLP    Cognitive Function (Cognition) mild impairment  -        Orientation Status (Cognition) awareness of basic personal information;person;place;time;situation;WFL  -CH        Memory (Cognitive) simple;functional;immediate;short-term;long-term;WFL;delayed;mild impairment  -CH        Attention (Cognitive) selective;sustained;WFL;attention to detail;mild impairment  -CH        Thought Organization (Cognitive) concrete divergent;concrete convergent;WFL;abstract divergent;abstract convergent;mild impairment  -CH        Reasoning (Cognitive) simple;deductive;mental flexibility;mild impairment  -CH        Problem Solving (Cognitive) simple;temporal;mild impairment  -CH        Functional Math (Cognitive) simple;word problems;mild impairment  -CH        Pragmatics (Communication) WNL  -           SLP Evaluation Clinical Impressions    SLP Diagnosis mild;aphasia;cognitive-linguistic disorder  -        Rehab Potential/Prognosis excellent  -        SLC Criteria for Skilled Therapy Interventions Met yes  -        Functional Impact functional impact in social situations;functional impact in ADLs;difficulty in expressing complex messages  -           Recommendations    Therapy Frequency (SLP SLC) 5 days per week  -        Predicted Duration Therapy Intervention (Days) 2 weeks  -        Anticipated Discharge Disposition (SLP) home with OP services;anticipate therapy at next level of care  -                  User Key  (r) = Recorded By, (t) = Taken By, (c) = Cosigned By      Initials Name Effective Dates     Thi Pearson, MS CCC-SLP 06/16/21 -                        EDUCATION  The patient has been educated in the following areas:     Cognitive Impairment Communication Impairment.           SLP GOALS       Row Name 05/23/24 1000              Patient will demonstrate functional language skills for return to discharge environment     [place] with minimal cues  -CH      Time frame --  2 weeks  -CH         Patient will demonstrate functional cognitive-linguistic skills for return to discharge environment    [place] with minimal cues  -CH      Time frame --  2 weeks  -CH         Comprehend Questions Goal 1 (SLP)    Improve Ability to Comprehend Questions Goal 1 (SLP) complex wh questions;complex general questions;80%;with minimal cues (75-90%)  -CH      Time Frame (Comprehend Questions Goal 1, SLP) 1 week  -CH         Word Retrieval Skills Goal 1 (SLP)    Improve Word Retrieval Skills By Goal 1 (SLP) low frequency;confrontational naming task;responsive naming task;supplying an antonym;supplying a synonym;completing a divergent task;90%;with minimal cues (75-90%)  -      Time Frame (Word Retrieval Goal 1, SLP) 1 week  -CH         Attention Goal 1 (SLP)    Improve Attention by Goal 1 (SLP) complete sustained attention task;80%;with minimal cues (75-90%)  -      Time Frame (Attention Goal 1, SLP) 1 week  -CH         Memory Skills Goal 1 (SLP)    Improve Memory Skills Through Goal 1 (SLP) recalling unrelated word lists with an imposed delay;select a word from a list by exclusion;80%;with minimal cues (75-90%)  -      Time Frame (Memory Skills Goal 1, SLP) 1 week  -CH         Reasoning Goal 1 (SLP)    Improve Reasoning Through Goal 1 (SLP) complete deductive reasoning task;complete mental flexibility task;80%;with minimal cues (75-90%)  -      Time Frame (Reasoning Goal 1, SLP) 1 week  -CH         Functional Problem Solving Skills Goal 1 (SLP)    Improve Problem Solving Through Goal 1 (SLP) answer questions about ADL problems;determine multiple solutions to problems;80%;with minimal cues (75-90%)  -CH      Time Frame (Problem Solving Goal 1, SLP) 1 week  -CH         Functional Math Skills Goal 1 (SLP)    Improve Functional Math Skills Through  Goal 1 (SLP) complete simple math problems;complete functional math task;80%;with minimal cues (75-90%)  -      Time Frame (Functional Math Skills Goal 1, SLP) 1 week  -                User Key  (r) = Recorded By, (t) = Taken By, (c) = Cosigned By      Initials Name Provider Type    Thi Giang MS CCC-SLP Speech and Language Pathologist                              Time Calculation:      Time Calculation- SLP       Row Name 05/23/24 1510             Time Calculation- SLP    SLP Start Time 1000  -CH      SLP Received On 05/23/24  -         Untimed Charges    SLP Eval/Re-eval  ST Eval Speech and Production w/ Language - 49210  -CH      [id number] Eval Speech and Production w/ Language Minutes 55  -CH         Total Minutes    Untimed Charges Total Minutes 55  -CH       Total Minutes 55  -CH                User Key  (r) = Recorded By, (t) = Taken By, (c) = Cosigned By      Initials Name Provider Type     Thi Pearson MS CCC-SLP Speech and Language Pathologist                    Therapy Charges for Today       Code Description Service Date Service Provider Modifiers Qty    [id number]  ST EVAL SPEECH AND PROD W LANG  4 5/23/2024 Thi Pearson MS CCC-SLP GN 1                       Thi Pearosn MS CCC-SLP  5/23/2024

## 2024-05-24 VITALS
HEIGHT: 74 IN | HEART RATE: 100 BPM | WEIGHT: 276 LBS | BODY MASS INDEX: 35.42 KG/M2 | DIASTOLIC BLOOD PRESSURE: 81 MMHG | SYSTOLIC BLOOD PRESSURE: 105 MMHG | RESPIRATION RATE: 16 BRPM | TEMPERATURE: 98.4 F | OXYGEN SATURATION: 94 %

## 2024-05-24 LAB
ANION GAP SERPL CALCULATED.3IONS-SCNC: 8 MMOL/L (ref 5–15)
BUN SERPL-MCNC: 11 MG/DL (ref 8–23)
BUN/CREAT SERPL: 9.7 (ref 7–25)
CALCIUM SPEC-SCNC: 8.5 MG/DL (ref 8.6–10.5)
CHLORIDE SERPL-SCNC: 95 MMOL/L (ref 98–107)
CO2 SERPL-SCNC: 24 MMOL/L (ref 22–29)
CREAT SERPL-MCNC: 1.13 MG/DL (ref 0.76–1.27)
EGFRCR SERPLBLD CKD-EPI 2021: 72.6 ML/MIN/1.73
GLUCOSE BLDC GLUCOMTR-MCNC: 162 MG/DL (ref 70–130)
GLUCOSE BLDC GLUCOMTR-MCNC: 164 MG/DL (ref 70–130)
GLUCOSE SERPL-MCNC: 186 MG/DL (ref 65–99)
MAGNESIUM SERPL-MCNC: 2 MG/DL (ref 1.6–2.4)
POTASSIUM SERPL-SCNC: 4.4 MMOL/L (ref 3.5–5.2)
SODIUM SERPL-SCNC: 127 MMOL/L (ref 136–145)

## 2024-05-24 PROCEDURE — 82948 REAGENT STRIP/BLOOD GLUCOSE: CPT

## 2024-05-24 PROCEDURE — 94799 UNLISTED PULMONARY SVC/PX: CPT

## 2024-05-24 PROCEDURE — 99239 HOSP IP/OBS DSCHRG MGMT >30: CPT | Performed by: NURSE PRACTITIONER

## 2024-05-24 PROCEDURE — 94664 DEMO&/EVAL PT USE INHALER: CPT

## 2024-05-24 PROCEDURE — G0378 HOSPITAL OBSERVATION PER HR: HCPCS

## 2024-05-24 PROCEDURE — 80048 BASIC METABOLIC PNL TOTAL CA: CPT | Performed by: FAMILY MEDICINE

## 2024-05-24 PROCEDURE — 63710000001 INSULIN LISPRO (HUMAN) PER 5 UNITS: Performed by: INTERNAL MEDICINE

## 2024-05-24 PROCEDURE — 83735 ASSAY OF MAGNESIUM: CPT | Performed by: INTERNAL MEDICINE

## 2024-05-24 RX ORDER — ASPIRIN 81 MG/1
81 TABLET, CHEWABLE ORAL DAILY
Qty: 30 TABLET | Refills: 0 | Status: SHIPPED | OUTPATIENT
Start: 2024-05-25

## 2024-05-24 RX ORDER — ATORVASTATIN CALCIUM 80 MG/1
80 TABLET, FILM COATED ORAL NIGHTLY
Qty: 90 TABLET | Refills: 0 | Status: SHIPPED | OUTPATIENT
Start: 2024-05-24

## 2024-05-24 RX ORDER — LISINOPRIL 10 MG/1
10 TABLET ORAL
Qty: 30 TABLET | Refills: 0 | Status: SHIPPED | OUTPATIENT
Start: 2024-05-25

## 2024-05-24 RX ORDER — CLOPIDOGREL BISULFATE 75 MG/1
75 TABLET ORAL DAILY
Qty: 30 TABLET | Refills: 0 | Status: SHIPPED | OUTPATIENT
Start: 2024-05-25

## 2024-05-24 RX ADMIN — ASPIRIN 81 MG CHEWABLE TABLET 81 MG: 81 TABLET CHEWABLE at 09:02

## 2024-05-24 RX ADMIN — CLOPIDOGREL BISULFATE 75 MG: 75 TABLET ORAL at 09:02

## 2024-05-24 RX ADMIN — BRIMONIDINE TARTRATE 1 DROP: 2 SOLUTION/ DROPS OPHTHALMIC at 09:17

## 2024-05-24 RX ADMIN — CETIRIZINE HYDROCHLORIDE 10 MG: 10 TABLET, FILM COATED ORAL at 09:02

## 2024-05-24 RX ADMIN — PANTOPRAZOLE SODIUM 40 MG: 40 TABLET, DELAYED RELEASE ORAL at 09:02

## 2024-05-24 RX ADMIN — GABAPENTIN 300 MG: 300 CAPSULE ORAL at 09:02

## 2024-05-24 RX ADMIN — LISINOPRIL 10 MG: 10 TABLET ORAL at 09:02

## 2024-05-24 RX ADMIN — BUDESONIDE AND FORMOTEROL FUMARATE DIHYDRATE 2 PUFF: 160; 4.5 AEROSOL RESPIRATORY (INHALATION) at 09:40

## 2024-05-24 RX ADMIN — INSULIN LISPRO 6 UNITS: 100 INJECTION, SOLUTION INTRAVENOUS; SUBCUTANEOUS at 09:02

## 2024-05-24 NOTE — CASE MANAGEMENT/SOCIAL WORK
Discharge Planning Assessment  [facility]     Patient Name: Amadeo Kong  MRN: [id number]  Today's Date: 5/24/2024    Admit Date: 5/22/2024    Plan: Home   Discharge Needs Assessment    No documentation.                  Discharge Plan       Row Name 05/24/24 1156       Plan    Plan Home    Patient/Family in Agreement with Plan yes    Plan Comments Spoke with Mr. Kong and Significant other at the bedside. Therapy is recommending OP Speech. Orders placed for [facility]. Patient will be contacted with an appointment. Patient is requesting a rollator for home. CM spoke with Robert with [facility] and gave referral. Rollator will be delivered to room prior to discharge. Family denies any other discharge needs, and are agreeable with discharge plan.    Final Discharge Disposition Code 01 - home or self-care                  Continued Care and Services - Admitted Since 5/22/2024       Durable Medical Equipment Coordination complete.      Service Provider Request Status Selected Services Address Phone Fax Patient Preferred    [facility] [place]  Selected Durable Medical Equipment [address] [facility] [address] [phone] --                               Zaida Miguel RN

## 2024-05-24 NOTE — DISCHARGE SUMMARY
[facility] Medicine Services  DISCHARGE SUMMARY    Patient Name: Amadeo Kong  : 1960  MRN: [id number]    Date of Admission: 2024  8:28 PM  Date of Discharge:  24    Primary Care Physician: Biakna Ann MD    Consults       No orders found from 2024 to 2024.            Hospital Course     Presenting Problem: Speech difficulty    Active Hospital Problems    Diagnosis  POA    **Aphasia [R47.01]  Yes    Hypomagnesemia [E83.42]  Unknown    Peripheral neuropathy [G62.9]  Yes    HLD (hyperlipidemia) [E78.5]  Yes    Hyponatremia [E87.1]  Yes    Type 2 diabetes mellitus, with long-term current use of insulin [E11.9, Z79.4]  Not Applicable    Hypertension [I10]  Yes      Resolved Hospital Problems   No resolved problems to display.          Hospital Course:  Amadeo Kong is a 63 y.o. male with PMH significant for insulin-dependent diabetes mellitus, HTN, HLD, peripheral neuropathy who presented to the ED on 2024 due to difficulty speaking.  MRI brain revealed chronic microvascular ischemic change with no diffusion restriction identified to suggest acute infarct. CTA head and neck revealed approximately 65% stenosis of the distal intracranial right ICA by NASCET criteria with no evidence of hemodynamically significant intracranial vascular stenosis seen elsewhere. Stroke neurology followed and recommended to continue ASA 81 mg daily, Plavix 75 mg daily, and Lipitor 80 mg daily for secondary stroke prevention. He will follow up with Neurology in 4-6 weeks. PT/OT evaluated and recommended home with assistance. His presenting symptoms have improved and he will be discharged home today in stable condition.      Aphasia  --stroke sofya, neurology consulted  --serial NIH/neurochecks  --aspirin 324 given  --continue aspirin 81mg oral/ 300 rectal daily  --high intensity statin  --lipid panel and tsh reviewed   --echo with bubble: Saline test results are negative  --CT head,  CTA head and neck: Approximately 65% stenosis of the distal intracranial right ICA by NASCET criteria. No evidence of hemodynamically significant intracranial vascular stenosis is seen elsewhere   --MRI Brain: Findings compatible with chronic microvascular ischemic change. .No diffusion restriction is identified to suggest acute infarct.  --PT/OT/SLP have seen    Hypertension  - Restart Lisinopril 10 mg p.o. QD     Hyponatremia, mild  - Chronic  -Recommended to follow up with PCP in 1 week.      HLD  - Atorvastatin     Diabetes mellitus type 2  - Hemoglobin A1c 8.3%  -Resume home regimen     Peripheral neuropathy  - Continue gabapentin     Hypomagnesemia  - Replaced per protocol    Discharge Follow Up Recommendations for outpatient labs/diagnostics:   Follow up with PCP In 1 week.  Follow up with [facility] in 4-6 weeks.     Day of Discharge     HPI:   Patient was seen resting in bed in no apparent distress. No acute events overnight per nursing. He is currently feeling well and feels ready to discharge home. We discussed the importance of taking all medications as prescribed and keeping all recommended follow up appointments. He expressed no additional concerns at this time.     Vital Signs:   Temp:  [97.6 °F (36.4 °C)-98.4 °F (36.9 °C)] 98.4 °F (36.9 °C)  Heart Rate:  [] 85  Resp:  [16-19] 16  BP: (105-126)/(81-93) 105/81      Physical Exam:  Constitutional: No acute distress, awake, alert, pleasant   HENT: NCAT, mucous membranes moist  Respiratory: Clear to auscultation bilaterally, respiratory effort normal   Cardiovascular: RRR, no murmurs, palpable pedal pulses bilaterally, cap refill brisk   Gastrointestinal: Positive bowel sounds, soft, nontender, nondistended  Musculoskeletal: No BLE edema   Psychiatric: Appropriate affect, cooperative  Neurologic: Oriented x 3, moves all extremities, speech clear  Skin: warm, dry, no visible rash       Pertinent  and/or Most Recent Results     LAB RESULTS:       Lab 05/23/24  0513 05/22/24 1954   WBC 6.92 8.15   HEMOGLOBIN 12.5* 13.2   HEMATOCRIT 36.6* 37.6   PLATELETS 169 192   NEUTROS ABS 3.77 4.83   IMMATURE GRANS (ABS) 0.03 0.03   LYMPHS ABS 2.23 2.23   MONOS ABS 0.81 0.97*   EOS ABS 0.05 0.06   MCV 89.7 90.2         Lab 05/24/24  0023 05/23/24  0513 05/22/24 1954   SODIUM 127* 131* 129*   POTASSIUM 4.4 4.0 4.0   CHLORIDE 95* 95* 94*   CO2 24.0 25.0 24.0   ANION GAP 8.0 11.0 11.0   BUN 11 11 12   CREATININE 1.13 1.01 1.23   EGFR 72.6 83.0 65.6   GLUCOSE 186* 143* 101*   CALCIUM 8.5* 8.9 8.9   MAGNESIUM 2.0  --  1.5*   HEMOGLOBIN A1C  --  8.30*  --    TSH  --  0.318  --          Lab 05/22/24 1954   TOTAL PROTEIN 7.2   ALBUMIN 3.7   GLOBULIN 3.5   ALT (SGPT) 17   AST (SGOT) 23   BILIRUBIN 0.7   ALK PHOS 129*         Lab 05/22/24 1954   HSTROP T 39*         Lab 05/23/24 0513   CHOLESTEROL 133   LDL CHOL 60   HDL CHOL 60   TRIGLYCERIDES 65             Brief Urine Lab Results  (Last result in the past 365 days)        Color   Clarity   Blood   Leuk Est   Nitrite   Protein   CREAT   Urine HCG        05/22/24 1956 Yellow   Clear   Negative   Negative   Negative   Negative                 Microbiology Results (last 10 days)       ** No results found for the last 240 hours. **            Duplex Carotid Ultrasound CAR    Result Date: 5/23/2024    Right internal carotid artery demonstrates normal flow without evidence of hemodynamically significant stenosis.   Left internal carotid artery demonstrates normal flow without evidence of hemodynamically significant stenosis.   Bilateral intimal thickening and minimal scattered plaque is noted.     Adult Transthoracic Echo Complete W/ Cont if Necessary Per Protocol (With Agitated Saline)    Result Date: 5/23/2024    Left ventricular systolic function is normal. Calculated left ventricular EF = 56.2% Normal left ventricular cavity size and wall thickness noted. All left ventricular wall segments contract normally. Left ventricular  diastolic function is consistent with (grade Ia w/high LAP) impaired relaxation.   Normal left atrial size and volume noted. Saline test results are negative.   The aortic valve is structurally normal with no regurgitation or stenosis present.   The mitral valve is structurally normal with no regurgitation or significant stenosis present.   Trace tricuspid valve regurgitation is present. Estimated right ventricular systolic pressure from tricuspid regurgitation is normal (<35 mmHg).     MRI Brain Without Contrast    Result Date: 5/23/2024  MRI BRAIN WO CONTRAST Date of Exam: 5/23/2024 1:45 AM EDT Indication: Stroke, follow up Speech difficulty.  Comparison: None available. Technique:  Routine multiplanar/multisequence sequence images of the brain were obtained without contrast administration. FINDINGS: Foci of T2/FLAIR signal hyperintensity are seen within the bilateral periventricular and subcortical white matter. Midline structures appear unremarkable. No significant mass effect, intracranial hemorrhage, or hydrocephalus is identified. Diffusion-weighted sequences demonstrate no acute infarct.The visualized intracranial flow-voids appear unremarkable. The paranasal sinuses and mastoid air cells show no fluid signal. The orbits, globes, retrobulbar soft tissues appear unremarkable. The visualized superficial soft tissues and cervical spine demonstrate no significant abnormality.     1.Findings compatible with chronic microvascular ischemic change. 2.No diffusion restriction is identified to suggest acute infarct. Electronically Signed: Chris Dorsey MD  5/23/2024 7:07 AM EDT  Workstation ID: [id number]    CT Angiogram Neck    Result Date: 5/22/2024  CT ANGIOGRAM NECK, CT ANGIOGRAM HEAD W AI ANALYSIS OF LVO Date of Exam: 5/22/2024 9:17 PM EDT Indication: aphasia. Comparison: None available. Technique: CTA of the head and neck was performed before and after the uneventful intravenous administration of 100 mL Isovue  370. Reconstructed coronal and sagittal images were also obtained. In addition, a 3-D volume rendered image was created for interpretation. Automated exposure control and iterative reconstruction methods were used. Findings: History of present illness describes speech problems, approximately 1 month of intermittent episodes of inability to speak properly, lasting seconds at a time. No associated symptoms. CTA NECK: Axial scan images and reconstruction images show mild left and moderate right carotid bulb calcification but no evidence of hemodynamically significant common internal or external carotid artery stenosis in the neck. Vertebral arteries are seen  in a limited fashion proximally, and are symmetric, small-moderate in size, otherwise normal in appearance with no evidence of hemodynamically significant stenosis to the level of the skull base. No significant incidental soft tissue neck pathology is appreciated. There appears to be moderate peripheral scarring of the included lung apices.     No evidence of hemodynamically significant carotid or vertebral artery stenosis in the neck. CTA HEAD: There appears to be approximately 65% % stenosis of the distal intracranial right ICA at the junction of the cavernous and supraclinoid segments, axial image numbers 329 and 330 of series 9. No other significant ICA stenosis is appreciated. Anterior and middle cerebral arteries, distal vertebral artery, basilar artery and posterior cerebral arteries show no evidence of significant stenosis. Posterior circulation vessels are noted to be relatively small in diameter. No evidence of aneurysm or other vascular malformation is seen. There is grossly normal contrast opacification of the major dural venous sinuses.. Impression: Approximately 65% stenosis of the distal intracranial right ICA by NASCET criteria. No evidence of hemodynamically significant intracranial vascular stenosis is seen elsewhere. Electronically Signed: Quentin  MD Jose Luis  5/22/2024 10:32 PM EDT  Workstation ID: [id number]    CT Angiogram Head w AI Analysis of LVO    Result Date: 5/22/2024  CT ANGIOGRAM NECK, CT ANGIOGRAM HEAD W AI ANALYSIS OF LVO Date of Exam: 5/22/2024 9:17 PM EDT Indication: aphasia. Comparison: None available. Technique: CTA of the head and neck was performed before and after the uneventful intravenous administration of 100 mL Isovue 370. Reconstructed coronal and sagittal images were also obtained. In addition, a 3-D volume rendered image was created for interpretation. Automated exposure control and iterative reconstruction methods were used. Findings: History of present illness describes speech problems, approximately 1 month of intermittent episodes of inability to speak properly, lasting seconds at a time. No associated symptoms. CTA NECK: Axial scan images and reconstruction images show mild left and moderate right carotid bulb calcification but no evidence of hemodynamically significant common internal or external carotid artery stenosis in the neck. Vertebral arteries are seen  in a limited fashion proximally, and are symmetric, small-moderate in size, otherwise normal in appearance with no evidence of hemodynamically significant stenosis to the level of the skull base. No significant incidental soft tissue neck pathology is appreciated. There appears to be moderate peripheral scarring of the included lung apices.     No evidence of hemodynamically significant carotid or vertebral artery stenosis in the neck. CTA HEAD: There appears to be approximately 65% % stenosis of the distal intracranial right ICA at the junction of the cavernous and supraclinoid segments, axial image numbers 329 and 330 of series 9. No other significant ICA stenosis is appreciated. Anterior and middle cerebral arteries, distal vertebral artery, basilar artery and posterior cerebral arteries show no evidence of significant stenosis. Posterior circulation vessels are noted to  be relatively small in diameter. No evidence of aneurysm or other vascular malformation is seen. There is grossly normal contrast opacification of the major dural venous sinuses.. Impression: Approximately 65% stenosis of the distal intracranial right ICA by NASCET criteria. No evidence of hemodynamically significant intracranial vascular stenosis is seen elsewhere. Electronically Signed: Quentin Amaya MD  5/22/2024 10:32 PM EDT  Workstation ID: [id number]    CT Head Without Contrast    Result Date: 5/22/2024  CT HEAD WO CONTRAST Date of Exam: 5/22/2024 9:17 PM EDT Indication: aphasia. Comparison: 7/19/2022 head CT scan Technique: Axial CT images were obtained of the head without contrast administration.  Automated exposure control and iterative construction methods were used. Findings: History indicates aphasia, study is not ordered as code-19 protocol. 7/18/2022 exam report describes no acute intracranial abnormality. Today's study shows the calvarium to appear intact. Included paranasal sinuses and mastoids appear clear. No gross abnormalities are seen of the included orbits. There is age-appropriate generalized cerebral atrophy. There is no no evidence of intracranial hemorrhage, contusion, or edema, no evidence of mass mass effect, acute or old infarct, hydrocephalus, or abnormal extra-axial collection.     Impression: Age-appropriate generalized cerebral atrophy. No evidence of acute intracranial disease is seen. Electronically Signed: Quentin Amaya MD  5/22/2024 10:20 PM EDT  Workstation ID: [id number]    XR Chest 1 View    Result Date: 5/22/2024  XR CHEST 1 VW Date of Exam: 5/22/2024 7:53 PM EDT Indication: Weak/Dizzy/AMS triage protocol Comparison: 3/12/2024 Findings: Heart size normal. Mild streaky bilateral perihilar opacities appear stable from the prior study. No superimposed new consolidation. No pneumothorax or pleural effusion. The osseous structures are grossly intact.     Impression: No acute process.  Electronically Signed: Chandan Brown MD  5/22/2024 8:59 PM EDT  Workstation ID: [id number]     Results for orders placed during the hospital encounter of 05/22/24    Duplex Carotid Ultrasound CAR    Interpretation Summary    Right internal carotid artery demonstrates normal flow without evidence of hemodynamically significant stenosis.    Left internal carotid artery demonstrates normal flow without evidence of hemodynamically significant stenosis.    Bilateral intimal thickening and minimal scattered plaque is noted.      Results for orders placed during the hospital encounter of 05/22/24    Duplex Carotid Ultrasound CAR    Interpretation Summary    Right internal carotid artery demonstrates normal flow without evidence of hemodynamically significant stenosis.    Left internal carotid artery demonstrates normal flow without evidence of hemodynamically significant stenosis.    Bilateral intimal thickening and minimal scattered plaque is noted.      Results for orders placed during the hospital encounter of 05/22/24    Adult Transthoracic Echo Complete W/ Cont if Necessary Per Protocol (With Agitated Saline)    Interpretation Summary    Left ventricular systolic function is normal. Calculated left ventricular EF = 56.2% Normal left ventricular cavity size and wall thickness noted. All left ventricular wall segments contract normally. Left ventricular diastolic function is consistent with (grade Ia w/high LAP) impaired relaxation.    Normal left atrial size and volume noted. Saline test results are negative.    The aortic valve is structurally normal with no regurgitation or stenosis present.    The mitral valve is structurally normal with no regurgitation or significant stenosis present.    Trace tricuspid valve regurgitation is present. Estimated right ventricular systolic pressure from tricuspid regurgitation is normal (<35 mmHg).      Plan for Follow-up of Pending Labs/Results: Follow up as directed.     Discharge  Details        Discharge Medications        New Medications        Instructions Start Date   aspirin 81 MG chewable tablet   81 mg, Oral, Daily   Start Date: May 25, 2024     clopidogrel 75 MG tablet  Commonly known as: PLAVIX   75 mg, Oral, Daily   Start Date: May 25, 2024     lisinopril 10 MG tablet  Commonly known as: PRINIVIL,ZESTRIL   10 mg, Oral, Every 24 Hours Scheduled   Start Date: May 25, 2024            Changes to Medications        Instructions Start Date   atorvastatin 80 MG tablet  Commonly known as: LIPITOR  What changed:   medication strength  how much to take  when to take this   80 mg, Oral, Nightly      gabapentin 300 MG capsule  Commonly known as: Neurontin  What changed: when to take this   300 mg, Oral, Daily             Continue These Medications        Instructions Start Date   acetaminophen 325 MG tablet  Commonly known as: TYLENOL   650 mg, Oral, Every 6 Hours PRN      albuterol sulfate  (90 Base) MCG/ACT inhaler  Commonly known as: PROVENTIL HFA;VENTOLIN HFA;PROAIR HFA   2 puffs, Inhalation, 4 Times Daily PRN      alfuzosin 10 MG 24 hr tablet  Commonly known as: UROXATRAL   10 mg, Oral, Daily      brimonidine 0.2 % ophthalmic solution  Commonly known as: ALPHAGAN   1 drop, Ophthalmic      budesonide-formoterol 160-4.5 MCG/ACT inhaler  Commonly known as: SYMBICORT   2 puffs, Inhalation, 2 Times Daily - RT      cetirizine 10 MG tablet  Commonly known as: Allergy (Cetirizine)   10 mg, Oral, Daily      dicyclomine 20 MG tablet  Commonly known as: BENTYL   20 mg, Oral, Every 8 Hours PRN      HumuLIN 70/30 (70-30) 100 UNIT/ML injection  Generic drug: insulin NPH-insulin regular   45 Units, Subcutaneous, 2 Times Daily With Meals      hydrocortisone 0.5 % ointment   1 application , Topical, 2 Times Daily      ondansetron 4 MG tablet  Commonly known as: ZOFRAN   4 mg, Oral, Every 8 Hours PRN      OneTouch Ultra test strip  Generic drug: glucose blood   TEST BLOOD SUGAR THREE TIMES DAILY AS  DIRECTED      pantoprazole 40 MG EC tablet  Commonly known as: PROTONIX   40 mg, Oral, Every Early Morning             Stop These Medications      amLODIPine 10 MG tablet  Commonly known as: NORVASC              Allergies   Allergen Reactions    Carrot [Daucus Carota] Swelling    Shellfish Allergy Swelling and Unknown (See Comments)     tongue swelling    Strawberry Swelling     tongue swelling    Banana Unknown (See Comments)    Sulfamethoxazole-Trimethoprim Other (See Comments)     Patient developed blisters on his hands.  Patient developed blisters on his hands.    Vancomycin Rash     Received vancomycin x1 dose 7/18/22         Discharge Disposition: home   Home or Self Care    Diet:  Hospital:  Diet Order   Procedures    Diet: Cardiac, Diabetic; Healthy Heart (2-3 Na+); Consistent Carbohydrate; Fluid Consistency: Thin (IDDSI 0)       Diet Instructions       Diet: Regular/House Diet, Cardiac Diets, Diabetic Diets; Healthy Heart (2-3 Na+); Regular (IDDSI 7); Thin (IDDSI 0); Consistent Carbohydrate      Discharge Diet:  Regular/House Diet  Cardiac Diets  Diabetic Diets       Cardiac Diet: Healthy Heart (2-3 Na+)    Texture: Regular (IDDSI 7)    Fluid Consistency: Thin (IDDSI 0)    Diabetic Diet: Consistent Carbohydrate             Activity: as tolerated   Activity Instructions       Activity as Tolerated                 CODE STATUS:    Code Status and Medical Interventions:   Ordered at: 05/23/24 0015     Level Of Support Discussed With:    Patient    Next of Kin (If No Surrogate)     Code Status (Patient has no pulse and is not breathing):    CPR (Attempt to Resuscitate)     Medical Interventions (Patient has pulse or is breathing):    Full Support       Future Appointments   Date Time Provider Department Center   6/20/2024  1:30 PM [facility]       Additional Instructions for the Follow-ups that You Need to Schedule       Discharge Follow-up with PCP   As directed       Currently Documented PCP:     Bianka Ann MD    PCP Phone Number:    [phone]     Follow Up Details: Follow up with PCP in 1 week.        Discharge Follow-up with Specified Provider: Follow up with [facility] in 4-6 weeks.   As directed      To: Follow up with [facility] in 4-6 weeks.                      REID Rosen  05/24/24      Time Spent on Discharge:  I spent  47  minutes on this discharge activity which included: face-to-face encounter with the patient, reviewing the data in the system, coordination of the care with the nursing staff as well as consultants, documentation, and entering orders.

## 2024-05-25 ENCOUNTER — READMISSION MANAGEMENT (OUTPATIENT)
Dept: CALL CENTER | Facility: HOSPITAL | Age: 64
End: 2024-05-25
Payer: MEDICARE

## 2024-05-25 NOTE — OUTREACH NOTE
Prep Survey      Flowsheet Row Responses   [facility] facility patient discharged from? [place]   Is LACE score < 7 ? No   Eligibility Readm Mgmt   Discharge diagnosis *Aphasia (   Does the patient have one of the following disease processes/diagnoses(primary or secondary)? Other   Does the patient have Home health ordered? No   Is there a DME ordered? Yes   What DME was ordered? [facility] [place]   Prep survey completed? Yes            JAVIER GUTIERREZ - Registered Nurse

## 2024-05-28 ENCOUNTER — READMISSION MANAGEMENT (OUTPATIENT)
Dept: CALL CENTER | Facility: HOSPITAL | Age: 64
End: 2024-05-28
Payer: MEDICARE

## 2024-05-28 NOTE — OUTREACH NOTE
Medical Week 1 Survey      Flowsheet Row Responses   [facility] patient discharged from? [place]   Does the patient have one of the following disease processes/diagnoses(primary or secondary)? Other   Week 1 attempt successful? Yes   Call start time 1631   Call end time 1652   Discharge diagnosis *Aphasia   Meds reviewed with patient/caregiver? Yes   Is the patient having any side effects they believe may be caused by any medication additions or changes? No   Does the patient have all medications ordered at discharge? Yes   Is the patient taking all medications as directed (includes completed medication regime)? Yes   Does the patient have a primary care provider?  Yes   Does the patient have an appointment with their PCP within 7 days of discharge? Yes   Has the patient kept scheduled appointments due by today? N/A   Comments pt states has no info on OP PT and Speech therapy discussed at discharge, encouraged pt to discuss plans with PCP on 5/31/24 at [facility]   Has home health visited the patient within 72 hours of discharge? N/A   What DME was ordered? [employer]   Has all DME been delivered? No   DME comments pt states never received rollator, encouraged pt to discuss with PCP on 5/31/24   Psychosocial issues? No   Did the patient receive a copy of their discharge instructions? Yes   Nursing interventions Reviewed instructions with patient   What is the patient's perception of their health status since discharge? Improving   Is the patient/caregiver able to teach back signs and symptoms related to disease process for when to call PCP? Yes   Is the patient/caregiver able to teach back signs and symptoms related to disease process for when to call 911? Yes   Is the patient/caregiver able to teach back the hierarchy of who to call/visit for symptoms/problems? PCP, Specialist, Home health nurse, Urgent Care, ED, 911 Yes   Week 1 call completed? Yes   Call end time 1652            Ashley MCGHEE - Registered Nurse

## 2024-05-30 ENCOUNTER — TELEPHONE (OUTPATIENT)
Dept: NEUROLOGY | Facility: CLINIC | Age: 64
End: 2024-05-30

## 2024-05-30 NOTE — TELEPHONE ENCOUNTER
Provider: LESLYE    Caller: JENNYFER    Phone Number: [phone]     Reason for Call: PT CALLED TO SCHEDULE FROM REFERRAL FOR A H/P FOLLOW FOLLOW UP FOR 4-6 WEEKS OUT. UNABLE TO GET PT IN WITH IN TIME FRAME. PT IS SCHEDULE FOR 08/14/24 AND ON WAIT LIST.    THANK YOU

## 2024-06-16 ENCOUNTER — APPOINTMENT (OUTPATIENT)
Dept: GENERAL RADIOLOGY | Facility: HOSPITAL | Age: 64
End: 2024-06-16
Payer: MEDICARE

## 2024-06-16 ENCOUNTER — HOSPITAL ENCOUNTER (EMERGENCY)
Facility: HOSPITAL | Age: 64
Discharge: HOME | End: 2024-06-16
Attending: STUDENT IN AN ORGANIZED HEALTH CARE EDUCATION/TRAINING PROGRAM | Admitting: STUDENT IN AN ORGANIZED HEALTH CARE EDUCATION/TRAINING PROGRAM
Payer: MEDICARE

## 2024-06-16 VITALS
DIASTOLIC BLOOD PRESSURE: 82 MMHG | BODY MASS INDEX: 34.91 KG/M2 | WEIGHT: 272 LBS | RESPIRATION RATE: 16 BRPM | HEART RATE: 72 BPM | SYSTOLIC BLOOD PRESSURE: 124 MMHG | HEIGHT: 74 IN | TEMPERATURE: 98.4 F | OXYGEN SATURATION: 95 %

## 2024-06-16 DIAGNOSIS — E87.1 HYPONATREMIA: ICD-10-CM

## 2024-06-16 DIAGNOSIS — R05.1 ACUTE COUGH: Primary | ICD-10-CM

## 2024-06-16 DIAGNOSIS — Z79.4 TYPE 2 DIABETES MELLITUS WITH HYPERGLYCEMIA, WITH LONG-TERM CURRENT USE OF INSULIN: ICD-10-CM

## 2024-06-16 DIAGNOSIS — E11.65 TYPE 2 DIABETES MELLITUS WITH HYPERGLYCEMIA, WITH LONG-TERM CURRENT USE OF INSULIN: ICD-10-CM

## 2024-06-16 LAB
ALBUMIN SERPL-MCNC: 3.8 G/DL (ref 3.5–5.2)
ALBUMIN/GLOB SERPL: 1.2 G/DL
ALP SERPL-CCNC: 101 U/L (ref 39–117)
ALT SERPL W P-5'-P-CCNC: 15 U/L (ref 1–41)
ANION GAP SERPL CALCULATED.3IONS-SCNC: 9 MMOL/L (ref 5–15)
AST SERPL-CCNC: 20 U/L (ref 1–40)
ATMOSPHERIC PRESS: ABNORMAL MM[HG]
B PARAPERT DNA SPEC QL NAA+PROBE: NOT DETECTED
B PERT DNA SPEC QL NAA+PROBE: NOT DETECTED
BASE EXCESS BLDV CALC-SCNC: -0.1 MMOL/L (ref -2–2)
BASOPHILS # BLD AUTO: 0.02 10*3/MM3 (ref 0–0.2)
BASOPHILS NFR BLD AUTO: 0.3 % (ref 0–1.5)
BDY SITE: ABNORMAL
BILIRUB SERPL-MCNC: 0.5 MG/DL (ref 0–1.2)
BODY TEMPERATURE: 37
BUN SERPL-MCNC: 12 MG/DL (ref 8–23)
BUN/CREAT SERPL: 10.5 (ref 7–25)
C PNEUM DNA NPH QL NAA+NON-PROBE: NOT DETECTED
CALCIUM SPEC-SCNC: 9.3 MG/DL (ref 8.6–10.5)
CHLORIDE SERPL-SCNC: 93 MMOL/L (ref 98–107)
CO2 BLDA-SCNC: 25.8 MMOL/L (ref 22–33)
CO2 SERPL-SCNC: 24 MMOL/L (ref 22–29)
COHGB MFR BLD: 1.2 %
CREAT SERPL-MCNC: 1.14 MG/DL (ref 0.76–1.27)
CRP SERPL-MCNC: <0.3 MG/DL (ref 0–0.5)
D-LACTATE SERPL-SCNC: 1.6 MMOL/L (ref 0.5–2)
DEPRECATED RDW RBC AUTO: 43.1 FL (ref 37–54)
EGFRCR SERPLBLD CKD-EPI 2021: 71.8 ML/MIN/1.73
EOSINOPHIL # BLD AUTO: 0.01 10*3/MM3 (ref 0–0.4)
EOSINOPHIL NFR BLD AUTO: 0.1 % (ref 0.3–6.2)
EPAP: 0
ERYTHROCYTE [DISTWIDTH] IN BLOOD BY AUTOMATED COUNT: 12.7 % (ref 12.3–15.4)
FLUAV SUBTYP SPEC NAA+PROBE: NOT DETECTED
FLUBV RNA ISLT QL NAA+PROBE: NOT DETECTED
GLOBULIN UR ELPH-MCNC: 3.2 GM/DL
GLUCOSE SERPL-MCNC: 101 MG/DL (ref 65–99)
HADV DNA SPEC NAA+PROBE: NOT DETECTED
HCO3 BLDV-SCNC: 24.6 MMOL/L (ref 22–28)
HCOV 229E RNA SPEC QL NAA+PROBE: NOT DETECTED
HCOV HKU1 RNA SPEC QL NAA+PROBE: NOT DETECTED
HCOV NL63 RNA SPEC QL NAA+PROBE: NOT DETECTED
HCOV OC43 RNA SPEC QL NAA+PROBE: NOT DETECTED
HCT VFR BLD AUTO: 37 % (ref 37.5–51)
HGB BLD-MCNC: 12.6 G/DL (ref 13–17.7)
HGB BLDA-MCNC: 12.9 G/DL (ref 13.5–17.5)
HMPV RNA NPH QL NAA+NON-PROBE: NOT DETECTED
HOLD SPECIMEN: NORMAL
HPIV1 RNA ISLT QL NAA+PROBE: NOT DETECTED
HPIV2 RNA SPEC QL NAA+PROBE: NOT DETECTED
HPIV3 RNA NPH QL NAA+PROBE: NOT DETECTED
HPIV4 P GENE NPH QL NAA+PROBE: NOT DETECTED
IMM GRANULOCYTES # BLD AUTO: 0.03 10*3/MM3 (ref 0–0.05)
IMM GRANULOCYTES NFR BLD AUTO: 0.4 % (ref 0–0.5)
INHALED O2 CONCENTRATION: 21 %
IPAP: 0
LYMPHOCYTES # BLD AUTO: 1.17 10*3/MM3 (ref 0.7–3.1)
LYMPHOCYTES NFR BLD AUTO: 17.2 % (ref 19.6–45.3)
M PNEUMO IGG SER IA-ACNC: NOT DETECTED
MAGNESIUM SERPL-MCNC: 1.6 MG/DL (ref 1.6–2.4)
MCH RBC QN AUTO: 31.4 PG (ref 26.6–33)
MCHC RBC AUTO-ENTMCNC: 34.1 G/DL (ref 31.5–35.7)
MCV RBC AUTO: 92.3 FL (ref 79–97)
METHGB BLD QL: 0.3 %
MODALITY: ABNORMAL
MONOCYTES # BLD AUTO: 0.61 10*3/MM3 (ref 0.1–0.9)
MONOCYTES NFR BLD AUTO: 9 % (ref 5–12)
NEUTROPHILS NFR BLD AUTO: 4.95 10*3/MM3 (ref 1.7–7)
NEUTROPHILS NFR BLD AUTO: 73 % (ref 42.7–76)
NRBC BLD AUTO-RTO: 0 /100 WBC (ref 0–0.2)
NT-PROBNP SERPL-MCNC: 54.7 PG/ML (ref 0–900)
OXYHGB MFR BLDV: 75.5 %
PAW @ PEAK INSP FLOW SETTING VENT: 0 CMH2O
PCO2 BLDV: 39.8 MM HG (ref 41–51)
PH BLDV: 7.4 PH UNITS (ref 7.31–7.41)
PLATELET # BLD AUTO: 164 10*3/MM3 (ref 140–450)
PMV BLD AUTO: 8.3 FL (ref 6–12)
PO2 BLDV: 41 MM HG (ref 27–53)
POTASSIUM SERPL-SCNC: 4.7 MMOL/L (ref 3.5–5.2)
PROCALCITONIN SERPL-MCNC: 0.06 NG/ML (ref 0–0.25)
PROT SERPL-MCNC: 7 G/DL (ref 6–8.5)
QT INTERVAL: 332 MS
QTC INTERVAL: 387 MS
RBC # BLD AUTO: 4.01 10*6/MM3 (ref 4.14–5.8)
RHINOVIRUS RNA SPEC NAA+PROBE: NOT DETECTED
RSV RNA NPH QL NAA+NON-PROBE: NOT DETECTED
SARS-COV-2 RNA NPH QL NAA+NON-PROBE: NOT DETECTED
SODIUM SERPL-SCNC: 126 MMOL/L (ref 136–145)
TOTAL RATE: 0 BREATHS/MINUTE
TROPONIN T SERPL HS-MCNC: 29 NG/L
TROPONIN T SERPL HS-MCNC: 34 NG/L
WBC NRBC COR # BLD AUTO: 6.79 10*3/MM3 (ref 3.4–10.8)
WHOLE BLOOD HOLD COAG: NORMAL
WHOLE BLOOD HOLD SPECIMEN: NORMAL

## 2024-06-16 PROCEDURE — 84484 ASSAY OF TROPONIN QUANT: CPT | Performed by: STUDENT IN AN ORGANIZED HEALTH CARE EDUCATION/TRAINING PROGRAM

## 2024-06-16 PROCEDURE — 96360 HYDRATION IV INFUSION INIT: CPT

## 2024-06-16 PROCEDURE — 94640 AIRWAY INHALATION TREATMENT: CPT

## 2024-06-16 PROCEDURE — 83880 ASSAY OF NATRIURETIC PEPTIDE: CPT | Performed by: STUDENT IN AN ORGANIZED HEALTH CARE EDUCATION/TRAINING PROGRAM

## 2024-06-16 PROCEDURE — 36415 COLL VENOUS BLD VENIPUNCTURE: CPT

## 2024-06-16 PROCEDURE — 71046 X-RAY EXAM CHEST 2 VIEWS: CPT

## 2024-06-16 PROCEDURE — 85025 COMPLETE CBC W/AUTO DIFF WBC: CPT | Performed by: STUDENT IN AN ORGANIZED HEALTH CARE EDUCATION/TRAINING PROGRAM

## 2024-06-16 PROCEDURE — 99284 EMERGENCY DEPT VISIT MOD MDM: CPT

## 2024-06-16 PROCEDURE — 84145 PROCALCITONIN (PCT): CPT | Performed by: STUDENT IN AN ORGANIZED HEALTH CARE EDUCATION/TRAINING PROGRAM

## 2024-06-16 PROCEDURE — 82805 BLOOD GASES W/O2 SATURATION: CPT

## 2024-06-16 PROCEDURE — 25810000003 SODIUM CHLORIDE 0.9 % SOLUTION: Performed by: STUDENT IN AN ORGANIZED HEALTH CARE EDUCATION/TRAINING PROGRAM

## 2024-06-16 PROCEDURE — 93005 ELECTROCARDIOGRAM TRACING: CPT | Performed by: STUDENT IN AN ORGANIZED HEALTH CARE EDUCATION/TRAINING PROGRAM

## 2024-06-16 PROCEDURE — 86140 C-REACTIVE PROTEIN: CPT | Performed by: STUDENT IN AN ORGANIZED HEALTH CARE EDUCATION/TRAINING PROGRAM

## 2024-06-16 PROCEDURE — 0202U NFCT DS 22 TRGT SARS-COV-2: CPT | Performed by: STUDENT IN AN ORGANIZED HEALTH CARE EDUCATION/TRAINING PROGRAM

## 2024-06-16 PROCEDURE — 87040 BLOOD CULTURE FOR BACTERIA: CPT | Performed by: STUDENT IN AN ORGANIZED HEALTH CARE EDUCATION/TRAINING PROGRAM

## 2024-06-16 PROCEDURE — 80053 COMPREHEN METABOLIC PANEL: CPT | Performed by: STUDENT IN AN ORGANIZED HEALTH CARE EDUCATION/TRAINING PROGRAM

## 2024-06-16 PROCEDURE — 83605 ASSAY OF LACTIC ACID: CPT | Performed by: STUDENT IN AN ORGANIZED HEALTH CARE EDUCATION/TRAINING PROGRAM

## 2024-06-16 PROCEDURE — 83735 ASSAY OF MAGNESIUM: CPT | Performed by: STUDENT IN AN ORGANIZED HEALTH CARE EDUCATION/TRAINING PROGRAM

## 2024-06-16 RX ORDER — SODIUM CHLORIDE 0.9 % (FLUSH) 0.9 %
10 SYRINGE (ML) INJECTION AS NEEDED
Status: DISCONTINUED | OUTPATIENT
Start: 2024-06-16 | End: 2024-06-16 | Stop reason: HOSPADM

## 2024-06-16 RX ORDER — IPRATROPIUM BROMIDE AND ALBUTEROL SULFATE 2.5; .5 MG/3ML; MG/3ML
3 SOLUTION RESPIRATORY (INHALATION) ONCE
Status: COMPLETED | OUTPATIENT
Start: 2024-06-16 | End: 2024-06-16

## 2024-06-16 RX ORDER — CODEINE PHOSPHATE/GUAIFENESIN 10-100MG/5
5 LIQUID (ML) ORAL 3 TIMES DAILY PRN
Qty: 118 ML | Refills: 0 | Status: SHIPPED | OUTPATIENT
Start: 2024-06-16

## 2024-06-16 RX ORDER — ALBUTEROL SULFATE 90 UG/1
2 AEROSOL, METERED RESPIRATORY (INHALATION) 4 TIMES DAILY PRN
Qty: 8 G | Refills: 0 | Status: SHIPPED | OUTPATIENT
Start: 2024-06-16

## 2024-06-16 RX ADMIN — SODIUM CHLORIDE 500 ML: 9 INJECTION, SOLUTION INTRAVENOUS at 17:14

## 2024-06-16 RX ADMIN — IPRATROPIUM BROMIDE AND ALBUTEROL SULFATE 3 ML: 2.5; .5 SOLUTION RESPIRATORY (INHALATION) at 13:13

## 2024-06-16 NOTE — DISCHARGE INSTRUCTIONS
Use the provided cough medicine and inhaler to help with symptoms.  Follow-up with your PCP.  While today's workup was reassuring if your symptoms change or worsen please return to the ED or seek other medical care.

## 2024-06-16 NOTE — ED PROVIDER NOTES
EMERGENCY DEPARTMENT ENCOUNTER    Pt Name: Amadeo Kong  MRN: [id number]  Pt :   1960  Room Number:    Date of encounter:  2024  PCP: Bianka Ann MD  ED Provider: Duy Vincent MD    Historian: Patient, significant other      HPI:  Chief Complaint: Productive cough        Context: Amadeo Kong is a 63-year-old man who presents emergency department for evaluation of approximately 2 weeks of productive cough has not appreciated fevers.  Has no sick contacts.  He denies actual chest pain just the cough and sensation of feeling short of breath.  He says he has previous history of asthma.  Denies abdominal pain or GI symptoms.  No other complaints at this time.      PAST MEDICAL HISTORY  Past Medical History:   Diagnosis Date    Arthritis     Asthma     inhaler daily     Diabetes mellitus 2000    insulin daily; checks blood sugars on occasion-run 150-180    Elevated cholesterol     History of sleep apnea     years ago diagnosed but never used a machine at home     History of staph infection     wound infection after left knee replacement -saw infectious disease MD     Hypertension     Wears glasses          PAST SURGICAL HISTORY  Past Surgical History:   Procedure Laterality Date    COLONOSCOPY      HIP SURGERY Right 2014    [facility]    INCISION AND DRAINAGE OF WOUND Left 2010    x2 of total knee replacement wound -iv antibiotics    KNEE SURGERY Bilateral     Right knee- , left knee- - [facility]    LUMBAR LAMINECTOMY WITH FUSION N/A 2018    Procedure: TLIF, OSTEOTOMY L4-5 , POST FUSION L4-5;  Surgeon: Yo Bryson MD;  Location:  [facility];  Service: Neurosurgery    LUMBAR LAMINECTOMY WITH FUSION N/A 2018    Procedure: LUMBAR LAMINECTOMY POSTERIOR LUMBAR INTERBODY FUSION;  Surgeon: Yo Bryson MD;  Location:  [facility];  Service: Neurosurgery    ROTATOR CUFF REPAIR Right     TONSILLECTOMY           FAMILY HISTORY  Family History   Problem Relation  Age of Onset    Hypertension Mother          SOCIAL HISTORY  Social History     Socioeconomic History    Marital status: Single   Tobacco Use    Smoking status: Never    Smokeless tobacco: Never   Vaping Use    Vaping status: Never Used   Substance and Sexual Activity    Alcohol use: No    Drug use: No    Sexual activity: Defer         ALLERGIES  Carrot [daucus carota], Shellfish allergy, Strawberry, Banana, Sulfamethoxazole-trimethoprim, and Vancomycin        REVIEW OF SYSTEMS  Review of Systems       All systems reviewed and negative except for those discussed in HPI.       PHYSICAL EXAM    I have reviewed the triage vital signs and nursing notes.    ED Triage Vitals [06/16/24 1159]   Temp Heart Rate Resp BP SpO2   98.4 °F (36.9 °C) 84 20 114/63 96 %      Temp src Heart Rate Source Patient Position BP Location FiO2 (%)   Oral Monitor Sitting Left arm --       Physical Exam  GENERAL:   Appears in no acute distress.   HENT: Nares patent.  EYES: No scleral icterus.  CV: Regular rhythm, regular rate.  RESPIRATORY: Normal effort.  Faint end expiratory wheezes without appreciated focal consolidation or rails  ABDOMEN: Soft, nontender  MUSCULOSKELETAL: No deformities.   NEURO: Alert, moves all extremities, follows commands.  SKIN: Warm, dry, no rash visualized.      LAB RESULTS  Recent Results (from the past 24 hour(s))   ECG 12 Lead ED Triage Standing Order; SOA    Collection Time: 06/16/24 12:08 PM   Result Value Ref Range    QT Interval 332 ms    QTC Interval 387 ms   Comprehensive Metabolic Panel    Collection Time: 06/16/24 12:44 PM    Specimen: Blood   Result Value Ref Range    Glucose 101 (H) 65 - 99 mg/dL    BUN 12 8 - 23 mg/dL    Creatinine 1.14 0.76 - 1.27 mg/dL    Sodium 126 (L) 136 - 145 mmol/L    Potassium 4.7 3.5 - 5.2 mmol/L    Chloride 93 (L) 98 - 107 mmol/L    CO2 24.0 22.0 - 29.0 mmol/L    Calcium 9.3 8.6 - 10.5 mg/dL    Total Protein 7.0 6.0 - 8.5 g/dL    Albumin 3.8 3.5 - 5.2 g/dL    ALT (SGPT) 15 1 -  41 U/L    AST (SGOT) 20 1 - 40 U/L    Alkaline Phosphatase 101 39 - 117 U/L    Total Bilirubin 0.5 0.0 - 1.2 mg/dL    Globulin 3.2 gm/dL    A/G Ratio 1.2 g/dL    BUN/Creatinine Ratio 10.5 7.0 - 25.0    Anion Gap 9.0 5.0 - 15.0 mmol/L    eGFR 71.8 >60.0 mL/min/1.73   BNP    Collection Time: 06/16/24 12:44 PM    Specimen: Blood   Result Value Ref Range    proBNP 54.7 0.0 - 900.0 pg/mL   Single High Sensitivity Troponin T    Collection Time: 06/16/24 12:44 PM    Specimen: Blood   Result Value Ref Range    HS Troponin T 34 (H) <22 ng/L   Green Top (Gel)    Collection Time: 06/16/24 12:44 PM   Result Value Ref Range    Extra Tube Hold for add-ons.    Lavender Top    Collection Time: 06/16/24 12:44 PM   Result Value Ref Range    Extra Tube hold for add-on    Gold Top - SST    Collection Time: 06/16/24 12:44 PM   Result Value Ref Range    Extra Tube Hold for add-ons.    Gray Top    Collection Time: 06/16/24 12:44 PM   Result Value Ref Range    Extra Tube Hold for add-ons.    Light Blue Top    Collection Time: 06/16/24 12:44 PM   Result Value Ref Range    Extra Tube Hold for add-ons.    CBC Auto Differential    Collection Time: 06/16/24 12:44 PM    Specimen: Blood   Result Value Ref Range    WBC 6.79 3.40 - 10.80 10*3/mm3    RBC 4.01 (L) 4.14 - 5.80 10*6/mm3    Hemoglobin 12.6 (L) 13.0 - 17.7 g/dL    Hematocrit 37.0 (L) 37.5 - 51.0 %    MCV 92.3 79.0 - 97.0 fL    MCH 31.4 26.6 - 33.0 pg    MCHC 34.1 31.5 - 35.7 g/dL    RDW 12.7 12.3 - 15.4 %    RDW-SD 43.1 37.0 - 54.0 fl    MPV 8.3 6.0 - 12.0 fL    Platelets 164 140 - 450 10*3/mm3    Neutrophil % 73.0 42.7 - 76.0 %    Lymphocyte % 17.2 (L) 19.6 - 45.3 %    Monocyte % 9.0 5.0 - 12.0 %    Eosinophil % 0.1 (L) 0.3 - 6.2 %    Basophil % 0.3 0.0 - 1.5 %    Immature Grans % 0.4 0.0 - 0.5 %    Neutrophils, Absolute 4.95 1.70 - 7.00 10*3/mm3    Lymphocytes, Absolute 1.17 0.70 - 3.10 10*3/mm3    Monocytes, Absolute 0.61 0.10 - 0.90 10*3/mm3    Eosinophils, Absolute 0.01 0.00 - 0.40  10*3/mm3    Basophils, Absolute 0.02 0.00 - 0.20 10*3/mm3    Immature Grans, Absolute 0.03 0.00 - 0.05 10*3/mm3    nRBC 0.0 0.0 - 0.2 /100 WBC   Procalcitonin    Collection Time: 06/16/24 12:44 PM    Specimen: Blood   Result Value Ref Range    Procalcitonin 0.06 0.00 - 0.25 ng/mL   Lactic Acid, Plasma    Collection Time: 06/16/24 12:44 PM    Specimen: Blood   Result Value Ref Range    Lactate 1.6 0.5 - 2.0 mmol/L   C-reactive Protein    Collection Time: 06/16/24 12:44 PM    Specimen: Blood   Result Value Ref Range    C-Reactive Protein <0.30 0.00 - 0.50 mg/dL   Magnesium    Collection Time: 06/16/24 12:44 PM    Specimen: Blood   Result Value Ref Range    Magnesium 1.6 1.6 - 2.4 mg/dL   Respiratory Panel PCR w/COVID-19(SARS-CoV-2) [facility], NP Swab in [place], 2 HR TAT - Swab, Nasopharynx    Collection Time: 06/16/24 12:44 PM    Specimen: Nasopharynx; Swab   Result Value Ref Range    ADENOVIRUS, PCR Not Detected Not Detected    Coronavirus 229E Not Detected Not Detected    Coronavirus HKU1 Not Detected Not Detected    Coronavirus NL63 Not Detected Not Detected    Coronavirus OC43 Not Detected Not Detected    COVID19 Not Detected Not Detected - Ref. Range    Human Metapneumovirus Not Detected Not Detected    Human Rhinovirus/Enterovirus Not Detected Not Detected    Influenza A PCR Not Detected Not Detected    Influenza B PCR Not Detected Not Detected    Parainfluenza Virus 1 Not Detected Not Detected    Parainfluenza Virus 2 Not Detected Not Detected    Parainfluenza Virus 3 Not Detected Not Detected    Parainfluenza Virus 4 Not Detected Not Detected    RSV, PCR Not Detected Not Detected    Bordetella pertussis pcr Not Detected Not Detected    Bordetella parapertussis PCR Not Detected Not Detected    Chlamydophila pneumoniae PCR Not Detected Not Detected    Mycoplasma pneumo by PCR Not Detected Not Detected   Blood Gas, Venous With Co-Ox    Collection Time: 06/16/24  1:22 PM    Specimen: Venous  Blood   Result Value Ref Range    Site Nurse/Dr Draw     pH, Venous 7.400 7.310 - 7.410 pH Units    pCO2, Venous 39.8 (L) 41.0 - 51.0 mm Hg    pO2, Venous 41.0 27.0 - 53.0 mm Hg    HCO3, Venous 24.6 22.0 - 28.0 mmol/L    Base Excess, Venous -0.1 -2.0 - 2.0 mmol/L    Hemoglobin, Blood Gas 12.9 (L) 13.5 - 17.5 g/dL    Oxyhemoglobin Venous 75.5 %    Methemoglobin Venous 0.3 %    Carboxyhemoglobin Venous 1.2 %    CO2 Content 25.8 22 - 33 mmol/L    Temperature 37.0     Barometric Pressure for Blood Gas      Modality Room Air     FIO2 21 %    Rate 0 Breaths/minute    PIP 0 cmH2O    IPAP 0     EPAP 0    Single High Sensitivity Troponin T    Collection Time: 06/16/24  4:19 PM    Specimen: Blood   Result Value Ref Range    HS Troponin T 29 (H) <22 ng/L       If labs were ordered, I independently reviewed the results and considered them in treating the patient.        RADIOLOGY  XR Chest 2 View    Result Date: 6/16/2024  XR CHEST 2 VW Date of Exam: 6/16/2024 12:14 PM EDT Indication: 3 weeks productive cough Comparison: 5/22/2024 Findings: No focal consolidation. No pneumothorax or pleural effusion. Cardiac size is normal. No displaced rib fractures. The clavicles are intact. The visualized upper abdomen is normal. The thoracic vertebral body height and alignment is normal. No lytic or blastic bony diseases.     Impression: No acute cardiopulmonary disease. Electronically Signed: Juan Manuel Vazquez MD  6/16/2024 12:41 PM EDT  Workstation ID: [id number]     I ordered and independently reviewed the above noted radiographic studies.      I viewed images of chest x-ray which showed no evidence of pneumonia edema or other acute pathology that I can appreciate.  Per my independent interpretation.    See radiologist's dictation for official interpretation.        PROCEDURES    Procedures    ECG 12 Lead ED Triage Standing Order; SOA   Preliminary Result   Test Reason : ED Triage Standing Order~   Blood Pressure :   */*   mmHG   Vent. Rate :   82 BPM     Atrial Rate :  82 BPM      P-R Int : 174 ms          QRS Dur :  68 ms       QT Int : 332 ms       P-R-T Axes :  47  26  49 degrees      QTc Int : 387 ms      Normal sinus rhythm   Normal ECG   When compared with ECG of 22-MAY-2024 20:01,   No significant change was found      Referred By: ED MD           Confirmed By:           MEDICATIONS GIVEN IN ER    Medications   sodium chloride 0.9 % flush 10 mL (has no administration in time range)   ipratropium-albuterol (DUO-NEB) nebulizer solution 3 mL (3 mL Nebulization Given 6/16/24 1313)   sodium chloride 0.9 % bolus 500 mL (0 mL Intravenous Stopped 6/16/24 1806)         MEDICAL DECISION MAKING, PROGRESS, and CONSULTS    All labs, if obtained, have been independently reviewed by me.  All radiology studies, if obtained, have been reviewed by me and the radiologist dictating the report.  All EKG's, if obtained, have been independently viewed and interpreted by me/my attending physician.      Discussion below represents my analysis of pertinent findings related to patient's condition, differential diagnosis, treatment plan and final disposition.                         Differential diagnosis:    Pneumonia, heart failure, COPD, asthma, COVID, flu, viral URI, sepsis, anemia, electrolyte abnormality      Additional sources:    - Discussed/ obtained information from independent historians: Significant other    - External (non-ED) record review:  Chart review of hospitalization for speech difficulty determined not to be a stroke shows history of:  insulin-dependent diabetes mellitus, HTN, HLD, peripheral neuropathy    - Chronic or social conditions impacting care: Diabetes, hypertension, hyperlipidemia    - Shared decision making: Patient/patient representative in complete agreement with current plans for evaluation and management.      Orders placed during this visit:  Orders Placed This Encounter   Procedures    COVID PRE-OP / PRE-PROCEDURE SCREENING ORDER (NO  ISOLATION) - Swab, Nasopharynx    Blood Culture - Blood,    Blood Culture - Blood,    Respiratory Panel PCR w/COVID-19(SARS-CoV-2) [facility], NP Swab in UTM/VTM, 2 HR TAT - Swab, Nasopharynx    XR Chest 2 View    [facility] Draw    Comprehensive Metabolic Panel    BNP    Single High Sensitivity Troponin T    CBC Auto Differential    Blood Gas, Venous -With Co-Ox Panel: Yes    Procalcitonin    Lactic Acid, Plasma    C-reactive Protein    Magnesium    Blood Gas, Venous With Co-Ox    Single High Sensitivity Troponin T    NPO Diet NPO Type: Strict NPO    Undress & Gown    Continuous Pulse Oximetry    Vital Signs    Oxygen Therapy- Nasal Cannula; Titrate 1-6 LPM Per SpO2; 90 - 95%    ECG 12 Lead ED Triage Standing Order; SOA    Insert Peripheral IV    CBC & Differential    Green Top (Gel)    Lavender Top    Gold Top - SST    Gray Top    Light Blue Top         Additional orders considered but not ordered:      ED Course:    Consultants:      ED Course as of 06/16/24 1837   Sun Jun 16, 2024   1205 Chart review of hospitalization for speech difficulty determined not to be a stroke shows history of:  insulin-dependent diabetes mellitus, HTN, HLD, peripheral neuropathy [CC]   1233 In summary is a very nice 63-year-old man who presents emergency department for evaluation of approximately 2 weeks of productive cough has not appreciated fevers.  Has no sick contacts.  He denies actual chest pain just the cough and sensation of feeling short of breath.  He says he has previous history of asthma.  Denies abdominal pain or GI symptoms.  No other complaints at this time. [CC]   1234 He arrived awake and alert somewhat ill-appearing but in no acute distress vitals are within normal limits lungs have very faint end expiratory wheezes but otherwise clear to auscultation concern for pneumonia, asthma exacerbation, COVID, respiratory failure etc. obtaining respiratory workup and two-view chest x-ray will reevaluate  pending initial workup treating with a nebulizer treatment. [CC]   1835 Chest x-ray is clear.  CBC does not show leukocytosis [CC]   1835 CBC does demonstrate mild anemia slightly improved from prior values.  Metabolic panel shows hyponatremia and hypochloremia, giving a small IV fluid bolus I discussed with the patient may benefit from adding on electrolyte beverages and get PCP to recheck sodium level later this week.  Initial troponin was elevated at 34 he denies chest pain and no acute abnormalities on ECG when compared with prior values this is actually decreased from few weeks ago.  Repeat troponin after the fluids downtrending to 29 he continues to deny chest pain right now I think the cause of his cough is likely viral no evidence of pneumonia so I think a cough suppressant is reasonable given him an albuterol inhaler and cough medicine.  He will follow-up with his PCP.  Discharged in stable condition with strict return precautions. [CC]      ED Course User Index  [CC] Duy Vincent MD              Shared Decision Making:  After my consideration of clinical presentation and any laboratory/radiology studies obtained, I discussed the findings with the patient/patient representative who is in agreement with the treatment plan and the final disposition.   Risks and benefits of discharge and/or observation/admission were discussed.       AS OF 18:37 EDT VITALS:    BP - 124/82  HR - 72  TEMP - 98.4 °F (36.9 °C) (Oral)  O2 SATS - 95%      JAZ reviewed by Duy Vincent MD           DIAGNOSIS  Final diagnoses:   Acute cough   Hyponatremia   Type 2 diabetes mellitus with hyperglycemia, with long-term current use of insulin         DISPOSITION  DISCHARGE    Patient discharged in stable condition.    Reviewed implications of results, diagnosis, meds, responsibility to follow up, warning signs and symptoms of possible worsening, potential complications and reasons to return to ER.    Patient/Family  voiced understanding of above instructions.    Discussed plan for discharge, as there is no emergent indication for admission.  Pt/family is agreeable and understands need for follow up and possible repeat testing.  Pt/family is aware that discharge does not mean that nothing is wrong but that it indicates no emergency is currently present that requires admission and they must continue care with follow-up as given below or with a physician of their choice.     FOLLOW-UP  Bianka Ann MD  [address] [facility] [address]  [address]  [place] [address]  [phone]    Call   For recheck of sodium level.         Medication List        New Prescriptions      guaiFENesin-codeine 100-10 MG/5ML liquid  Commonly known as: GUAIFENESIN AC  Take 5 mL by mouth 3 (Three) Times a Day As Needed for Cough.            Changed      gabapentin 300 MG capsule  Commonly known as: Neurontin  Take 1 capsule by mouth Daily for 7 days.  What changed: when to take this               Where to Get Your Medications        These medications were sent to [id number] - [place], [place] - [address] AT [facility] [phone] [facility] [phone]   [address] [facility] [address]      Phone: [phone]   albuterol sulfate  (90 Base) MCG/ACT inhaler  guaiFENesin-codeine 100-10 MG/5ML liquid             Please note that portions of this document were completed with voice recognition software.        Duy Vincent MD  06/16/24 3289

## 2024-06-20 ENCOUNTER — HOSPITAL ENCOUNTER (OUTPATIENT)
Dept: DIABETES SERVICES | Facility: HOSPITAL | Age: 64
Setting detail: RECURRING SERIES
Discharge: HOME | End: 2024-06-20
Payer: MEDICARE

## 2024-06-20 NOTE — PLAN OF CARE
Mr Kong attended follow up outpatient diabetes management education class via phone call -30 minute class.Patient stated he did not have any concerns about his diabetes. He has supplies to monitor his blood sugar, prescription for medications.  He is able to care for his diabetes. He did not have questions or want to discuss his diabetes care at this time. He wanted to discuss questions related to affordable housing options. He does not want assisted living but wants to live independently from his significant other. He would like to talk with a  or . Patient to call PCP.

## 2024-06-21 LAB
BACTERIA SPEC AEROBE CULT: NORMAL
BACTERIA SPEC AEROBE CULT: NORMAL

## 2024-06-24 ENCOUNTER — APPOINTMENT (OUTPATIENT)
Dept: CT IMAGING | Facility: HOSPITAL | Age: 64
End: 2024-06-24
Payer: MEDICARE

## 2024-06-24 ENCOUNTER — APPOINTMENT (OUTPATIENT)
Dept: GENERAL RADIOLOGY | Facility: HOSPITAL | Age: 64
End: 2024-06-24
Payer: MEDICARE

## 2024-06-24 ENCOUNTER — HOSPITAL ENCOUNTER (EMERGENCY)
Facility: HOSPITAL | Age: 64
Discharge: HOME | End: 2024-06-24
Attending: EMERGENCY MEDICINE | Admitting: EMERGENCY MEDICINE
Payer: MEDICARE

## 2024-06-24 VITALS
WEIGHT: 270 LBS | BODY MASS INDEX: 34.65 KG/M2 | OXYGEN SATURATION: 94 % | TEMPERATURE: 98.6 F | HEART RATE: 92 BPM | HEIGHT: 74 IN | DIASTOLIC BLOOD PRESSURE: 77 MMHG | SYSTOLIC BLOOD PRESSURE: 111 MMHG | RESPIRATION RATE: 16 BRPM

## 2024-06-24 DIAGNOSIS — S20.229A CONTUSION OF BACK, UNSPECIFIED LATERALITY, INITIAL ENCOUNTER: Primary | ICD-10-CM

## 2024-06-24 DIAGNOSIS — M51.37 DDD (DEGENERATIVE DISC DISEASE), LUMBOSACRAL: ICD-10-CM

## 2024-06-24 DIAGNOSIS — M25.511 ACUTE PAIN OF RIGHT SHOULDER: ICD-10-CM

## 2024-06-24 DIAGNOSIS — M19.011 OSTEOARTHRITIS OF RIGHT SHOULDER, UNSPECIFIED OSTEOARTHRITIS TYPE: ICD-10-CM

## 2024-06-24 PROCEDURE — 72100 X-RAY EXAM L-S SPINE 2/3 VWS: CPT

## 2024-06-24 PROCEDURE — 72131 CT LUMBAR SPINE W/O DYE: CPT

## 2024-06-24 PROCEDURE — 99284 EMERGENCY DEPT VISIT MOD MDM: CPT

## 2024-06-24 PROCEDURE — 73030 X-RAY EXAM OF SHOULDER: CPT

## 2024-06-24 RX ORDER — METHOCARBAMOL 750 MG/1
750 TABLET, FILM COATED ORAL 3 TIMES DAILY PRN
Qty: 15 TABLET | Refills: 0 | Status: SHIPPED | OUTPATIENT
Start: 2024-06-24 | End: 2024-06-29

## 2024-06-24 NOTE — ED PROVIDER NOTES
EMERGENCY DEPARTMENT ENCOUNTER    Pt Name: Amadeo Kong  MRN: [id number]  Pt :   1960  Room Number:  24SF/24  Date of encounter:  2024  PCP: Bianka Ann MD  ED Provider: REID Cote    Historian: Patient    HPI:  Chief Complaint:  Rt shoulder pain, Lower back pain    Context: Amadeo Kong is a 63 y.o. male who presents to the ED c/o right shoulder pain, low back pain.  Patient was at a restaurant.  Patient indicates that the chair slid out from under him last night.  Patient landed on his low back.  He tried to break his fall with his right upper extremity.  Patient complains of right shoulder pain, low back pain.  Patient denies any saddle anesthesia.  Denies numbness or tingling.  Patient denies hitting his head or any loss of consciousness.  HPI     REVIEW OF SYSTEMS  A chief complaint appropriate review of systems was completed and is negative except as noted in the HPI.     PAST MEDICAL HISTORY  Past Medical History:   Diagnosis Date    Arthritis     Asthma     inhaler daily     Diabetes mellitus 2000    insulin daily; checks blood sugars on occasion-run 150-180    Elevated cholesterol     History of sleep apnea     years ago diagnosed but never used a machine at home     History of staph infection     wound infection after left knee replacement -saw infectious disease MD     Hypertension     Wears glasses        PAST SURGICAL HISTORY  Past Surgical History:   Procedure Laterality Date    COLONOSCOPY      HIP SURGERY Right 2014    [employer]    INCISION AND DRAINAGE OF WOUND Left 2010    x2 of total knee replacement wound -iv antibiotics    KNEE SURGERY Bilateral     Right knee- , left knee- - [employer]    LUMBAR LAMINECTOMY WITH FUSION N/A 2018    Procedure: TLIF, OSTEOTOMY L4-5 , POST FUSION L4-5;  Surgeon: Yo Bryson MD;  Location: [facility] OR;  Service: Neurosurgery    LUMBAR LAMINECTOMY WITH FUSION N/A 2018    Procedure: LUMBAR LAMINECTOMY  POSTERIOR LUMBAR INTERBODY FUSION;  Surgeon: Yo Bryson MD;  Location: [facility];  Service: Neurosurgery    ROTATOR CUFF REPAIR Right     TONSILLECTOMY         FAMILY HISTORY  Family History   Problem Relation Age of Onset    Hypertension Mother        SOCIAL HISTORY  Social History     Socioeconomic History    Marital status: Single   Tobacco Use    Smoking status: Never    Smokeless tobacco: Never   Vaping Use    Vaping status: Never Used   Substance and Sexual Activity    Alcohol use: No    Drug use: No    Sexual activity: Defer       ALLERGIES  Carrot [daucus carota], Shellfish allergy, Strawberry, Banana, Sulfamethoxazole-trimethoprim, and Vancomycin    PHYSICAL EXAM  Physical Exam  Vitals and nursing note reviewed.   Constitutional:       General: He is not in acute distress.     Appearance: Normal appearance. He is not ill-appearing.   HENT:      Head: Normocephalic and atraumatic.      Nose: Nose normal.      Mouth/Throat:      Mouth: Mucous membranes are moist.   Eyes:      Extraocular Movements: Extraocular movements intact.      Pupils: Pupils are equal, round, and reactive to light.   Cardiovascular:      Rate and Rhythm: Normal rate and regular rhythm.      Pulses: Normal pulses.      Heart sounds: Normal heart sounds.   Pulmonary:      Effort: Pulmonary effort is normal. No respiratory distress.      Breath sounds: Normal breath sounds.   Musculoskeletal:      Right shoulder: Tenderness present. No swelling or deformity. Normal pulse.      Cervical back: Normal, normal range of motion and neck supple. No tenderness.      Thoracic back: Normal. No tenderness. Normal range of motion.      Lumbar back: Tenderness present. Normal range of motion.   Skin:     General: Skin is warm and dry.   Neurological:      General: No focal deficit present.      Mental Status: He is alert and oriented to person, place, and time.   Psychiatric:         Mood and Affect: Mood normal.         Behavior: Behavior normal.            LAB RESULTS  Results for orders placed or performed during the hospital encounter of 06/16/24   Blood Culture - Blood, Arm, Right    Specimen: Arm, Right; Blood   Result Value Ref Range    Blood Culture No growth at 5 days    Blood Culture - Blood, Arm, Right    Specimen: Arm, Right; Blood   Result Value Ref Range    Blood Culture No growth at 5 days    Respiratory Panel PCR w/COVID-19(SARS-CoV-2) RANDAL/LAURIE/AGUSTIN/PAD/COR/VIKRAM In-House, NP Swab in UTM/VTM, 2 HR TAT - Swab, Nasopharynx    Specimen: Nasopharynx; Swab   Result Value Ref Range    ADENOVIRUS, PCR Not Detected Not Detected    Coronavirus 229E Not Detected Not Detected    Coronavirus HKU1 Not Detected Not Detected    Coronavirus NL63 Not Detected Not Detected    Coronavirus OC43 Not Detected Not Detected    COVID19 Not Detected Not Detected - Ref. Range    Human Metapneumovirus Not Detected Not Detected    Human Rhinovirus/Enterovirus Not Detected Not Detected    Influenza A PCR Not Detected Not Detected    Influenza B PCR Not Detected Not Detected    Parainfluenza Virus 1 Not Detected Not Detected    Parainfluenza Virus 2 Not Detected Not Detected    Parainfluenza Virus 3 Not Detected Not Detected    Parainfluenza Virus 4 Not Detected Not Detected    RSV, PCR Not Detected Not Detected    Bordetella pertussis pcr Not Detected Not Detected    Bordetella parapertussis PCR Not Detected Not Detected    Chlamydophila pneumoniae PCR Not Detected Not Detected    Mycoplasma pneumo by PCR Not Detected Not Detected   Comprehensive Metabolic Panel    Specimen: Blood   Result Value Ref Range    Glucose 101 (H) 65 - 99 mg/dL    BUN 12 8 - 23 mg/dL    Creatinine 1.14 0.76 - 1.27 mg/dL    Sodium 126 (L) 136 - 145 mmol/L    Potassium 4.7 3.5 - 5.2 mmol/L    Chloride 93 (L) 98 - 107 mmol/L    CO2 24.0 22.0 - 29.0 mmol/L    Calcium 9.3 8.6 - 10.5 mg/dL    Total Protein 7.0 6.0 - 8.5 g/dL    Albumin 3.8 3.5 - 5.2 g/dL    ALT (SGPT) 15 1 - 41 U/L    AST (SGOT) 20 1 - 40  U/L    Alkaline Phosphatase 101 39 - 117 U/L    Total Bilirubin 0.5 0.0 - 1.2 mg/dL    Globulin 3.2 gm/dL    A/G Ratio 1.2 g/dL    BUN/Creatinine Ratio 10.5 7.0 - 25.0    Anion Gap 9.0 5.0 - 15.0 mmol/L    eGFR 71.8 >60.0 mL/min/1.73   BNP    Specimen: Blood   Result Value Ref Range    proBNP 54.7 0.0 - 900.0 pg/mL   Single High Sensitivity Troponin T    Specimen: Blood   Result Value Ref Range    HS Troponin T 34 (H) <22 ng/L   CBC Auto Differential    Specimen: Blood   Result Value Ref Range    WBC 6.79 3.40 - 10.80 10*3/mm3    RBC 4.01 (L) 4.14 - 5.80 10*6/mm3    Hemoglobin 12.6 (L) 13.0 - 17.7 g/dL    Hematocrit 37.0 (L) 37.5 - 51.0 %    MCV 92.3 79.0 - 97.0 fL    MCH 31.4 26.6 - 33.0 pg    MCHC 34.1 31.5 - 35.7 g/dL    RDW 12.7 12.3 - 15.4 %    RDW-SD 43.1 37.0 - 54.0 fl    MPV 8.3 6.0 - 12.0 fL    Platelets 164 140 - 450 10*3/mm3    Neutrophil % 73.0 42.7 - 76.0 %    Lymphocyte % 17.2 (L) 19.6 - 45.3 %    Monocyte % 9.0 5.0 - 12.0 %    Eosinophil % 0.1 (L) 0.3 - 6.2 %    Basophil % 0.3 0.0 - 1.5 %    Immature Grans % 0.4 0.0 - 0.5 %    Neutrophils, Absolute 4.95 1.70 - 7.00 10*3/mm3    Lymphocytes, Absolute 1.17 0.70 - 3.10 10*3/mm3    Monocytes, Absolute 0.61 0.10 - 0.90 10*3/mm3    Eosinophils, Absolute 0.01 0.00 - 0.40 10*3/mm3    Basophils, Absolute 0.02 0.00 - 0.20 10*3/mm3    Immature Grans, Absolute 0.03 0.00 - 0.05 10*3/mm3    nRBC 0.0 0.0 - 0.2 /100 WBC   Procalcitonin    Specimen: Blood   Result Value Ref Range    Procalcitonin 0.06 0.00 - 0.25 ng/mL   Lactic Acid, Plasma    Specimen: Blood   Result Value Ref Range    Lactate 1.6 0.5 - 2.0 mmol/L   C-reactive Protein    Specimen: Blood   Result Value Ref Range    C-Reactive Protein <0.30 0.00 - 0.50 mg/dL   Magnesium    Specimen: Blood   Result Value Ref Range    Magnesium 1.6 1.6 - 2.4 mg/dL   Blood Gas, Venous With Co-Ox    Specimen: Venous Blood   Result Value Ref Range    Site Nurse/Dr Draw     pH, Venous 7.400 7.310 - 7.410 pH Units    pCO2,  Venous 39.8 (L) 41.0 - 51.0 mm Hg    pO2, Venous 41.0 27.0 - 53.0 mm Hg    HCO3, Venous 24.6 22.0 - 28.0 mmol/L    Base Excess, Venous -0.1 -2.0 - 2.0 mmol/L    Hemoglobin, Blood Gas 12.9 (L) 13.5 - 17.5 g/dL    Oxyhemoglobin Venous 75.5 %    Methemoglobin Venous 0.3 %    Carboxyhemoglobin Venous 1.2 %    CO2 Content 25.8 22 - 33 mmol/L    Temperature 37.0     Barometric Pressure for Blood Gas      Modality Room Air     FIO2 21 %    Rate 0 Breaths/minute    PIP 0 cmH2O    IPAP 0     EPAP 0    Single High Sensitivity Troponin T    Specimen: Blood   Result Value Ref Range    HS Troponin T 29 (H) <22 ng/L   ECG 12 Lead ED Triage Standing Order; SOA   Result Value Ref Range    QT Interval 332 ms    QTC Interval 387 ms   Green Top (Gel)   Result Value Ref Range    Extra Tube Hold for add-ons.    Lavender Top   Result Value Ref Range    Extra Tube hold for add-on    Gold Top - SST   Result Value Ref Range    Extra Tube Hold for add-ons.    Gray Top   Result Value Ref Range    Extra Tube Hold for add-ons.    Light Blue Top   Result Value Ref Range    Extra Tube Hold for add-ons.        If labs were ordered, I independently reviewed the results and considered them in treating the patient.    RADIOLOGY  CT Lumbar Spine Without Contrast   Final Result   Impression:   1.No acute fracture or traumatic subluxation.   2.Post-surgical changes as described above, with note of fractured bilateral L5 pedicle screws and loosening along the right S1 pedicle screw.   3.Multilevel degenerative changes as described above.            Electronically Signed: Gutierrez Saab MD     6/24/2024 3:15 PM EDT     Workstation ID: [id number]      XR Spine Lumbar 2 or 3 View   Final Result   Impression:   Postsurgical changes of L4-S1 posterior fusion and decompression. The L5 screw fragments are still seen and disconnected. This is similar to prior examination. There is questionable new nondisplaced fracture of the right S1 screw seen on AP view.  There    is similar lucency around the right greater than left S1 screws as well.      No evidence of displaced fracture or traumatic malalignment of the lumbar spine. Scattered similar degenerative changes.         Electronically Signed: Abel Palacios MD     6/24/2024 2:25 PM EDT     Workstation ID: [id number]      XR Shoulder 2+ View Right   Final Result   Impression:   Degenerative change with no acute osseous abnormality noted         Electronically Signed: Wallace Cook MD     6/24/2024 2:03 PM EDT     Workstation ID: [id number]        [x] Radiologist's Report Reviewed:  I ordered and independently interpreted the above noted radiographic studies.  See radiologist's dictation for official interpretation.      PROCEDURES    Procedures    No orders to display       MEDICATIONS GIVEN IN ER    Medications - No data to display    MEDICAL DECISION MAKING, PROGRESS, and CONSULTS   Medical Decision Making  Amadeo Kong is a 63 y.o. male who presents to the ED c/o right shoulder pain, low back pain.  Patient was at a restaurant.  Patient indicates that the chair slid out from under him last night.  Patient landed on his low back.  He tried to break his fall with his right upper extremity.  Patient complains of right shoulder pain, low back pain.  Patient denies any saddle anesthesia.  Denies numbness or tingling.  Patient denies hitting his head or any loss of consciousness.    Problems Addressed:  Acute pain of right shoulder: complicated acute illness or injury     Details: Imaging reveals no acute findings.  Contusion of back, unspecified laterality, initial encounter: complicated acute illness or injury     Details: Imaging reveals no acute findings.  DDD (degenerative disc disease), lumbosacral: chronic illness or injury  Osteoarthritis of right shoulder, unspecified osteoarthritis type: chronic illness or injury    Amount and/or Complexity of Data Reviewed  Radiology: ordered. Decision-making details documented  in ED Course.    Risk  Prescription drug management.        All labs have been independently reviewed by me.  All radiology studies have been interpreted by me and the radiologist dictating the report.  All EKG's have been independently interpreted by me as well as and overseeing attending physician.    [] Discussed with radiology regarding test interpretation:    Discussion below represents my analysis of pertinent findings related to patient's condition, differential diagnosis, treatment plan and final disposition.    Differential diagnosis:  The differential diagnosis associated with the patient's presentation includes: Fracture, dislocation, sprain, strain, contusion    Additional sources  Discussed/ obtained information from independent historians:   [] Spouse  [] Parent  [] Family member  [] Friend  [] EMS   [] Other:  External (non-ED) record review:   [] Inpatient record:   [] Office record:   [] Outpatient record:   [x] Prior Outpatient labs:   [x] Prior Outpatient radiology:   [] Primary Care record:   [] Outside ED record:   [] Other:   Patient's care impacted by:   [x] Diabetes  [x] Hypertension  [] Hyperlipidemia  [] Hypothyroidism   [] Coronary Artery Disease   [] COPD   [] Cancer   [] Obesity  [] GERD   [] Tobacco Abuse   [] Substance Abuse    [] Anxiety   [] Depression   [] Other:   Care significantly affected by Social Determinants of Health (housing and economic circumstances, unemployment)    [] Yes     [x] No   If yes, Patient's care significantly limited by  Social Determinants of Health including:   [] Inadequate housing   [] Low income   [] Alcoholism and drug addiction in family   [] Problems related to primary support group   [] Unemployment   [] Problems related to employment   [] Other Social Determinants of Health:     Shared decision making: Shared decision making with patient imaging is negative for acute findings.  We will discharge the patient home.  Patient to take muscle relaxant  and Tylenol for pain.  Patient to return to the ER as needed.  Patient to follow-up with Ortho.    Orders placed during this visit:  Orders Placed This Encounter   Procedures    XR Spine Lumbar 2 or 3 View    XR Shoulder 2+ View Right    CT Lumbar Spine Without Contrast       I considered prescription management  with:   [] Pain medication  [] Antiviral  [] Antibiotic   [] Other:   Rationale:  Additional orders considered but not ordered:  The following testing was considered but ultimately not selected after discussion with patient/family:    ED Course:    ED Course as of 06/26/24 2128 Mon Jun 24, 2024   1416 Right shoulder imaging reviewed:  Impression:  Degenerative change with no acute osseous abnormality noted      [KG]      ED Course User Index  [KG] Mojgan Burger, REID            DIAGNOSIS  Final diagnoses:   Contusion of back, unspecified laterality, initial encounter   Acute pain of right shoulder   Osteoarthritis of right shoulder, unspecified osteoarthritis type   DDD (degenerative disc disease), lumbosacral       DISPOSITION    DISCHARGE    Patient discharged in stable condition.    Reviewed implications of results, diagnosis, meds, responsibility to follow up, warning signs and symptoms of possible worsening, potential complications and reasons to return to ER.    Patient/Family voiced understanding of above instructions.    Discussed plan for discharge, as there is no emergent indication for admission.  Pt/family is agreeable and understands need for follow up and possible repeat testing.  Pt/family is aware that discharge does not mean that nothing is wrong but that it indicates no emergency is currently present that requires admission and they must continue care with follow-up as given below or with a physician of their choice.     FOLLOW-UP  Bainka Ann MD  [address] [place] [address]  [phone]               Medication List        New Prescriptions      methocarbamol  750 MG tablet  Commonly known as: ROBAXIN  Take 1 tablet by mouth 3 (Three) Times a Day As Needed for Muscle Spasms for up to 5 days.            Changed      gabapentin 300 MG capsule  Commonly known as: Neurontin  Take 1 capsule by mouth Daily for 7 days.  What changed: when to take this               Where to Get Your Medications        These medications were sent to [id number] - [place], [place] - [address] [facility] - [phone] [facility] [phone]   [address] [facility] [address]      Phone: [phone]   methocarbamol 750 MG tablet          ED Disposition       ED Disposition   Discharge    Condition   Stable    Comment   --               Please note that portions of this document were completed with voice recognition software.       Mojgan Burger, APRN  06/26/24 5000

## 2024-06-24 NOTE — DISCHARGE INSTRUCTIONS
There are no acute fractures on imaging.  Images of your back and shoulder do reveal arthritis.    We will discharge you home with muscle relaxant.    Follow-up with primary care as needed.

## 2024-08-08 ENCOUNTER — APPOINTMENT (OUTPATIENT)
Dept: GENERAL RADIOLOGY | Facility: HOSPITAL | Age: 64
DRG: 176 | End: 2024-08-08
Payer: MEDICARE

## 2024-08-08 ENCOUNTER — APPOINTMENT (OUTPATIENT)
Dept: CT IMAGING | Facility: HOSPITAL | Age: 64
DRG: 176 | End: 2024-08-08
Payer: MEDICARE

## 2024-08-08 ENCOUNTER — HOSPITAL ENCOUNTER (INPATIENT)
Facility: HOSPITAL | Age: 64
LOS: 1 days | Discharge: HOME | DRG: 176 | End: 2024-08-10
Attending: EMERGENCY MEDICINE | Admitting: INTERNAL MEDICINE
Payer: MEDICARE

## 2024-08-08 DIAGNOSIS — I26.99 ACUTE PULMONARY EMBOLISM, UNSPECIFIED PULMONARY EMBOLISM TYPE, UNSPECIFIED WHETHER ACUTE COR PULMONALE PRESENT: Primary | ICD-10-CM

## 2024-08-08 DIAGNOSIS — R06.00 ACUTE DYSPNEA: ICD-10-CM

## 2024-08-08 DIAGNOSIS — R79.89 ELEVATED SERUM CREATININE: ICD-10-CM

## 2024-08-08 LAB
ALBUMIN SERPL-MCNC: 3.9 G/DL (ref 3.5–5.2)
ALBUMIN/GLOB SERPL: 1.1 G/DL
ALP SERPL-CCNC: 96 U/L (ref 39–117)
ALT SERPL W P-5'-P-CCNC: 13 U/L (ref 1–41)
ANION GAP SERPL CALCULATED.3IONS-SCNC: 11 MMOL/L (ref 5–15)
AST SERPL-CCNC: 21 U/L (ref 1–40)
BASOPHILS # BLD AUTO: 0.02 10*3/MM3 (ref 0–0.2)
BASOPHILS NFR BLD AUTO: 0.3 % (ref 0–1.5)
BILIRUB SERPL-MCNC: 0.7 MG/DL (ref 0–1.2)
BUN SERPL-MCNC: 13 MG/DL (ref 8–23)
BUN/CREAT SERPL: 6.7 (ref 7–25)
CALCIUM SPEC-SCNC: 9.2 MG/DL (ref 8.6–10.5)
CHLORIDE SERPL-SCNC: 103 MMOL/L (ref 98–107)
CO2 SERPL-SCNC: 23 MMOL/L (ref 22–29)
CREAT SERPL-MCNC: 1.94 MG/DL (ref 0.76–1.27)
D DIMER PPP FEU-MCNC: 2.5 MCGFEU/ML (ref 0–0.64)
DEPRECATED RDW RBC AUTO: 39.3 FL (ref 37–54)
EGFRCR SERPLBLD CKD-EPI 2021: 37.9 ML/MIN/1.73
EOSINOPHIL # BLD AUTO: 0.13 10*3/MM3 (ref 0–0.4)
EOSINOPHIL NFR BLD AUTO: 2.1 % (ref 0.3–6.2)
ERYTHROCYTE [DISTWIDTH] IN BLOOD BY AUTOMATED COUNT: 12 % (ref 12.3–15.4)
FLUAV SUBTYP SPEC NAA+PROBE: NOT DETECTED
FLUBV RNA ISLT QL NAA+PROBE: NOT DETECTED
GEN 5 2HR TROPONIN T REFLEX: 42 NG/L
GLOBULIN UR ELPH-MCNC: 3.5 GM/DL
GLUCOSE BLDC GLUCOMTR-MCNC: 131 MG/DL (ref 70–130)
GLUCOSE SERPL-MCNC: 165 MG/DL (ref 65–99)
HBA1C MFR BLD: 6.2 % (ref 4.8–5.6)
HCT VFR BLD AUTO: 38.5 % (ref 37.5–51)
HGB BLD-MCNC: 13.4 G/DL (ref 13–17.7)
HOLD SPECIMEN: NORMAL
IMM GRANULOCYTES # BLD AUTO: 0.02 10*3/MM3 (ref 0–0.05)
IMM GRANULOCYTES NFR BLD AUTO: 0.3 % (ref 0–0.5)
LYMPHOCYTES # BLD AUTO: 1.18 10*3/MM3 (ref 0.7–3.1)
LYMPHOCYTES NFR BLD AUTO: 19.2 % (ref 19.6–45.3)
MCH RBC QN AUTO: 31 PG (ref 26.6–33)
MCHC RBC AUTO-ENTMCNC: 34.8 G/DL (ref 31.5–35.7)
MCV RBC AUTO: 89.1 FL (ref 79–97)
MONOCYTES # BLD AUTO: 0.57 10*3/MM3 (ref 0.1–0.9)
MONOCYTES NFR BLD AUTO: 9.3 % (ref 5–12)
NEUTROPHILS NFR BLD AUTO: 4.23 10*3/MM3 (ref 1.7–7)
NEUTROPHILS NFR BLD AUTO: 68.8 % (ref 42.7–76)
NRBC BLD AUTO-RTO: 0 /100 WBC (ref 0–0.2)
NT-PROBNP SERPL-MCNC: 37.8 PG/ML (ref 0–900)
PLATELET # BLD AUTO: 190 10*3/MM3 (ref 140–450)
PMV BLD AUTO: 8.7 FL (ref 6–12)
POTASSIUM SERPL-SCNC: 4.7 MMOL/L (ref 3.5–5.2)
PROT SERPL-MCNC: 7.4 G/DL (ref 6–8.5)
QT INTERVAL: 338 MS
QTC INTERVAL: 424 MS
RBC # BLD AUTO: 4.32 10*6/MM3 (ref 4.14–5.8)
RSV RNA NPH QL NAA+NON-PROBE: NOT DETECTED
SARS-COV-2 RNA NPH QL NAA+NON-PROBE: NOT DETECTED
SODIUM SERPL-SCNC: 137 MMOL/L (ref 136–145)
TROPONIN T DELTA: -11 NG/L
TROPONIN T SERPL HS-MCNC: 53 NG/L
WBC NRBC COR # BLD AUTO: 6.15 10*3/MM3 (ref 3.4–10.8)
WHOLE BLOOD HOLD COAG: NORMAL
WHOLE BLOOD HOLD SPECIMEN: NORMAL

## 2024-08-08 PROCEDURE — 80053 COMPREHEN METABOLIC PANEL: CPT | Performed by: EMERGENCY MEDICINE

## 2024-08-08 PROCEDURE — 74176 CT ABD & PELVIS W/O CONTRAST: CPT

## 2024-08-08 PROCEDURE — G0378 HOSPITAL OBSERVATION PER HR: HCPCS

## 2024-08-08 PROCEDURE — 25010000002 HEPARIN (PORCINE) 25000-0.45 UT/250ML-% SOLUTION: Performed by: INTERNAL MEDICINE

## 2024-08-08 PROCEDURE — 83036 HEMOGLOBIN GLYCOSYLATED A1C: CPT | Performed by: INTERNAL MEDICINE

## 2024-08-08 PROCEDURE — 25810000003 SODIUM CHLORIDE 0.9 % SOLUTION: Performed by: EMERGENCY MEDICINE

## 2024-08-08 PROCEDURE — 25010000002 HEPARIN (PORCINE) PER 1000 UNITS: Performed by: INTERNAL MEDICINE

## 2024-08-08 PROCEDURE — 84484 ASSAY OF TROPONIN QUANT: CPT | Performed by: EMERGENCY MEDICINE

## 2024-08-08 PROCEDURE — 36415 COLL VENOUS BLD VENIPUNCTURE: CPT

## 2024-08-08 PROCEDURE — 63710000001 INSULIN GLARGINE PER 5 UNITS: Performed by: INTERNAL MEDICINE

## 2024-08-08 PROCEDURE — 99222 1ST HOSP IP/OBS MODERATE 55: CPT | Performed by: INTERNAL MEDICINE

## 2024-08-08 PROCEDURE — 25010000002 ENOXAPARIN PER 10 MG: Performed by: EMERGENCY MEDICINE

## 2024-08-08 PROCEDURE — 85379 FIBRIN DEGRADATION QUANT: CPT | Performed by: EMERGENCY MEDICINE

## 2024-08-08 PROCEDURE — 99285 EMERGENCY DEPT VISIT HI MDM: CPT

## 2024-08-08 PROCEDURE — 82948 REAGENT STRIP/BLOOD GLUCOSE: CPT

## 2024-08-08 PROCEDURE — 25810000003 SODIUM CHLORIDE 0.9 % SOLUTION: Performed by: INTERNAL MEDICINE

## 2024-08-08 PROCEDURE — 25510000001 IOPAMIDOL PER 1 ML: Performed by: EMERGENCY MEDICINE

## 2024-08-08 PROCEDURE — 85025 COMPLETE CBC W/AUTO DIFF WBC: CPT | Performed by: EMERGENCY MEDICINE

## 2024-08-08 PROCEDURE — 71275 CT ANGIOGRAPHY CHEST: CPT

## 2024-08-08 PROCEDURE — 93005 ELECTROCARDIOGRAM TRACING: CPT | Performed by: EMERGENCY MEDICINE

## 2024-08-08 PROCEDURE — 71045 X-RAY EXAM CHEST 1 VIEW: CPT

## 2024-08-08 PROCEDURE — 83880 ASSAY OF NATRIURETIC PEPTIDE: CPT | Performed by: EMERGENCY MEDICINE

## 2024-08-08 RX ORDER — DEXTROSE MONOHYDRATE 25 G/50ML
25 INJECTION, SOLUTION INTRAVENOUS
Status: DISCONTINUED | OUTPATIENT
Start: 2024-08-08 | End: 2024-08-10 | Stop reason: HOSPADM

## 2024-08-08 RX ORDER — SODIUM CHLORIDE 0.9 % (FLUSH) 0.9 %
10 SYRINGE (ML) INJECTION EVERY 12 HOURS SCHEDULED
Status: DISCONTINUED | OUTPATIENT
Start: 2024-08-08 | End: 2024-08-10 | Stop reason: HOSPADM

## 2024-08-08 RX ORDER — ASPIRIN 81 MG/1
81 TABLET, CHEWABLE ORAL DAILY
Status: DISCONTINUED | OUTPATIENT
Start: 2024-08-09 | End: 2024-08-10

## 2024-08-08 RX ORDER — CLOPIDOGREL BISULFATE 75 MG/1
75 TABLET ORAL DAILY
Status: DISCONTINUED | OUTPATIENT
Start: 2024-08-09 | End: 2024-08-10 | Stop reason: HOSPADM

## 2024-08-08 RX ORDER — ONDANSETRON 2 MG/ML
4 INJECTION INTRAMUSCULAR; INTRAVENOUS EVERY 6 HOURS PRN
Status: DISCONTINUED | OUTPATIENT
Start: 2024-08-08 | End: 2024-08-10 | Stop reason: HOSPADM

## 2024-08-08 RX ORDER — IBUPROFEN 600 MG/1
1 TABLET ORAL
Status: DISCONTINUED | OUTPATIENT
Start: 2024-08-08 | End: 2024-08-10 | Stop reason: HOSPADM

## 2024-08-08 RX ORDER — ATORVASTATIN CALCIUM 40 MG/1
80 TABLET, FILM COATED ORAL NIGHTLY
Status: DISCONTINUED | OUTPATIENT
Start: 2024-08-08 | End: 2024-08-10 | Stop reason: HOSPADM

## 2024-08-08 RX ORDER — HEPARIN SODIUM 1000 [USP'U]/ML
2000 INJECTION, SOLUTION INTRAVENOUS; SUBCUTANEOUS AS NEEDED
Status: DISCONTINUED | OUTPATIENT
Start: 2024-08-08 | End: 2024-08-08

## 2024-08-08 RX ORDER — ENOXAPARIN SODIUM 150 MG/ML
1 INJECTION SUBCUTANEOUS ONCE
Status: COMPLETED | OUTPATIENT
Start: 2024-08-08 | End: 2024-08-08

## 2024-08-08 RX ORDER — HEPARIN SODIUM 1000 [USP'U]/ML
80 INJECTION, SOLUTION INTRAVENOUS; SUBCUTANEOUS ONCE
Status: COMPLETED | OUTPATIENT
Start: 2024-08-08 | End: 2024-08-08

## 2024-08-08 RX ORDER — ACETAMINOPHEN 650 MG/1
650 SUPPOSITORY RECTAL EVERY 4 HOURS PRN
Status: DISCONTINUED | OUTPATIENT
Start: 2024-08-08 | End: 2024-08-10 | Stop reason: HOSPADM

## 2024-08-08 RX ORDER — SODIUM CHLORIDE 0.9 % (FLUSH) 0.9 %
10 SYRINGE (ML) INJECTION AS NEEDED
Status: DISCONTINUED | OUTPATIENT
Start: 2024-08-08 | End: 2024-08-10 | Stop reason: HOSPADM

## 2024-08-08 RX ORDER — CODEINE PHOSPHATE AND GUAIFENESIN 10; 100 MG/5ML; MG/5ML
5 SOLUTION ORAL 3 TIMES DAILY PRN
Status: DISCONTINUED | OUTPATIENT
Start: 2024-08-08 | End: 2024-08-10 | Stop reason: HOSPADM

## 2024-08-08 RX ORDER — ONDANSETRON 4 MG/1
4 TABLET, ORALLY DISINTEGRATING ORAL EVERY 6 HOURS PRN
Status: DISCONTINUED | OUTPATIENT
Start: 2024-08-08 | End: 2024-08-10 | Stop reason: HOSPADM

## 2024-08-08 RX ORDER — BISACODYL 10 MG
10 SUPPOSITORY, RECTAL RECTAL DAILY PRN
Status: DISCONTINUED | OUTPATIENT
Start: 2024-08-08 | End: 2024-08-10 | Stop reason: HOSPADM

## 2024-08-08 RX ORDER — SODIUM CHLORIDE 9 MG/ML
100 INJECTION, SOLUTION INTRAVENOUS CONTINUOUS
Status: ACTIVE | OUTPATIENT
Start: 2024-08-08 | End: 2024-08-09

## 2024-08-08 RX ORDER — LISINOPRIL 10 MG/1
10 TABLET ORAL
Status: DISCONTINUED | OUTPATIENT
Start: 2024-08-09 | End: 2024-08-10 | Stop reason: HOSPADM

## 2024-08-08 RX ORDER — GABAPENTIN 300 MG/1
300 CAPSULE ORAL DAILY
Status: DISCONTINUED | OUTPATIENT
Start: 2024-08-09 | End: 2024-08-10 | Stop reason: HOSPADM

## 2024-08-08 RX ORDER — NITROGLYCERIN 0.4 MG/1
0.4 TABLET SUBLINGUAL
Status: DISCONTINUED | OUTPATIENT
Start: 2024-08-08 | End: 2024-08-10 | Stop reason: HOSPADM

## 2024-08-08 RX ORDER — NICOTINE POLACRILEX 4 MG
15 LOZENGE BUCCAL
Status: DISCONTINUED | OUTPATIENT
Start: 2024-08-08 | End: 2024-08-10 | Stop reason: HOSPADM

## 2024-08-08 RX ORDER — INSULIN LISPRO 100 [IU]/ML
2-9 INJECTION, SOLUTION INTRAVENOUS; SUBCUTANEOUS
Status: DISCONTINUED | OUTPATIENT
Start: 2024-08-08 | End: 2024-08-10 | Stop reason: HOSPADM

## 2024-08-08 RX ORDER — HEPARIN SODIUM 10000 [USP'U]/100ML
13 INJECTION, SOLUTION INTRAVENOUS
Status: DISCONTINUED | OUTPATIENT
Start: 2024-08-08 | End: 2024-08-09

## 2024-08-08 RX ORDER — HYDRALAZINE HYDROCHLORIDE 20 MG/ML
10 INJECTION INTRAMUSCULAR; INTRAVENOUS EVERY 4 HOURS PRN
Status: DISCONTINUED | OUTPATIENT
Start: 2024-08-08 | End: 2024-08-10 | Stop reason: HOSPADM

## 2024-08-08 RX ORDER — ACETAMINOPHEN 160 MG/5ML
650 SOLUTION ORAL EVERY 4 HOURS PRN
Status: DISCONTINUED | OUTPATIENT
Start: 2024-08-08 | End: 2024-08-10 | Stop reason: HOSPADM

## 2024-08-08 RX ORDER — HEPARIN SODIUM 1000 [USP'U]/ML
4000 INJECTION, SOLUTION INTRAVENOUS; SUBCUTANEOUS AS NEEDED
Status: DISCONTINUED | OUTPATIENT
Start: 2024-08-08 | End: 2024-08-08

## 2024-08-08 RX ORDER — BUDESONIDE AND FORMOTEROL FUMARATE DIHYDRATE 160; 4.5 UG/1; UG/1
2 AEROSOL RESPIRATORY (INHALATION)
Status: DISCONTINUED | OUTPATIENT
Start: 2024-08-08 | End: 2024-08-10 | Stop reason: HOSPADM

## 2024-08-08 RX ORDER — BISACODYL 5 MG/1
5 TABLET, DELAYED RELEASE ORAL DAILY PRN
Status: DISCONTINUED | OUTPATIENT
Start: 2024-08-08 | End: 2024-08-10 | Stop reason: HOSPADM

## 2024-08-08 RX ORDER — AMOXICILLIN 250 MG
2 CAPSULE ORAL 2 TIMES DAILY PRN
Status: DISCONTINUED | OUTPATIENT
Start: 2024-08-08 | End: 2024-08-10 | Stop reason: HOSPADM

## 2024-08-08 RX ORDER — POLYETHYLENE GLYCOL 3350 17 G/17G
17 POWDER, FOR SOLUTION ORAL DAILY PRN
Status: DISCONTINUED | OUTPATIENT
Start: 2024-08-08 | End: 2024-08-10 | Stop reason: HOSPADM

## 2024-08-08 RX ORDER — PANTOPRAZOLE SODIUM 40 MG/1
40 TABLET, DELAYED RELEASE ORAL
Status: DISCONTINUED | OUTPATIENT
Start: 2024-08-09 | End: 2024-08-10 | Stop reason: HOSPADM

## 2024-08-08 RX ORDER — SODIUM CHLORIDE 9 MG/ML
40 INJECTION, SOLUTION INTRAVENOUS AS NEEDED
Status: DISCONTINUED | OUTPATIENT
Start: 2024-08-08 | End: 2024-08-10 | Stop reason: HOSPADM

## 2024-08-08 RX ORDER — ACETAMINOPHEN 325 MG/1
650 TABLET ORAL EVERY 4 HOURS PRN
Status: DISCONTINUED | OUTPATIENT
Start: 2024-08-08 | End: 2024-08-10 | Stop reason: HOSPADM

## 2024-08-08 RX ORDER — ALBUTEROL SULFATE 2.5 MG/3ML
2.5 SOLUTION RESPIRATORY (INHALATION) EVERY 6 HOURS PRN
Status: DISCONTINUED | OUTPATIENT
Start: 2024-08-08 | End: 2024-08-10 | Stop reason: HOSPADM

## 2024-08-08 RX ORDER — TERAZOSIN 1 MG/1
1 CAPSULE ORAL NIGHTLY
Status: DISCONTINUED | OUTPATIENT
Start: 2024-08-08 | End: 2024-08-09

## 2024-08-08 RX ADMIN — SODIUM CHLORIDE 1000 ML: 9 INJECTION, SOLUTION INTRAVENOUS at 14:42

## 2024-08-08 RX ADMIN — IOPAMIDOL 85 ML: 755 INJECTION, SOLUTION INTRAVENOUS at 19:17

## 2024-08-08 RX ADMIN — ATORVASTATIN CALCIUM 80 MG: 40 TABLET, FILM COATED ORAL at 23:28

## 2024-08-08 RX ADMIN — HEPARIN SODIUM 9440 UNITS: 1000 INJECTION INTRAVENOUS; SUBCUTANEOUS at 23:29

## 2024-08-08 RX ADMIN — Medication 10 ML: at 23:41

## 2024-08-08 RX ADMIN — ENOXAPARIN SODIUM 120 MG: 120 INJECTION SUBCUTANEOUS at 20:21

## 2024-08-08 RX ADMIN — INSULIN GLARGINE 20 UNITS: 100 INJECTION, SOLUTION SUBCUTANEOUS at 23:28

## 2024-08-08 RX ADMIN — HEPARIN SODIUM 13 UNITS/KG/HR: 10000 INJECTION, SOLUTION INTRAVENOUS at 23:34

## 2024-08-08 RX ADMIN — SODIUM CHLORIDE 100 ML/HR: 9 INJECTION, SOLUTION INTRAVENOUS at 23:41

## 2024-08-08 NOTE — ED PROVIDER NOTES
" EMERGENCY DEPARTMENT ENCOUNTER    Pt Name: Amadeo Kong  MRN: [id number]  Pt :   1960  Room Number:  10/10  Date of encounter:  2024  PCP: Bianka Ann MD  ED Provider: Jose Guadalupe Lechuga MD    Historian: Patient and his fiancée      HPI:  Chief Complaint: Shortness of breath         Context: Amadeo Kong is a 63 y.o. male who presents to the ED c/o shortness of breath since last night prior to going to bed around 11 PM.  He woke up and still felt somewhat short of breath.  The patient has a very difficult time putting his finger on his exact complaint any mostly simply states \"something just was not right\".  At 1 point he mentions a \"tingle in my chest\".  The patient has diabetes but does not check his blood sugars as directed.  Initially the patient does not mention any abdominal pain but when palpating his abdomen he tells me that diffuse abdominal pain started this morning and he notes tenderness throughout.  He denies previous abdominal surgeries.  He denies nausea vomiting and diarrhea.  No recent fevers chills cough or congestion.  He has been around a lot of people especially at [employer] but knows of no definite sick contacts.        PAST MEDICAL HISTORY  Past Medical History:   Diagnosis Date    Arthritis     Asthma     inhaler daily     Diabetes mellitus 2000    insulin daily; checks blood sugars on occasion-run 150-180    Elevated cholesterol     History of sleep apnea     years ago diagnosed but never used a machine at home     History of staph infection     wound infection after left knee replacement -saw infectious disease MD     Hypertension     Wears glasses          PAST SURGICAL HISTORY  Past Surgical History:   Procedure Laterality Date    COLONOSCOPY      HIP SURGERY Right 2014    [facility]    INCISION AND DRAINAGE OF WOUND Left 2010    x2 of total knee replacement wound -iv antibiotics    KNEE SURGERY Bilateral     Right knee- , left knee- - [facility]    " LUMBAR LAMINECTOMY WITH FUSION N/A 7/27/2018    Procedure: TLIF, OSTEOTOMY L4-5 , POST FUSION L4-5;  Surgeon: Yo Bryson MD;  Location: [facility] OR;  Service: Neurosurgery    LUMBAR LAMINECTOMY WITH FUSION N/A 8/27/2018    Procedure: LUMBAR LAMINECTOMY POSTERIOR LUMBAR INTERBODY FUSION;  Surgeon: Yo Bryson MD;  Location:  [facility] OR;  Service: Neurosurgery    ROTATOR CUFF REPAIR Right     TONSILLECTOMY           FAMILY HISTORY  Family History   Problem Relation Age of Onset    Hypertension Mother          SOCIAL HISTORY  Social History     Socioeconomic History    Marital status: Single   Tobacco Use    Smoking status: Never    Smokeless tobacco: Never   Vaping Use    Vaping status: Never Used   Substance and Sexual Activity    Alcohol use: No    Drug use: No    Sexual activity: Defer         ALLERGIES  Carrot [daucus carota], Shellfish allergy, Strawberry, Banana, Sulfamethoxazole-trimethoprim, and Vancomycin        REVIEW OF SYSTEMS  Review of Systems       All systems reviewed and negative except for those discussed in HPI.       PHYSICAL EXAM    I have reviewed the triage vital signs and nursing notes.    ED Triage Vitals [08/08/24 1307]   Temp Heart Rate Resp BP SpO2   98.2 °F (36.8 °C) 116 22 125/92 94 %      Temp src Heart Rate Source Patient Position BP Location FiO2 (%)   Oral Monitor Sitting Right arm --       Physical Exam  GENERAL:   Appears in no acute distress.  Pleasant nontoxic  HENT: Nares patent.  Dry mucous membranes  EYES: No scleral icterus.  CV: Regular rhythm, tachycardic rate.  No murmurs gallops rubs  RESPIRATORY: Normal effort.  No audible wheezes, rales or rhonchi.  Clear to auscultation  ABDOMEN: Soft, diffusely tender to palpation in all quadrants/diffuse throughout the entire abdomen.  No guarding rebound or rigidity.  No hepatosplenomegaly or masses.  Bowel sounds within normal limits.  MUSCULOSKELETAL: No deformities.   NEURO: Alert, moves all extremities, follows  commands.  SKIN: Warm, dry, no rash visualized.      LAB RESULTS  Recent Results (from the past 24 hour(s))   ECG 12 Lead ED Triage Standing Order; SOA    Collection Time: 08/08/24  1:24 PM   Result Value Ref Range    QT Interval 338 ms    QTC Interval 424 ms   Comprehensive Metabolic Panel    Collection Time: 08/08/24  1:41 PM    Specimen: Blood   Result Value Ref Range    Glucose 165 (H) 65 - 99 mg/dL    BUN 13 8 - 23 mg/dL    Creatinine 1.94 (H) 0.76 - 1.27 mg/dL    Sodium 137 136 - 145 mmol/L    Potassium 4.7 3.5 - 5.2 mmol/L    Chloride 103 98 - 107 mmol/L    CO2 23.0 22.0 - 29.0 mmol/L    Calcium 9.2 8.6 - 10.5 mg/dL    Total Protein 7.4 6.0 - 8.5 g/dL    Albumin 3.9 3.5 - 5.2 g/dL    ALT (SGPT) 13 1 - 41 U/L    AST (SGOT) 21 1 - 40 U/L    Alkaline Phosphatase 96 39 - 117 U/L    Total Bilirubin 0.7 0.0 - 1.2 mg/dL    Globulin 3.5 gm/dL    A/G Ratio 1.1 g/dL    BUN/Creatinine Ratio 6.7 (L) 7.0 - 25.0    Anion Gap 11.0 5.0 - 15.0 mmol/L    eGFR 37.9 (L) >60.0 mL/min/1.73   BNP    Collection Time: 08/08/24  1:41 PM    Specimen: Blood   Result Value Ref Range    proBNP 37.8 0.0 - 900.0 pg/mL   Single High Sensitivity Troponin T    Collection Time: 08/08/24  1:41 PM    Specimen: Blood   Result Value Ref Range    HS Troponin T 53 (C) <22 ng/L   Green Top (Gel)    Collection Time: 08/08/24  1:41 PM   Result Value Ref Range    Extra Tube Hold for add-ons.    Lavender Top    Collection Time: 08/08/24  1:41 PM   Result Value Ref Range    Extra Tube hold for add-on    Gold Top - SST    Collection Time: 08/08/24  1:41 PM   Result Value Ref Range    Extra Tube Hold for add-ons.    Gray Top    Collection Time: 08/08/24  1:41 PM   Result Value Ref Range    Extra Tube Hold for add-ons.    Light Blue Top    Collection Time: 08/08/24  1:41 PM   Result Value Ref Range    Extra Tube Hold for add-ons.    CBC Auto Differential    Collection Time: 08/08/24  1:41 PM    Specimen: Blood   Result Value Ref Range    WBC 6.15 3.40 - 10.80  10*3/mm3    RBC 4.32 4.14 - 5.80 10*6/mm3    Hemoglobin 13.4 13.0 - 17.7 g/dL    Hematocrit 38.5 37.5 - 51.0 %    MCV 89.1 79.0 - 97.0 fL    MCH 31.0 26.6 - 33.0 pg    MCHC 34.8 31.5 - 35.7 g/dL    RDW 12.0 (L) 12.3 - 15.4 %    RDW-SD 39.3 37.0 - 54.0 fl    MPV 8.7 6.0 - 12.0 fL    Platelets 190 140 - 450 10*3/mm3    Neutrophil % 68.8 42.7 - 76.0 %    Lymphocyte % 19.2 (L) 19.6 - 45.3 %    Monocyte % 9.3 5.0 - 12.0 %    Eosinophil % 2.1 0.3 - 6.2 %    Basophil % 0.3 0.0 - 1.5 %    Immature Grans % 0.3 0.0 - 0.5 %    Neutrophils, Absolute 4.23 1.70 - 7.00 10*3/mm3    Lymphocytes, Absolute 1.18 0.70 - 3.10 10*3/mm3    Monocytes, Absolute 0.57 0.10 - 0.90 10*3/mm3    Eosinophils, Absolute 0.13 0.00 - 0.40 10*3/mm3    Basophils, Absolute 0.02 0.00 - 0.20 10*3/mm3    Immature Grans, Absolute 0.02 0.00 - 0.05 10*3/mm3    nRBC 0.0 0.0 - 0.2 /100 WBC   D-dimer, Quantitative    Collection Time: 08/08/24  1:41 PM    Specimen: Blood   Result Value Ref Range    D-Dimer, Quantitative 2.50 (H) 0.00 - 0.64 MCGFEU/mL   COVID-19, FLU A/B, RSV PCR 1 HR TAT - Swab, Nasopharynx    Collection Time: 08/08/24  2:15 PM    Specimen: Nasopharynx; Swab   Result Value Ref Range    COVID19 Not Detected Not Detected - Ref. Range    Influenza A PCR Not Detected Not Detected    Influenza B PCR Not Detected Not Detected    RSV, PCR Not Detected Not Detected   High Sensitivity Troponin T 2Hr    Collection Time: 08/08/24  4:00 PM    Specimen: Arm, Left; Blood   Result Value Ref Range    HS Troponin T 42 (H) <22 ng/L    Troponin T Delta -11 (L) >=-4 - <+4 ng/L       If labs were ordered, I independently reviewed the results and considered them in treating the patient.        RADIOLOGY  CT Angiogram Chest    Result Date: 8/8/2024  CT ANGIOGRAM CHEST Date of Exam: 8/8/2024 7:12 PM EDT Indication: soa and pos d dimer. Comparison: Chest radiograph from earlier today Technique: CTA of the chest was performed after the uneventful intravenous administration  of 85 mL Isovue-370. Reconstructed coronal and sagittal images were also obtained. In addition, a 3-D volume rendered image was created for interpretation. Automated exposure control and iterative reconstruction methods were used. Findings: The central tracheobronchial tree is clear. There are multiple interstitial markings along the periphery of both lungs, suggesting mild interstitial lung disease. There is no focal consolidation. There is no pleural effusion. The heart size appears normal. The great vessels are normal in caliber. There is acute pulmonary embolus within the distal left main pulmonary artery extending to a couple lingular and left lower lobe branches as well as embolus within a right lower lobe  branch. There is suggestion of early developing right heart strain. No abnormally enlarged lymph nodes are identified. Partial evaluation of the upper abdomen demonstrates cholelithiasis. No aggressive osseous lesions are identified.     Impression: 1.Multiple bilateral pulmonary emboli as described above, with suggestion of early developing right strain. 2.Suggestion of mild interstitial lung disease. 3.Cholelithiasis. 4.Results were discussed with Dr. Lechuga at time of dictation. Electronically Signed: Gutierrez Saab MD  8/8/2024 7:35 PM EDT  Workstation ID: [id number]    CT Abdomen Pelvis Without Contrast    Result Date: 8/8/2024  CT ABDOMEN PELVIS WO CONTRAST Date of Exam: 8/8/2024 2:21 PM EDT Indication: diffuse abd tenderness. Comparison: 12/13/2022. Technique: Axial CT images were obtained of the abdomen and pelvis without the administration of contrast. Reconstructed coronal and sagittal images were also obtained. Automated exposure control and iterative construction methods were used. Findings: There is respiratory motion on the film. There is artifact from spinal hardware. There has been posterior fusion from L4-S1. The bilateral L5 pedicle screws appear broken although the appearance is  unchanged as compared to the prior study. There is stable grade 1 spondylolisthesis of L4 on L5. There is also artifact from right hip hardware. The prostate gland is normal in size. The bladder is poorly distended. There is no large or small bowel dilatation. There is a normal appendix. There are no focal inflammatory changes. There are no renal stones or hydronephrosis. There is cholelithiasis without acute cholecystitis. There is no bile duct dilatation. The nonenhanced solid organs are normal in size.     Impression: Exam somewhat limited by artifact and respiratory motion. No acute process identified. Cholelithiasis without acute cholecystitis. Chronic findings in the spine as noted. Electronically Signed: Virginia Arauz MD  8/8/2024 2:41 PM EDT  Workstation ID: [id number]    XR Chest 1 View    Result Date: 8/8/2024  XR CHEST 1 VW Date of Exam: 8/8/2024 1:27 PM EDT Indication: SOA triage protocol Comparison: 6/16/2024 Findings: Heart size and pulmonary vasculature are within normal limits. Haziness of the lateral lower lungs due to overlying soft tissue attenuation. Lungs grossly clear. Costophrenic angle sharp     Impression: No active cardiopulmonary disease Electronically Signed: Russell Martinez  8/8/2024 1:58 PM EDT  Workstation ID: [id number]     I ordered and independently reviewed the above noted radiographic studies.      I viewed images of chest x-ray and CT scan of the abdomen and pelvis which showed no acute abnormalities per my independent interpretation.    Viewed images of CTA chest and note what appeared to be acute pulmonary emboli on the left side.  This is per my independent interpretation.    See radiologist's dictation for official interpretation.        PROCEDURES    Procedures    ECG 12 Lead ED Triage Standing Order; SOA   Final Result   Test Reason : ED Triage Standing Order~   Blood Pressure :   */*   mmHG   Vent. Rate :  95 BPM     Atrial Rate :  95 BPM      P-R Int : 160 ms          QRS Dur  :  66 ms       QT Int : 338 ms       P-R-T Axes :  39  16  45 degrees      QTc Int : 424 ms      Normal sinus rhythm   Normal ECG   When compared with ECG of 16-JUN-2024 12:08,   No significant change was found   Confirmed by LYNDSAY CHRISTINE MD ([id number]) on 8/8/2024 1:48:08 PM      Referred By: ED MD           Confirmed By: LYNDSAY CHRISTINE MD      Provide interpretation twelve-lead electrocardiogram changes since 7/17/2022 include inferior T wave inversions.    MEDICATIONS GIVEN IN ER    Medications   sodium chloride 0.9 % flush 10 mL (has no administration in time range)   Enoxaparin Sodium (LOVENOX) syringe 120 mg (has no administration in time range)   sodium chloride 0.9 % bolus 1,000 mL (1,000 mL Intravenous New Bag 8/8/24 1442)   iopamidol (ISOVUE-370) 76 % injection 85 mL (85 mL Intravenous Given 8/8/24 1917)         MEDICAL DECISION MAKING, PROGRESS, and CONSULTS    All labs, if obtained, have been independently reviewed by me.  All radiology studies, if obtained, have been reviewed by me and the radiologist dictating the report.  All EKG's, if obtained, have been independently viewed and interpreted by me/my attending physician.      Discussion below represents my analysis of pertinent findings related to patient's condition, differential diagnosis, treatment plan and final disposition.                         Differential diagnosis:    Pneumonia versus COVID versus CHF versus pulmonary embolism, etc.      Additional sources:    - Discussed/ obtained information from independent historians: I spoke with patient's fiancé at bedside.    - External (non-ED) record review: Reviewed progress note by REID Arzola on 8/6/2024 when the patient had been sent to her secondary to his cardiologist recommendation.    - Chronic or social conditions impacting care: No alcohol tobacco or recreational drug use    - Shared decision making: Patient/patient representative in complete agreement with current plans for evaluation and  management.      Orders placed during this visit:  Orders Placed This Encounter   Procedures    COVID PRE-OP / PRE-PROCEDURE SCREENING ORDER (NO ISOLATION) - Swab, Nasopharynx    COVID-19, FLU A/B, RSV PCR 1 HR TAT - Swab, Nasopharynx    XR Chest 1 View    CT Abdomen Pelvis Without Contrast    CT Angiogram Chest    [place] Draw    Comprehensive Metabolic Panel    BNP    Single High Sensitivity Troponin T    CBC Auto Differential    High Sensitivity Troponin T 2Hr    D-dimer, Quantitative    NPO Diet NPO Type: Strict NPO    Undress & Gown    Continuous Pulse Oximetry    Vital Signs    Oxygen Therapy- Nasal Cannula; Titrate 1-6 LPM Per SpO2; 90 - 95%    ECG 12 Lead ED Triage Standing Order; SOA    Insert Peripheral IV    CBC & Differential    Green Top (Gel)    Lavender Top    Gold Top - SST    Gray Top    Light Blue Top         Additional orders considered but not ordered:  IV contrasted CT scan of the abdomen pelvis    ED Course:    Consultants:      ED Course as of 08/08/24 1947   Thu Aug 08, 2024   1432 HS Troponin T(!!): 53  Higher than previous troponins but the patient's creatinine has significantly elevated since last check as well. [MS]   1432 Creatinine(!): 1.94  Repeat troponin pending.  Liter of normal saline has been ordered but has not yet started infusing. [MS]   1814 Patient is agitated at having been in the emergency department and wishes to be discharged to soon as possible. [MS]   1923 D-dimer was positive.  I have ordered and now and I am interpreting the CTA chest.  I see what looks like relatively large pulmonary emboli on the left.  Will anticoagulate and admit.  Currently awaiting radiologist reading. [MS]      ED Course User Index  [MS] Jose Guadalupe Lechuga MD              Shared Decision Making:  After my consideration of clinical presentation and any laboratory/radiology studies obtained, I discussed the findings with the patient/patient representative who is in agreement with the treatment plan  and the final disposition.   Risks and benefits of discharge and/or observation/admission were discussed.       AS OF 19:47 EDT VITALS:    BP - 115/87  HR - 74  TEMP - 98.2 °F (36.8 °C) (Oral)  O2 SATS - 98%                  DIAGNOSIS  Final diagnoses:   Acute pulmonary embolism, unspecified pulmonary embolism type, unspecified whether acute cor pulmonale present   Acute dyspnea   Elevated serum creatinine         DISPOSITION  Admission      Please note that portions of this document were completed with voice recognition software.        Jose Guadalupe Lechuga MD  08/08/24 0701

## 2024-08-09 ENCOUNTER — APPOINTMENT (OUTPATIENT)
Dept: CARDIOLOGY | Facility: HOSPITAL | Age: 64
DRG: 176 | End: 2024-08-09
Payer: MEDICARE

## 2024-08-09 LAB
ALBUMIN SERPL-MCNC: 3.4 G/DL (ref 3.5–5.2)
ALBUMIN/GLOB SERPL: 1.3 G/DL
ALP SERPL-CCNC: 84 U/L (ref 39–117)
ALT SERPL W P-5'-P-CCNC: 10 U/L (ref 1–41)
ANION GAP SERPL CALCULATED.3IONS-SCNC: 11 MMOL/L (ref 5–15)
APTT PPP: >200 SECONDS (ref 60–90)
AST SERPL-CCNC: 16 U/L (ref 1–40)
BASOPHILS # BLD AUTO: 0.02 10*3/MM3 (ref 0–0.2)
BASOPHILS NFR BLD AUTO: 0.4 % (ref 0–1.5)
BH CV ECHO MEAS - AO MAX PG: 6.4 MMHG
BH CV ECHO MEAS - AO MEAN PG: 4 MMHG
BH CV ECHO MEAS - AO ROOT DIAM: 3.3 CM
BH CV ECHO MEAS - AO V2 MAX: 126.6 CM/SEC
BH CV ECHO MEAS - EF(MOD-BP): 57.5 %
BH CV ECHO MEAS - EF(MOD-SP2): 58.9 %
BH CV ECHO MEAS - EF(MOD-SP4): 55.8 %
BH CV ECHO MEAS - IVS/LVPW: 0.92 CM
BH CV ECHO MEAS - IVSD: 1.1 CM
BH CV ECHO MEAS - LA DIMENSION: 3.8 CM
BH CV ECHO MEAS - LV MAX PG: 7.4 MMHG
BH CV ECHO MEAS - LV MEAN PG: 3.4 MMHG
BH CV ECHO MEAS - LV V1 MAX: 135.8 CM/SEC
BH CV ECHO MEAS - LV V1 VTI: 24.1 CM
BH CV ECHO MEAS - LVIDD: 4.9 CM
BH CV ECHO MEAS - LVIDS: 3.3 CM
BH CV ECHO MEAS - LVOT DIAM: 2 CM
BH CV ECHO MEAS - LVPWD: 1.2 CM
BH CV ECHO MEAS - MV A MAX VEL: 105.2 CM/SEC
BH CV ECHO MEAS - MV E MAX VEL: 80.9 CM/SEC
BH CV ECHO MEAS - MV E/A: 0.77
BH CV ECHO MEAS - MV MAX PG: 7 MMHG
BH CV ECHO MEAS - MV MEAN PG: 3 MMHG
BH CV ECHO MEAS - MV P1/2T: 54 MSEC
BH CV ECHO MEAS - PA ACC TIME: 0.09 SEC
BH CV ECHO MEAS - TAPSE (>1.6): 1.87 CM
BH CV VAS BP LEFT ARM: NORMAL MMHG
BH CV XLRA - RV BASE: 3.1 CM
BH CV XLRA - RV LENGTH: 7 CM
BH CV XLRA - RV MID: 2.35 CM
BILIRUB SERPL-MCNC: 0.5 MG/DL (ref 0–1.2)
BUN SERPL-MCNC: 14 MG/DL (ref 8–23)
BUN/CREAT SERPL: 11.3 (ref 7–25)
CALCIUM SPEC-SCNC: 8.3 MG/DL (ref 8.6–10.5)
CHLORIDE SERPL-SCNC: 106 MMOL/L (ref 98–107)
CO2 SERPL-SCNC: 19 MMOL/L (ref 22–29)
CREAT SERPL-MCNC: 1.24 MG/DL (ref 0.76–1.27)
DEPRECATED RDW RBC AUTO: 40.7 FL (ref 37–54)
EGFRCR SERPLBLD CKD-EPI 2021: 64.9 ML/MIN/1.73
EOSINOPHIL # BLD AUTO: 0.11 10*3/MM3 (ref 0–0.4)
EOSINOPHIL NFR BLD AUTO: 1.9 % (ref 0.3–6.2)
ERYTHROCYTE [DISTWIDTH] IN BLOOD BY AUTOMATED COUNT: 12.1 % (ref 12.3–15.4)
GLOBULIN UR ELPH-MCNC: 2.7 GM/DL
GLUCOSE BLDC GLUCOMTR-MCNC: 116 MG/DL (ref 70–130)
GLUCOSE BLDC GLUCOMTR-MCNC: 117 MG/DL (ref 70–130)
GLUCOSE BLDC GLUCOMTR-MCNC: 125 MG/DL (ref 70–130)
GLUCOSE BLDC GLUCOMTR-MCNC: 154 MG/DL (ref 70–130)
GLUCOSE SERPL-MCNC: 110 MG/DL (ref 65–99)
HCT VFR BLD AUTO: 36.1 % (ref 37.5–51)
HGB BLD-MCNC: 12.3 G/DL (ref 13–17.7)
IMM GRANULOCYTES # BLD AUTO: 0.01 10*3/MM3 (ref 0–0.05)
IMM GRANULOCYTES NFR BLD AUTO: 0.2 % (ref 0–0.5)
INR PPP: 1.45 (ref 0.89–1.12)
LYMPHOCYTES # BLD AUTO: 1.51 10*3/MM3 (ref 0.7–3.1)
LYMPHOCYTES NFR BLD AUTO: 26.7 % (ref 19.6–45.3)
MAGNESIUM SERPL-MCNC: 1.3 MG/DL (ref 1.6–2.4)
MCH RBC QN AUTO: 31.1 PG (ref 26.6–33)
MCHC RBC AUTO-ENTMCNC: 34.1 G/DL (ref 31.5–35.7)
MCV RBC AUTO: 91.4 FL (ref 79–97)
MONOCYTES # BLD AUTO: 0.53 10*3/MM3 (ref 0.1–0.9)
MONOCYTES NFR BLD AUTO: 9.4 % (ref 5–12)
NEUTROPHILS NFR BLD AUTO: 3.47 10*3/MM3 (ref 1.7–7)
NEUTROPHILS NFR BLD AUTO: 61.4 % (ref 42.7–76)
NRBC BLD AUTO-RTO: 0 /100 WBC (ref 0–0.2)
PHOSPHATE SERPL-MCNC: 3.5 MG/DL (ref 2.5–4.5)
PLATELET # BLD AUTO: 178 10*3/MM3 (ref 140–450)
PMV BLD AUTO: 9.1 FL (ref 6–12)
POTASSIUM SERPL-SCNC: 4.2 MMOL/L (ref 3.5–5.2)
PROT SERPL-MCNC: 6.1 G/DL (ref 6–8.5)
PROTHROMBIN TIME: 17.8 SECONDS (ref 12.2–14.5)
RBC # BLD AUTO: 3.95 10*6/MM3 (ref 4.14–5.8)
SODIUM SERPL-SCNC: 136 MMOL/L (ref 136–145)
UFH PPP CHRO-ACNC: 0.63 IU/ML (ref 0.3–0.7)
UFH PPP CHRO-ACNC: 0.88 IU/ML (ref 0.3–0.7)
UFH PPP CHRO-ACNC: 0.9 IU/ML (ref 0.3–0.7)
UFH PPP CHRO-ACNC: >1.1 IU/ML (ref 0.3–0.7)
WBC NRBC COR # BLD AUTO: 5.65 10*3/MM3 (ref 3.4–10.8)

## 2024-08-09 PROCEDURE — 87205 SMEAR GRAM STAIN: CPT | Performed by: INTERNAL MEDICINE

## 2024-08-09 PROCEDURE — 94799 UNLISTED PULMONARY SVC/PX: CPT

## 2024-08-09 PROCEDURE — 87070 CULTURE OTHR SPECIMN AEROBIC: CPT | Performed by: INTERNAL MEDICINE

## 2024-08-09 PROCEDURE — 94664 DEMO&/EVAL PT USE INHALER: CPT

## 2024-08-09 PROCEDURE — 25010000002 HEPARIN (PORCINE) 25000-0.45 UT/250ML-% SOLUTION

## 2024-08-09 PROCEDURE — 85520 HEPARIN ASSAY: CPT

## 2024-08-09 PROCEDURE — 63710000001 INSULIN GLARGINE PER 5 UNITS: Performed by: INTERNAL MEDICINE

## 2024-08-09 PROCEDURE — 93306 TTE W/DOPPLER COMPLETE: CPT | Performed by: INTERNAL MEDICINE

## 2024-08-09 PROCEDURE — 85520 HEPARIN ASSAY: CPT | Performed by: INTERNAL MEDICINE

## 2024-08-09 PROCEDURE — 87186 SC STD MICRODIL/AGAR DIL: CPT | Performed by: INTERNAL MEDICINE

## 2024-08-09 PROCEDURE — 93306 TTE W/DOPPLER COMPLETE: CPT

## 2024-08-09 PROCEDURE — 85730 THROMBOPLASTIN TIME PARTIAL: CPT | Performed by: INTERNAL MEDICINE

## 2024-08-09 PROCEDURE — 80053 COMPREHEN METABOLIC PANEL: CPT | Performed by: INTERNAL MEDICINE

## 2024-08-09 PROCEDURE — 85025 COMPLETE CBC W/AUTO DIFF WBC: CPT | Performed by: INTERNAL MEDICINE

## 2024-08-09 PROCEDURE — 99232 SBSQ HOSP IP/OBS MODERATE 35: CPT | Performed by: INTERNAL MEDICINE

## 2024-08-09 PROCEDURE — 25010000002 MAGNESIUM SULFATE 2 GM/50ML SOLUTION: Performed by: INTERNAL MEDICINE

## 2024-08-09 PROCEDURE — 25010000002 MAGNESIUM SULFATE 2 GM/50ML SOLUTION

## 2024-08-09 PROCEDURE — 82948 REAGENT STRIP/BLOOD GLUCOSE: CPT

## 2024-08-09 PROCEDURE — 25810000003 SODIUM CHLORIDE 0.9 % SOLUTION: Performed by: INTERNAL MEDICINE

## 2024-08-09 PROCEDURE — 84100 ASSAY OF PHOSPHORUS: CPT | Performed by: INTERNAL MEDICINE

## 2024-08-09 PROCEDURE — 83735 ASSAY OF MAGNESIUM: CPT | Performed by: INTERNAL MEDICINE

## 2024-08-09 PROCEDURE — 85610 PROTHROMBIN TIME: CPT | Performed by: INTERNAL MEDICINE

## 2024-08-09 PROCEDURE — 87077 CULTURE AEROBIC IDENTIFY: CPT | Performed by: INTERNAL MEDICINE

## 2024-08-09 RX ORDER — MAGNESIUM SULFATE HEPTAHYDRATE 40 MG/ML
2 INJECTION, SOLUTION INTRAVENOUS
Status: COMPLETED | OUTPATIENT
Start: 2024-08-09 | End: 2024-08-09

## 2024-08-09 RX ORDER — TERAZOSIN 1 MG/1
1 CAPSULE ORAL NIGHTLY
Status: DISCONTINUED | OUTPATIENT
Start: 2024-08-09 | End: 2024-08-10 | Stop reason: HOSPADM

## 2024-08-09 RX ORDER — MAGNESIUM SULFATE HEPTAHYDRATE 40 MG/ML
2 INJECTION, SOLUTION INTRAVENOUS
Status: DISCONTINUED | OUTPATIENT
Start: 2024-08-09 | End: 2024-08-09

## 2024-08-09 RX ORDER — HEPARIN SODIUM 10000 [USP'U]/100ML
13 INJECTION, SOLUTION INTRAVENOUS
Status: DISCONTINUED | OUTPATIENT
Start: 2024-08-09 | End: 2024-08-10

## 2024-08-09 RX ORDER — MAGNESIUM SULFATE HEPTAHYDRATE 40 MG/ML
2 INJECTION, SOLUTION INTRAVENOUS
Status: CANCELLED | OUTPATIENT
Start: 2024-08-09 | End: 2024-08-09

## 2024-08-09 RX ADMIN — BUDESONIDE AND FORMOTEROL FUMARATE DIHYDRATE 2 PUFF: 160; 4.5 AEROSOL RESPIRATORY (INHALATION) at 09:51

## 2024-08-09 RX ADMIN — SODIUM CHLORIDE 100 ML/HR: 9 INJECTION, SOLUTION INTRAVENOUS at 09:31

## 2024-08-09 RX ADMIN — INSULIN GLARGINE 20 UNITS: 100 INJECTION, SOLUTION SUBCUTANEOUS at 21:20

## 2024-08-09 RX ADMIN — BUDESONIDE AND FORMOTEROL FUMARATE DIHYDRATE 2 PUFF: 160; 4.5 AEROSOL RESPIRATORY (INHALATION) at 21:27

## 2024-08-09 RX ADMIN — Medication 10 ML: at 08:02

## 2024-08-09 RX ADMIN — MAGNESIUM SULFATE HEPTAHYDRATE 2 G: 40 INJECTION, SOLUTION INTRAVENOUS at 15:25

## 2024-08-09 RX ADMIN — MAGNESIUM SULFATE HEPTAHYDRATE 2 G: 40 INJECTION, SOLUTION INTRAVENOUS at 18:30

## 2024-08-09 RX ADMIN — PANTOPRAZOLE SODIUM 40 MG: 40 TABLET, DELAYED RELEASE ORAL at 05:55

## 2024-08-09 RX ADMIN — ASPIRIN 81 MG: 81 TABLET, CHEWABLE ORAL at 08:02

## 2024-08-09 RX ADMIN — ATORVASTATIN CALCIUM 80 MG: 40 TABLET, FILM COATED ORAL at 21:19

## 2024-08-09 RX ADMIN — SODIUM CHLORIDE 100 ML/HR: 9 INJECTION, SOLUTION INTRAVENOUS at 19:45

## 2024-08-09 RX ADMIN — TERAZOSIN HYDROCHLORIDE 1 MG: 1 CAPSULE ORAL at 00:12

## 2024-08-09 RX ADMIN — MAGNESIUM SULFATE HEPTAHYDRATE 2 G: 40 INJECTION, SOLUTION INTRAVENOUS at 14:08

## 2024-08-09 RX ADMIN — TERAZOSIN HYDROCHLORIDE 1 MG: 1 CAPSULE ORAL at 22:52

## 2024-08-09 RX ADMIN — GABAPENTIN 300 MG: 300 CAPSULE ORAL at 08:02

## 2024-08-09 RX ADMIN — CLOPIDOGREL BISULFATE 75 MG: 75 TABLET ORAL at 08:02

## 2024-08-09 RX ADMIN — HEPARIN SODIUM 13 UNITS/KG/HR: 10000 INJECTION, SOLUTION INTRAVENOUS at 08:05

## 2024-08-09 RX ADMIN — Medication 10 ML: at 21:20

## 2024-08-09 NOTE — NURSING NOTE
"Reason for Wound, Ostomy and Continence (WOC) Nursing Consultation: \"Right knee & left knee \"    Patient sitting up in bed.  Family/support person at bedside.     Wound Assessment  Wound Type: Abrasion, delayed healing  Location: R knee  Measurements: approx 1.5cm x 2cm x 0.3cm  Wound Bed: pink and red  Wound Edges: chronic hyperpigmentation   Periwound Skin: intact   Drainage Characteristics/Odor: purulent  Drainage Amount: small  Pain: No   Care provided: Vashe moistened guaze soak for 5 minutes. Applied HydraFera Blue, moistened. Optifoam applied to cover and secure.   Notes: Pt known to WOC when seen here in May for non healing knee abrasions. Since then, pt has been treated at [facility]. Reviewed notes and continued current regimen.   Wound Image:     Wound Assessment  Wound Type: Abrasion, delayed healing  Location: R shin  Measurements: approx 1cm x 0.8cm x 0.2cm  Wound Bed: pink, red, dry  Periwound Skin: dry   Drainage Characteristics/Odor: none  Drainage Amount: none  Pain: No   Care provided: as above.  Notes:       Wound Assessment  Wound Type: Abrasion, delayed healing  Location: L knee  Measurements: approx 1.5cm x 1.5cm x 0.3cm  Wound Bed: pink and red  Periwound Skin: chronic hyperpigmentation   Drainage Characteristics/Odor: purulent  Drainage Amount: small  Pain: No   Care provided: as above  Wound Image:     Recommendation(s) for management of wound:   -Refer to wound care orders for specific instructions on how to treat/manage wound.  -WOC will change dressings 1-2x/week  -Practice pressure injury prevention protocol.    Most recent Skyler Scale score:  Sensory Perception: 3-->slightly limited  Moisture: 4-->rarely moist  Activity: 3-->walks occasionally  Mobility: 3-->slightly limited  Nutrition: 3-->adequate  Friction and Shear: 3-->no apparent problem  Skyler Score: 19 (08/08/24 2124)     Specialty support surface: Foam Mattress       Pressure Injury Prevention Protocol (initiate " for Skyler Score of 18 or less):   *Turn q 2 hr, keep heels elevated and offloaded with offloading heel boots.  *Allevn dressings to heels, sacrum/coccyx  *Follow C.A.R.E protocol if medical devices (Bipap, blackburn, Ng tube, etc) are being used.  *Reduce layers under patient (one sheet as drawsheet and two incontinence pads) to allow waffle or JACINTA to improve microclimate   *Raise knee-gatch before elevating HOB to reduce shearing      WOC Team will continue to follow.  Please re-consult if the wound(s) worsens.

## 2024-08-09 NOTE — SIGNIFICANT NOTE
08/09/24 1610   Living Situation   Current Living Arrangements home   Potentially Unsafe Housing Conditions none   Transportation Needs   In the past 12 months, has lack of transportation kept you from medical appointments or from getting medications? no   In the past 12 months, has lack of transportation kept you from meetings, work, or from getting things needed for daily living? No   Utilities   In the past 12 months has the electric, gas, oil, or water company threatened to shut off services in your home? No   Abuse Screen (yes response referral indicated)   Feels Unsafe at Home or Work/School no   Feels Threatened by Someone no   Does Anyone Try to Keep You From Having Contact with Others or Doing Things Outside Your Home? no   Employment   Do you want help finding or keeping work or a job? I do not need or want help   Family and Community Support   If for any reason you need help with day-to-day activities such as bathing, preparing meals, shopping, managing finances, etc., do you get the help you need? I get all the help I need   How often do you feel lonely or isolated from those around you? Often   Education   Do you want help with school or training? For example, starting or completing job training or getting a high school diploma, GED or equivalent No   Physical Activity   On average, how many days per week do you engage in moderate to strenuous exercise (like a brisk walk)? 1 day   On average, how many minutes do you engage in exercise at this level? 10 min   Number of minutes of exercise per week (!) 10   Alcohol Use   Q1: How often do you have a drink containing alcohol? Never   Q2: How many drinks containing alcohol do you have on a typical day when you are drinking? Pt Declined   Q3: How often do you have six or more drinks on one occasion? Pt Declined   Stress   Do you feel stress - tense, restless, nervous, or anxious, or unable to sleep at night because your mind is troubled all the time -  these days? Pt Declined   Mental Health   Little Interest or Pleasure in Doing Things 99-->patient declined   Feeling Down, Depressed or Hopeless 99-->patient declined   Disabilities   Concentrating, Remembering or Making Decisions Difficulty no   Doing Errands Independently Difficulty (such as shopping) no   SDOH Completion Check   Social Determinants Score 3

## 2024-08-09 NOTE — PROGRESS NOTES
[facility]  PROGRESS NOTE    Patient Name: Amadeo Kong  : 1960  MRN: [id number]    Date of Admission: 2024  Primary Care Physician: Bianka Ann MD    Subjective   Subjective     CC:  Pulmonary embolism    HPI:  Patient resting in bed. Wants to go home. Denies chest pain. Still with some mild shortness of breath.      Objective   Objective     Vital Signs:   Temp:  [97.8 °F (36.6 °C)-98.2 °F (36.8 °C)] 98.2 °F (36.8 °C)  Heart Rate:  [] 81  Resp:  [16-22] 17  BP: (102-125)/(69-93) 112/69  Flow (L/min):  [2] 2     Physical Exam:  Constitutional: No acute distress, awake, alert  HENT: NCAT, mucous membranes moist  Respiratory: Clear to auscultation bilaterally, respiratory effort normal   Cardiovascular: RRR, no murmurs, rubs, or gallops  Gastrointestinal: soft, nontender, nondistended  Musculoskeletal: bilateral knee wounds present  Psychiatric: Appropriate affect, cooperative  Neurologic: Oriented x 3, speech clear, no focal deficits  Skin: No rashes      Results Reviewed:  LAB RESULTS:      Lab 24  0658 24  0018 24  1341   WBC 5.65  --  6.15   HEMOGLOBIN 12.3*  --  13.4   HEMATOCRIT 36.1*  --  38.5   PLATELETS 178  --  190   NEUTROS ABS 3.47  --  4.23   IMMATURE GRANS (ABS) 0.01  --  0.02   LYMPHS ABS 1.51  --  1.18   MONOS ABS 0.53  --  0.57   EOS ABS 0.11  --  0.13   MCV 91.4  --  89.1   PROTIME  --  17.8*  --    APTT  --  >200.0*  --    HEPARIN ANTI-XA 0.88* >1.10*  --    D DIMER QUANT  --   --  2.50*         Lab 24  0658 24  1341   SODIUM 136 137   POTASSIUM 4.2 4.7   CHLORIDE 106 103   CO2 19.0* 23.0   ANION GAP 11.0 11.0   BUN 14 13   CREATININE 1.24 1.94*   EGFR 64.9 37.9*   GLUCOSE 110* 165*   CALCIUM 8.3* 9.2   MAGNESIUM 1.3*  --    PHOSPHORUS 3.5  --    HEMOGLOBIN A1C  --  6.20*         Lab 24  0658 24  1341   TOTAL PROTEIN 6.1 7.4   ALBUMIN 3.4* 3.9   GLOBULIN 2.7 3.5   ALT (SGPT) 10 13   AST (SGOT)  16 21   BILIRUBIN 0.5 0.7   ALK PHOS 84 96         Lab 08/09/24  0018 08/08/24  1600 08/08/24  1341   PROBNP  --   --  37.8   HSTROP T  --  42* 53*   PROTIME 17.8*  --   --    INR 1.45*  --   --                  Brief Urine Lab Results  (Last result in the past 365 days)        Color   Clarity   Blood   Leuk Est   Nitrite   Protein   CREAT   Urine HCG        05/22/24 1956 Yellow   Clear   Negative   Negative   Negative   Negative                   Microbiology Results Abnormal       Procedure Component Value - Date/Time    Wound Culture - Wound, Leg, Left [[id number]] Collected: 08/09/24 0008    Lab Status: Preliminary result Specimen: Wound from Leg, Left Updated: 08/09/24 0631     Gram Stain Many (4+) WBCs seen      Many (4+) Gram negative bacilli    COVID PRE-OP / PRE-PROCEDURE SCREENING ORDER (NO ISOLATION) - Swab, Nasopharynx [[id number]]  (Normal) Collected: 08/08/24 1415    Lab Status: Final result Specimen: Swab from Nasopharynx Updated: 08/08/24 1526    Narrative:      The following orders were created for panel order COVID PRE-OP / PRE-PROCEDURE SCREENING ORDER (NO ISOLATION) - Swab, Nasopharynx.  Procedure                               Abnormality         Status                     ---------                               -----------         ------                     COVID-19, FLU A/B, RSV P...[[id number]]  Normal              Final result                 Please view results for these tests on the individual orders.    COVID-19, FLU A/B, RSV PCR 1 HR TAT - Swab, Nasopharynx [[id number]]  (Normal) Collected: 08/08/24 1415    Lab Status: Final result Specimen: Swab from Nasopharynx Updated: 08/08/24 1526     COVID19 Not Detected     Influenza A PCR Not Detected     Influenza B PCR Not Detected     RSV, PCR Not Detected            CT Angiogram Chest    Result Date: 8/8/2024  CT ANGIOGRAM CHEST Date of Exam: 8/8/2024 7:12 PM EDT Indication: soa and pos d dimer. Comparison: Chest radiograph from earlier today  Technique: CTA of the chest was performed after the uneventful intravenous administration of 85 mL Isovue-370. Reconstructed coronal and sagittal images were also obtained. In addition, a 3-D volume rendered image was created for interpretation. Automated exposure control and iterative reconstruction methods were used. Findings: The central tracheobronchial tree is clear. There are multiple interstitial markings along the periphery of both lungs, suggesting mild interstitial lung disease. There is no focal consolidation. There is no pleural effusion. The heart size appears normal. The great vessels are normal in caliber. There is acute pulmonary embolus within the distal left main pulmonary artery extending to a couple lingular and left lower lobe branches as well as embolus within a right lower lobe  branch. There is suggestion of early developing right heart strain. No abnormally enlarged lymph nodes are identified. Partial evaluation of the upper abdomen demonstrates cholelithiasis. No aggressive osseous lesions are identified.     Impression: Impression: 1.Multiple bilateral pulmonary emboli as described above, with suggestion of early developing right strain. 2.Suggestion of mild interstitial lung disease. 3.Cholelithiasis. 4.Results were discussed with Dr. Lechuga at time of dictation. Electronically Signed: Gutierrez Saab MD  8/8/2024 7:35 PM EDT  Workstation ID: [id number]    CT Abdomen Pelvis Without Contrast    Result Date: 8/8/2024  CT ABDOMEN PELVIS WO CONTRAST Date of Exam: 8/8/2024 2:21 PM EDT Indication: diffuse abd tenderness. Comparison: 12/13/2022. Technique: Axial CT images were obtained of the abdomen and pelvis without the administration of contrast. Reconstructed coronal and sagittal images were also obtained. Automated exposure control and iterative construction methods were used. Findings: There is respiratory motion on the film. There is artifact from spinal hardware. There has been  posterior fusion from L4-S1. The bilateral L5 pedicle screws appear broken although the appearance is unchanged as compared to the prior study. There is stable grade 1 spondylolisthesis of L4 on L5. There is also artifact from right hip hardware. The prostate gland is normal in size. The bladder is poorly distended. There is no large or small bowel dilatation. There is a normal appendix. There are no focal inflammatory changes. There are no renal stones or hydronephrosis. There is cholelithiasis without acute cholecystitis. There is no bile duct dilatation. The nonenhanced solid organs are normal in size.     Impression: Impression: Exam somewhat limited by artifact and respiratory motion. No acute process identified. Cholelithiasis without acute cholecystitis. Chronic findings in the spine as noted. Electronically Signed: Virginia Arauz MD  8/8/2024 2:41 PM EDT  Workstation ID: [id number]    XR Chest 1 View    Result Date: 8/8/2024  XR CHEST 1 VW Date of Exam: 8/8/2024 1:27 PM EDT Indication: SOA triage protocol Comparison: 6/16/2024 Findings: Heart size and pulmonary vasculature are within normal limits. Haziness of the lateral lower lungs due to overlying soft tissue attenuation. Lungs grossly clear. Costophrenic angle sharp     Impression: Impression: No active cardiopulmonary disease Electronically Signed: Russell Martinez  8/8/2024 1:58 PM EDT  Workstation ID: [id number]     Results for orders placed during the hospital encounter of 05/22/24    Adult Transthoracic Echo Complete W/ Cont if Necessary Per Protocol (With Agitated Saline)    Interpretation Summary    Left ventricular systolic function is normal. Calculated left ventricular EF = 56.2% Normal left ventricular cavity size and wall thickness noted. All left ventricular wall segments contract normally. Left ventricular diastolic function is consistent with (grade Ia w/high LAP) impaired relaxation.    Normal left atrial size and volume noted. Saline test  results are negative.    The aortic valve is structurally normal with no regurgitation or stenosis present.    The mitral valve is structurally normal with no regurgitation or significant stenosis present.    Trace tricuspid valve regurgitation is present. Estimated right ventricular systolic pressure from tricuspid regurgitation is normal (<35 mmHg).      Current medications:  Scheduled Meds:aspirin, 81 mg, Oral, Daily  atorvastatin, 80 mg, Oral, Nightly  budesonide-formoterol, 2 puff, Inhalation, BID - RT  clopidogrel, 75 mg, Oral, Daily  gabapentin, 300 mg, Oral, Daily  insulin glargine, 20 Units, Subcutaneous, Nightly  insulin lispro, 2-9 Units, Subcutaneous, 4x Daily AC & at Bedtime  [Held by provider] lisinopril, 10 mg, Oral, Q24H  pantoprazole, 40 mg, Oral, Q AM  sodium chloride, 10 mL, Intravenous, Q12H  terazosin, 1 mg, Oral, Nightly      Continuous Infusions:heparin, 13 Units/kg/hr, Last Rate: 13 Units/kg/hr (08/09/24 0805)  Pharmacy to Dose Heparin,   sodium chloride, 100 mL/hr, Last Rate: 100 mL/hr (08/09/24 0931)      PRN Meds:.  acetaminophen **OR** acetaminophen **OR** acetaminophen    albuterol    senna-docusate sodium **AND** polyethylene glycol **AND** bisacodyl **AND** bisacodyl    Calcium Replacement - Follow Nurse / BPA Driven Protocol    dextrose    dextrose    glucagon (human recombinant)    guaiFENesin-codeine    hydrALAZINE    Magnesium Standard Dose Replacement - Follow Nurse / BPA Driven Protocol    nitroglycerin    ondansetron ODT **OR** ondansetron    Pharmacy to Dose Heparin    Phosphorus Replacement - Follow Nurse / BPA Driven Protocol    Potassium Replacement - Follow Nurse / BPA Driven Protocol    sodium chloride    sodium chloride    sodium chloride    Assessment & Plan   Assessment & Plan     Active Hospital Problems    Diagnosis  POA    **Acute pulmonary embolism [I26.99]  Yes      Resolved Hospital Problems   No resolved problems to display.        Brief Hospital Course to  date:  Amadeo Kong is a 63 y.o. male with past medical history of essential hypertension, type 2 diabetes, insulin requiring, chronic arthritis, bronchial asthma, dyslipidemia, obstructive sleep apnea, bilateral knee wounds following with wound care as outpatient, presented to the hospital with chest discomfort and shortness of breath.  Found to have bilateral PE with questionable RV strain     Bilateral PE w/Possible Developing RV strain  Hypoxia  - Likely provoked secondary to prolonged recumbency  - Confirmed on CTA chest on admission.  CTA chest is concerning for developing RV strain.  Troponin mildly elevated and trending down.  proBNP is normal  - continue heparin drip.  Can transition to p.o. Eliquis upon discharge.    - Echo pending. Consider cardiology consultation if echo shows RV strain     Elevated Creatinine on CKD stage III  - Likely secondary to poor oral intake, 1.94 on admission, baseline appears to be around 1.1-1.2  - Holding lisinopril for now  - improved this morning     Type 2 diabetes  - A1c 6.2  - continue Lantus and SSI     Essential hypertension  Dyslipidemia  - Holding lisinopril in the view of his THOMAS, currently normotensive  - Continue statins     Bilateral knee ulcers  - He follows with wound care as outpatient     Weakness and debility  - Consult PT and OT    Expected Discharge Location and Transportation: Home  Expected Discharge   Expected discharge date/ time has not been documented.     VTE Prophylaxis:  Pharmacologic VTE prophylaxis orders are present.         AM-PAC 6 Clicks Score (PT): 24 (08/08/24 2129)    CODE STATUS:   Code Status and Medical Interventions: CPR (Attempt to Resuscitate); Full Support   Ordered at: 08/08/24 2123     Level Of Support Discussed With:    Patient     Code Status (Patient has no pulse and is not breathing):    CPR (Attempt to Resuscitate)     Medical Interventions (Patient has pulse or is breathing):    Full Support       Jaci Mari,  DO  08/09/24

## 2024-08-09 NOTE — PAYOR COMM NOTE
"Amadeo Kong (63 y.o. Male)       Date of Birth   1960    Social Security Number       Address   [address] [address]    Home Phone   [phone]    MRN   [id number]       [facility]    Marital Status   Single                            Admission Date   24    Admission Type   Emergency    Admitting Provider   Jaci Mari DO    Attending Provider   Jaci Mari DO    Department, Room/Bed   [facility] 5F, S520/1       Discharge Date       Discharge Disposition       Discharge Destination                                 Attending Provider: Jaci Mari DO    Allergies: Carrot [Daucus Carota], Shellfish Allergy, Strawberry, Banana, Sulfamethoxazole-trimethoprim, Vancomycin    Isolation: None   Infection: None   Code Status: CPR    Ht: 188 cm (74\")   Wt: 118 kg (260 lb)    Admission Cmt: None   Principal Problem: Acute pulmonary embolism [I26.99]                   Active Insurance as of 2024       Primary Coverage       Payor Plan Insurance Group Employer/Plan Group    AETNA MEDICARE REPLACEMENT AETNA MEDICARE REPLACEMENT [id number]       Payor Plan Address Payor Plan Phone Number Payor Plan Fax Number Effective Dates    [address] [phone]  2024 - None Entered    [place] [address]         Subscriber Name Subscriber Birth Date Member ID       AMADEO KONG 1960 [id number]                     Emergency Contacts        (Rel.) Home Phone Work Phone Mobile Phone    Angelica Schofield (Significant Other) [phone] -- [phone]                 History & Physical        Stewart Bonner MD at 24 [facility]              [facility] Medicine Services  HISTORY AND PHYSICAL    Patient Name: Amadeo Kong  : 1960  MRN: [id number]  Primary Care Physician: Bianka Ann MD  Date of admission: 2024      Subjective  Subjective     Chief Complaint:  Shortness of breath and chest " discomfort    HPI:  Amadeo Kong is a 63 y.o. male with past medical history of essential hypertension, type 2 diabetes, insulin requiring, chronic arthritis, bronchial asthma, dyslipidemia, obstructive sleep apnea, bilateral knee wounds following with wound care as outpatient, presented to the hospital with chest discomfort and shortness of breath    Patient reported that his shortness of breath started 3 days ago and has been progressing since then.  Yesterday, he started feeling funny in his chest/chest discomfort and his shortness of breath got worse prompting him to come to the hospital today.  He denies any fever or chills.  Denied any lower extremity pain or swelling.  He admits that he lays in his recliner most of the day and does not move a lot.  He does have bilateral ulcers on his knees for which she follows with wound care as outpatient.  Has been having poor oral intake over the last few days and low urine output    In ER, his troponin was 53 and trended down to 42.  proBNP is 37.8, creatinine 1.94, up from his baseline of 1.1, D-dimer 2.5, CBC was normal.  CTA chest with bilateral PE and developing a right ventricular strain.  He will be admitted to the hospitalist service for further management    Personal History     Past Medical History:   Diagnosis Date    Arthritis     Asthma     inhaler daily     Diabetes mellitus 2000    insulin daily; checks blood sugars on occasion-run 150-180    Elevated cholesterol     History of sleep apnea     years ago diagnosed but never used a machine at home     History of staph infection 2010    wound infection after left knee replacement -saw infectious disease MD     Hypertension     Wears glasses            Past Surgical History:   Procedure Laterality Date    COLONOSCOPY      HIP SURGERY Right 08/11/2014    [facility]    INCISION AND DRAINAGE OF WOUND Left 2010    x2 of total knee replacement wound -iv antibiotics    KNEE SURGERY Bilateral     Right knee-  2008, left knee- 2010- [facility]    LUMBAR LAMINECTOMY WITH FUSION N/A 7/27/2018    Procedure: TLIF, OSTEOTOMY L4-5 , POST FUSION L4-5;  Surgeon: Yo Bryson MD;  Location:  [facility];  Service: Neurosurgery    LUMBAR LAMINECTOMY WITH FUSION N/A 8/27/2018    Procedure: LUMBAR LAMINECTOMY POSTERIOR LUMBAR INTERBODY FUSION;  Surgeon: Yo Bryson MD;  Location:  [facility];  Service: Neurosurgery    ROTATOR CUFF REPAIR Right     TONSILLECTOMY         Family History: family history includes Hypertension in his mother.     Social History:  reports that he has never smoked. He has never used smokeless tobacco. He reports that he does not drink alcohol and does not use drugs.  Social History     Social History Narrative    Not on file       Medications:  Available home medication information reviewed.  acetaminophen, albuterol sulfate HFA, alfuzosin, aspirin, atorvastatin, brimonidine, budesonide-formoterol, cetirizine, clopidogrel, dicyclomine, gabapentin, glucose blood, guaiFENesin-codeine, hydrocortisone, insulin NPH-insulin regular, lisinopril, ondansetron, and pantoprazole    Allergies   Allergen Reactions    Carrot [Daucus Carota] Swelling    Shellfish Allergy Swelling and Unknown (See Comments)     tongue swelling    Strawberry Swelling     tongue swelling    Banana Unknown (See Comments)    Sulfamethoxazole-Trimethoprim Other (See Comments)     Patient developed blisters on his hands.  Patient developed blisters on his hands.    Vancomycin Rash     Received vancomycin x1 dose 7/18/22       Objective  Objective     Vital Signs:   Temp:  [98.2 °F (36.8 °C)] 98.2 °F (36.8 °C)  Heart Rate:  [] 74  Resp:  [22] 22  BP: (110-125)/(82-93) 115/87       Physical Exam   General: Comfortable, not in distress, conversant and cooperative  Head: Atraumatic and normocephalic  Eyes: No Icterus. No pallor  Ears:  Ears appear intact with no abnormalities noted  Throat: No oral lesions, no thrush  Neck: Supple, trachea  midline  Lungs: Clear to auscultation bilaterally, equal air entry, no wheezing or crackles  Heart:  Normal S1 and S2, no murmur, no gallop, No JVD, no lower extremity swelling  Abdomen:  Soft, no tenderness, no organomegaly, normal bowel sounds, no organomegaly  Extremities: Multiple skin marks on both lower extremities.  2 deep ulcers on both knees, 1 inch in diameter, clean base with no erythema or discharge  Skin: No bleeding, bruising or rash, normal skin turgor and elasticity  Neurologic: Cranial nerves appear intact with no evidence of facial asymmetry, normal motor and sensory functions in all 4 extremities  Psych: Alert and oriented x 3, normal mood    Result Review:  I have personally reviewed the results from the time of this admission to 8/8/2024 21:23 EDT and agree with these findings:  [x]  Laboratory list / accordion  [x]  Microbiology  [x]  Radiology  [x]  EKG/Telemetry   []  Cardiology/Vascular   [x]  Pathology  [x]  Old records      LAB RESULTS:      Lab 08/08/24  1341   WBC 6.15   HEMOGLOBIN 13.4   HEMATOCRIT 38.5   PLATELETS 190   NEUTROS ABS 4.23   IMMATURE GRANS (ABS) 0.02   LYMPHS ABS 1.18   MONOS ABS 0.57   EOS ABS 0.13   MCV 89.1   D DIMER QUANT 2.50*         Lab 08/08/24  1341   SODIUM 137   POTASSIUM 4.7   CHLORIDE 103   CO2 23.0   ANION GAP 11.0   BUN 13   CREATININE 1.94*   EGFR 37.9*   GLUCOSE 165*   CALCIUM 9.2         Lab 08/08/24  1341   TOTAL PROTEIN 7.4   ALBUMIN 3.9   GLOBULIN 3.5   ALT (SGPT) 13   AST (SGOT) 21   BILIRUBIN 0.7   ALK PHOS 96         Lab 08/08/24  1600 08/08/24  1341   PROBNP  --  37.8   HSTROP T 42* 53*                 UA          8/28/2023    16:02 3/12/2024    19:39 5/22/2024    19:56   Urinalysis   Specific Gravity, UA 1.010     1.010  1.007    Ketones, UA  Negative  Negative    Blood, UA Negative     Negative  Negative    Leukocytes, UA Negative     Negative  Negative    Nitrite, UA  Negative  Negative       Details          This result is from an external  source.               Microbiology Results (last 10 days)       Procedure Component Value - Date/Time    COVID PRE-OP / PRE-PROCEDURE SCREENING ORDER (NO ISOLATION) - Swab, Nasopharynx [[id number]]  (Normal) Collected: 08/08/24 1415    Lab Status: Final result Specimen: Swab from Nasopharynx Updated: 08/08/24 1526    Narrative:      The following orders were created for panel order COVID PRE-OP / PRE-PROCEDURE SCREENING ORDER (NO ISOLATION) - Swab, Nasopharynx.  Procedure                               Abnormality         Status                     ---------                               -----------         ------                     COVID-19, FLU A/B, RSV P...[[id number]]  Normal              Final result                 Please view results for these tests on the individual orders.    COVID-19, FLU A/B, RSV PCR 1 HR TAT - Swab, Nasopharynx [[id number]]  (Normal) Collected: 08/08/24 1415    Lab Status: Final result Specimen: Swab from Nasopharynx Updated: 08/08/24 1526     COVID19 Not Detected     Influenza A PCR Not Detected     Influenza B PCR Not Detected     RSV, PCR Not Detected            CT Angiogram Chest    Result Date: 8/8/2024  CT ANGIOGRAM CHEST Date of Exam: 8/8/2024 7:12 PM EDT Indication: soa and pos d dimer. Comparison: Chest radiograph from earlier today Technique: CTA of the chest was performed after the uneventful intravenous administration of 85 mL Isovue-370. Reconstructed coronal and sagittal images were also obtained. In addition, a 3-D volume rendered image was created for interpretation. Automated exposure control and iterative reconstruction methods were used. Findings: The central tracheobronchial tree is clear. There are multiple interstitial markings along the periphery of both lungs, suggesting mild interstitial lung disease. There is no focal consolidation. There is no pleural effusion. The heart size appears normal. The great vessels are normal in caliber. There is acute pulmonary embolus  within the distal left main pulmonary artery extending to a couple lingular and left lower lobe branches as well as embolus within a right lower lobe  branch. There is suggestion of early developing right heart strain. No abnormally enlarged lymph nodes are identified. Partial evaluation of the upper abdomen demonstrates cholelithiasis. No aggressive osseous lesions are identified.     Impression: Impression: 1.Multiple bilateral pulmonary emboli as described above, with suggestion of early developing right strain. 2.Suggestion of mild interstitial lung disease. 3.Cholelithiasis. 4.Results were discussed with Dr. Lechuga at time of dictation. Electronically Signed: Gutierrez Saab MD  8/8/2024 7:35 PM EDT  Workstation ID: [id number]    CT Abdomen Pelvis Without Contrast    Result Date: 8/8/2024  CT ABDOMEN PELVIS WO CONTRAST Date of Exam: 8/8/2024 2:21 PM EDT Indication: diffuse abd tenderness. Comparison: 12/13/2022. Technique: Axial CT images were obtained of the abdomen and pelvis without the administration of contrast. Reconstructed coronal and sagittal images were also obtained. Automated exposure control and iterative construction methods were used. Findings: There is respiratory motion on the film. There is artifact from spinal hardware. There has been posterior fusion from L4-S1. The bilateral L5 pedicle screws appear broken although the appearance is unchanged as compared to the prior study. There is stable grade 1 spondylolisthesis of L4 on L5. There is also artifact from right hip hardware. The prostate gland is normal in size. The bladder is poorly distended. There is no large or small bowel dilatation. There is a normal appendix. There are no focal inflammatory changes. There are no renal stones or hydronephrosis. There is cholelithiasis without acute cholecystitis. There is no bile duct dilatation. The nonenhanced solid organs are normal in size.     Impression: Impression: Exam somewhat limited by  artifact and respiratory motion. No acute process identified. Cholelithiasis without acute cholecystitis. Chronic findings in the spine as noted. Electronically Signed: Virginia Arauz MD  8/8/2024 2:41 PM EDT  Workstation ID: [id number]    XR Chest 1 View    Result Date: 8/8/2024  XR CHEST 1 VW Date of Exam: 8/8/2024 1:27 PM EDT Indication: SOA triage protocol Comparison: 6/16/2024 Findings: Heart size and pulmonary vasculature are within normal limits. Haziness of the lateral lower lungs due to overlying soft tissue attenuation. Lungs grossly clear. Costophrenic angle sharp     Impression: Impression: No active cardiopulmonary disease Electronically Signed: Russell Martinez  8/8/2024 1:58 PM EDT  Workstation ID: [id number]     Results for orders placed during the hospital encounter of 05/22/24    Adult Transthoracic Echo Complete W/ Cont if Necessary Per Protocol (With Agitated Saline)    Interpretation Summary    Left ventricular systolic function is normal. Calculated left ventricular EF = 56.2% Normal left ventricular cavity size and wall thickness noted. All left ventricular wall segments contract normally. Left ventricular diastolic function is consistent with (grade Ia w/high LAP) impaired relaxation.    Normal left atrial size and volume noted. Saline test results are negative.    The aortic valve is structurally normal with no regurgitation or stenosis present.    The mitral valve is structurally normal with no regurgitation or significant stenosis present.    Trace tricuspid valve regurgitation is present. Estimated right ventricular systolic pressure from tricuspid regurgitation is normal (<35 mmHg).      Assessment & Plan  Assessment & Plan       Acute pulmonary embolism    Summary:  Amadeo Kong is a 63 y.o. male with past medical history of essential hypertension, type 2 diabetes, insulin requiring, chronic arthritis, bronchial asthma, dyslipidemia, obstructive sleep apnea, bilateral knee wounds following  with wound care as outpatient, presented to the hospital with chest discomfort and shortness of breath.  Found to have bilateral PE with questionable RV strain    Bilateral PE  ?  RV strain  Likely provoked secondary to prolonged recumbency  Confirmed on CTA chest on admission.  CTA chest is concerning for developing RV strain.  Troponin mildly elevated and trending down.  proBNP is normal  Start heparin drip.  Can transition to p.o. Eliquis upon discharge.  Tender plan to treat for 6 months as provoked PE  Obtain echocardiogram in the morning  Consider cardiology consultation if echo showed RV strain    THOMAS on CKD stage III  Likely secondary to poor oral intake  Holding lisinopril  IV fluids monitor kidney functions    Type 2 diabetes  Check A1c  Insulin Lantus and SSI    Essential hypertension  Dyslipidemia  Holding lisinopril in the view of his THOMAS  Continue statins    Bilateral knee ulcers  He follows with wound care as outpatient    Weakness and debility  Consult PT and OT    VTE Prophylaxis:  Pharmacologic VTE prophylaxis orders are present.          CODE STATUS:    Code Status and Medical Interventions: CPR (Attempt to Resuscitate); Full Support   Ordered at: 24     Level Of Support Discussed With:    Patient     Code Status (Patient has no pulse and is not breathing):    CPR (Attempt to Resuscitate)     Medical Interventions (Patient has pulse or is breathing):    Full Support       Expected Discharge   Expected discharge date/ time has not been documented.     Stewart Bonner MD  24     Electronically signed by Stewart Bonner MD at 24          Physician Progress Notes (all)        Jaci Mari DO at 24 1147              [facility] Medicine Services  PROGRESS NOTE    Patient Name: Amadeo Kong  : 1960  MRN: [id number]    Date of Admission: 2024  Primary Care Physician: Bianka Ann MD    Subjective   Subjective      CC:  Pulmonary embolism    HPI:  Patient resting in bed. Wants to go home. Denies chest pain. Still with some mild shortness of breath.      Objective   Objective     Vital Signs:   Temp:  [97.8 °F (36.6 °C)-98.2 °F (36.8 °C)] 98.2 °F (36.8 °C)  Heart Rate:  [] 81  Resp:  [16-22] 17  BP: (102-125)/(69-93) 112/69  Flow (L/min):  [2] 2     Physical Exam:  Constitutional: No acute distress, awake, alert  HENT: NCAT, mucous membranes moist  Respiratory: Clear to auscultation bilaterally, respiratory effort normal   Cardiovascular: RRR, no murmurs, rubs, or gallops  Gastrointestinal: soft, nontender, nondistended  Musculoskeletal: bilateral knee wounds present  Psychiatric: Appropriate affect, cooperative  Neurologic: Oriented x 3, speech clear, no focal deficits  Skin: No rashes      Results Reviewed:  LAB RESULTS:      Lab 08/09/24  0658 08/09/24  0018 08/08/24  1341   WBC 5.65  --  6.15   HEMOGLOBIN 12.3*  --  13.4   HEMATOCRIT 36.1*  --  38.5   PLATELETS 178  --  190   NEUTROS ABS 3.47  --  4.23   IMMATURE GRANS (ABS) 0.01  --  0.02   LYMPHS ABS 1.51  --  1.18   MONOS ABS 0.53  --  0.57   EOS ABS 0.11  --  0.13   MCV 91.4  --  89.1   PROTIME  --  17.8*  --    APTT  --  >200.0*  --    HEPARIN ANTI-XA 0.88* >1.10*  --    D DIMER QUANT  --   --  2.50*         Lab 08/09/24  0658 08/08/24  1341   SODIUM 136 137   POTASSIUM 4.2 4.7   CHLORIDE 106 103   CO2 19.0* 23.0   ANION GAP 11.0 11.0   BUN 14 13   CREATININE 1.24 1.94*   EGFR 64.9 37.9*   GLUCOSE 110* 165*   CALCIUM 8.3* 9.2   MAGNESIUM 1.3*  --    PHOSPHORUS 3.5  --    HEMOGLOBIN A1C  --  6.20*         Lab 08/09/24  0658 08/08/24  1341   TOTAL PROTEIN 6.1 7.4   ALBUMIN 3.4* 3.9   GLOBULIN 2.7 3.5   ALT (SGPT) 10 13   AST (SGOT) 16 21   BILIRUBIN 0.5 0.7   ALK PHOS 84 96         Lab 08/09/24  0018 08/08/24  1600 08/08/24  1341   PROBNP  --   --  37.8   HSTROP T  --  42* 53*   PROTIME 17.8*  --   --    INR 1.45*  --   --                  Brief Urine Lab  Results  (Last result in the past 365 days)        Color   Clarity   Blood   Leuk Est   Nitrite   Protein   CREAT   Urine HCG        05/22/24 1956 Yellow   Clear   Negative   Negative   Negative   Negative                   Microbiology Results Abnormal       Procedure Component Value - Date/Time    Wound Culture - Wound, Leg, Left [[id number]] Collected: 08/09/24 0008    Lab Status: Preliminary result Specimen: Wound from Leg, Left Updated: 08/09/24 0631     Gram Stain Many (4+) WBCs seen      Many (4+) Gram negative bacilli    COVID PRE-OP / PRE-PROCEDURE SCREENING ORDER (NO ISOLATION) - Swab, Nasopharynx [[id number]]  (Normal) Collected: 08/08/24 1415    Lab Status: Final result Specimen: Swab from Nasopharynx Updated: 08/08/24 1526    Narrative:      The following orders were created for panel order COVID PRE-OP / PRE-PROCEDURE SCREENING ORDER (NO ISOLATION) - Swab, Nasopharynx.  Procedure                               Abnormality         Status                     ---------                               -----------         ------                     COVID-19, FLU A/B, RSV P...[[id number]]  Normal              Final result                 Please view results for these tests on the individual orders.    COVID-19, FLU A/B, RSV PCR 1 HR TAT - Swab, Nasopharynx [[id number]]  (Normal) Collected: 08/08/24 1415    Lab Status: Final result Specimen: Swab from Nasopharynx Updated: 08/08/24 1526     COVID19 Not Detected     Influenza A PCR Not Detected     Influenza B PCR Not Detected     RSV, PCR Not Detected            CT Angiogram Chest    Result Date: 8/8/2024  CT ANGIOGRAM CHEST Date of Exam: 8/8/2024 7:12 PM EDT Indication: soa and pos d dimer. Comparison: Chest radiograph from earlier today Technique: CTA of the chest was performed after the uneventful intravenous administration of 85 mL Isovue-370. Reconstructed coronal and sagittal images were also obtained. In addition, a 3-D volume rendered image was created for  interpretation. Automated exposure control and iterative reconstruction methods were used. Findings: The central tracheobronchial tree is clear. There are multiple interstitial markings along the periphery of both lungs, suggesting mild interstitial lung disease. There is no focal consolidation. There is no pleural effusion. The heart size appears normal. The great vessels are normal in caliber. There is acute pulmonary embolus within the distal left main pulmonary artery extending to a couple lingular and left lower lobe branches as well as embolus within a right lower lobe  branch. There is suggestion of early developing right heart strain. No abnormally enlarged lymph nodes are identified. Partial evaluation of the upper abdomen demonstrates cholelithiasis. No aggressive osseous lesions are identified.     Impression: Impression: 1.Multiple bilateral pulmonary emboli as described above, with suggestion of early developing right strain. 2.Suggestion of mild interstitial lung disease. 3.Cholelithiasis. 4.Results were discussed with Dr. Lechuga at time of dictation. Electronically Signed: Gutierrez Saab MD  8/8/2024 7:35 PM EDT  Workstation ID: [id number]    CT Abdomen Pelvis Without Contrast    Result Date: 8/8/2024  CT ABDOMEN PELVIS WO CONTRAST Date of Exam: 8/8/2024 2:21 PM EDT Indication: diffuse abd tenderness. Comparison: 12/13/2022. Technique: Axial CT images were obtained of the abdomen and pelvis without the administration of contrast. Reconstructed coronal and sagittal images were also obtained. Automated exposure control and iterative construction methods were used. Findings: There is respiratory motion on the film. There is artifact from spinal hardware. There has been posterior fusion from L4-S1. The bilateral L5 pedicle screws appear broken although the appearance is unchanged as compared to the prior study. There is stable grade 1 spondylolisthesis of L4 on L5. There is also artifact from right hip  hardware. The prostate gland is normal in size. The bladder is poorly distended. There is no large or small bowel dilatation. There is a normal appendix. There are no focal inflammatory changes. There are no renal stones or hydronephrosis. There is cholelithiasis without acute cholecystitis. There is no bile duct dilatation. The nonenhanced solid organs are normal in size.     Impression: Impression: Exam somewhat limited by artifact and respiratory motion. No acute process identified. Cholelithiasis without acute cholecystitis. Chronic findings in the spine as noted. Electronically Signed: Virginia Arauz MD  8/8/2024 2:41 PM EDT  Workstation ID: [id number]    XR Chest 1 View    Result Date: 8/8/2024  XR CHEST 1 VW Date of Exam: 8/8/2024 1:27 PM EDT Indication: SOA triage protocol Comparison: 6/16/2024 Findings: Heart size and pulmonary vasculature are within normal limits. Haziness of the lateral lower lungs due to overlying soft tissue attenuation. Lungs grossly clear. Costophrenic angle sharp     Impression: Impression: No active cardiopulmonary disease Electronically Signed: Russell Martinez  8/8/2024 1:58 PM EDT  Workstation ID: [id number]     Results for orders placed during the hospital encounter of 05/22/24    Adult Transthoracic Echo Complete W/ Cont if Necessary Per Protocol (With Agitated Saline)    Interpretation Summary    Left ventricular systolic function is normal. Calculated left ventricular EF = 56.2% Normal left ventricular cavity size and wall thickness noted. All left ventricular wall segments contract normally. Left ventricular diastolic function is consistent with (grade Ia w/high LAP) impaired relaxation.    Normal left atrial size and volume noted. Saline test results are negative.    The aortic valve is structurally normal with no regurgitation or stenosis present.    The mitral valve is structurally normal with no regurgitation or significant stenosis present.    Trace tricuspid valve  regurgitation is present. Estimated right ventricular systolic pressure from tricuspid regurgitation is normal (<35 mmHg).      Current medications:  Scheduled Meds:aspirin, 81 mg, Oral, Daily  atorvastatin, 80 mg, Oral, Nightly  budesonide-formoterol, 2 puff, Inhalation, BID - RT  clopidogrel, 75 mg, Oral, Daily  gabapentin, 300 mg, Oral, Daily  insulin glargine, 20 Units, Subcutaneous, Nightly  insulin lispro, 2-9 Units, Subcutaneous, 4x Daily AC & at Bedtime  [Held by provider] lisinopril, 10 mg, Oral, Q24H  pantoprazole, 40 mg, Oral, Q AM  sodium chloride, 10 mL, Intravenous, Q12H  terazosin, 1 mg, Oral, Nightly      Continuous Infusions:heparin, 13 Units/kg/hr, Last Rate: 13 Units/kg/hr (08/09/24 0805)  Pharmacy to Dose Heparin,   sodium chloride, 100 mL/hr, Last Rate: 100 mL/hr (08/09/24 0931)      PRN Meds:.  acetaminophen **OR** acetaminophen **OR** acetaminophen    albuterol    senna-docusate sodium **AND** polyethylene glycol **AND** bisacodyl **AND** bisacodyl    Calcium Replacement - Follow Nurse / BPA Driven Protocol    dextrose    dextrose    glucagon (human recombinant)    guaiFENesin-codeine    hydrALAZINE    Magnesium Standard Dose Replacement - Follow Nurse / BPA Driven Protocol    nitroglycerin    ondansetron ODT **OR** ondansetron    Pharmacy to Dose Heparin    Phosphorus Replacement - Follow Nurse / BPA Driven Protocol    Potassium Replacement - Follow Nurse / BPA Driven Protocol    sodium chloride    sodium chloride    sodium chloride    Assessment & Plan   Assessment & Plan     Active Hospital Problems    Diagnosis  POA    **Acute pulmonary embolism [I26.99]  Yes      Resolved Hospital Problems   No resolved problems to display.        Brief Hospital Course to date:  Amadeo Kong is a 63 y.o. male with past medical history of essential hypertension, type 2 diabetes, insulin requiring, chronic arthritis, bronchial asthma, dyslipidemia, obstructive sleep apnea, bilateral knee wounds following  with wound care as outpatient, presented to the hospital with chest discomfort and shortness of breath.  Found to have bilateral PE with questionable RV strain     Bilateral PE w/Possible Developing RV strain  Hypoxia  - Likely provoked secondary to prolonged recumbency  - Confirmed on CTA chest on admission.  CTA chest is concerning for developing RV strain.  Troponin mildly elevated and trending down.  proBNP is normal  - continue heparin drip.  Can transition to p.o. Eliquis upon discharge.    - Echo pending. Consider cardiology consultation if echo shows RV strain     Elevated Creatinine on CKD stage III  - Likely secondary to poor oral intake, 1.94 on admission, baseline appears to be around 1.1-1.2  - Holding lisinopril for now  - improved this morning     Type 2 diabetes  - A1c 6.2  - continue Lantus and SSI     Essential hypertension  Dyslipidemia  - Holding lisinopril in the view of his THOMAS, currently normotensive  - Continue statins     Bilateral knee ulcers  - He follows with wound care as outpatient     Weakness and debility  - Consult PT and OT    Expected Discharge Location and Transportation: Home  Expected Discharge   Expected discharge date/ time has not been documented.     VTE Prophylaxis:  Pharmacologic VTE prophylaxis orders are present.         AM-PAC 6 Clicks Score (PT): 24 (08/08/24 2129)    CODE STATUS:   Code Status and Medical Interventions: CPR (Attempt to Resuscitate); Full Support   Ordered at: 08/08/24 2123     Level Of Support Discussed With:    Patient     Code Status (Patient has no pulse and is not breathing):    CPR (Attempt to Resuscitate)     Medical Interventions (Patient has pulse or is breathing):    Full Support       Jaci Mari DO  08/09/24        Electronically signed by Jaci Mari DO at 08/09/24 6201

## 2024-08-09 NOTE — PLAN OF CARE
Problem: Adult Inpatient Plan of Care  Goal: Absence of Hospital-Acquired Illness or Injury  Intervention: Identify and Manage Fall Risk  Recent Flowsheet Documentation  Taken 8/9/2024 0424 by Willow Cooper, RN  Safety Promotion/Fall Prevention:   activity supervised   assistive device/personal items within reach   clutter free environment maintained   fall prevention program maintained   lighting adjusted   nonskid shoes/slippers when out of bed   room organization consistent   safety round/check completed  Taken 8/9/2024 0224 by Willow Cooper, RN  Safety Promotion/Fall Prevention:   activity supervised   assistive device/personal items within reach   clutter free environment maintained   fall prevention program maintained   lighting adjusted   nonskid shoes/slippers when out of bed   room organization consistent   safety round/check completed  Taken 8/9/2024 0024 by Willow Cooper, RN  Safety Promotion/Fall Prevention:   activity supervised   assistive device/personal items within reach   clutter free environment maintained   fall prevention program maintained   lighting adjusted   nonskid shoes/slippers when out of bed   room organization consistent   safety round/check completed  Taken 8/8/2024 2224 by Willow Cooper, RN  Safety Promotion/Fall Prevention:   activity supervised   assistive device/personal items within reach   clutter free environment maintained   fall prevention program maintained   lighting adjusted   nonskid shoes/slippers when out of bed   room organization consistent   safety round/check completed  Taken 8/8/2024 2124 by Willow Cooper, RN  Safety Promotion/Fall Prevention:   activity supervised   assistive device/personal items within reach   clutter free environment maintained   fall prevention program maintained   lighting adjusted   nonskid shoes/slippers when out of bed   room organization consistent   safety round/check completed  Intervention: Prevent Skin Injury  Recent  Flowsheet Documentation  Taken 8/9/2024 0424 by Willow Cooper RN  Body Position: position changed independently  Taken 8/9/2024 0224 by Willow Cooper RN  Body Position: position changed independently  Taken 8/9/2024 0024 by Willow Cooper RN  Body Position: position changed independently  Taken 8/8/2024 2224 by Willow Cooper RN  Body Position: position changed independently  Taken 8/8/2024 2124 by Willow Cooper RN  Body Position: position changed independently  Intervention: Prevent and Manage VTE (Venous Thromboembolism) Risk  Recent Flowsheet Documentation  Taken 8/8/2024 2124 by Willow Cooper RN  Activity Management: activity minimized  Intervention: Prevent Infection  Recent Flowsheet Documentation  Taken 8/9/2024 0424 by Willow Cooper RN  Infection Prevention:   environmental surveillance performed   hand hygiene promoted   rest/sleep promoted   single patient room provided  Taken 8/9/2024 0224 by Willow Cooper RN  Infection Prevention:   environmental surveillance performed   hand hygiene promoted   rest/sleep promoted   single patient room provided  Taken 8/9/2024 0024 by Willow Cooper RN  Infection Prevention:   environmental surveillance performed   hand hygiene promoted   rest/sleep promoted   single patient room provided  Taken 8/8/2024 2224 by Willow Cooper RN  Infection Prevention:   environmental surveillance performed   hand hygiene promoted   rest/sleep promoted   single patient room provided  Taken 8/8/2024 2124 by Willow Cooper RN  Infection Prevention:   environmental surveillance performed   hand hygiene promoted   rest/sleep promoted   single patient room provided  Goal: Optimal Comfort and Wellbeing  Intervention: Provide Person-Centered Care  Recent Flowsheet Documentation  Taken 8/8/2024 2124 by Willow Cooper RN  Trust Relationship/Rapport:   care explained   choices provided   questions answered   questions encouraged   thoughts/feelings  acknowledged  Goal: Readiness for Transition of Care  Intervention: Mutually Develop Transition Plan  Recent Flowsheet Documentation  Taken 8/8/2024 2126 by Willow Cooper, RN  Transportation Anticipated: family or friend will provide  Patient/Family Anticipated Services at Transition: none  Patient/Family Anticipates Transition to: home with family  Taken 8/8/2024 2122 by Willow Cooper, RN  Equipment Currently Used at Home: cane, straight   Goal Outcome Evaluation:

## 2024-08-09 NOTE — PROGRESS NOTES
Discharge Planning Assessment  [facility]     Patient Name: Amadeo Kong  MRN: [id number]  Today's Date: 8/9/2024    Admit Date: 8/8/2024        Discharge Needs Assessment    No documentation.                  Discharge Plan       Row Name 08/09/24 1535       Plan    Plan Comments Mr. Kong lives in [place] and his insurance is [employer]. His provider is Bianka Ann. He gets his medications from [facility] and [facility]. He is independent and plan is to discharge home when he is medically stable for discharge. Case management will continue to follow for discharge planning needs.    Final Discharge Disposition Code 01 - home or self-care                  Continued Care and Services - Admitted Since 8/8/2024    No active coordination exists for this encounter.       Selected Continued Care - Prior Encounters Includes continued care and service providers with selected services from prior encounters from 5/10/2024 to 8/9/2024      Discharged on 5/24/2024 Admission date: 5/22/2024 - Discharge disposition: Home or Self Care      Durable Medical Equipment       Service Provider Selected Services Address Phone Fax Patient Preferred    [facility] [address] [id number] [phone] --                             Demographic Summary       Row Name 08/09/24 1534       General Information    Admission Type inpatient    Arrived From home    Referral Source patient    Reason for Consult discharge planning    Preferred Language English       Contact Information    Permission Granted to Share Info With     Contact Information Obtained for                    Functional Status       Row Name 08/09/24 1534       Functional Status    Usual Activity Tolerance good    Current Activity Tolerance good       Functional Status, IADL    Medications independent    Meal Preparation independent    Housekeeping independent     Laundry independent    Shopping independent                   Psychosocial    No documentation.                  Abuse/Neglect    No documentation.                  Legal    No documentation.                  Substance Abuse    No documentation.                  Patient Forms    No documentation.                     BUFFY Saravia

## 2024-08-09 NOTE — PROGRESS NOTES
HEPARIN INFUSION  Amadeo Kong is a  63 y.o. male receiving heparin infusion.     Therapy for (VTE/Cardiac):   VTE  Patient Weight: 118kg  Initial Bolus (Y/N):   Y  Any Bolus (Y/N):   Y    Signs or Symptoms of Bleeding: No    VTE (PE/DVT)   Initial Bolus: 80 units/kg (Max 10,000 units)  Initial rate: 18 units/kg/hr (Max 1,500 units/hr)    Anti Xa Rebolus Infusion Hold time Change infusion Dose (Units/kg/hr) Next Anti Xa Level Due   < 0.11 50 Units/kg  (4000 Units Max) None Increase by  4 Units/kg/hr 6 hours   0.11 - 0.19 25 Units/kg  (2000 Units Max) None Increase by  3 Units/kg/hr 6 hours   0.2 - 0.29 0 None Increase by  2 Units/kg/hr 6 hours   0.3 - 0.7 0 None No Change 6 hours (after 2 consecutive levels in range check qAM)   0.71 - 0.8 0 None Decrease by  1 Units/kg/hr 6 hours   0.81 - 0.9 0 None Decrease by  2 Units/kg/hr 6 hours   0.91 - 1 0 60 Minutes Decrease by  3 Units/kg/hr 6 hours   >1 0 Hold  After Anti Xa less than 0.7 decrease previous rate by  4 Units/kg/hr  Every 2 hours until Anti Xa is less than 0.7 then when infusion restarts in 6 hours     Results from last 7 days   Lab Units 08/08/24  1341   HEMOGLOBIN g/dL 13.4   HEMATOCRIT % 38.5   PLATELETS 10*3/mm3 190          Date   Time   Anti-Xa Current Rate (Unit/kg/hr) Bolus   (Units) Rate Change   (Unit/kg/hr) New Rate (Unit/kg/hr) Next   Anti-Xa Comments  Pump Check Daily   8/8 2142 pending -- 9440 +13 13 0600 DW GABRIELLA Marks [facility]  8/8/2024  21:41 EDT

## 2024-08-09 NOTE — ED NOTES
Amadeo Kong    Nursing Report ED to Floor:  Mental status: AXO X4  Ambulatory status: STAND BY  Oxygen Therapy:  ROOM AIR  Cardiac Rhythm: SINUS ARRHYTHMMIA  Admitted from: HOME  Safety Concerns:  NONE  Social Issues: NONE  ED Room #:  10    ED Nurse Phone Extension - [phone] or may call [phone].      HPI:   Chief Complaint   Patient presents with    Shortness of Breath       Past Medical History:  Past Medical History:   Diagnosis Date    Arthritis     Asthma     inhaler daily     Diabetes mellitus 2000    insulin daily; checks blood sugars on occasion-run 150-180    Elevated cholesterol     History of sleep apnea     years ago diagnosed but never used a machine at home     History of staph infection 2010    wound infection after left knee replacement -saw infectious disease MD     Hypertension     Wears glasses         Past Surgical History:  Past Surgical History:   Procedure Laterality Date    COLONOSCOPY      HIP SURGERY Right 08/11/2014    [facility]    INCISION AND DRAINAGE OF WOUND Left 2010    x2 of total knee replacement wound -iv antibiotics    KNEE SURGERY Bilateral     Right knee- 2008, left knee- 2010- [facility]    LUMBAR LAMINECTOMY WITH FUSION N/A 7/27/2018    Procedure: TLIF, OSTEOTOMY L4-5 , POST FUSION L4-5;  Surgeon: Yo Bryson MD;  Location:  [facility];  Service: Neurosurgery    LUMBAR LAMINECTOMY WITH FUSION N/A 8/27/2018    Procedure: LUMBAR LAMINECTOMY POSTERIOR LUMBAR INTERBODY FUSION;  Surgeon: Yo Bryson MD;  Location:  [facility];  Service: Neurosurgery    ROTATOR CUFF REPAIR Right     TONSILLECTOMY          Admitting Doctor:   Haleigh Lou MD    Consulting Provider(s):  Consults       No orders found from 7/10/2024 to 8/9/2024.             Admitting Diagnosis:   The primary encounter diagnosis was Acute pulmonary embolism, unspecified pulmonary embolism type, unspecified whether acute cor pulmonale present. Diagnoses of Acute dyspnea and Elevated serum creatinine were also  pertinent to this visit.    Most Recent Vitals:   Vitals:    08/08/24 1732 08/08/24 1759 08/08/24 1830 08/08/24 1859   BP: 122/93 116/86 123/90 115/87   BP Location:       Patient Position:       Pulse: 75 63 79 74   Resp:       Temp:       TempSrc:       SpO2: 95% 97% 96% 98%   Weight:       Height:           Active LDAs/IV Access:   Lines, Drains & Airways       Active LDAs       Name Placement date Placement time Site Days    Peripheral IV 08/08/24 1343 Right Antecubital 08/08/24  1343  Antecubital  less than 1                    Labs (abnormal labs have a star):   Labs Reviewed   COMPREHENSIVE METABOLIC PANEL - Abnormal; Notable for the following components:       Result Value    Glucose 165 (*)     Creatinine 1.94 (*)     BUN/Creatinine Ratio 6.7 (*)     eGFR 37.9 (*)     All other components within normal limits    Narrative:     GFR Normal >60  Chronic Kidney Disease <60  Kidney Failure <15     SINGLE HS TROPONIN T - Abnormal; Notable for the following components:    HS Troponin T 53 (*)     All other components within normal limits    Narrative:     High Sensitive Troponin T Reference Range:  <14.0 ng/L- Negative Female for AMI  <22.0 ng/L- Negative Male for AMI  >=14 - Abnormal Female indicating possible myocardial injury.  >=22 - Abnormal Male indicating possible myocardial injury.   Clinicians would have to utilize clinical acumen, EKG, Troponin, and serial changes to determine if it is an Acute Myocardial Infarction or myocardial injury due to an underlying chronic condition.        CBC WITH AUTO DIFFERENTIAL - Abnormal; Notable for the following components:    RDW 12.0 (*)     Lymphocyte % 19.2 (*)     All other components within normal limits   HIGH SENSITIVITIY TROPONIN T 2HR - Abnormal; Notable for the following components:    HS Troponin T 42 (*)     Troponin T Delta -11 (*)     All other components within normal limits    Narrative:     High Sensitive Troponin T Reference Range:  <14.0 ng/L-  "Negative Female for AMI  <22.0 ng/L- Negative Male for AMI  >=14 - Abnormal Female indicating possible myocardial injury.  >=22 - Abnormal Male indicating possible myocardial injury.   Clinicians would have to utilize clinical acumen, EKG, Troponin, and serial changes to determine if it is an Acute Myocardial Infarction or myocardial injury due to an underlying chronic condition.        D-DIMER, QUANTITATIVE - Abnormal; Notable for the following components:    D-Dimer, Quantitative 2.50 (*)     All other components within normal limits    Narrative:     According to the assay 's published package insert, a normal (<0.50 MCGFEU/mL) D-dimer result in conjunction with a non-high clinical probability assessment, excludes deep vein thrombosis (DVT) and pulmonary embolism (PE) with high sensitivity.    D-dimer values increase with age and this can make VTE exclusion of an older population difficult. To address this, the American College of Physicians, based on best available evidence and recent guidelines, recommends that clinicians use age-adjusted D-dimer thresholds in patients greater than 50 years of age with: a) a low probability of PE who do not meet all Pulmonary Embolism Rule Out Criteria, or b) in those with intermediate probability of PE.   The formula for an age-adjusted D-dimer cut-off is \"age/100\".  For example, a 60 year old patient would have an age-adjusted cut-off of 0.60 MCGFEU/mL and an 80 year old 0.80 MCGFEU/mL.   COVID-19/FLUA&B/RSV, NP SWAB IN TRANSPORT MEDIA 1 HR TAT - Normal   BNP (IN-HOUSE) - Normal    Narrative:     This assay is used as an aid in the diagnosis of individuals suspected of having heart failure. It can be used as an aid in the diagnosis of acute decompensated heart failure (ADHF) in patients presenting with signs and symptoms of ADHF to the emergency department (ED). In addition, NT-proBNP of <300 pg/mL indicates ADHF is not likely.    Age Range Result Interpretation  " NT-proBNP Concentration (pg/mL:      <50             Positive            >450                   Gray                 300-450                    Negative             <300    50-75           Positive            >900                  Gray                300-900                  Negative            <300      >75             Positive            >1800                  Gray                300-1800                  Negative            <300   COVID PRE-OP / PRE-PROCEDURE SCREENING ORDER (NO ISOLATION)    Narrative:     The following orders were created for panel order COVID PRE-OP / PRE-PROCEDURE SCREENING ORDER (NO ISOLATION) - Swab, Nasopharynx.  Procedure                               Abnormality         Status                     ---------                               -----------         ------                     COVID-19, FLU A/B, RSV P...[[id number]]  Normal              Final result                 Please view results for these tests on the individual orders.   RAINBOW DRAW    Narrative:     The following orders were created for panel order [place] Draw.  Procedure                               Abnormality         Status                     ---------                               -----------         ------                     Green Top (Gel)[[id number]]                                  Final result               Lavender Top[[id number]]                                     Final result               Gold Top - SST[[id number]]                                   Final result               Gray Top[[id number]]                                         Final result               Light Blue Top[[id number]]                                   Final result                 Please view results for these tests on the individual orders.   CBC AND DIFFERENTIAL    Narrative:     The following orders were created for panel order CBC & Differential.  Procedure                               Abnormality         Status                      ---------                               -----------         ------                     CBC Auto Differential[[id number]]        Abnormal            Final result                 Please view results for these tests on the individual orders.   GREEN TOP   LAVENDER TOP   GOLD TOP - SST   GRAY TOP   LIGHT BLUE TOP       Meds Given in ED:   Medications   sodium chloride 0.9 % flush 10 mL (has no administration in time range)   Enoxaparin Sodium (LOVENOX) syringe 120 mg (has no administration in time range)   sodium chloride 0.9 % bolus 1,000 mL (1,000 mL Intravenous New Bag 8/8/24 1442)   iopamidol (ISOVUE-370) 76 % injection 85 mL (85 mL Intravenous Given 8/8/24 1917)           Last NIH score:                                                          Dysphagia screening results:        [place] Coma Scale:  No data recorded     CIWA:        Restraint Type:            Isolation Status:  No active isolations

## 2024-08-09 NOTE — PROGRESS NOTES
HEPARIN INFUSION  Amadeo Kong is a  63 y.o. male receiving heparin infusion.     Therapy for (VTE/Cardiac):   VTE  Patient Weight: 118 kg  Initial Bolus (Y/N):   Y  Any Bolus (Y/N):   Y    Signs or Symptoms of Bleeding: No    VTE (PE/DVT)   Initial Bolus: 80 units/kg (Max 10,000 units)  Initial rate: 18 units/kg/hr (Max 1,500 units/hr)    Anti Xa Rebolus Infusion Hold time Change infusion Dose (Units/kg/hr) Next Anti Xa Level Due   < 0.11 50 Units/kg  (4000 Units Max) None Increase by  4 Units/kg/hr 6 hours   0.11 - 0.19 25 Units/kg  (2000 Units Max) None Increase by  3 Units/kg/hr 6 hours   0.2 - 0.29 0 None Increase by  2 Units/kg/hr 6 hours   0.3 - 0.7 0 None No Change 6 hours (after 2 consecutive levels in range check qAM)   0.71 - 0.8 0 None Decrease by  1 Units/kg/hr 6 hours   0.81 - 0.9 0 None Decrease by  2 Units/kg/hr 6 hours   0.91 - 1 0 60 Minutes Decrease by  3 Units/kg/hr 6 hours   >1 0 Hold  After Anti Xa less than 0.7 decrease previous rate by  4 Units/kg/hr  Every 2 hours until Anti Xa is less than 0.7 then when infusion restarts in 6 hours     Results from last 7 days   Lab Units 08/08/24  1341   HEMOGLOBIN g/dL 13.4   HEMATOCRIT % 38.5   PLATELETS 10*3/mm3 190          Date   Time   Anti-Xa Current Rate (Unit/kg/hr) Bolus   (Units) Rate Change   (Unit/kg/hr) New Rate (Unit/kg/hr) Next   Anti-Xa Comments  Pump Check Daily   8/8 2338 pending -- 9440 +13 13 0600 DIEGO QUIROZ   8/9 0018 >1.1 13 -- -- 13 --    8/9 0245 -- 13 -- -- Hold 0600 Enoxaparin at 2021  Discussed zuleima/ MD   8/9 0658 0.88 Off -- +13 13 1400 DW GABRIELLA Frank. Starting heparin gtt with no bolus, appropriate timing 12 hours after therapeutic Lovenox last night                                                                                                                       Russell Davis, Daphnie  08/09/24  07:56 EDT

## 2024-08-09 NOTE — SIGNIFICANT NOTE
08/09/24 1617   Living Situation   Current Living Arrangements home   Potentially Unsafe Housing Conditions none   Food Insecurity   Within the past 12 months, you worried that your food would run out before you got the money to buy more. Never true   Within the past 12 months, the food you bought just didn't last and you didn't have money to get more. Never true   Transportation Needs   In the past 12 months, has lack of transportation kept you from medical appointments or from getting medications? no   In the past 12 months, has lack of transportation kept you from meetings, work, or from getting things needed for daily living? No   Utilities   In the past 12 months has the electric, gas, oil, or water company threatened to shut off services in your home? No   Abuse Screen (yes response referral indicated)   Feels Unsafe at Home or Work/School no   Feels Threatened by Someone no   Does Anyone Try to Keep You From Having Contact with Others or Doing Things Outside Your Home? no   Physical Signs of Abuse Present no   Financial Resource Strain   How hard is it for you to pay for the very basics like food, housing, medical care, and heating? Pt Declined   Employment   Do you want help finding or keeping work or a job? Patient declined   Family and Community Support   If for any reason you need help with day-to-day activities such as bathing, preparing meals, shopping, managing finances, etc., do you get the help you need? Patient declined   How often do you feel lonely or isolated from those around you? Patient declined   Education   Preferred Language English   Do you want help with school or training? For example, starting or completing job training or getting a high school diploma, GED or equivalent No   Physical Activity   On average, how many days per week do you engage in moderate to strenuous exercise (like a brisk walk)? 1 day   On average, how many minutes do you engage in exercise at this level? 10 min    Number of minutes of exercise per week (!) 10   Alcohol Use   Q1: How often do you have a drink containing alcohol? Never   Q2: How many drinks containing alcohol do you have on a typical day when you are drinking? None   Q3: How often do you have six or more drinks on one occasion? Never   Stress   Do you feel stress - tense, restless, nervous, or anxious, or unable to sleep at night because your mind is troubled all the time - these days? Not at all   Mental Health   Little Interest or Pleasure in Doing Things 0-->not at all   Feeling Down, Depressed or Hopeless 0-->not at all   Disabilities   Concentrating, Remembering or Making Decisions Difficulty no   Doing Errands Independently Difficulty (such as shopping) no   SDOH Completion Check   Social Determinants Score 3

## 2024-08-09 NOTE — H&P
[facility] Medicine Services  HISTORY AND PHYSICAL    Patient Name: Amadeo Kong  : 1960  MRN: [id number]  Primary Care Physician: Bianka Ann MD  Date of admission: 2024      Subjective   Subjective     Chief Complaint:  Shortness of breath and chest discomfort    HPI:  Amadeo Kong is a 63 y.o. male with past medical history of essential hypertension, type 2 diabetes, insulin requiring, chronic arthritis, bronchial asthma, dyslipidemia, obstructive sleep apnea, bilateral knee wounds following with wound care as outpatient, presented to the hospital with chest discomfort and shortness of breath    Patient reported that his shortness of breath started 3 days ago and has been progressing since then.  Yesterday, he started feeling funny in his chest/chest discomfort and his shortness of breath got worse prompting him to come to the hospital today.  He denies any fever or chills.  Denied any lower extremity pain or swelling.  He admits that he lays in his recliner most of the day and does not move a lot.  He does have bilateral ulcers on his knees for which she follows with wound care as outpatient.  Has been having poor oral intake over the last few days and low urine output    In ER, his troponin was 53 and trended down to 42.  proBNP is 37.8, creatinine 1.94, up from his baseline of 1.1, D-dimer 2.5, CBC was normal.  CTA chest with bilateral PE and developing a right ventricular strain.  He will be admitted to the hospitalist service for further management    Personal History     Past Medical History:   Diagnosis Date    Arthritis     Asthma     inhaler daily     Diabetes mellitus 2000    insulin daily; checks blood sugars on occasion-run 150-180    Elevated cholesterol     History of sleep apnea     years ago diagnosed but never used a machine at home     History of staph infection 2010    wound infection after left knee replacement -saw infectious disease MD     Hypertension      Wears glasses            Past Surgical History:   Procedure Laterality Date    COLONOSCOPY      HIP SURGERY Right 08/11/2014    [facility]    INCISION AND DRAINAGE OF WOUND Left 2010    x2 of total knee replacement wound -iv antibiotics    KNEE SURGERY Bilateral     Right knee- 2008, left knee- 2010- [facility]    LUMBAR LAMINECTOMY WITH FUSION N/A 7/27/2018    Procedure: TLIF, OSTEOTOMY L4-5 , POST FUSION L4-5;  Surgeon: Yo Bryson MD;  Location:  [facility];  Service: Neurosurgery    LUMBAR LAMINECTOMY WITH FUSION N/A 8/27/2018    Procedure: LUMBAR LAMINECTOMY POSTERIOR LUMBAR INTERBODY FUSION;  Surgeon: Yo Bryson MD;  Location:  [facility];  Service: Neurosurgery    ROTATOR CUFF REPAIR Right     TONSILLECTOMY         Family History: family history includes Hypertension in his mother.     Social History:  reports that he has never smoked. He has never used smokeless tobacco. He reports that he does not drink alcohol and does not use drugs.  Social History     Social History Narrative    Not on file       Medications:  Available home medication information reviewed.  acetaminophen, albuterol sulfate HFA, alfuzosin, aspirin, atorvastatin, brimonidine, budesonide-formoterol, cetirizine, clopidogrel, dicyclomine, gabapentin, glucose blood, guaiFENesin-codeine, hydrocortisone, insulin NPH-insulin regular, lisinopril, ondansetron, and pantoprazole    Allergies   Allergen Reactions    Carrot [Daucus Carota] Swelling    Shellfish Allergy Swelling and Unknown (See Comments)     tongue swelling    Strawberry Swelling     tongue swelling    Banana Unknown (See Comments)    Sulfamethoxazole-Trimethoprim Other (See Comments)     Patient developed blisters on his hands.  Patient developed blisters on his hands.    Vancomycin Rash     Received vancomycin x1 dose 7/18/22       Objective   Objective     Vital Signs:   Temp:  [98.2 °F (36.8 °C)] 98.2 °F (36.8 °C)  Heart Rate:  [] 74  Resp:  [22] 22  BP:  (110-125)/(82-93) 115/87       Physical Exam   General: Comfortable, not in distress, conversant and cooperative  Head: Atraumatic and normocephalic  Eyes: No Icterus. No pallor  Ears:  Ears appear intact with no abnormalities noted  Throat: No oral lesions, no thrush  Neck: Supple, trachea midline  Lungs: Clear to auscultation bilaterally, equal air entry, no wheezing or crackles  Heart:  Normal S1 and S2, no murmur, no gallop, No JVD, no lower extremity swelling  Abdomen:  Soft, no tenderness, no organomegaly, normal bowel sounds, no organomegaly  Extremities: Multiple skin marks on both lower extremities.  2 deep ulcers on both knees, 1 inch in diameter, clean base with no erythema or discharge  Skin: No bleeding, bruising or rash, normal skin turgor and elasticity  Neurologic: Cranial nerves appear intact with no evidence of facial asymmetry, normal motor and sensory functions in all 4 extremities  Psych: Alert and oriented x 3, normal mood    Result Review:  I have personally reviewed the results from the time of this admission to 8/8/2024 21:23 EDT and agree with these findings:  [x]  Laboratory list / accordion  [x]  Microbiology  [x]  Radiology  [x]  EKG/Telemetry   []  Cardiology/Vascular   [x]  Pathology  [x]  Old records      LAB RESULTS:      Lab 08/08/24  1341   WBC 6.15   HEMOGLOBIN 13.4   HEMATOCRIT 38.5   PLATELETS 190   NEUTROS ABS 4.23   IMMATURE GRANS (ABS) 0.02   LYMPHS ABS 1.18   MONOS ABS 0.57   EOS ABS 0.13   MCV 89.1   D DIMER QUANT 2.50*         Lab 08/08/24  1341   SODIUM 137   POTASSIUM 4.7   CHLORIDE 103   CO2 23.0   ANION GAP 11.0   BUN 13   CREATININE 1.94*   EGFR 37.9*   GLUCOSE 165*   CALCIUM 9.2         Lab 08/08/24  1341   TOTAL PROTEIN 7.4   ALBUMIN 3.9   GLOBULIN 3.5   ALT (SGPT) 13   AST (SGOT) 21   BILIRUBIN 0.7   ALK PHOS 96         Lab 08/08/24  1600 08/08/24  1341   PROBNP  --  37.8   HSTROP T 42* 53*                 UA          8/28/2023    16:02 3/12/2024    19:39 5/22/2024     19:56   Urinalysis   Specific Gravity, UA 1.010     1.010  1.007    Ketones, UA  Negative  Negative    Blood, UA Negative     Negative  Negative    Leukocytes, UA Negative     Negative  Negative    Nitrite, UA  Negative  Negative       Details          This result is from an external source.               Microbiology Results (last 10 days)       Procedure Component Value - Date/Time    COVID PRE-OP / PRE-PROCEDURE SCREENING ORDER (NO ISOLATION) - Swab, Nasopharynx [[id number]]  (Normal) Collected: 08/08/24 1415    Lab Status: Final result Specimen: Swab from Nasopharynx Updated: 08/08/24 1526    Narrative:      The following orders were created for panel order COVID PRE-OP / PRE-PROCEDURE SCREENING ORDER (NO ISOLATION) - Swab, Nasopharynx.  Procedure                               Abnormality         Status                     ---------                               -----------         ------                     COVID-19, FLU A/B, RSV P...[[id number]]  Normal              Final result                 Please view results for these tests on the individual orders.    COVID-19, FLU A/B, RSV PCR 1 HR TAT - Swab, Nasopharynx [[id number]]  (Normal) Collected: 08/08/24 1415    Lab Status: Final result Specimen: Swab from Nasopharynx Updated: 08/08/24 1526     COVID19 Not Detected     Influenza A PCR Not Detected     Influenza B PCR Not Detected     RSV, PCR Not Detected            CT Angiogram Chest    Result Date: 8/8/2024  CT ANGIOGRAM CHEST Date of Exam: 8/8/2024 7:12 PM EDT Indication: soa and pos d dimer. Comparison: Chest radiograph from earlier today Technique: CTA of the chest was performed after the uneventful intravenous administration of 85 mL Isovue-370. Reconstructed coronal and sagittal images were also obtained. In addition, a 3-D volume rendered image was created for interpretation. Automated exposure control and iterative reconstruction methods were used. Findings: The central tracheobronchial tree is  clear. There are multiple interstitial markings along the periphery of both lungs, suggesting mild interstitial lung disease. There is no focal consolidation. There is no pleural effusion. The heart size appears normal. The great vessels are normal in caliber. There is acute pulmonary embolus within the distal left main pulmonary artery extending to a couple lingular and left lower lobe branches as well as embolus within a right lower lobe  branch. There is suggestion of early developing right heart strain. No abnormally enlarged lymph nodes are identified. Partial evaluation of the upper abdomen demonstrates cholelithiasis. No aggressive osseous lesions are identified.     Impression: Impression: 1.Multiple bilateral pulmonary emboli as described above, with suggestion of early developing right strain. 2.Suggestion of mild interstitial lung disease. 3.Cholelithiasis. 4.Results were discussed with Dr. Lechuga at time of dictation. Electronically Signed: Gutierrez Saab MD  8/8/2024 7:35 PM EDT  Workstation ID: [id number]    CT Abdomen Pelvis Without Contrast    Result Date: 8/8/2024  CT ABDOMEN PELVIS WO CONTRAST Date of Exam: 8/8/2024 2:21 PM EDT Indication: diffuse abd tenderness. Comparison: 12/13/2022. Technique: Axial CT images were obtained of the abdomen and pelvis without the administration of contrast. Reconstructed coronal and sagittal images were also obtained. Automated exposure control and iterative construction methods were used. Findings: There is respiratory motion on the film. There is artifact from spinal hardware. There has been posterior fusion from L4-S1. The bilateral L5 pedicle screws appear broken although the appearance is unchanged as compared to the prior study. There is stable grade 1 spondylolisthesis of L4 on L5. There is also artifact from right hip hardware. The prostate gland is normal in size. The bladder is poorly distended. There is no large or small bowel dilatation. There is a  normal appendix. There are no focal inflammatory changes. There are no renal stones or hydronephrosis. There is cholelithiasis without acute cholecystitis. There is no bile duct dilatation. The nonenhanced solid organs are normal in size.     Impression: Impression: Exam somewhat limited by artifact and respiratory motion. No acute process identified. Cholelithiasis without acute cholecystitis. Chronic findings in the spine as noted. Electronically Signed: Virginia Arauz MD  8/8/2024 2:41 PM EDT  Workstation ID: [id number]    XR Chest 1 View    Result Date: 8/8/2024  XR CHEST 1 VW Date of Exam: 8/8/2024 1:27 PM EDT Indication: SOA triage protocol Comparison: 6/16/2024 Findings: Heart size and pulmonary vasculature are within normal limits. Haziness of the lateral lower lungs due to overlying soft tissue attenuation. Lungs grossly clear. Costophrenic angle sharp     Impression: Impression: No active cardiopulmonary disease Electronically Signed: Russell Martinez  8/8/2024 1:58 PM EDT  Workstation ID: [id number]     Results for orders placed during the hospital encounter of 05/22/24    Adult Transthoracic Echo Complete W/ Cont if Necessary Per Protocol (With Agitated Saline)    Interpretation Summary    Left ventricular systolic function is normal. Calculated left ventricular EF = 56.2% Normal left ventricular cavity size and wall thickness noted. All left ventricular wall segments contract normally. Left ventricular diastolic function is consistent with (grade Ia w/high LAP) impaired relaxation.    Normal left atrial size and volume noted. Saline test results are negative.    The aortic valve is structurally normal with no regurgitation or stenosis present.    The mitral valve is structurally normal with no regurgitation or significant stenosis present.    Trace tricuspid valve regurgitation is present. Estimated right ventricular systolic pressure from tricuspid regurgitation is normal (<35 mmHg).      Assessment & Plan    Assessment & Plan       Acute pulmonary embolism    Summary:  Amadeo Kong is a 63 y.o. male with past medical history of essential hypertension, type 2 diabetes, insulin requiring, chronic arthritis, bronchial asthma, dyslipidemia, obstructive sleep apnea, bilateral knee wounds following with wound care as outpatient, presented to the hospital with chest discomfort and shortness of breath.  Found to have bilateral PE with questionable RV strain    Bilateral PE  ?  RV strain  Likely provoked secondary to prolonged recumbency  Confirmed on CTA chest on admission.  CTA chest is concerning for developing RV strain.  Troponin mildly elevated and trending down.  proBNP is normal  Start heparin drip.  Can transition to p.o. Eliquis upon discharge.  Tender plan to treat for 6 months as provoked PE  Obtain echocardiogram in the morning  Consider cardiology consultation if echo showed RV strain    THOMAS on CKD stage III  Likely secondary to poor oral intake  Holding lisinopril  IV fluids monitor kidney functions    Type 2 diabetes  Check A1c  Insulin Lantus and SSI    Essential hypertension  Dyslipidemia  Holding lisinopril in the view of his THOMAS  Continue statins    Bilateral knee ulcers  He follows with wound care as outpatient    Weakness and debility  Consult PT and OT    VTE Prophylaxis:  Pharmacologic VTE prophylaxis orders are present.          CODE STATUS:    Code Status and Medical Interventions: CPR (Attempt to Resuscitate); Full Support   Ordered at: 08/08/24 2123     Level Of Support Discussed With:    Patient     Code Status (Patient has no pulse and is not breathing):    CPR (Attempt to Resuscitate)     Medical Interventions (Patient has pulse or is breathing):    Full Support       Expected Discharge   Expected discharge date/ time has not been documented.     Stewart Bonner MD  08/08/24

## 2024-08-10 ENCOUNTER — READMISSION MANAGEMENT (OUTPATIENT)
Dept: CALL CENTER | Facility: HOSPITAL | Age: 64
End: 2024-08-10
Payer: MEDICARE

## 2024-08-10 VITALS
WEIGHT: 260 LBS | HEART RATE: 96 BPM | DIASTOLIC BLOOD PRESSURE: 88 MMHG | SYSTOLIC BLOOD PRESSURE: 126 MMHG | HEIGHT: 74 IN | RESPIRATION RATE: 18 BRPM | TEMPERATURE: 97.6 F | OXYGEN SATURATION: 95 % | BODY MASS INDEX: 33.37 KG/M2

## 2024-08-10 LAB
ANION GAP SERPL CALCULATED.3IONS-SCNC: 9 MMOL/L (ref 5–15)
BUN SERPL-MCNC: 8 MG/DL (ref 8–23)
BUN/CREAT SERPL: 8.2 (ref 7–25)
CALCIUM SPEC-SCNC: 8.3 MG/DL (ref 8.6–10.5)
CHLORIDE SERPL-SCNC: 110 MMOL/L (ref 98–107)
CO2 SERPL-SCNC: 19 MMOL/L (ref 22–29)
CREAT SERPL-MCNC: 0.97 MG/DL (ref 0.76–1.27)
EGFRCR SERPLBLD CKD-EPI 2021: 87.2 ML/MIN/1.73
GLUCOSE BLDC GLUCOMTR-MCNC: 123 MG/DL (ref 70–130)
GLUCOSE BLDC GLUCOMTR-MCNC: 125 MG/DL (ref 70–130)
GLUCOSE SERPL-MCNC: 136 MG/DL (ref 65–99)
MAGNESIUM SERPL-MCNC: 1.9 MG/DL (ref 1.6–2.4)
POTASSIUM SERPL-SCNC: 4.4 MMOL/L (ref 3.5–5.2)
SODIUM SERPL-SCNC: 138 MMOL/L (ref 136–145)
UFH PPP CHRO-ACNC: 0.21 IU/ML (ref 0.3–0.7)

## 2024-08-10 PROCEDURE — 97161 PT EVAL LOW COMPLEX 20 MIN: CPT

## 2024-08-10 PROCEDURE — 25010000002 HEPARIN (PORCINE) 25000-0.45 UT/250ML-% SOLUTION

## 2024-08-10 PROCEDURE — 85520 HEPARIN ASSAY: CPT

## 2024-08-10 PROCEDURE — 97165 OT EVAL LOW COMPLEX 30 MIN: CPT

## 2024-08-10 PROCEDURE — 83735 ASSAY OF MAGNESIUM: CPT | Performed by: INTERNAL MEDICINE

## 2024-08-10 PROCEDURE — 80048 BASIC METABOLIC PNL TOTAL CA: CPT | Performed by: INTERNAL MEDICINE

## 2024-08-10 PROCEDURE — 94664 DEMO&/EVAL PT USE INHALER: CPT

## 2024-08-10 PROCEDURE — 82948 REAGENT STRIP/BLOOD GLUCOSE: CPT

## 2024-08-10 PROCEDURE — 94799 UNLISTED PULMONARY SVC/PX: CPT

## 2024-08-10 RX ADMIN — HEPARIN SODIUM 11 UNITS/KG/HR: 10000 INJECTION, SOLUTION INTRAVENOUS at 02:56

## 2024-08-10 RX ADMIN — CLOPIDOGREL BISULFATE 75 MG: 75 TABLET ORAL at 08:14

## 2024-08-10 RX ADMIN — PANTOPRAZOLE SODIUM 40 MG: 40 TABLET, DELAYED RELEASE ORAL at 06:35

## 2024-08-10 RX ADMIN — BUDESONIDE AND FORMOTEROL FUMARATE DIHYDRATE 2 PUFF: 160; 4.5 AEROSOL RESPIRATORY (INHALATION) at 09:00

## 2024-08-10 RX ADMIN — APIXABAN 10 MG: 5 TABLET, FILM COATED ORAL at 12:37

## 2024-08-10 RX ADMIN — GABAPENTIN 300 MG: 300 CAPSULE ORAL at 08:14

## 2024-08-10 RX ADMIN — ACETAMINOPHEN 650 MG: 325 TABLET ORAL at 08:14

## 2024-08-10 NOTE — THERAPY DISCHARGE NOTE
Patient Name: Amadeo Kong  : 1960    MRN: [id number]                              Today's Date: 8/10/2024       Admit Date: 2024    Visit Dx:     ICD-10-CM ICD-9-CM   1. Acute pulmonary embolism, unspecified pulmonary embolism type, unspecified whether acute cor pulmonale present  I26.99 415.19   2. Acute dyspnea  R06.00 786.09   3. Elevated serum creatinine  R79.89 790.99     Patient Active Problem List   Diagnosis    Hypertension    Type 2 diabetes mellitus, with long-term current use of insulin    Arthritis    Chronic bilateral low back pain without sciatica    Spondylolisthesis of lumbar region    Acquired spondylolisthesis    Hyponatremia    Anemia    Sciatica of right side    Stenosis of lateral recess of lumbar spine    Anterior leg pain, right    THOMAS (acute kidney injury)    Sepsis due to Klebsiella    HLD (hyperlipidemia)    Peripheral neuropathy    Acute cystitis with hematuria    Aphasia    Hypomagnesemia    Acute pulmonary embolism     Past Medical History:   Diagnosis Date    Arthritis     Asthma     inhaler daily     Diabetes mellitus 2000    insulin daily; checks blood sugars on occasion-run 150-180    Elevated cholesterol     History of sleep apnea     years ago diagnosed but never used a machine at home     History of staph infection     wound infection after left knee replacement -saw infectious disease MD     Hypertension     Wears glasses      Past Surgical History:   Procedure Laterality Date    COLONOSCOPY      HIP SURGERY Right 2014    [facility]    INCISION AND DRAINAGE OF WOUND Left 2010    x2 of total knee replacement wound -iv antibiotics    KNEE SURGERY Bilateral     Right knee- , left knee- - [facility]    LUMBAR LAMINECTOMY WITH FUSION N/A 2018    Procedure: TLIF, OSTEOTOMY L4-5 , POST FUSION L4-5;  Surgeon: Yo Bryson MD;  Location: [facility];  Service: Neurosurgery    LUMBAR LAMINECTOMY WITH FUSION N/A 2018    Procedure: LUMBAR  LAMINECTOMY POSTERIOR LUMBAR INTERBODY FUSION;  Surgeon: Yo Bryson MD;  Location: [facility];  Service: Neurosurgery    ROTATOR CUFF REPAIR Right     TONSILLECTOMY        General Information       Row Name 08/10/24 1142          Physical Therapy Time and Intention    Document Type discharge evaluation/summary  -     Mode of Treatment physical therapy  -       Row Name 08/10/24 1142          General Information    Patient Profile Reviewed yes  -ML     Prior Level of Function independent:;all household mobility;community mobility;gait;transfer;bed mobility;ADL's;driving;using stairs  patient reports ambulating with a SPC at baseline  -     Existing Precautions/Restrictions fall  -     Barriers to Rehab medically complex  -ML       Row Name 08/10/24 1142          Living Environment    People in Home spouse  -       Row Name 08/10/24 1142          Home Main Entrance    Number of Stairs, Main Entrance two  -ML     Stair Railings, Main Entrance railing on left side (ascending)  -ML       Row Name 08/10/24 1142          Stairs Within Home, Primary    Number of Stairs, Within Home, Primary none  -       Row Name 08/10/24 1142          Cognition    Orientation Status (Cognition) oriented x 3  -ML       Row Name 08/10/24 1142          Safety Issues, Functional Mobility    Safety Issues Affecting Function (Mobility) awareness of need for assistance;insight into deficits/self-awareness;safety precaution awareness  -               User Key  (r) = Recorded By, (t) = Taken By, (c) = Cosigned By      Initials Name Provider Type    ML Diane Cruz Physical Therapist                   Mobility       Row Name 08/10/24 1143          Bed Mobility    Comment, (Bed Mobility) patient found and left seated in bedside chair  -       Row Name 08/10/24 1143          Sit-Stand Transfer    Sit-Stand [place] (Transfers) independent  -       Row Name 08/10/24 1143          Gait/Stairs (Locomotion)    [place] Level  (Gait) standby assist  -ML     Assistive Device (Gait) other (see comments)  IV pole  -ML     Distance in Feet (Gait) 200  -ML     Bilateral Gait Deviations forward flexed posture  -ML     Comment, (Gait/Stairs) patient ambulates with step through gait pattern, no loss of balance, verbal cues to increase safety awareness during ambulation  -ML               User Key  (r) = Recorded By, (t) = Taken By, (c) = Cosigned By      Initials Name Provider Type    ML Diane Cruz Physical Therapist                   Obj/Interventions       Row Name 08/10/24 1145          Range of Motion Comprehensive    General Range of Motion bilateral lower extremity ROM WFL  -ML       Row Name 08/10/24 1145          Strength Comprehensive (MMT)    General Manual Muscle Testing (MMT) Assessment lower extremity strength deficits identified  -ML     Comment, General Manual Muscle Testing (MMT) Assessment BLE grossly 5/5  -ML       Row Name 08/10/24 1145          Balance    Balance Assessment sitting static balance;sitting dynamic balance;sit to stand dynamic balance;standing static balance;standing dynamic balance  -ML     Static Sitting Balance independent  -ML     Dynamic Sitting Balance independent  -ML     Position, Sitting Balance unsupported;sitting in chair  -ML     Sit to Stand Dynamic Balance independent  -ML     Static Standing Balance independent  -ML     Dynamic Standing Balance standby assist  -ML     Position/Device Used, Standing Balance unsupported  -ML     Balance Interventions sitting;standing;sit to stand;occupation based/functional task  -ML       Row Name 08/10/24 1145          Sensory Assessment (Somatosensory)    Sensory Assessment (Somatosensory) LE sensation intact  -ML               User Key  (r) = Recorded By, (t) = Taken By, (c) = Cosigned By      Initials Name Provider Type    ML Diane Cruz Physical Therapist                   Goals/Plan    No documentation.                  Clinical Impression       Row Name  08/10/24 1145          Pain    Pretreatment Pain Rating 0/10 - no pain  -ML     Posttreatment Pain Rating 0/10 - no pain  -ML       Row Name 08/10/24 1145          Plan of Care Review    Plan of Care Reviewed With patient;spouse  -ML     Outcome Evaluation Physical therapy evaluation complete. The patient independent with transfers and required SBA to ambulate. Patient presents near baseline for mobility, no acute PT needs identified.  -ML       Row Name 08/10/24 1145          Therapy Assessment/Plan (PT)    Criteria for Skilled Interventions Met (PT) no;no problems identified which require skilled intervention;does not meet criteria for skilled intervention  -ML     Therapy Frequency (PT) evaluation only  -ML       Row Name 08/10/24 1145          Vital Signs    Pre Systolic BP Rehab 126  -ML     Pre Treatment Diastolic BP 88  -ML     Pretreatment Heart Rate (beats/min) 89  -ML     Intratreatment Heart Rate (beats/min) 133  -ML     Posttreatment Heart Rate (beats/min) 110  -ML     Pre Patient Position Sitting  -ML     Intra Patient Position Standing  -ML     Post Patient Position Sitting  -ML       Row Name 08/10/24 1145          Positioning and Restraints    Pre-Treatment Position sitting in chair/recliner  -ML     Post Treatment Position chair  -ML     In Chair notified nsg;sitting;call light within reach;encouraged to call for assist;with family/caregiver  RN ok'd no chair alarm  -ML               User Key  (r) = Recorded By, (t) = Taken By, (c) = Cosigned By      Initials Name Provider Type    ML Diane Cruz Physical Therapist                   Outcome Measures       Row Name 08/10/24 1147          How much help from another person do you currently need...    Turning from your back to your side while in flat bed without using bedrails? 4  -ML     Moving from lying on back to sitting on the side of a flat bed without bedrails? 4  -ML     Moving to and from a bed to a chair (including a wheelchair)? 4  -ML      Standing up from a chair using your arms (e.g., wheelchair, bedside chair)? 4  -ML     Climbing 3-5 steps with a railing? 4  -ML     To walk in hospital room? 4  -ML     AM-PAC 6 Clicks Score (PT) 24  -ML     Highest Level of Mobility Goal 8 --> Walked 250 feet or more  -ML       Row Name 08/10/24 1147          Functional Assessment    Outcome Measure Options AM-PAC 6 Clicks Basic Mobility (PT)  -ML               User Key  (r) = Recorded By, (t) = Taken By, (c) = Cosigned By      Initials Name Provider Type    ML Diane Cruz Physical Therapist                                 Physical Therapy Education       Title: PT OT SLP Therapies (In Progress)       Topic: Physical Therapy (In Progress)       Point: Mobility training (Done)       Learning Progress Summary             Patient Acceptance, E, VU,NR by ML at 8/10/2024 1147   Family Acceptance, E, VU,NR by ML at 8/10/2024 1147                         Point: Home exercise program (Not Started)       Learner Progress:  Not documented in this visit.              Point: Body mechanics (Not Started)       Learner Progress:  Not documented in this visit.              Point: Precautions (Done)       Learning Progress Summary             Patient Acceptance, E, VU,NR by ML at 8/10/2024 1147   Family Acceptance, E, VU,NR by ML at 8/10/2024 1147                                         User Key       Initials Effective Dates Name Provider Type Discipline     04/22/21 -  Diane Cruz Physical Therapist PT                  PT Recommendation and Plan     Plan of Care Reviewed With: patient, spouse  Outcome Evaluation: Physical therapy evaluation complete. The patient independent with transfers and required SBA to ambulate. Patient presents near baseline for mobility, no acute PT needs identified.     Time Calculation:   PT Evaluation Complexity  History, PT Evaluation Complexity: 1-2 personal factors and/or comorbidities  Examination of Body Systems (PT Eval Complexity): 1-2  elements  Clinical Presentation (PT Evaluation Complexity): evolving  Clinical Decision Making (PT Evaluation Complexity): low complexity  Overall Complexity (PT Evaluation Complexity): low complexity     PT Charges       Row Name 08/10/24 1148             Time Calculation    Start Time 1115  -ML      PT Received On 08/10/24  -ML         Untimed Charges    PT Eval/Re-eval Minutes 33  -ML         Total Minutes    Untimed Charges Total Minutes 33  -ML       Total Minutes 33  -ML                User Key  (r) = Recorded By, (t) = Taken By, (c) = Cosigned By      Initials Name Provider Type    Diane Hollingsworth Physical Therapist                  Therapy Charges for Today       Code Description Service Date Service Provider Modifiers Qty    88621019683 HC PT EVAL LOW COMPLEXITY 3 8/10/2024 Diane Cruz GP 1            PT G-Codes  Outcome Measure Options: AM-PAC 6 Clicks Basic Mobility (PT)  AM-PAC 6 Clicks Score (PT): 24  PT Discharge Summary  Anticipated Discharge Disposition (PT): home with assist    Diane Cruz  8/10/2024

## 2024-08-10 NOTE — PROGRESS NOTES
HEPARIN INFUSION  Amadeo Kong is a  63 y.o. male receiving heparin infusion.     Therapy for (VTE/Cardiac):   VTE  Patient Weight: 118 kg  Initial Bolus (Y/N): Yes  Any Bolus (Y/N): Yes    Signs or Symptoms of Bleeding: No    VTE (PE/DVT)   Initial Bolus: 80 units/kg (Max 10,000 units)  Initial rate: 18 units/kg/hr (Max 1,500 units/hr)    Anti Xa Rebolus Infusion Hold time Change infusion Dose (Units/kg/hr) Next Anti Xa Level Due   < 0.11 50 Units/kg  (4000 Units Max) None Increase by  4 Units/kg/hr 6 hours   0.11 - 0.19 25 Units/kg  (2000 Units Max) None Increase by  3 Units/kg/hr 6 hours   0.2 - 0.29 0 None Increase by  2 Units/kg/hr 6 hours   0.3 - 0.7 0 None No Change 6 hours (after 2 consecutive levels in range check qAM)   0.71 - 0.8 0 None Decrease by  1 Units/kg/hr 6 hours   0.81 - 0.9 0 None Decrease by  2 Units/kg/hr 6 hours   0.91 - 1 0 60 Minutes Decrease by  3 Units/kg/hr 6 hours   >1 0 Hold  After Anti Xa less than 0.7 decrease previous rate by  4 Units/kg/hr  Every 2 hours until Anti Xa is less than 0.7 then when infusion restarts in 6 hours     Results from last 7 days   Lab Units 08/09/24  0658 08/09/24  0018 08/08/24  1341   INR   --  1.45*  --    HEMOGLOBIN g/dL 12.3*  --  13.4   HEMATOCRIT % 36.1*  --  38.5   PLATELETS 10*3/mm3 178  --  190          Date   Time   Anti-Xa Current Rate (Unit/kg/hr) Bolus   (Units) Rate Change   (Unit/kg/hr) New Rate (Unit/kg/hr) Next   Anti-Xa Comments  Pump Check Daily   8/8 2338 pending -- 9440 +13 13 0600 DIEGO QUIROZ   8/9 0018 >1.1 13 -- -- 13 --    8/9 0245 -- 13 -- -- Hold 0600 Enoxaparin at 2021  Discussed zuleima/ MD   8/9 0658 0.88 Off -- +13 13 1400 DIEGO Frank. Starting heparin gtt with no bolus, appropriate timing 12 hours after therapeutic Lovenox last night    8/9 1359 0.90 13 -- -2 11 2200 Discussed with RN.   8/9 2304 0.63 11 -- -- 11 0600 Discussed w/ nurse   8/10 0548 0.21 11 -- +2 13 1400 D/w GABRIELLA [place]                                                                                   Sheridan Cortez, PharmD  8/10/2024   07:54 EDT

## 2024-08-10 NOTE — THERAPY DISCHARGE NOTE
Acute Care - Occupational Therapy Discharge  [facility]    Patient Name: Amadeo Kong  : 1960    MRN: [id number]                              Today's Date: 8/10/2024       Admit Date: 2024    Visit Dx:     ICD-10-CM ICD-9-CM   1. Acute pulmonary embolism, unspecified pulmonary embolism type, unspecified whether acute cor pulmonale present  I26.99 415.19   2. Acute dyspnea  R06.00 786.09   3. Elevated serum creatinine  R79.89 790.99     Patient Active Problem List   Diagnosis    Hypertension    Type 2 diabetes mellitus, with long-term current use of insulin    Arthritis    Chronic bilateral low back pain without sciatica    Spondylolisthesis of lumbar region    Acquired spondylolisthesis    Hyponatremia    Anemia    Sciatica of right side    Stenosis of lateral recess of lumbar spine    Anterior leg pain, right    THOMAS (acute kidney injury)    Sepsis due to Klebsiella    HLD (hyperlipidemia)    Peripheral neuropathy    Acute cystitis with hematuria    Aphasia    Hypomagnesemia    Acute pulmonary embolism     Past Medical History:   Diagnosis Date    Arthritis     Asthma     inhaler daily     Diabetes mellitus 2000    insulin daily; checks blood sugars on occasion-run 150-180    Elevated cholesterol     History of sleep apnea     years ago diagnosed but never used a machine at home     History of staph infection     wound infection after left knee replacement -saw infectious disease MD     Hypertension     Wears glasses      Past Surgical History:   Procedure Laterality Date    COLONOSCOPY      HIP SURGERY Right 2014    [facility]    INCISION AND DRAINAGE OF WOUND Left 2010    x2 of total knee replacement wound -iv antibiotics    KNEE SURGERY Bilateral     Right knee- , left knee- - [facility]    LUMBAR LAMINECTOMY WITH FUSION N/A 2018    Procedure: TLIF, OSTEOTOMY L4-5 , POST FUSION L4-5;  Surgeon: Yo Bryson MD;  Location: [facility];  Service: Neurosurgery    LUMBAR  LAMINECTOMY WITH FUSION N/A 8/27/2018    Procedure: LUMBAR LAMINECTOMY POSTERIOR LUMBAR INTERBODY FUSION;  Surgeon: Yo Bryson MD;  Location: [facility];  Service: Neurosurgery    ROTATOR CUFF REPAIR Right     TONSILLECTOMY        General Information       Row Name 08/10/24 1310          OT Time and Intention    Document Type discharge evaluation/summary  -     Mode of Treatment occupational therapy  -AC       Row Name 08/10/24 1310          General Information    Patient Profile Reviewed yes  -AC     Prior Level of Function independent:;all household mobility;community mobility;driving  uses SPC, also has RW, but reports it is too big  -     Barriers to Rehab medically complex  -AC       Row Name 08/10/24 1310          Occupational Profile    Environmental Supports and Barriers (Occupational Profile) tub shower  -AC       Row Name 08/10/24 1310          Living Environment    People in Home spouse  -AC       Row Name 08/10/24 1310          Home Main Entrance    Number of Stairs, Main Entrance two  -AC     Stair Railings, Main Entrance railing on left side (ascending)  -AC       Row Name 08/10/24 1310          Stairs Within Home, Primary    Number of Stairs, Within Home, Primary none  -AC       Row Name 08/10/24 1310          Cognition    Orientation Status (Cognition) oriented x 3  -AC       Row Name 08/10/24 1310          Safety Issues, Functional Mobility    Safety Issues Affecting Function (Mobility) safety precaution awareness;insight into deficits/self-awareness  -               User Key  (r) = Recorded By, (t) = Taken By, (c) = Cosigned By      Initials Name Provider Type    AC Kristi Be OT Occupational Therapist                   Mobility/ADL's       Row Name 08/10/24 1312          Bed Mobility    Comment, (Bed Mobility) UIC pre/post  -AC       Row Name 08/10/24 1312          Sit-Stand Transfer    Sit-Stand [place] (Transfers) independent  -AC       Row Name 08/10/24 1312           Functional Mobility    Functional Mobility- Ind. Level standby assist  -AC     Functional Mobility- Device other (see comments)  held onto IV pole  -       Row Name 08/10/24 1312          Activities of Daily Living    BADL Assessment/Intervention lower body dressing  -       Row Name 08/10/24 1312          Lower Body Dressing Assessment/Training    [place] Level (Lower Body Dressing) don;doff;shoes/slippers;independent  -AC     Position (Lower Body Dressing) supported sitting  -AC     Comment, (Lower Body Dressing) increased time and effort, more difficulty on R than L  -AC               User Key  (r) = Recorded By, (t) = Taken By, (c) = Cosigned By      Initials Name Provider Type    Kristi Tsai, OT Occupational Therapist                   Obj/Interventions       Row Name 08/10/24 1313          Sensory Assessment (Somatosensory)    Sensory Assessment (Somatosensory) UE sensation intact  -[facility] Name 08/10/24 1313          Vision Assessment/Intervention    Visual Impairment/Limitations WFL  -       Row Name 08/10/24 1313          Range of Motion Comprehensive    General Range of Motion bilateral upper extremity ROM WNL  -[facility] Name 08/10/24 1313          Strength Comprehensive (MMT)    Comment, General Manual Muscle Testing (MMT) Assessment BUE grossly 5/5  -       Row Name 08/10/24 1313          Balance    Balance Assessment sitting static balance;sitting dynamic balance;standing static balance;standing dynamic balance  -AC     Static Sitting Balance independent  -AC     Dynamic Sitting Balance independent  -AC     Position, Sitting Balance unsupported;sitting in chair  -AC     Static Standing Balance independent  -AC     Dynamic Standing Balance standby assist  -AC               User Key  (r) = Recorded By, (t) = Taken By, (c) = Cosigned By      Initials Name Provider Type    Kristi Tsai, OT Occupational Therapist                   Goals/Plan    No documentation.                   Clinical Impression       Row Name 08/10/24 1314          Pain Assessment    Pretreatment Pain Rating 0/10 - no pain  -AC     Posttreatment Pain Rating 0/10 - no pain  -AC       Row Name 08/10/24 1314          Plan of Care Review    Plan of Care Reviewed With patient;spouse  -     Outcome Evaluation Pt ind  LBD, SBA to ambulate.  Pt is at baseline with self care/mobility. No further skilled OT indicated at this time.  Recommend home with assist upon d/c.  -AC       Row Name 08/10/24 1314          Therapy Assessment/Plan (OT)    Criteria for Skilled Therapeutic Interventions Met (OT) no;no problems identified which require skilled intervention  -     Therapy Frequency (OT) evaluation only  -AC       Row Name 08/10/24 1314          Therapy Plan Review/Discharge Plan (OT)    Anticipated Discharge Disposition (OT) home with assist  -AC       Row Name 08/10/24 1314          Vital Signs    Pre Systolic BP Rehab 126  -AC     Pre Treatment Diastolic BP 88  -AC     Pretreatment Heart Rate (beats/min) 89  -AC     Posttreatment Heart Rate (beats/min) 110  -AC     Pre Patient Position Sitting  -AC     Post Patient Position Sitting  -[facility] 08/10/24 1314          Positioning and Restraints    Pre-Treatment Position sitting in chair/recliner  -AC     Post Treatment Position chair  -AC     In Chair notified nsg;sitting;call light within reach;encouraged to call for assist;with family/caregiver  -               User Key  (r) = Recorded By, (t) = Taken By, (c) = Cosigned By      Initials Name Provider Type    AC Kristi Be, OT Occupational Therapist                   Outcome Measures       [facility] Name 08/10/24 1316          How much help from another is currently needed...    Putting on and taking off regular lower body clothing? 4  -AC     Bathing (including washing, rinsing, and drying) 4  -AC     Toileting (which includes using toilet bed pan or urinal) 4  -AC     Putting on and taking off regular upper  body clothing 4  -AC     Taking care of personal grooming (such as brushing teeth) 4  -AC     Eating meals 4  -AC     AM-PAC 6 Clicks Score (OT) 24  -AC       Row Name 08/10/24 1147          How much help from another person do you currently need...    Turning from your back to your side while in flat bed without using bedrails? 4  -ML     Moving from lying on back to sitting on the side of a flat bed without bedrails? 4  -ML     Moving to and from a bed to a chair (including a wheelchair)? 4  -ML     Standing up from a chair using your arms (e.g., wheelchair, bedside chair)? 4  -ML     Climbing 3-5 steps with a railing? 4  -ML     To walk in hospital room? 4  -ML     AM-PAC 6 Clicks Score (PT) 24  -ML     Highest Level of Mobility Goal 8 --> Walked 250 feet or more  -ML       Row Name 08/10/24 1316 08/10/24 1147       Functional Assessment    Outcome Measure Options AM-PAC 6 Clicks Daily Activity (OT)  - AM-PAC 6 Clicks Basic Mobility (PT)  -ML              User Key  (r) = Recorded By, (t) = Taken By, (c) = Cosigned By      Initials Name Provider Type     Kristi Be OT Occupational Therapist     Diane Cruz Physical Therapist                  Occupational Therapy Education       Title: PT OT SLP Therapies (In Progress)       Topic: Occupational Therapy (In Progress)       Point: ADL training (Done)       Description:   Instruct learner(s) on proper safety adaptation and remediation techniques during self care or transfers.   Instruct in proper use of assistive devices.                  Learning Progress Summary             Patient Acceptance, E, VU by  at 8/10/2024 1318                         Point: Home exercise program (Not Started)       Description:   Instruct learner(s) on appropriate technique for monitoring, assisting and/or progressing therapeutic exercises/activities.                  Learner Progress:  Not documented in this visit.              Point: Precautions (Not Started)        Description:   Instruct learner(s) on prescribed precautions during self-care and functional transfers.                  Learner Progress:  Not documented in this visit.              Point: Body mechanics (Not Started)       Description:   Instruct learner(s) on proper positioning and spine alignment during self-care, functional mobility activities and/or exercises.                  Learner Progress:  Not documented in this visit.                              User Key       Initials Effective Dates Name Provider Type [facility] 02/03/23 -  Kristi Be OT Occupational Therapist OT                  OT Recommendation and Plan  Therapy Frequency (OT): evaluation only  Plan of Care Review  Plan of Care Reviewed With: patient, spouse  Outcome Evaluation: Pt ind  LBD, SBA to ambulate.  Pt is at baseline with self care/mobility. No further skilled OT indicated at this time.  Recommend home with assist upon d/c.  Plan of Care Reviewed With: patient, spouse  Outcome Evaluation: Pt ind  LBD, SBA to ambulate.  Pt is at baseline with self care/mobility. No further skilled OT indicated at this time.  Recommend home with assist upon d/c.     Time Calculation:   Evaluation Complexity (OT)  Review Occupational Profile/Medical/Therapy History Complexity: brief/low complexity  Assessment, Occupational Performance/Identification of Deficit Complexity: 1-3 performance deficits  Clinical Decision Making Complexity (OT): problem focused assessment/low complexity  Overall Complexity of Evaluation (OT): low complexity     Time Calculation- OT       Row Name 08/10/24 1120             Time Calculation- OT    OT Start Time 1120  -AC      OT Received On 08/10/24  -AC         Untimed Charges    OT Eval/Re-eval Minutes 40  -AC         Total Minutes    Untimed Charges Total Minutes 40  -AC       Total Minutes 40  -AC                User Key  (r) = Recorded By, (t) = Taken By, (c) = Cosigned By      Initials Name Provider Type      Kristi Be, OT Occupational Therapist                  Therapy Charges for Today       Code Description Service Date Service Provider Modifiers Qty    [id number]  OT EVAL LOW COMPLEXITY 3 8/10/2024 Kristi Be, OT GO 1               OT Discharge Summary  Anticipated Discharge Disposition (OT): home with assist    Kristi Be OT  8/10/2024

## 2024-08-10 NOTE — PLAN OF CARE
Goal Outcome Evaluation:  Plan of Care Reviewed With: patient, spouse           Outcome Evaluation: Pt ind  LBD, SBA to ambulate.  Pt is at baseline with self care/mobility. No further skilled OT indicated at this time.  Recommend home with assist upon d/c.      Anticipated Discharge Disposition (OT): home with assist

## 2024-08-10 NOTE — PLAN OF CARE
Problem: Adult Inpatient Plan of Care  Goal: Absence of Hospital-Acquired Illness or Injury  Intervention: Identify and Manage Fall Risk  Recent Flowsheet Documentation  Taken 8/10/2024 0610 by Willow Cooper, RN  Safety Promotion/Fall Prevention:   activity supervised   assistive device/personal items within reach   clutter free environment maintained   fall prevention program maintained   lighting adjusted   nonskid shoes/slippers when out of bed   room organization consistent   safety round/check completed  Taken 8/10/2024 0419 by Willow Cooper, RN  Safety Promotion/Fall Prevention:   activity supervised   assistive device/personal items within reach   clutter free environment maintained   fall prevention program maintained   lighting adjusted   nonskid shoes/slippers when out of bed   room organization consistent   safety round/check completed  Taken 8/10/2024 0219 by Willow Cooper, RN  Safety Promotion/Fall Prevention:   activity supervised   assistive device/personal items within reach   clutter free environment maintained   fall prevention program maintained   lighting adjusted   nonskid shoes/slippers when out of bed   room organization consistent   safety round/check completed  Taken 8/10/2024 0019 by Willow Cooper, RN  Safety Promotion/Fall Prevention:   activity supervised   assistive device/personal items within reach   clutter free environment maintained   fall prevention program maintained   lighting adjusted   nonskid shoes/slippers when out of bed   room organization consistent   safety round/check completed  Taken 8/9/2024 2219 by Willow Cooper, RN  Safety Promotion/Fall Prevention:   activity supervised   assistive device/personal items within reach   clutter free environment maintained   fall prevention program maintained   lighting adjusted   nonskid shoes/slippers when out of bed   room organization consistent   safety round/check completed  Taken 8/9/2024 2119 by Kenneth  GABRIELLA Daugherty  Safety Promotion/Fall Prevention:   activity supervised   assistive device/personal items within reach   clutter free environment maintained   fall prevention program maintained   lighting adjusted   nonskid shoes/slippers when out of bed   room organization consistent   safety round/check completed  Intervention: Prevent Skin Injury  Recent Flowsheet Documentation  Taken 8/10/2024 0610 by Willow Cooper RN  Body Position: position changed independently  Taken 8/10/2024 0419 by Willow Cooper RN  Body Position: position changed independently  Taken 8/10/2024 0219 by Willow Cooper RN  Body Position: position changed independently  Taken 8/10/2024 0019 by Willow Cooper RN  Body Position: position changed independently  Taken 8/9/2024 2219 by Willow Cooper RN  Body Position: position changed independently  Taken 8/9/2024 2119 by Willow Cooper RN  Body Position: position changed independently  Intervention: Prevent and Manage VTE (Venous Thromboembolism) Risk  Recent Flowsheet Documentation  Taken 8/9/2024 2119 by Willow Cooper RN  Activity Management: activity minimized  Intervention: Prevent Infection  Recent Flowsheet Documentation  Taken 8/10/2024 0610 by Willow Cooper RN  Infection Prevention:   environmental surveillance performed   hand hygiene promoted   rest/sleep promoted   single patient room provided  Taken 8/10/2024 0419 by Willow Cooper RN  Infection Prevention:   environmental surveillance performed   hand hygiene promoted   rest/sleep promoted   single patient room provided  Taken 8/10/2024 0219 by Willow Cooper RN  Infection Prevention:   environmental surveillance performed   hand hygiene promoted   rest/sleep promoted   single patient room provided  Taken 8/10/2024 0019 by Willow Cooper RN  Infection Prevention:   environmental surveillance performed   hand hygiene promoted   rest/sleep promoted   single patient room provided  Taken 8/9/2024 2219  by Willow Cooper, RN  Infection Prevention:   environmental surveillance performed   hand hygiene promoted   rest/sleep promoted   single patient room provided  Taken 8/9/2024 2119 by Willow Cooper, RN  Infection Prevention:   environmental surveillance performed   hand hygiene promoted   rest/sleep promoted   single patient room provided  Goal: Optimal Comfort and Wellbeing  Intervention: Provide Person-Centered Care  Recent Flowsheet Documentation  Taken 8/9/2024 2119 by Willow Cooper, RN  Trust Relationship/Rapport:   care explained   choices provided   questions answered   questions encouraged   thoughts/feelings acknowledged     Problem: Fall Injury Risk  Goal: Absence of Fall and Fall-Related Injury  Intervention: Promote Injury-Free Environment  Recent Flowsheet Documentation  Taken 8/10/2024 0610 by Willow Cooper, RN  Safety Promotion/Fall Prevention:   activity supervised   assistive device/personal items within reach   clutter free environment maintained   fall prevention program maintained   lighting adjusted   nonskid shoes/slippers when out of bed   room organization consistent   safety round/check completed  Taken 8/10/2024 0419 by Willow Cooper, RN  Safety Promotion/Fall Prevention:   activity supervised   assistive device/personal items within reach   clutter free environment maintained   fall prevention program maintained   lighting adjusted   nonskid shoes/slippers when out of bed   room organization consistent   safety round/check completed  Taken 8/10/2024 0219 by Willow Cooper, RN  Safety Promotion/Fall Prevention:   activity supervised   assistive device/personal items within reach   clutter free environment maintained   fall prevention program maintained   lighting adjusted   nonskid shoes/slippers when out of bed   room organization consistent   safety round/check completed  Taken 8/10/2024 0019 by Willow Cooper, RN  Safety Promotion/Fall Prevention:   activity  supervised   assistive device/personal items within reach   clutter free environment maintained   fall prevention program maintained   lighting adjusted   nonskid shoes/slippers when out of bed   room organization consistent   safety round/check completed  Taken 8/9/2024 2219 by Willow Cooper RN  Safety Promotion/Fall Prevention:   activity supervised   assistive device/personal items within reach   clutter free environment maintained   fall prevention program maintained   lighting adjusted   nonskid shoes/slippers when out of bed   room organization consistent   safety round/check completed  Taken 8/9/2024 2119 by Willow Cooper RN  Safety Promotion/Fall Prevention:   activity supervised   assistive device/personal items within reach   clutter free environment maintained   fall prevention program maintained   lighting adjusted   nonskid shoes/slippers when out of bed   room organization consistent   safety round/check completed     Problem: Skin Injury Risk Increased  Goal: Skin Health and Integrity  Intervention: Optimize Skin Protection  Recent Flowsheet Documentation  Taken 8/10/2024 0610 by Willow Cooper RN  Head of Bed (HOB) Positioning: HOB elevated  Taken 8/10/2024 0419 by Willow Cooper RN  Head of Bed (HOB) Positioning: HOB elevated  Taken 8/10/2024 0219 by Willow Cooper RN  Head of Bed (HOB) Positioning: HOB elevated  Taken 8/10/2024 0019 by Willow Cooper RN  Head of Bed (HOB) Positioning: HOB elevated  Taken 8/9/2024 2219 by Willow Cooper RN  Head of Bed (HOB) Positioning: HOB elevated  Taken 8/9/2024 2119 by Willow Cooper RN  Head of Bed (HOB) Positioning: HOB elevated   Goal Outcome Evaluation:

## 2024-08-10 NOTE — PLAN OF CARE
Goal Outcome Evaluation:  Plan of Care Reviewed With: patient, spouse           Outcome Evaluation: Physical therapy evaluation complete. The patient independent with transfers and required SBA to ambulate. Patient presents near baseline for mobility, no acute PT needs identified.      Anticipated Discharge Disposition (PT): home with assist

## 2024-08-11 LAB
BACTERIA SPEC AEROBE CULT: ABNORMAL
GRAM STN SPEC: ABNORMAL
GRAM STN SPEC: ABNORMAL

## 2024-08-11 NOTE — OUTREACH NOTE
Prep Survey      Flowsheet Row Responses   [facility] facility patient discharged from? [facility]   Is LACE score < 7 ? No   Eligibility Readm Mgmt   Discharge diagnosis Acute pulmonary embolism   Does the patient have one of the following disease processes/diagnoses(primary or secondary)? Other   Does the patient have Home health ordered? No   Is there a DME ordered? No   Prep survey completed? Yes            CHRIS GUZMAN - Registered Nurse          
No
24-Dec-2023 19:41

## 2024-08-12 NOTE — PAYOR COMM NOTE
"[facility]# [id number]   Discharge Summary    Utilization Review  Phone [phone]  Fax [phone]    [address] [address]           Amadeo Kong (63 y.o. Male)       Date of Birth   1960    Social Security Number       Address   [address] [place] [address]    Home Phone   [phone]    MRN   [id number]       Jain   Caodaism    Marital Status   Single                            Admission Date   8/8/24    Admission Type   Emergency    Admitting Provider   Venecia Bryson MD    Attending Provider       Department, Room/Bed   [address], S520/1       Discharge Date   8/10/2024    Discharge Disposition   Home or Self Care    Discharge Destination                                 Attending Provider: (none)   Allergies: Carrot [Daucus Carota], Shellfish Allergy, Strawberry, Banana, Sulfamethoxazole-trimethoprim, Vancomycin    Isolation: None   Infection: None   Code Status: Prior    Ht: 188 cm (74\")   Wt: 118 kg (260 lb)    Admission Cmt: None   Principal Problem: Acute pulmonary embolism [I26.99]                   Active Insurance as of 8/8/2024       Primary Coverage       Payor Plan Insurance Group Employer/Plan Group    AETNA MEDICARE REPLACEMENT [place] [id number]       Payor Plan Address Payor Plan Phone Number Payor Plan Fax Number Effective Dates    [address] [phone]  7/1/2024 - None Entered    [facility] [id number]         Subscriber Name Subscriber Birth Date Member ID       AMADEO KONG 1960 [id number]                     Emergency Contacts        (Rel.) Home Phone Work Phone Mobile Phone    Angelica Schofield (Significant Other) [phone] -- [phone]              Discharge Summary    No notes of this type exist for this encounter.       Discharge Order (From admission, onward)       Start     Ordered    08/10/24 1209  Discharge patient  Once        Expected Discharge " Date: 08/10/24   Discharge Disposition: Home or Self Care   Physician of Record for Attribution - Please select from Treatment Team: MEIR LICONA [[id number]]   Review needed by CMO to determine Physician of Record: No      Question Answer Comment   Physician of Record for Attribution - Please select from Treatment Team MEIR LICONA    Review needed by CMO to determine Physician of Record No        08/10/24 7220

## 2024-08-14 ENCOUNTER — READMISSION MANAGEMENT (OUTPATIENT)
Dept: CALL CENTER | Facility: HOSPITAL | Age: 64
End: 2024-08-14
Payer: MEDICARE

## 2024-08-14 NOTE — OUTREACH NOTE
Medical Week 1 Survey      Flowsheet Row Responses   [facility] patient discharged from? [place]   Does the patient have one of the following disease processes/diagnoses(primary or secondary)? Other   Week 1 attempt successful? Yes   Call start time 1138   Call end time 1142   Discharge diagnosis Acute pulmonary embolism   Meds reviewed with patient/caregiver? Yes   Is the patient having any side effects they believe may be caused by any medication additions or changes? No   Does the patient have all medications ordered at discharge? Yes   Is the patient taking all medications as directed (includes completed medication regime)? Yes   Does the patient have a primary care provider?  Yes   Does the patient have an appointment with their PCP within 7 days of discharge? Yes   Has the patient kept scheduled appointments due by today? N/A   Comments Pt is followed by outpt woundcare at , he reports   Comments Pt denies further chest pain or SOA at this time. Pt reports that his appetite has increased since DC and that his urine output has returned to normal. Pt denies any issues at this time.   Did the patient receive a copy of their discharge instructions? Yes   Nursing interventions Reviewed instructions with patient   What is the patient's perception of their health status since discharge? Improving   Is the patient/caregiver able to teach back signs and symptoms related to disease process for when to call PCP? Yes   Is the patient/caregiver able to teach back signs and symptoms related to disease process for when to call 911? Yes   Is the patient/caregiver able to teach back the hierarchy of who to call/visit for symptoms/problems? PCP, Specialist, Home health nurse, Urgent Care, ED, 911 Yes   Week 1 call completed? Yes   Revoked No further contact(revokes)-requires comment   Call end time 1142            Karla NICHOLAS - Registered Nurse

## 2024-08-18 NOTE — DISCHARGE SUMMARY
[facility]  DISCHARGE SUMMARY    Patient Name: Amadeo Kong  : 1960  MRN: [id number]    Date of Admission: 2024  1:12 PM  Date of Discharge:  8/10/2024  Primary Care Physician: Bianka Ann MD    Consults       No orders found from 7/10/2024 to 2024.            Hospital Course     Presenting Problem: acute PE    Active Hospital Problems    Diagnosis  POA    **Acute pulmonary embolism [I26.99]  Yes      Resolved Hospital Problems   No resolved problems to display.          Hospital Course:  Amadeo Kong is a 63 y.o. male w h/o DMII, HTN, LIZY who presented w chest discomfort and shortness of breath.  Found to have bilateral pulmonary embolism w concern for RV strain on CT chest. No RV strain appreciated on ECHO. Patient able to walk up/down méndez without symptoms on room air day of discharge and put on eliquis for home use.    Discharge Follow Up Recommendations for outpatient labs/diagnostics:   F/u PCP 1 week    Day of Discharge     HPI:   Doing very well  Walks halls with me and chats about his life      Vital Signs:      Vitals:    08/10/24 1100   BP: 126/88   Pulse:    Resp: 18   Temp: 97.6 °F (36.4 °C)   SpO2:          Physical Exam:  Constitutional: No acute distress, awake, alert very talkative male, walks halls with me after me asking about his symptoms  HENT: NCAT, mucous membranes moist  Respiratory: Clear to auscultation bilaterally, respiratory effort normal   Cardiovascular: RRR, no murmurs, rubs, or gallops  Gastrointestinal: Soft, nontender, nondistended  Musculoskeletal: Muscle tone within normal limits, no joint effusions appreciated  Psychiatric: Appropriate affect, cooperative  Neurologic: Alert and oriented, facial movements symmetric and spontaneous movement of all 4 extremities grossly equal bilaterally, speech clear  Skin: No rashes    Pertinent  and/or Most Recent Results     LAB RESULTS:                              Brief Urine Lab  Results  (Last result in the past 365 days)        Color   Clarity   Blood   Leuk Est   Nitrite   Protein   CREAT   Urine HCG        05/22/24 1956 Yellow   Clear   Negative   Negative   Negative   Negative                 Microbiology Results (last 10 days)       Procedure Component Value - Date/Time    Wound Culture - Wound, Leg, Left [[id number]]  (Abnormal)  (Susceptibility) Collected: 08/09/24 0008    Lab Status: Final result Specimen: Wound from Leg, Left Updated: 08/11/24 0913     Wound Culture Heavy growth (4+) Pseudomonas aeruginosa     Gram Stain Many (4+) WBCs seen      Many (4+) Gram negative bacilli    Susceptibility        Pseudomonas aeruginosa      MAY      Cefepime Susceptible      Ceftazidime Susceptible      Ciprofloxacin Susceptible      Levofloxacin Susceptible      Piperacillin + Tazobactam Susceptible      Tobramycin Susceptible                       Susceptibility Comments       Pseudomonas aeruginosa    With the exception of urinary-sourced infections, aminoglycosides should not be used as monotherapy.                       Adult Transthoracic Echo Complete W/ Cont if Necessary Per Protocol    Result Date: 8/9/2024    Left ventricular systolic function is normal. Left ventricular ejection fraction appears to be 61 - 65%.   Left ventricular wall thickness is consistent with mild concentric hypertrophy.   Saline test results are negative.   There is calcification of the aortic valve. No significant change compared to previous study 5/2024     CT Angiogram Chest    Result Date: 8/8/2024  CT ANGIOGRAM CHEST Date of Exam: 8/8/2024 7:12 PM EDT Indication: soa and pos d dimer. Comparison: Chest radiograph from earlier today Technique: CTA of the chest was performed after the uneventful intravenous administration of 85 mL Isovue-370. Reconstructed coronal and sagittal images were also obtained. In addition, a 3-D volume rendered image was created for interpretation. Automated exposure control and  iterative reconstruction methods were used. Findings: The central tracheobronchial tree is clear. There are multiple interstitial markings along the periphery of both lungs, suggesting mild interstitial lung disease. There is no focal consolidation. There is no pleural effusion. The heart size appears normal. The great vessels are normal in caliber. There is acute pulmonary embolus within the distal left main pulmonary artery extending to a couple lingular and left lower lobe branches as well as embolus within a right lower lobe  branch. There is suggestion of early developing right heart strain. No abnormally enlarged lymph nodes are identified. Partial evaluation of the upper abdomen demonstrates cholelithiasis. No aggressive osseous lesions are identified.     Impression: 1.Multiple bilateral pulmonary emboli as described above, with suggestion of early developing right strain. 2.Suggestion of mild interstitial lung disease. 3.Cholelithiasis. 4.Results were discussed with Dr. Lechuga at time of dictation. Electronically Signed: Gutierrez Saab MD  8/8/2024 7:35 PM EDT  Workstation ID: [id number]    CT Abdomen Pelvis Without Contrast    Result Date: 8/8/2024  CT ABDOMEN PELVIS WO CONTRAST Date of Exam: 8/8/2024 2:21 PM EDT Indication: diffuse abd tenderness. Comparison: 12/13/2022. Technique: Axial CT images were obtained of the abdomen and pelvis without the administration of contrast. Reconstructed coronal and sagittal images were also obtained. Automated exposure control and iterative construction methods were used. Findings: There is respiratory motion on the film. There is artifact from spinal hardware. There has been posterior fusion from L4-S1. The bilateral L5 pedicle screws appear broken although the appearance is unchanged as compared to the prior study. There is stable grade 1 spondylolisthesis of L4 on L5. There is also artifact from right hip hardware. The prostate gland is normal in size. The bladder  is poorly distended. There is no large or small bowel dilatation. There is a normal appendix. There are no focal inflammatory changes. There are no renal stones or hydronephrosis. There is cholelithiasis without acute cholecystitis. There is no bile duct dilatation. The nonenhanced solid organs are normal in size.     Impression: Exam somewhat limited by artifact and respiratory motion. No acute process identified. Cholelithiasis without acute cholecystitis. Chronic findings in the spine as noted. Electronically Signed: Virginia Arauz MD  8/8/2024 2:41 PM EDT  Workstation ID: [id number]    XR Chest 1 View    Result Date: 8/8/2024  XR CHEST 1 VW Date of Exam: 8/8/2024 1:27 PM EDT Indication: SOA triage protocol Comparison: 6/16/2024 Findings: Heart size and pulmonary vasculature are within normal limits. Haziness of the lateral lower lungs due to overlying soft tissue attenuation. Lungs grossly clear. Costophrenic angle sharp     Impression: No active cardiopulmonary disease Electronically Signed: Russell Martinez  8/8/2024 1:58 PM EDT  Workstation ID: [id number]     Results for orders placed during the hospital encounter of 05/22/24    Duplex Carotid Ultrasound CAR    Interpretation Summary    Right internal carotid artery demonstrates normal flow without evidence of hemodynamically significant stenosis.    Left internal carotid artery demonstrates normal flow without evidence of hemodynamically significant stenosis.    Bilateral intimal thickening and minimal scattered plaque is noted.      Results for orders placed during the hospital encounter of 05/22/24    Duplex Carotid Ultrasound CAR    Interpretation Summary    Right internal carotid artery demonstrates normal flow without evidence of hemodynamically significant stenosis.    Left internal carotid artery demonstrates normal flow without evidence of hemodynamically significant stenosis.    Bilateral intimal thickening and minimal scattered plaque is  noted.      Results for orders placed during the hospital encounter of 08/08/24    Adult Transthoracic Echo Complete W/ Cont if Necessary Per Protocol    Interpretation Summary    Left ventricular systolic function is normal. Left ventricular ejection fraction appears to be 61 - 65%.    Left ventricular wall thickness is consistent with mild concentric hypertrophy.    Saline test results are negative.    There is calcification of the aortic valve.    No significant change compared to previous study 5/2024      Plan for Follow-up of Pending Labs/Results:     Discharge Details        Discharge Medications        New Medications        Instructions Start Date   Eliquis 5 MG tablet tablet  Generic drug: apixaban   Take 2 (TWO) tablets by mouth every 12 hours for 7 days; THEN take 1 (ONE) tablet every 12 hours thereafter   Start Date: August 10, 2024            Changes to Medications        Instructions Start Date   gabapentin 300 MG capsule  Commonly known as: Neurontin  What changed: when to take this   300 mg, Oral, Daily             Continue These Medications        Instructions Start Date   acetaminophen 325 MG tablet  Commonly known as: TYLENOL   650 mg, Oral, Every 6 Hours PRN      albuterol sulfate  (90 Base) MCG/ACT inhaler  Commonly known as: PROVENTIL HFA;VENTOLIN HFA;PROAIR HFA   2 puffs, Inhalation, 4 Times Daily PRN      alfuzosin 10 MG 24 hr tablet  Commonly known as: UROXATRAL   10 mg, Oral, Daily      atorvastatin 80 MG tablet  Commonly known as: LIPITOR   80 mg, Oral, Nightly      brimonidine 0.2 % ophthalmic solution  Commonly known as: ALPHAGAN   1 drop, Ophthalmic      budesonide-formoterol 160-4.5 MCG/ACT inhaler  Commonly known as: SYMBICORT   2 puffs, Inhalation, 2 Times Daily - RT      cetirizine 10 MG tablet  Commonly known as: Allergy (Cetirizine)   10 mg, Oral, Daily      dicyclomine 20 MG tablet  Commonly known as: BENTYL   20 mg, Oral, Every 8 Hours PRN      guaiFENesin-codeine 100-10  MG/5ML liquid  Commonly known as: GUAIFENESIN AC   5 mL, Oral, 3 Times Daily PRN      HumuLIN 70/30 (70-30) 100 UNIT/ML injection  Generic drug: insulin NPH-insulin regular   45 Units, Subcutaneous, 2 Times Daily With Meals      hydrocortisone 0.5 % ointment   1 application , Topical, 2 Times Daily      lisinopril 10 MG tablet  Commonly known as: PRINIVIL,ZESTRIL   10 mg, Oral, Every 24 Hours Scheduled      ondansetron 4 MG tablet  Commonly known as: ZOFRAN   4 mg, Oral, Every 8 Hours PRN      OneTouch Ultra test strip  Generic drug: glucose blood   TEST BLOOD SUGAR THREE TIMES DAILY AS DIRECTED      pantoprazole 40 MG EC tablet  Commonly known as: PROTONIX   40 mg, Oral, Every Early Morning             Stop These Medications      aspirin 81 MG chewable tablet              Allergies   Allergen Reactions    Carrot [Daucus Carota] Swelling    Ensure Swelling     Carrots only    Shellfish Allergy Swelling and Unknown (See Comments)     tongue swelling    Strawberry Swelling     tongue swelling    Banana Unknown (See Comments)    Dust Mite Extract Other (See Comments)    Sulfamethoxazole-Trimethoprim Other (See Comments)     Patient developed blisters on his hands.  Patient developed blisters on his hands.    Vancomycin Rash     Received vancomycin x1 dose 7/18/22         Discharge Disposition:  Home or Self Care    Diet:  Hospital:No active diet order           Activity:      Restrictions or Other Recommendations:         CODE STATUS:    Code Status and Medical Interventions: CPR (Attempt to Resuscitate); Full Support   Ordered at: 08/08/24 212     Level Of Support Discussed With:    Patient     Code Status (Patient has no pulse and is not breathing):    CPR (Attempt to Resuscitate)     Medical Interventions (Patient has pulse or is breathing):    Full Support       No future appointments.    Additional Instructions for the Follow-ups that You Need to Schedule       Discharge Follow-up with PCP   As directed        Currently Documented PCP:    Bianka Ann MD    PCP Phone Number:    [phone]     Follow Up Details: 1 week                      Venecia Bryson MD  08/18/24      Time Spent on Discharge:  I spent  25  minutes on this discharge activity which included: face-to-face encounter with the patient, reviewing the data in the system, coordination of the care with the nursing staff as well as consultants, documentation, and entering orders.

## 2024-09-16 ENCOUNTER — APPOINTMENT (OUTPATIENT)
Dept: GENERAL RADIOLOGY | Facility: HOSPITAL | Age: 64
End: 2024-09-16
Payer: MEDICARE

## 2024-09-16 ENCOUNTER — APPOINTMENT (OUTPATIENT)
Dept: CT IMAGING | Facility: HOSPITAL | Age: 64
End: 2024-09-16
Payer: MEDICARE

## 2024-09-16 ENCOUNTER — HOSPITAL ENCOUNTER (EMERGENCY)
Facility: HOSPITAL | Age: 64
Discharge: HOME | End: 2024-09-16
Attending: STUDENT IN AN ORGANIZED HEALTH CARE EDUCATION/TRAINING PROGRAM | Admitting: STUDENT IN AN ORGANIZED HEALTH CARE EDUCATION/TRAINING PROGRAM
Payer: MEDICARE

## 2024-09-16 VITALS
BODY MASS INDEX: 32.08 KG/M2 | DIASTOLIC BLOOD PRESSURE: 87 MMHG | WEIGHT: 250 LBS | HEART RATE: 83 BPM | TEMPERATURE: 98.1 F | SYSTOLIC BLOOD PRESSURE: 117 MMHG | HEIGHT: 74 IN | OXYGEN SATURATION: 97 % | RESPIRATION RATE: 18 BRPM

## 2024-09-16 DIAGNOSIS — E83.42 HYPOMAGNESEMIA: ICD-10-CM

## 2024-09-16 DIAGNOSIS — N39.0 ACUTE UTI (URINARY TRACT INFECTION): ICD-10-CM

## 2024-09-16 DIAGNOSIS — R79.89 LOW TSH LEVEL: ICD-10-CM

## 2024-09-16 DIAGNOSIS — E87.1 HYPONATREMIA: ICD-10-CM

## 2024-09-16 DIAGNOSIS — R53.1 GENERALIZED WEAKNESS: Primary | ICD-10-CM

## 2024-09-16 LAB
ALBUMIN SERPL-MCNC: 3.8 G/DL (ref 3.5–5.2)
ALBUMIN/GLOB SERPL: 1.1 G/DL
ALP SERPL-CCNC: 113 U/L (ref 39–117)
ALT SERPL W P-5'-P-CCNC: 8 U/L (ref 1–41)
AMPHET+METHAMPHET UR QL: NEGATIVE
AMPHETAMINES UR QL: NEGATIVE
ANION GAP SERPL CALCULATED.3IONS-SCNC: 12 MMOL/L (ref 5–15)
APAP SERPL-MCNC: 5.3 MCG/ML (ref 0–30)
AST SERPL-CCNC: 18 U/L (ref 1–40)
BACTERIA UR QL AUTO: ABNORMAL /HPF
BARBITURATES UR QL SCN: NEGATIVE
BASOPHILS # BLD AUTO: 0.02 10*3/MM3 (ref 0–0.2)
BASOPHILS NFR BLD AUTO: 0.3 % (ref 0–1.5)
BENZODIAZ UR QL SCN: NEGATIVE
BILIRUB SERPL-MCNC: 0.5 MG/DL (ref 0–1.2)
BILIRUB UR QL STRIP: NEGATIVE
BUN SERPL-MCNC: 8 MG/DL (ref 8–23)
BUN/CREAT SERPL: 6.8 (ref 7–25)
BUPRENORPHINE SERPL-MCNC: NEGATIVE NG/ML
CALCIUM SPEC-SCNC: 9.5 MG/DL (ref 8.6–10.5)
CANNABINOIDS SERPL QL: NEGATIVE
CHLORIDE SERPL-SCNC: 94 MMOL/L (ref 98–107)
CLARITY UR: CLEAR
CO2 SERPL-SCNC: 24 MMOL/L (ref 22–29)
COCAINE UR QL: NEGATIVE
COLOR UR: YELLOW
CREAT SERPL-MCNC: 1.18 MG/DL (ref 0.76–1.27)
CRP SERPL-MCNC: 0.89 MG/DL (ref 0–0.5)
D-LACTATE SERPL-SCNC: 1.4 MMOL/L (ref 0.5–2)
DEPRECATED RDW RBC AUTO: 41.1 FL (ref 37–54)
EGFRCR SERPLBLD CKD-EPI 2021: 68.9 ML/MIN/1.73
EOSINOPHIL # BLD AUTO: 0.13 10*3/MM3 (ref 0–0.4)
EOSINOPHIL NFR BLD AUTO: 2.1 % (ref 0.3–6.2)
ERYTHROCYTE [DISTWIDTH] IN BLOOD BY AUTOMATED COUNT: 12.6 % (ref 12.3–15.4)
ETHANOL BLD-MCNC: <10 MG/DL (ref 0–10)
FENTANYL UR-MCNC: NEGATIVE NG/ML
FLUAV RNA RESP QL NAA+PROBE: NOT DETECTED
FLUBV RNA RESP QL NAA+PROBE: NOT DETECTED
GLOBULIN UR ELPH-MCNC: 3.6 GM/DL
GLUCOSE SERPL-MCNC: 132 MG/DL (ref 65–99)
GLUCOSE UR STRIP-MCNC: NEGATIVE MG/DL
HCT VFR BLD AUTO: 42.9 % (ref 37.5–51)
HGB BLD-MCNC: 14.7 G/DL (ref 13–17.7)
HGB UR QL STRIP.AUTO: NEGATIVE
HOLD SPECIMEN: NORMAL
HYALINE CASTS UR QL AUTO: ABNORMAL /LPF
IMM GRANULOCYTES # BLD AUTO: 0.02 10*3/MM3 (ref 0–0.05)
IMM GRANULOCYTES NFR BLD AUTO: 0.3 % (ref 0–0.5)
KETONES UR QL STRIP: NEGATIVE
LEUKOCYTE ESTERASE UR QL STRIP.AUTO: ABNORMAL
LIPASE SERPL-CCNC: 19 U/L (ref 13–60)
LYMPHOCYTES # BLD AUTO: 1.68 10*3/MM3 (ref 0.7–3.1)
LYMPHOCYTES NFR BLD AUTO: 27.3 % (ref 19.6–45.3)
MAGNESIUM SERPL-MCNC: 1.5 MG/DL (ref 1.6–2.4)
MCH RBC QN AUTO: 30.4 PG (ref 26.6–33)
MCHC RBC AUTO-ENTMCNC: 34.3 G/DL (ref 31.5–35.7)
MCV RBC AUTO: 88.6 FL (ref 79–97)
METHADONE UR QL SCN: NEGATIVE
MONOCYTES # BLD AUTO: 0.74 10*3/MM3 (ref 0.1–0.9)
MONOCYTES NFR BLD AUTO: 12 % (ref 5–12)
NEUTROPHILS NFR BLD AUTO: 3.57 10*3/MM3 (ref 1.7–7)
NEUTROPHILS NFR BLD AUTO: 58 % (ref 42.7–76)
NITRITE UR QL STRIP: NEGATIVE
NRBC BLD AUTO-RTO: 0 /100 WBC (ref 0–0.2)
NT-PROBNP SERPL-MCNC: <36 PG/ML (ref 0–900)
OPIATES UR QL: NEGATIVE
OXYCODONE UR QL SCN: NEGATIVE
PCP UR QL SCN: NEGATIVE
PH UR STRIP.AUTO: 5.5 [PH] (ref 5–8)
PHOSPHATE SERPL-MCNC: 3.7 MG/DL (ref 2.5–4.5)
PLATELET # BLD AUTO: 246 10*3/MM3 (ref 140–450)
PMV BLD AUTO: 8.4 FL (ref 6–12)
POTASSIUM SERPL-SCNC: 4.2 MMOL/L (ref 3.5–5.2)
PROT SERPL-MCNC: 7.4 G/DL (ref 6–8.5)
PROT UR QL STRIP: NEGATIVE
RBC # BLD AUTO: 4.84 10*6/MM3 (ref 4.14–5.8)
RBC # UR STRIP: ABNORMAL /HPF
REF LAB TEST METHOD: ABNORMAL
RSV RNA RESP QL NAA+PROBE: NOT DETECTED
SALICYLATES SERPL-MCNC: <0.3 MG/DL
SARS-COV-2 RNA RESP QL NAA+PROBE: NOT DETECTED
SODIUM SERPL-SCNC: 130 MMOL/L (ref 136–145)
SP GR UR STRIP: 1.01 (ref 1–1.03)
SQUAMOUS #/AREA URNS HPF: ABNORMAL /HPF
T4 FREE SERPL-MCNC: 1.5 NG/DL (ref 0.92–1.68)
TRICYCLICS UR QL SCN: NEGATIVE
TROPONIN T SERPL HS-MCNC: 27 NG/L
TROPONIN T SERPL HS-MCNC: 30 NG/L
TSH SERPL DL<=0.05 MIU/L-ACNC: 0.05 UIU/ML (ref 0.27–4.2)
UROBILINOGEN UR QL STRIP: ABNORMAL
WBC # UR STRIP: ABNORMAL /HPF
WBC NRBC COR # BLD AUTO: 6.16 10*3/MM3 (ref 3.4–10.8)
WHOLE BLOOD HOLD COAG: NORMAL
WHOLE BLOOD HOLD SPECIMEN: NORMAL

## 2024-09-16 PROCEDURE — 71045 X-RAY EXAM CHEST 1 VIEW: CPT

## 2024-09-16 PROCEDURE — 80143 DRUG ASSAY ACETAMINOPHEN: CPT | Performed by: STUDENT IN AN ORGANIZED HEALTH CARE EDUCATION/TRAINING PROGRAM

## 2024-09-16 PROCEDURE — 80307 DRUG TEST PRSMV CHEM ANLYZR: CPT | Performed by: STUDENT IN AN ORGANIZED HEALTH CARE EDUCATION/TRAINING PROGRAM

## 2024-09-16 PROCEDURE — 93005 ELECTROCARDIOGRAM TRACING: CPT | Performed by: STUDENT IN AN ORGANIZED HEALTH CARE EDUCATION/TRAINING PROGRAM

## 2024-09-16 PROCEDURE — 25810000003 LACTATED RINGERS SOLUTION: Performed by: STUDENT IN AN ORGANIZED HEALTH CARE EDUCATION/TRAINING PROGRAM

## 2024-09-16 PROCEDURE — 84100 ASSAY OF PHOSPHORUS: CPT | Performed by: STUDENT IN AN ORGANIZED HEALTH CARE EDUCATION/TRAINING PROGRAM

## 2024-09-16 PROCEDURE — 80053 COMPREHEN METABOLIC PANEL: CPT | Performed by: STUDENT IN AN ORGANIZED HEALTH CARE EDUCATION/TRAINING PROGRAM

## 2024-09-16 PROCEDURE — 83690 ASSAY OF LIPASE: CPT | Performed by: STUDENT IN AN ORGANIZED HEALTH CARE EDUCATION/TRAINING PROGRAM

## 2024-09-16 PROCEDURE — 82077 ASSAY SPEC XCP UR&BREATH IA: CPT | Performed by: STUDENT IN AN ORGANIZED HEALTH CARE EDUCATION/TRAINING PROGRAM

## 2024-09-16 PROCEDURE — 84439 ASSAY OF FREE THYROXINE: CPT | Performed by: STUDENT IN AN ORGANIZED HEALTH CARE EDUCATION/TRAINING PROGRAM

## 2024-09-16 PROCEDURE — 84484 ASSAY OF TROPONIN QUANT: CPT | Performed by: STUDENT IN AN ORGANIZED HEALTH CARE EDUCATION/TRAINING PROGRAM

## 2024-09-16 PROCEDURE — 96360 HYDRATION IV INFUSION INIT: CPT

## 2024-09-16 PROCEDURE — 86140 C-REACTIVE PROTEIN: CPT | Performed by: STUDENT IN AN ORGANIZED HEALTH CARE EDUCATION/TRAINING PROGRAM

## 2024-09-16 PROCEDURE — 83605 ASSAY OF LACTIC ACID: CPT | Performed by: STUDENT IN AN ORGANIZED HEALTH CARE EDUCATION/TRAINING PROGRAM

## 2024-09-16 PROCEDURE — 36415 COLL VENOUS BLD VENIPUNCTURE: CPT

## 2024-09-16 PROCEDURE — 83735 ASSAY OF MAGNESIUM: CPT | Performed by: STUDENT IN AN ORGANIZED HEALTH CARE EDUCATION/TRAINING PROGRAM

## 2024-09-16 PROCEDURE — 87637 SARSCOV2&INF A&B&RSV AMP PRB: CPT | Performed by: STUDENT IN AN ORGANIZED HEALTH CARE EDUCATION/TRAINING PROGRAM

## 2024-09-16 PROCEDURE — 25510000001 IOPAMIDOL PER 1 ML: Performed by: STUDENT IN AN ORGANIZED HEALTH CARE EDUCATION/TRAINING PROGRAM

## 2024-09-16 PROCEDURE — 84443 ASSAY THYROID STIM HORMONE: CPT | Performed by: STUDENT IN AN ORGANIZED HEALTH CARE EDUCATION/TRAINING PROGRAM

## 2024-09-16 PROCEDURE — 71275 CT ANGIOGRAPHY CHEST: CPT

## 2024-09-16 PROCEDURE — 83880 ASSAY OF NATRIURETIC PEPTIDE: CPT | Performed by: STUDENT IN AN ORGANIZED HEALTH CARE EDUCATION/TRAINING PROGRAM

## 2024-09-16 PROCEDURE — 81001 URINALYSIS AUTO W/SCOPE: CPT | Performed by: STUDENT IN AN ORGANIZED HEALTH CARE EDUCATION/TRAINING PROGRAM

## 2024-09-16 PROCEDURE — 99285 EMERGENCY DEPT VISIT HI MDM: CPT

## 2024-09-16 PROCEDURE — 80179 DRUG ASSAY SALICYLATE: CPT | Performed by: STUDENT IN AN ORGANIZED HEALTH CARE EDUCATION/TRAINING PROGRAM

## 2024-09-16 PROCEDURE — 85025 COMPLETE CBC W/AUTO DIFF WBC: CPT | Performed by: STUDENT IN AN ORGANIZED HEALTH CARE EDUCATION/TRAINING PROGRAM

## 2024-09-16 RX ORDER — IOPAMIDOL 755 MG/ML
100 INJECTION, SOLUTION INTRAVASCULAR
Status: COMPLETED | OUTPATIENT
Start: 2024-09-16 | End: 2024-09-16

## 2024-09-16 RX ORDER — MULTIVITAMIN
1 CAPSULE ORAL DAILY
Qty: 30 CAPSULE | Refills: 0 | Status: SHIPPED | OUTPATIENT
Start: 2024-09-16 | End: 2025-09-16

## 2024-09-16 RX ORDER — NITROFURANTOIN 25; 75 MG/1; MG/1
100 CAPSULE ORAL 2 TIMES DAILY
Qty: 14 CAPSULE | Refills: 0 | Status: SHIPPED | OUTPATIENT
Start: 2024-09-16

## 2024-09-16 RX ORDER — SODIUM CHLORIDE 0.9 % (FLUSH) 0.9 %
10 SYRINGE (ML) INJECTION AS NEEDED
Status: DISCONTINUED | OUTPATIENT
Start: 2024-09-16 | End: 2024-09-16 | Stop reason: HOSPADM

## 2024-09-16 RX ADMIN — IOPAMIDOL 79 ML: 755 INJECTION, SOLUTION INTRAVENOUS at 10:56

## 2024-09-16 RX ADMIN — SODIUM CHLORIDE, POTASSIUM CHLORIDE, SODIUM LACTATE AND CALCIUM CHLORIDE 1000 ML: 600; 310; 30; 20 INJECTION, SOLUTION INTRAVENOUS at 08:38

## 2024-09-18 LAB
QT INTERVAL: 322 MS
QTC INTERVAL: 404 MS

## 2024-11-03 ENCOUNTER — APPOINTMENT (OUTPATIENT)
Dept: GENERAL RADIOLOGY | Facility: HOSPITAL | Age: 64
End: 2024-11-03
Payer: MEDICARE

## 2024-11-03 ENCOUNTER — APPOINTMENT (OUTPATIENT)
Dept: CT IMAGING | Facility: HOSPITAL | Age: 64
End: 2024-11-03
Payer: MEDICARE

## 2024-11-03 ENCOUNTER — HOSPITAL ENCOUNTER (INPATIENT)
Facility: HOSPITAL | Age: 64
LOS: 2 days | Discharge: HOME | End: 2024-11-06
Attending: EMERGENCY MEDICINE | Admitting: INTERNAL MEDICINE
Payer: MEDICARE

## 2024-11-03 DIAGNOSIS — N17.9 AKI (ACUTE KIDNEY INJURY): ICD-10-CM

## 2024-11-03 DIAGNOSIS — Z74.09 IMPAIRED FUNCTIONAL MOBILITY, BALANCE, GAIT, AND ENDURANCE: ICD-10-CM

## 2024-11-03 DIAGNOSIS — E11.65 HYPERGLYCEMIA DUE TO DIABETES MELLITUS: ICD-10-CM

## 2024-11-03 DIAGNOSIS — R07.9 CHEST PAIN, UNSPECIFIED TYPE: Primary | ICD-10-CM

## 2024-11-03 DIAGNOSIS — E27.40 ADRENAL INSUFFICIENCY: ICD-10-CM

## 2024-11-03 DIAGNOSIS — R11.2 NAUSEA AND VOMITING, UNSPECIFIED VOMITING TYPE: ICD-10-CM

## 2024-11-03 PROBLEM — G89.29 CHRONIC BACK PAIN: Status: ACTIVE | Noted: 2018-08-24

## 2024-11-03 PROBLEM — R11.10 VOMITING: Status: ACTIVE | Noted: 2024-11-03

## 2024-11-03 PROBLEM — I95.9 SYMPTOMATIC HYPOTENSION: Status: ACTIVE | Noted: 2024-11-03

## 2024-11-03 PROBLEM — E86.0 DEHYDRATION: Status: ACTIVE | Noted: 2024-11-03

## 2024-11-03 LAB
ALBUMIN SERPL-MCNC: 3.1 G/DL (ref 3.5–5.2)
ALBUMIN/GLOB SERPL: 0.9 G/DL
ALP SERPL-CCNC: 77 U/L (ref 39–117)
ALT SERPL W P-5'-P-CCNC: 8 U/L (ref 1–41)
ANION GAP SERPL CALCULATED.3IONS-SCNC: 13 MMOL/L (ref 5–15)
AST SERPL-CCNC: 16 U/L (ref 1–40)
BASOPHILS # BLD AUTO: 0.02 10*3/MM3 (ref 0–0.2)
BASOPHILS NFR BLD AUTO: 0.4 % (ref 0–1.5)
BILIRUB SERPL-MCNC: 0.9 MG/DL (ref 0–1.2)
BUN SERPL-MCNC: 10 MG/DL (ref 8–23)
BUN/CREAT SERPL: 4.4 (ref 7–25)
CALCIUM SPEC-SCNC: 8.3 MG/DL (ref 8.6–10.5)
CHLORIDE SERPL-SCNC: 98 MMOL/L (ref 98–107)
CO2 SERPL-SCNC: 26 MMOL/L (ref 22–29)
CREAT SERPL-MCNC: 2.27 MG/DL (ref 0.76–1.27)
D DIMER PPP FEU-MCNC: 0.5 MCGFEU/ML (ref 0–0.64)
D-LACTATE SERPL-SCNC: 2.8 MMOL/L (ref 0.5–2)
D-LACTATE SERPL-SCNC: 3.3 MMOL/L (ref 0.5–2)
DEPRECATED RDW RBC AUTO: 43.7 FL (ref 37–54)
EGFRCR SERPLBLD CKD-EPI 2021: 31.4 ML/MIN/1.73
EOSINOPHIL # BLD AUTO: 0.07 10*3/MM3 (ref 0–0.4)
EOSINOPHIL NFR BLD AUTO: 1.3 % (ref 0.3–6.2)
ERYTHROCYTE [DISTWIDTH] IN BLOOD BY AUTOMATED COUNT: 13.4 % (ref 12.3–15.4)
FERRITIN SERPL-MCNC: 702 NG/ML (ref 30–400)
GEN 5 2HR TROPONIN T REFLEX: 56 NG/L
GLOBULIN UR ELPH-MCNC: 3.3 GM/DL
GLUCOSE BLDC GLUCOMTR-MCNC: 129 MG/DL (ref 70–130)
GLUCOSE BLDC GLUCOMTR-MCNC: 231 MG/DL (ref 70–130)
GLUCOSE BLDC GLUCOMTR-MCNC: 91 MG/DL (ref 70–130)
GLUCOSE SERPL-MCNC: 157 MG/DL (ref 65–99)
HCT VFR BLD AUTO: 35.6 % (ref 37.5–51)
HGB BLD-MCNC: 12.1 G/DL (ref 13–17.7)
HOLD SPECIMEN: NORMAL
IMM GRANULOCYTES # BLD AUTO: 0.01 10*3/MM3 (ref 0–0.05)
IMM GRANULOCYTES NFR BLD AUTO: 0.2 % (ref 0–0.5)
IRON 24H UR-MRATE: 48 MCG/DL (ref 59–158)
IRON SATN MFR SERPL: 28 % (ref 20–50)
LIPASE SERPL-CCNC: 14 U/L (ref 13–60)
LYMPHOCYTES # BLD AUTO: 1.25 10*3/MM3 (ref 0.7–3.1)
LYMPHOCYTES NFR BLD AUTO: 23.9 % (ref 19.6–45.3)
MCH RBC QN AUTO: 30.3 PG (ref 26.6–33)
MCHC RBC AUTO-ENTMCNC: 34 G/DL (ref 31.5–35.7)
MCV RBC AUTO: 89.2 FL (ref 79–97)
MONOCYTES # BLD AUTO: 0.56 10*3/MM3 (ref 0.1–0.9)
MONOCYTES NFR BLD AUTO: 10.7 % (ref 5–12)
NEUTROPHILS NFR BLD AUTO: 3.31 10*3/MM3 (ref 1.7–7)
NEUTROPHILS NFR BLD AUTO: 63.5 % (ref 42.7–76)
NRBC BLD AUTO-RTO: 0 /100 WBC (ref 0–0.2)
NT-PROBNP SERPL-MCNC: 66.9 PG/ML (ref 0–900)
PHOSPHATE SERPL-MCNC: 3.7 MG/DL (ref 2.5–4.5)
PLATELET # BLD AUTO: 148 10*3/MM3 (ref 140–450)
PMV BLD AUTO: 9.1 FL (ref 6–12)
POTASSIUM SERPL-SCNC: 3.4 MMOL/L (ref 3.5–5.2)
PROT SERPL-MCNC: 6.4 G/DL (ref 6–8.5)
QT INTERVAL: 430 MS
QT INTERVAL: 448 MS
QTC INTERVAL: 450 MS
QTC INTERVAL: 462 MS
RBC # BLD AUTO: 3.99 10*6/MM3 (ref 4.14–5.8)
RETICS # AUTO: 0.05 10*6/MM3 (ref 0.02–0.13)
RETICS/RBC NFR AUTO: 1.34 % (ref 0.7–1.9)
SODIUM SERPL-SCNC: 137 MMOL/L (ref 136–145)
T4 FREE SERPL-MCNC: 2.08 NG/DL (ref 0.92–1.68)
TIBC SERPL-MCNC: 170 MCG/DL (ref 298–536)
TRANSFERRIN SERPL-MCNC: 114 MG/DL (ref 200–360)
TROPONIN T DELTA: -7 NG/L
TROPONIN T SERPL HS-MCNC: 63 NG/L
TSH SERPL DL<=0.05 MIU/L-ACNC: 0.01 UIU/ML (ref 0.27–4.2)
WBC NRBC COR # BLD AUTO: 5.22 10*3/MM3 (ref 3.4–10.8)
WHOLE BLOOD HOLD COAG: NORMAL
WHOLE BLOOD HOLD SPECIMEN: NORMAL

## 2024-11-03 PROCEDURE — 71250 CT THORAX DX C-: CPT

## 2024-11-03 PROCEDURE — 25810000003 SODIUM CHLORIDE 0.9 % SOLUTION: Performed by: PHYSICIAN ASSISTANT

## 2024-11-03 PROCEDURE — G0378 HOSPITAL OBSERVATION PER HR: HCPCS

## 2024-11-03 PROCEDURE — 84466 ASSAY OF TRANSFERRIN: CPT

## 2024-11-03 PROCEDURE — 82948 REAGENT STRIP/BLOOD GLUCOSE: CPT

## 2024-11-03 PROCEDURE — 82533 TOTAL CORTISOL: CPT | Performed by: INTERNAL MEDICINE

## 2024-11-03 PROCEDURE — 83605 ASSAY OF LACTIC ACID: CPT

## 2024-11-03 PROCEDURE — 85025 COMPLETE CBC W/AUTO DIFF WBC: CPT | Performed by: EMERGENCY MEDICINE

## 2024-11-03 PROCEDURE — 63710000001 INSULIN LISPRO (HUMAN) PER 5 UNITS

## 2024-11-03 PROCEDURE — 83540 ASSAY OF IRON: CPT

## 2024-11-03 PROCEDURE — 80053 COMPREHEN METABOLIC PANEL: CPT | Performed by: EMERGENCY MEDICINE

## 2024-11-03 PROCEDURE — 25010000002 CALCIUM GLUCONATE-NACL 1-0.675 GM/50ML-% SOLUTION: Performed by: INTERNAL MEDICINE

## 2024-11-03 PROCEDURE — 63710000001 INSULIN GLARGINE PER 5 UNITS

## 2024-11-03 PROCEDURE — 71045 X-RAY EXAM CHEST 1 VIEW: CPT

## 2024-11-03 PROCEDURE — 74176 CT ABD & PELVIS W/O CONTRAST: CPT

## 2024-11-03 PROCEDURE — 99223 1ST HOSP IP/OBS HIGH 75: CPT

## 2024-11-03 PROCEDURE — 83880 ASSAY OF NATRIURETIC PEPTIDE: CPT | Performed by: EMERGENCY MEDICINE

## 2024-11-03 PROCEDURE — 84484 ASSAY OF TROPONIN QUANT: CPT | Performed by: EMERGENCY MEDICINE

## 2024-11-03 PROCEDURE — 25010000002 HEPARIN (PORCINE) PER 1000 UNITS

## 2024-11-03 PROCEDURE — 36415 COLL VENOUS BLD VENIPUNCTURE: CPT

## 2024-11-03 PROCEDURE — 84443 ASSAY THYROID STIM HORMONE: CPT

## 2024-11-03 PROCEDURE — 99285 EMERGENCY DEPT VISIT HI MDM: CPT

## 2024-11-03 PROCEDURE — 82728 ASSAY OF FERRITIN: CPT

## 2024-11-03 PROCEDURE — 84100 ASSAY OF PHOSPHORUS: CPT

## 2024-11-03 PROCEDURE — 85379 FIBRIN DEGRADATION QUANT: CPT

## 2024-11-03 PROCEDURE — 85045 AUTOMATED RETICULOCYTE COUNT: CPT

## 2024-11-03 PROCEDURE — 93005 ELECTROCARDIOGRAM TRACING: CPT | Performed by: EMERGENCY MEDICINE

## 2024-11-03 PROCEDURE — 83690 ASSAY OF LIPASE: CPT | Performed by: EMERGENCY MEDICINE

## 2024-11-03 PROCEDURE — 25810000003 SODIUM CHLORIDE 0.9 % SOLUTION

## 2024-11-03 PROCEDURE — 84439 ASSAY OF FREE THYROXINE: CPT

## 2024-11-03 RX ORDER — PANTOPRAZOLE SODIUM 40 MG/1
40 TABLET, DELAYED RELEASE ORAL
Status: CANCELLED | OUTPATIENT
Start: 2024-11-04

## 2024-11-03 RX ORDER — BUDESONIDE AND FORMOTEROL FUMARATE DIHYDRATE 160; 4.5 UG/1; UG/1
2 AEROSOL RESPIRATORY (INHALATION)
Status: DISCONTINUED | OUTPATIENT
Start: 2024-11-03 | End: 2024-11-06 | Stop reason: HOSPADM

## 2024-11-03 RX ORDER — SODIUM CHLORIDE 9 MG/ML
125 INJECTION, SOLUTION INTRAVENOUS CONTINUOUS
Status: ACTIVE | OUTPATIENT
Start: 2024-11-04 | End: 2024-11-04

## 2024-11-03 RX ORDER — POTASSIUM CHLORIDE 1500 MG/1
40 TABLET, EXTENDED RELEASE ORAL EVERY 4 HOURS
Status: COMPLETED | OUTPATIENT
Start: 2024-11-03 | End: 2024-11-03

## 2024-11-03 RX ORDER — BUDESONIDE AND FORMOTEROL FUMARATE DIHYDRATE 160; 4.5 UG/1; UG/1
2 AEROSOL RESPIRATORY (INHALATION)
Status: CANCELLED | OUTPATIENT
Start: 2024-11-03

## 2024-11-03 RX ORDER — GABAPENTIN 300 MG/1
300 CAPSULE ORAL DAILY
Status: CANCELLED | OUTPATIENT
Start: 2024-11-03

## 2024-11-03 RX ORDER — GABAPENTIN 300 MG/1
300 CAPSULE ORAL 3 TIMES DAILY
COMMUNITY

## 2024-11-03 RX ORDER — INSULIN ASPART 100 [IU]/ML
INJECTION, SUSPENSION SUBCUTANEOUS
COMMUNITY
End: 2024-11-06 | Stop reason: HOSPADM

## 2024-11-03 RX ORDER — ASPIRIN 81 MG/1
324 TABLET, CHEWABLE ORAL ONCE
Status: DISCONTINUED | OUTPATIENT
Start: 2024-11-03 | End: 2024-11-03

## 2024-11-03 RX ORDER — SODIUM CHLORIDE 9 MG/ML
40 INJECTION, SOLUTION INTRAVENOUS AS NEEDED
Status: DISCONTINUED | OUTPATIENT
Start: 2024-11-03 | End: 2024-11-03

## 2024-11-03 RX ORDER — ACETAMINOPHEN 160 MG/5ML
650 SOLUTION ORAL EVERY 4 HOURS PRN
Status: DISCONTINUED | OUTPATIENT
Start: 2024-11-03 | End: 2024-11-06 | Stop reason: HOSPADM

## 2024-11-03 RX ORDER — ACETAMINOPHEN 650 MG/1
650 SUPPOSITORY RECTAL EVERY 4 HOURS PRN
Status: DISCONTINUED | OUTPATIENT
Start: 2024-11-03 | End: 2024-11-06 | Stop reason: HOSPADM

## 2024-11-03 RX ORDER — BRIMONIDINE TARTRATE 2 MG/ML
1 SOLUTION/ DROPS OPHTHALMIC 3 TIMES DAILY
Status: DISCONTINUED | OUTPATIENT
Start: 2024-11-03 | End: 2024-11-06 | Stop reason: HOSPADM

## 2024-11-03 RX ORDER — DEXTROSE MONOHYDRATE 25 G/50ML
25 INJECTION, SOLUTION INTRAVENOUS
Status: DISCONTINUED | OUTPATIENT
Start: 2024-11-03 | End: 2024-11-06 | Stop reason: HOSPADM

## 2024-11-03 RX ORDER — ACETAMINOPHEN 325 MG/1
650 TABLET ORAL EVERY 6 HOURS PRN
Status: DISCONTINUED | OUTPATIENT
Start: 2024-11-03 | End: 2024-11-06 | Stop reason: HOSPADM

## 2024-11-03 RX ORDER — ALBUTEROL SULFATE 90 UG/1
2 INHALANT RESPIRATORY (INHALATION) 4 TIMES DAILY PRN
Status: DISCONTINUED | OUTPATIENT
Start: 2024-11-03 | End: 2024-11-06 | Stop reason: HOSPADM

## 2024-11-03 RX ORDER — GABAPENTIN 300 MG/1
300 CAPSULE ORAL EVERY 12 HOURS SCHEDULED
Status: DISCONTINUED | OUTPATIENT
Start: 2024-11-03 | End: 2024-11-06 | Stop reason: HOSPADM

## 2024-11-03 RX ORDER — SODIUM CHLORIDE 0.9 % (FLUSH) 0.9 %
10 SYRINGE (ML) INJECTION EVERY 12 HOURS SCHEDULED
Status: DISCONTINUED | OUTPATIENT
Start: 2024-11-03 | End: 2024-11-06 | Stop reason: HOSPADM

## 2024-11-03 RX ORDER — CALCIUM GLUCONATE 20 MG/ML
1000 INJECTION, SOLUTION INTRAVENOUS ONCE
Status: COMPLETED | OUTPATIENT
Start: 2024-11-03 | End: 2024-11-03

## 2024-11-03 RX ORDER — INSULIN LISPRO 100 [IU]/ML
2-9 INJECTION, SOLUTION INTRAVENOUS; SUBCUTANEOUS
Status: DISCONTINUED | OUTPATIENT
Start: 2024-11-03 | End: 2024-11-06 | Stop reason: HOSPADM

## 2024-11-03 RX ORDER — ACETAMINOPHEN 325 MG/1
650 TABLET ORAL EVERY 4 HOURS PRN
Status: DISCONTINUED | OUTPATIENT
Start: 2024-11-03 | End: 2024-11-06 | Stop reason: HOSPADM

## 2024-11-03 RX ORDER — ASPIRIN 81 MG/1
81 TABLET ORAL DAILY
Status: CANCELLED | OUTPATIENT
Start: 2024-11-03

## 2024-11-03 RX ORDER — BRIMONIDINE TARTRATE 2 MG/ML
1 SOLUTION/ DROPS OPHTHALMIC 3 TIMES DAILY
Status: CANCELLED | OUTPATIENT
Start: 2024-11-03

## 2024-11-03 RX ORDER — ATORVASTATIN CALCIUM 40 MG/1
80 TABLET, FILM COATED ORAL NIGHTLY
Status: DISCONTINUED | OUTPATIENT
Start: 2024-11-03 | End: 2024-11-06 | Stop reason: HOSPADM

## 2024-11-03 RX ORDER — ATORVASTATIN CALCIUM 40 MG/1
80 TABLET, FILM COATED ORAL NIGHTLY
Status: CANCELLED | OUTPATIENT
Start: 2024-11-03

## 2024-11-03 RX ORDER — SODIUM CHLORIDE 0.9 % (FLUSH) 0.9 %
10 SYRINGE (ML) INJECTION AS NEEDED
Status: DISCONTINUED | OUTPATIENT
Start: 2024-11-03 | End: 2024-11-06 | Stop reason: HOSPADM

## 2024-11-03 RX ORDER — ASPIRIN 81 MG/1
81 TABLET ORAL DAILY
Status: DISCONTINUED | OUTPATIENT
Start: 2024-11-04 | End: 2024-11-06 | Stop reason: HOSPADM

## 2024-11-03 RX ORDER — SODIUM CHLORIDE 9 MG/ML
175 INJECTION, SOLUTION INTRAVENOUS CONTINUOUS
Status: DISCONTINUED | OUTPATIENT
Start: 2024-11-03 | End: 2024-11-03

## 2024-11-03 RX ORDER — ACETAMINOPHEN 325 MG/1
650 TABLET ORAL EVERY 6 HOURS PRN
Status: CANCELLED | OUTPATIENT
Start: 2024-11-03

## 2024-11-03 RX ORDER — NICOTINE POLACRILEX 4 MG
15 LOZENGE BUCCAL
Status: DISCONTINUED | OUTPATIENT
Start: 2024-11-03 | End: 2024-11-06 | Stop reason: HOSPADM

## 2024-11-03 RX ORDER — ALBUTEROL SULFATE 90 UG/1
2 INHALANT RESPIRATORY (INHALATION) 4 TIMES DAILY PRN
Status: CANCELLED | OUTPATIENT
Start: 2024-11-03

## 2024-11-03 RX ORDER — NITROGLYCERIN 0.4 MG/1
0.4 TABLET SUBLINGUAL
Status: DISCONTINUED | OUTPATIENT
Start: 2024-11-03 | End: 2024-11-06 | Stop reason: HOSPADM

## 2024-11-03 RX ORDER — CARVEDILOL 6.25 MG/1
6.25 TABLET ORAL 2 TIMES DAILY WITH MEALS
COMMUNITY
End: 2024-11-06 | Stop reason: HOSPADM

## 2024-11-03 RX ORDER — HEPARIN SODIUM 5000 [USP'U]/ML
5000 INJECTION, SOLUTION INTRAVENOUS; SUBCUTANEOUS EVERY 8 HOURS SCHEDULED
Status: DISCONTINUED | OUTPATIENT
Start: 2024-11-03 | End: 2024-11-06 | Stop reason: HOSPADM

## 2024-11-03 RX ADMIN — CALCIUM GLUCONATE 1000 MG: 20 INJECTION, SOLUTION INTRAVENOUS at 16:56

## 2024-11-03 RX ADMIN — SODIUM CHLORIDE 125 ML/HR: 9 INJECTION, SOLUTION INTRAVENOUS at 22:19

## 2024-11-03 RX ADMIN — INSULIN LISPRO 4 UNITS: 100 INJECTION, SOLUTION INTRAVENOUS; SUBCUTANEOUS at 18:23

## 2024-11-03 RX ADMIN — SODIUM CHLORIDE 1000 ML: 9 INJECTION, SOLUTION INTRAVENOUS at 13:06

## 2024-11-03 RX ADMIN — ATORVASTATIN CALCIUM 80 MG: 40 TABLET, FILM COATED ORAL at 22:12

## 2024-11-03 RX ADMIN — SODIUM CHLORIDE 1000 ML: 9 INJECTION, SOLUTION INTRAVENOUS at 18:22

## 2024-11-03 RX ADMIN — INSULIN GLARGINE 15 UNITS: 100 INJECTION, SOLUTION SUBCUTANEOUS at 22:13

## 2024-11-03 RX ADMIN — HEPARIN SODIUM 5000 UNITS: 5000 INJECTION INTRAVENOUS; SUBCUTANEOUS at 22:13

## 2024-11-03 RX ADMIN — GABAPENTIN 300 MG: 300 CAPSULE ORAL at 22:12

## 2024-11-03 RX ADMIN — POTASSIUM CHLORIDE 40 MEQ: 1500 TABLET, EXTENDED RELEASE ORAL at 18:33

## 2024-11-03 RX ADMIN — POTASSIUM CHLORIDE 40 MEQ: 1500 TABLET, EXTENDED RELEASE ORAL at 22:13

## 2024-11-03 RX ADMIN — Medication 5 MG: at 22:14

## 2024-11-03 RX ADMIN — BRIMONIDINE TARTRATE 1 DROP: 2 SOLUTION/ DROPS OPHTHALMIC at 22:13

## 2024-11-03 NOTE — H&P
[facility]   HISTORY AND PHYSICAL    Patient Name: Amadeo Kong  : 1960  MRN: [id number]  Primary Care Physician:  Bianka Ann MD  Date of admission: 11/3/2024    Subjective   Subjective     Chief Complaint: Sweating and vomiting with low blood pressure    HPI:    Amadeo Kong is a 63 y.o. male with a PMH significant for type 2 diabetes mellitus, HTN, peripheral diabetic neuropathy on gabapentin, chronic back pain secondary to spondylolisthesis of lumbar region and lateral recess lumbar spine stenosis presenting to [facility] ED secondary to diaphoresis, lightheadedness and vomiting that began a few hours prior to presentation while the patient was at [employer], he states he suddenly felt nauseous and broke out in a sweat, vomited a very small amount (less than a cup) and EMS was called and he was brought to ED.  He denies any previous similar episodes however states he had transient midsternal chest pain that lasted less than 3 minutes and subsequently subsided without further episodes.  Of note he is on Lasix 40 p.o. as needed for fluid buildup, his last dose was 2 days ago.  He denies ever checking blood pressure at home.  Of note did have recent admission in August for bilateral segmental PE, no RV strain on echo however was prescribed Eliquis at discharge, subsequent CTA PE revealed interval resolution of PE.  He does not recall his medications accurately however is on multiple medications for hypertension. He denies any pleuritic chest pain, loss of consciousness, melena, hematochezia, hematemesis, palpitations, loss of consciousness, dyspnea, orthopnea or bendopnea.    In the ED, had soft blood pressures, was hypotensive with blood pressure 96/60 and HR 64 on admission subsequently had labile blood pressures however he is mentating well and satting well.  Initial troponin 63 with subsequent decline in troponins to 56, no significant EKG changes for ischemia, potassium 3.4, abrupt  increase in creatinine 2.24 from previous check 1.18 September 2024, blood glucose 157, hemoglobin 12.1, proBNP WNL.  Patient had CT abdomen pelvis and chest, ruled out acute pathology at this time.  Hospitalist team was contacted agreed to admit patient.        Review of Systems   The following systems were reviewed and negative;  constitution, eyes, ENT, respiratory, cardiovascular, gastrointestinal, musculoskeletal, neurological, and behavioral/psych.    Personal History     Past Medical History:   Diagnosis Date    Arthritis     Asthma     inhaler daily     Diabetes mellitus 2000    insulin daily; checks blood sugars on occasion-run 150-180    Elevated cholesterol     History of sleep apnea     years ago diagnosed but never used a machine at home     History of staph infection 2010    wound infection after left knee replacement -saw infectious disease MD     Hypertension     Wears glasses        Past Surgical History:   Procedure Laterality Date    COLONOSCOPY      HIP SURGERY Right 08/11/2014    [facility]    INCISION AND DRAINAGE OF WOUND Left 2010    x2 of total knee replacement wound -iv antibiotics    KNEE SURGERY Bilateral     Right knee- 2008, left knee- 2010- [facility]    LUMBAR LAMINECTOMY WITH FUSION N/A 7/27/2018    Procedure: TLIF, OSTEOTOMY L4-5 , POST FUSION L4-5;  Surgeon: Yo Bryson MD;  Location:  [facility];  Service: Neurosurgery    LUMBAR LAMINECTOMY WITH FUSION N/A 8/27/2018    Procedure: LUMBAR LAMINECTOMY POSTERIOR LUMBAR INTERBODY FUSION;  Surgeon: Yo Bryson MD;  Location:  [facility];  Service: Neurosurgery    ROTATOR CUFF REPAIR Right     TONSILLECTOMY         Family History: family history includes Hypertension in his mother. Otherwise pertinent FHx was reviewed and not pertinent to current issue.    Social History:  reports that he has never smoked. He has never used smokeless tobacco. He reports that he does not drink alcohol and does not use drugs.    Home  Medications:  Cholecalciferol, Insulin Syringe-Needle U-100, OneTouch Delica Plus [id number], acetaminophen, albuterol sulfate HFA, aspirin, atorvastatin, atropine, brimonidine, budesonide-formoterol, carvedilol, colchicine, furosemide, gabapentin, glucose blood, insulin aspart prot-insulin aspart, levocetirizine, lisinopril, meloxicam, montelukast, multivitamin, naproxen, and potassium chloride ER      Allergies:  Allergies   Allergen Reactions    Carrot [Daucus Carota] Swelling    Ensure Swelling     Carrots only    Shellfish Allergy Swelling and Unknown (See Comments)     tongue swelling    Strawberry Swelling     tongue swelling    Banana Unknown (See Comments)    Dust Mite Extract Other (See Comments)    Sulfamethoxazole-Trimethoprim Other (See Comments)     Patient developed blisters on his hands.  Patient developed blisters on his hands.    Vancomycin Rash     Received vancomycin x1 dose 7/18/22       Objective   Objective     Vitals:   Temp:  [97.6 °F (36.4 °C)] 97.6 °F (36.4 °C)  Heart Rate:  [60-73] 73  Resp:  [18] 18  BP: ()/(55-70) 82/56  Physical Exam    Constitutional: Awake, alert   Eyes: PERRLA, sclerae anicteric, no conjunctival injection   HENT: NCAT, mucous membranes moist   Neck: Supple, no thyromegaly, no lymphadenopathy, trachea midline   Respiratory: Clear to auscultation bilaterally, nonlabored respirations    Cardiovascular: RRR, no murmurs, rubs, or gallops, palpable pedal pulses bilaterally   Gastrointestinal: Positive bowel sounds, soft, nontender, nondistended   Musculoskeletal: No bilateral ankle edema, no clubbing or cyanosis to extremities   Psychiatric: Appropriate affect, cooperative   Neurologic: Oriented x 3, strength symmetric in all extremities, Cranial Nerves grossly intact to confrontation, speech clear   Skin: No rashes     Result Review    Result Review:  I have personally reviewed the results from the time of this admission to 11/3/2024 18:06 EST and agree with these  findings:  [x]  Laboratory list / accordion  []  Microbiology  [x]  Radiology  [x]  EKG/Telemetry   [x]  Cardiology/Vascular   []  Pathology  [x]  Old records  []  Other:  Most notable findings include: Hypotension and THOMAS      Assessment & Plan     Active Hospital Problems:  Active Hospital Problems    Diagnosis     **THOMAS (acute kidney injury)     Symptomatic hypotension     Dehydration     Acute pulmonary embolism     Type 2 diabetes mellitus, with long-term current use of insulin     Vomiting     Chest pain at rest     Peripheral neuropathy     Chronic back pain     Hypertension      Plan:     THOMAS (acute kidney injury):  Patient presenting to [facility] ED with a creatinine of 2.27 (previous creatinine 1.18 September 2024)  - Endorses not drinking any water during the day, had episode of symptomatic hypotension, remained hypotensive in the ER despite IV fluids (2 L boluses).  On multiple blood pressure medications and recently took a dose of Lasix 40 p.o. that he uses for swelling (2 days ago), patient also on multiple NSAIDs  - Suspect secondary to hypotension, is able to urinate denies oligouria  - CT abdomen pelvis without contrast ruled out hydronephrosis, cysts or other intrinsic overt anomaly.  Will hold off on renal ultrasound for now  - Will order urine lites, urea and creatinine  - Will order urine analysis to evaluate urine studies, need to rule out ATN in setting of hypotension  - Will consult nephrology, appreciate recommendations    Symptomatic hypotension/Dehydration:  Patient hypotensive with soft blood pressures on admission, was symptomatic prior to coming in at [facility], had episode of vomiting (endorses small-volume less than 1 cup) and diaphoresis with some mild transient chest pain with no recurrent symptoms  - Patient on multiple blood pressure medications including Coreg, lisinopril 10 mg and as needed Lasix  - Will order lactic acid levels to evaluate for tissue perfusion  -  Will hold home regimen for now  - If evidence of hypoperfusion and persistent hypotension low threshold for transfer to ICU    Previous pulmonary embolism:  PMH of acute bilateral segmental PE on admission in August 2024, patient was discharged on Eliquis subsequently with interval resolution on CTA chest in September 2024   - Patient responding to IV fluids at this time, no need for echo TTE, low suspicion for PE related RV Strain as cause of hypotension  - Not on Eliquis at this time, appears was provoked according to documentation  - Can repeat echo TTE this admission if patient does not respond to IV fluids   - Will need to use caution with fluid administration if patient has RV strain    Transient chest pain:  Patient had episode of transient chest pain that lasted less than 3 minutes during episode of vomiting and diaphoresis prior to coming in, no subsequent symptoms or episodes  - Troponins downtrending peaked at 63 no significant delta  - Likely secondary to vomiting episode, described as midsternal nonradiating at rest    Type 2 diabetes mellitus, with long-term current use of insulin/peripheral/diabetic neuropathy:  PMH of type 2 diabetes mellitus, patient on twice daily 45 units insulin aspart protamine - insulin aspart  - Will recheck HbA1c this admission, on gabapentin 300 mg 3 times daily for diabetic neuropathy  - Will cut dose to 300 twice daily given THOMAS and hypotensive episodes, GFR 31  - Will initiate patient on Lantus 15 units nightly and sliding scale insulin at this time, blood sugar 147 at this time  - Can consider increasing during this hospitalization as needed    Chronic back pain:   Patient has chronic back pain with spondylolisthesis of lumbar spine with stenosis, on tramadol, gabapentin, meloxicam, naproxen previously  - Holding most pain medications at this time given low blood pressures, not endorsing active pain at this time    Hypertension:  On Coreg and lisinopril, holding home  regimen given hypotension    Anemia:  Hemoglobin 12.1 this admission, will check iron panel, unclear previous colonoscopy    Hx of Gout:  Holding febuxostat and colchicine in setting of THOMAS    VTE Prophylaxis:  Pharmacologic & mechanical VTE prophylaxis orders are present.        CODE STATUS:    Level Of Support Discussed With: Patient  Code Status (Patient has no pulse and is not breathing): CPR (Attempt to Resuscitate)  Medical Interventions (Patient has pulse or is breathing): Full Support    Admission Status:  I believe this patient meets Admission status.    Total time spent: 75 minutes  Time spent includes time reviewing chart, face-to-face time, counseling patient/family/caregiver, ordering medications/tests/procedures, communicating with other health care professionals, documenting clinical information in the electronic health record, and coordination of care.      Tony Lockhart MD

## 2024-11-03 NOTE — PLAN OF CARE
Problem: Adult Inpatient Plan of Care  Goal: Absence of Hospital-Acquired Illness or Injury  Intervention: Identify and Manage Fall Risk  Recent Flowsheet Documentation  Taken 11/3/2024 1800 by Denise Nevarez RN  Safety Promotion/Fall Prevention:   assistive device/personal items within reach   clutter free environment maintained   fall prevention program maintained   nonskid shoes/slippers when out of bed   room organization consistent   safety round/check completed   toileting scheduled  Taken 11/3/2024 1712 by Denise Nevarez RN  Safety Promotion/Fall Prevention:   assistive device/personal items within reach   activity supervised   clutter free environment maintained   fall prevention program maintained   nonskid shoes/slippers when out of bed   room organization consistent   safety round/check completed   toileting scheduled  Intervention: Prevent Skin Injury  Recent Flowsheet Documentation  Taken 11/3/2024 1800 by Denise Nevarez RN  Body Position: position changed independently  Skin Protection:   incontinence pads utilized   transparent dressing maintained  Taken 11/3/2024 1712 by Denise Nevarez RN  Body Position: position changed independently  Skin Protection:   incontinence pads utilized   transparent dressing maintained  Intervention: Prevent Infection  Recent Flowsheet Documentation  Taken 11/3/2024 1800 by Denise Nevarez RN  Infection Prevention:   cohorting utilized   environmental surveillance performed   rest/sleep promoted   single patient room provided  Taken 11/3/2024 1712 by Denise Nevarez RN  Infection Prevention:   cohorting utilized   environmental surveillance performed   rest/sleep promoted   single patient room provided  Goal: Optimal Comfort and Wellbeing  Intervention: Provide Person-Centered Care  Recent Flowsheet Documentation  Taken 11/3/2024 1712 by Denise Nevarez RN  Trust Relationship/Rapport:   care explained   choices provided   questions answered   questions encouraged      Problem: Fall Injury Risk  Goal: Absence of Fall and Fall-Related Injury  Intervention: Identify and Manage Contributors  Recent Flowsheet Documentation  Taken 11/3/2024 1800 by Denise Nevarez RN  Medication Review/Management: medications reviewed  Self-Care Promotion: independence encouraged  Taken 11/3/2024 1712 by Denise Nevarez RN  Medication Review/Management: medications reviewed  Self-Care Promotion: independence encouraged  Intervention: Promote Injury-Free Environment  Recent Flowsheet Documentation  Taken 11/3/2024 1800 by Denise Nevarez RN  Safety Promotion/Fall Prevention:   assistive device/personal items within reach   clutter free environment maintained   fall prevention program maintained   nonskid shoes/slippers when out of bed   room organization consistent   safety round/check completed   toileting scheduled  Taken 11/3/2024 1712 by Denise Nevarez RN  Safety Promotion/Fall Prevention:   assistive device/personal items within reach   activity supervised   clutter free environment maintained   fall prevention program maintained   nonskid shoes/slippers when out of bed   room organization consistent   safety round/check completed   toileting scheduled   Goal Outcome Evaluation:

## 2024-11-03 NOTE — CASE MANAGEMENT/SOCIAL WORK
Discharge Planning Assessment  [facility]     Patient Name: Amadeo Kong  MRN: [id number]  Today's Date: 11/3/2024    Admit Date: 11/3/2024    Plan: IDP   Discharge Needs Assessment       Row Name 11/03/24 1539       Living Environment    People in Home significant other    Current Living Arrangements home    Primary Care Provided by self    Provides Primary Care For no one    Family Caregiver if Needed spouse    Quality of Family Relationships helpful;involved    Able to Return to Prior Arrangements yes       Transition Planning    Patient/Family Anticipates Transition to home    Transportation Anticipated family or friend will provide       Discharge Needs Assessment    Readmission Within the Last 30 Days no previous admission in last 30 days    Equipment Currently Used at Home cane, straight    Concerns to be Addressed denies needs/concerns at this time                   Discharge Plan       Row Name 11/03/24 5154       Plan    Plan IDP    Plan Comments MSW met with Pt and SO at bedside to complete IDP. Pt lives with SO in [facility]. Pt confirmed demographics and reports PCP is Dr. Ann. Pt has [employer] Medicare insurance coverage. Pt independent with ADLs; Pt reports cane PRN, no HH services, and no home O2. Pt’s preferred pharmacy is [facility]. Pt denied needs or concerns. CM will continue to follow.                  Continued Care and Services - Admitted Since 11/3/2024    No active coordination exists for this encounter.          Demographic Summary       Row Name 11/03/24 1538       General Information    Arrived From home    Referral Source admission list;emergency department    Reason for Consult discharge planning    Preferred Language English                   Functional Status       Row Name 11/03/24 1538       Functional Status    Usual Activity Tolerance good    Current Activity Tolerance moderate       Functional Status, IADL    Medications independent    Meal Preparation independent     Housekeeping independent    Laundry independent    Shopping independent                   Psychosocial    No documentation.                       BUFFY Vicente

## 2024-11-03 NOTE — Clinical Note
Level of Care: Telemetry [5]   Diagnosis: THOMAS (acute kidney injury) [132720]   Admitting Physician: DANITA HOLLINS [[id number]]   Attending Physician: DANITA HOLLINS [[id number]]   Is patient appropriate for Inpatient Observation Unit?: Yes [1]

## 2024-11-03 NOTE — ED PROVIDER NOTES
Subjective   History of Present Illness  Pt is a 62 yo male presenting to ED with complaints of chest pain and vomiting. PMHx significant for DM (insulin), HTN, HLD, LIZY, Asthma and Arthritis. Pt reports he was sitting at [employer] and began feeling ill and vomited several times. He then reports having mid sternal chest pain that lasted 5 minutes. Denies pain radiated into his back. EMS brought to ED and on arrival to ED patient has no complaints. He denies syncope, headache, dizziness, fever, cough, SOB, diarrhea or abdominal pain. He denies new or change in meds. He denies suspicious food or sick contacts. He has not eaten today. Blood sugar with . Denies tobacco, drug or ETOH use.     History provided by:  Patient and medical records      Review of Systems   Constitutional:  Negative for chills and fever.   HENT:  Negative for congestion, sore throat and trouble swallowing.    Eyes:  Negative for pain and visual disturbance.   Respiratory:  Negative for cough and shortness of breath.    Cardiovascular:  Positive for chest pain. Negative for leg swelling.   Gastrointestinal:  Positive for nausea and vomiting. Negative for abdominal pain, blood in stool, constipation and diarrhea.   Genitourinary:  Negative for difficulty urinating, dysuria and flank pain.   Musculoskeletal:  Negative for arthralgias and back pain.   Skin: Negative.  Negative for rash and wound.   Allergic/Immunologic: Negative.    Neurological:  Negative for dizziness, syncope, weakness, numbness and headaches.   Psychiatric/Behavioral:  Negative for confusion.    All other systems reviewed and are negative.      Past Medical History:   Diagnosis Date    Arthritis     Asthma     inhaler daily     Diabetes mellitus 2000    insulin daily; checks blood sugars on occasion-run 150-180    Elevated cholesterol     History of sleep apnea     years ago diagnosed but never used a machine at home     History of staph infection 2010    wound infection  after left knee replacement -saw infectious disease MD     Hypertension     Wears glasses        Allergies   Allergen Reactions    Carrot [Daucus Carota] Swelling    Ensure Swelling     Carrots only    Shellfish Allergy Swelling and Unknown (See Comments)     tongue swelling    Strawberry Swelling     tongue swelling    Banana Unknown (See Comments)    Dust Mite Extract Other (See Comments)    Sulfamethoxazole-Trimethoprim Other (See Comments)     Patient developed blisters on his hands.  Patient developed blisters on his hands.    Vancomycin Rash     Received vancomycin x1 dose 7/18/22       Past Surgical History:   Procedure Laterality Date    COLONOSCOPY      HIP SURGERY Right 08/11/2014    [facility]    INCISION AND DRAINAGE OF WOUND Left 2010    x2 of total knee replacement wound -iv antibiotics    KNEE SURGERY Bilateral     Right knee- 2008, left knee- 2010- [facility]    LUMBAR LAMINECTOMY WITH FUSION N/A 7/27/2018    Procedure: TLIF, OSTEOTOMY L4-5 , POST FUSION L4-5;  Surgeon: Yo Bryson MD;  Location: [facility] OR;  Service: Neurosurgery    LUMBAR LAMINECTOMY WITH FUSION N/A 8/27/2018    Procedure: LUMBAR LAMINECTOMY POSTERIOR LUMBAR INTERBODY FUSION;  Surgeon: Yo Bryson MD;  Location:  [facility] OR;  Service: Neurosurgery    ROTATOR CUFF REPAIR Right     TONSILLECTOMY         Family History   Problem Relation Age of Onset    Hypertension Mother        Social History     Socioeconomic History    Marital status: Single   Tobacco Use    Smoking status: Never    Smokeless tobacco: Never   Vaping Use    Vaping status: Never Used   Substance and Sexual Activity    Alcohol use: No    Drug use: No    Sexual activity: Defer           Objective   Physical Exam  Vitals and nursing note reviewed.   Constitutional:       Appearance: He is well-developed.   HENT:      Head: Atraumatic.      Nose: Nose normal.   Eyes:      General: Lids are normal.      Conjunctiva/sclera: Conjunctivae normal.      Pupils:  Pupils are equal, round, and reactive to light.   Cardiovascular:      Rate and Rhythm: Normal rate and regular rhythm.      Heart sounds: Normal heart sounds.   Pulmonary:      Effort: Pulmonary effort is normal.      Breath sounds: Normal breath sounds.   Abdominal:      General: There is no distension.      Palpations: Abdomen is soft.      Tenderness: There is no abdominal tenderness. There is no guarding or rebound.   Musculoskeletal:         General: No tenderness or deformity. Normal range of motion.      Cervical back: Normal range of motion and neck supple.   Skin:     General: Skin is warm and dry.   Neurological:      Mental Status: He is alert and oriented to person, place, and time.      Sensory: No sensory deficit.   Psychiatric:         Behavior: Behavior normal.         Procedures           ED Course  ED Course as of 11/03/24 1532   Sun Nov 03, 2024   1319 I personally reviewed and interpreted labs results and went over with patient. I personally reviewed and interpreted vitals. [RT]   1319 HS Troponin T(!!): 63  Elevated from prior [RT]   1319 EKG reviewed and interpreted by Dr. Albright. No acute ischemic changes.  [RT]   1320 Creatinine(!): 2.27  Noted THOMAS. Baseline closer to 1.1-1.4 [RT]   1320 Patient receiving fluids. [RT]   1321 Discussed patient with Dr. Albright. Will obtain CT chest and CT abd/pelvis  at this time. [RT]   1330 BP(!): 82/55 [RT]   1435 Noted Hypotension. Monitoring.  [RT]   1435 BP: 92/63  Improving BP [RT]   1503 Glucose: 129 [RT]   1515 Reviewed old records. CTA 9-16-24 with resolved PE's.  [RT]   1515 HS Troponin T(!!): 56  Improved Trop.  [RT]   1525 BP: 101/70  Improving BP. [RT]   1526 Discussed admission with hospitalist Dr. Coy [RT]      ED Course User Index  [RT] Maricarmen Argueta, PA      Recent Results (from the past 24 hours)   ECG 12 Lead ED Triage Standing Order; Chest Pain    Collection Time: 11/03/24 12:18 PM   Result Value Ref Range    QT Interval 430 ms    QTC  Interval 450 ms   High Sensitivity Troponin T    Collection Time: 11/03/24 12:26 PM    Specimen: Blood   Result Value Ref Range    HS Troponin T 63 (C) <22 ng/L   Comprehensive Metabolic Panel    Collection Time: 11/03/24 12:26 PM    Specimen: Blood   Result Value Ref Range    Glucose 157 (H) 65 - 99 mg/dL    BUN 10 8 - 23 mg/dL    Creatinine 2.27 (H) 0.76 - 1.27 mg/dL    Sodium 137 136 - 145 mmol/L    Potassium 3.4 (L) 3.5 - 5.2 mmol/L    Chloride 98 98 - 107 mmol/L    CO2 26.0 22.0 - 29.0 mmol/L    Calcium 8.3 (L) 8.6 - 10.5 mg/dL    Total Protein 6.4 6.0 - 8.5 g/dL    Albumin 3.1 (L) 3.5 - 5.2 g/dL    ALT (SGPT) 8 1 - 41 U/L    AST (SGOT) 16 1 - 40 U/L    Alkaline Phosphatase 77 39 - 117 U/L    Total Bilirubin 0.9 0.0 - 1.2 mg/dL    Globulin 3.3 gm/dL    A/G Ratio 0.9 g/dL    BUN/Creatinine Ratio 4.4 (L) 7.0 - 25.0    Anion Gap 13.0 5.0 - 15.0 mmol/L    eGFR 31.4 (L) >60.0 mL/min/1.73   Lipase    Collection Time: 11/03/24 12:26 PM    Specimen: Blood   Result Value Ref Range    Lipase 14 13 - 60 U/L   BNP    Collection Time: 11/03/24 12:26 PM    Specimen: Blood   Result Value Ref Range    proBNP 66.9 0.0 - 900.0 pg/mL   Green Top (Gel)    Collection Time: 11/03/24 12:26 PM   Result Value Ref Range    Extra Tube Hold for add-ons.    Lavender Top    Collection Time: 11/03/24 12:26 PM   Result Value Ref Range    Extra Tube hold for add-on    Gold Top - SST    Collection Time: 11/03/24 12:26 PM   Result Value Ref Range    Extra Tube Hold for add-ons.    Gray Top    Collection Time: 11/03/24 12:26 PM   Result Value Ref Range    Extra Tube Hold for add-ons.    Light Blue Top    Collection Time: 11/03/24 12:26 PM   Result Value Ref Range    Extra Tube Hold for add-ons.    CBC Auto Differential    Collection Time: 11/03/24 12:26 PM    Specimen: Blood   Result Value Ref Range    WBC 5.22 3.40 - 10.80 10*3/mm3    RBC 3.99 (L) 4.14 - 5.80 10*6/mm3    Hemoglobin 12.1 (L) 13.0 - 17.7 g/dL    Hematocrit 35.6 (L) 37.5 - 51.0 %     MCV 89.2 79.0 - 97.0 fL    MCH 30.3 26.6 - 33.0 pg    MCHC 34.0 31.5 - 35.7 g/dL    RDW 13.4 12.3 - 15.4 %    RDW-SD 43.7 37.0 - 54.0 fl    MPV 9.1 6.0 - 12.0 fL    Platelets 148 140 - 450 10*3/mm3    Neutrophil % 63.5 42.7 - 76.0 %    Lymphocyte % 23.9 19.6 - 45.3 %    Monocyte % 10.7 5.0 - 12.0 %    Eosinophil % 1.3 0.3 - 6.2 %    Basophil % 0.4 0.0 - 1.5 %    Immature Grans % 0.2 0.0 - 0.5 %    Neutrophils, Absolute 3.31 1.70 - 7.00 10*3/mm3    Lymphocytes, Absolute 1.25 0.70 - 3.10 10*3/mm3    Monocytes, Absolute 0.56 0.10 - 0.90 10*3/mm3    Eosinophils, Absolute 0.07 0.00 - 0.40 10*3/mm3    Basophils, Absolute 0.02 0.00 - 0.20 10*3/mm3    Immature Grans, Absolute 0.01 0.00 - 0.05 10*3/mm3    nRBC 0.0 0.0 - 0.2 /100 WBC   ECG 12 Lead ED Triage Standing Order; Chest Pain    Collection Time: 11/03/24  2:32 PM   Result Value Ref Range    QT Interval 448 ms    QTC Interval 462 ms   High Sensitivity Troponin T 2Hr    Collection Time: 11/03/24  2:38 PM    Specimen: Blood   Result Value Ref Range    HS Troponin T 56 (C) <22 ng/L    Troponin T Delta -7 (L) >=-4 - <+4 ng/L   POC Glucose Once    Collection Time: 11/03/24  3:00 PM    Specimen: Blood   Result Value Ref Range    Glucose 129 70 - 130 mg/dL     Note: In addition to lab results from this visit, the labs listed above may include labs taken at another facility or during a different encounter within the last 24 hours. Please correlate lab times with ED admission and discharge times for further clarification of the services performed during this visit.    CT Abdomen Pelvis Without Contrast   Final Result   Impression:   No acute findings in the chest, abdomen, or pelvis.      Cholelithiasis without CT evidence of cholecystitis.            Electronically Signed: Adolph Sandoval MD     11/3/2024 2:06 PM EST     Workstation ID: [id number]      CT Chest Without Contrast Diagnostic   Final Result   Impression:   No acute findings in the chest, abdomen, or pelvis.       Cholelithiasis without CT evidence of cholecystitis.            Electronically Signed: Adolph Sandoval MD     11/3/2024 2:06 PM EST     Workstation ID: [id number]      XR Chest 1 View   Final Result   Impression:   No acute cardiopulmonary findings.            Electronically Signed: Adolph Sandoval MD     11/3/2024 1:04 PM EST     Workstation ID: [id number]        Vitals:    11/03/24 1400 11/03/24 1415 11/03/24 1500 11/03/24 1515   BP: (!) 86/61 92/63 (!) 84/59 101/70   BP Location:       Patient Position:       Pulse: 64 66 64 70   Resp:       Temp:       TempSrc:       SpO2: 98% 98%  98%   Weight:       Height:         Medications   sodium chloride 0.9 % flush 10 mL (has no administration in time range)   aspirin chewable tablet 324 mg (324 mg Oral Not Given 11/3/24 1229)   calcium gluconate 1000 Mg/50ml 0.675% NaCl IV SOLN (has no administration in time range)   sodium chloride 0.9 % bolus 1,000 mL (has no administration in time range)   sodium chloride 0.9 % bolus 1,000 mL (1,000 mL Intravenous New Bag 11/3/24 1306)     ECG/EMG Results (last 24 hours)       Procedure Component Value Units Date/Time    ECG 12 Lead ED Triage Standing Order; Chest Pain [[id number]] Collected: 11/03/24 1218     Updated: 11/03/24 1218     QT Interval 430 ms      QTC Interval 450 ms     Narrative:      Test Reason : ED Triage Standing Order~  Blood Pressure :   */*   mmHG  Vent. Rate :  66 BPM     Atrial Rate :  66 BPM     P-R Int : 158 ms          QRS Dur :  70 ms      QT Int : 430 ms       P-R-T Axes :  44  23  31 degrees     QTc Int : 450 ms    Normal sinus rhythm  Nonspecific T wave abnormality  Abnormal ECG  When compared with ECG of 16-SEP-2024 08:22,  Nonspecific T wave abnormality now evident in Inferior leads  Nonspecific T wave abnormality, improved in Lateral leads    Referred By: ED MD           Confirmed By:           ECG 12 Lead ED Triage Standing Order; Chest Pain   Preliminary Result   Test Reason : ED Triage Standing  Order~   Blood Pressure :   */*   mmHG   Vent. Rate :  64 BPM     Atrial Rate :  64 BPM      P-R Int : 156 ms          QRS Dur :  72 ms       QT Int : 448 ms       P-R-T Axes :  33  18  29 degrees      QTc Int : 462 ms      Normal sinus rhythm   Nonspecific T wave abnormality   Prolonged QT   Abnormal ECG   When compared with ECG of 03-NOV-2024 12:18, (Unconfirmed)   No significant change was found      Referred By: ED MD           Confirmed By:       ECG 12 Lead ED Triage Standing Order; Chest Pain   Preliminary Result   Test Reason : ED Triage Standing Order~   Blood Pressure :   */*   mmHG   Vent. Rate :  66 BPM     Atrial Rate :  66 BPM      P-R Int : 158 ms          QRS Dur :  70 ms       QT Int : 430 ms       P-R-T Axes :  44  23  31 degrees      QTc Int : 450 ms      Normal sinus rhythm   Nonspecific T wave abnormality   Abnormal ECG   When compared with ECG of 16-SEP-2024 08:22,   Nonspecific T wave abnormality now evident in Inferior leads   Nonspecific T wave abnormality, improved in Lateral leads      Referred By: ED MD           Confirmed By:                       HEART Score: 4   Shared Decision Making  I discussed the findings with the patient/patient representative who is in agreement with the treatment plan and the final disposition.  Risks and benefits of discharge and/or observation/admission were discussed: Yes                                Medical Decision Making  Pt is a 64 yo male presenting to ED with complaints of chest pain and vomiting. I had a discussion with the patient / family regarding ED course, diagnosis, diagnostic results and treatment plan including medications and admission / discharge.     DDx  Sepsis, THOMAS, Dehydration, CHF, Pneumonia, PE, SBO, Electrolyte abnormality, Hypoglycemia, medication problem, Arrhythmia, ACS    Problems Addressed:  THOMAS (acute kidney injury): complicated acute illness or injury  Chest pain, unspecified type: complicated acute illness or  injury  Hyperglycemia due to diabetes mellitus: complicated acute illness or injury  Nausea and vomiting, unspecified vomiting type: complicated acute illness or injury    Amount and/or Complexity of Data Reviewed  Independent Historian: spouse and EMS  External Data Reviewed: notes.     Details: Reviewed previous non ED visits including prior labs, imaging, available notes, medications, allergies and surgical hx.     Labs: ordered. Decision-making details documented in ED Course.  Radiology: ordered. Decision-making details documented in ED Course.  ECG/medicine tests: ordered. Decision-making details documented in ED Course.    Risk  OTC drugs.  Prescription drug management.  Decision regarding hospitalization.        Final diagnoses:   Chest pain, unspecified type   Nausea and vomiting, unspecified vomiting type   THOMAS (acute kidney injury)   Hyperglycemia due to diabetes mellitus       ED Disposition  ED Disposition       ED Disposition   Decision to Admit    Condition   --    Comment   Level of Care: Telemetry [5]   Diagnosis: THOMAS (acute kidney injury) [609580]   Admitting Physician: DANITA HOLLINS [[id number]]   Attending Physician: DANITA HOLLINS [[id number]]   Is patient appropriate for Inpatient Observation Unit?: Yes [1]                 No follow-up provider specified.       Medication List      No changes were made to your prescriptions during this visit.            Maricarmen Argueta PA  11/03/24 1531       Maricarmen Argueta PA  11/03/24 1532

## 2024-11-03 NOTE — ED NOTES
Amadeo Kong    Nursing Report ED to Floor:  Mental status: A & O x 4  Ambulatory status: ad harrison  Oxygen Therapy:  RA  Cardiac Rhythm: sinus  Admitted from: home  Safety Concerns:  N/A  Social Issues: N/A  ED Room #:  16    ED Nurse Phone Extension - [phone] or may call [phone].      HPI:   Chief Complaint   Patient presents with    Chest Pain    Nausea       Past Medical History:  Past Medical History:   Diagnosis Date    Arthritis     Asthma     inhaler daily     Diabetes mellitus 2000    insulin daily; checks blood sugars on occasion-run 150-180    Elevated cholesterol     History of sleep apnea     years ago diagnosed but never used a machine at home     History of staph infection 2010    wound infection after left knee replacement -saw infectious disease MD     Hypertension     Wears glasses         Past Surgical History:  Past Surgical History:   Procedure Laterality Date    COLONOSCOPY      HIP SURGERY Right 08/11/2014    [facility]    INCISION AND DRAINAGE OF WOUND Left 2010    x2 of total knee replacement wound -iv antibiotics    KNEE SURGERY Bilateral     Right knee- 2008, left knee- 2010- [facility]    LUMBAR LAMINECTOMY WITH FUSION N/A 7/27/2018    Procedure: TLIF, OSTEOTOMY L4-5 , POST FUSION L4-5;  Surgeon: Yo Bryson MD;  Location:  [facility] OR;  Service: Neurosurgery    LUMBAR LAMINECTOMY WITH FUSION N/A 8/27/2018    Procedure: LUMBAR LAMINECTOMY POSTERIOR LUMBAR INTERBODY FUSION;  Surgeon: Yo Bryson MD;  Location:  [facility];  Service: Neurosurgery    ROTATOR CUFF REPAIR Right     TONSILLECTOMY          Admitting Doctor:   Tony Lockhart MD    Consulting Provider(s):  Consults       No orders found from 10/5/2024 to 11/4/2024.             Admitting Diagnosis:   The primary encounter diagnosis was Chest pain, unspecified type. Diagnoses of Nausea and vomiting, unspecified vomiting type, THOMAS (acute kidney injury), and Hyperglycemia due to diabetes mellitus were also pertinent to this  visit.    Most Recent Vitals:   Vitals:    11/03/24 1528 11/03/24 1530 11/03/24 1545 11/03/24 1546   BP:  96/62 94/64    BP Location:   Left arm    Patient Position:   Sitting    Pulse: 68 64 66 66   Resp:       Temp:       TempSrc:       SpO2: 92%  96% 96%   Weight:       Height:           Active LDAs/IV Access:   Lines, Drains & Airways       Active LDAs       Name Placement date Placement time Site Days    Peripheral IV 11/03/24 1213 Anterior;Left Forearm 11/03/24  1213  Forearm  less than 1                    Labs (abnormal labs have a star):   Labs Reviewed   TROPONIN - Abnormal; Notable for the following components:       Result Value    HS Troponin T 63 (*)     All other components within normal limits    Narrative:     High Sensitive Troponin T Reference Range:  <14.0 ng/L- Negative Female for AMI  <22.0 ng/L- Negative Male for AMI  >=14 - Abnormal Female indicating possible myocardial injury.  >=22 - Abnormal Male indicating possible myocardial injury.   Clinicians would have to utilize clinical acumen, EKG, Troponin, and serial changes to determine if it is an Acute Myocardial Infarction or myocardial injury due to an underlying chronic condition.        COMPREHENSIVE METABOLIC PANEL - Abnormal; Notable for the following components:    Glucose 157 (*)     Creatinine 2.27 (*)     Potassium 3.4 (*)     Calcium 8.3 (*)     Albumin 3.1 (*)     BUN/Creatinine Ratio 4.4 (*)     eGFR 31.4 (*)     All other components within normal limits    Narrative:     GFR Normal >60  Chronic Kidney Disease <60  Kidney Failure <15     CBC WITH AUTO DIFFERENTIAL - Abnormal; Notable for the following components:    RBC 3.99 (*)     Hemoglobin 12.1 (*)     Hematocrit 35.6 (*)     All other components within normal limits   HIGH SENSITIVITIY TROPONIN T 2HR - Abnormal; Notable for the following components:    HS Troponin T 56 (*)     Troponin T Delta -7 (*)     All other components within normal limits    Narrative:     High  Sensitive Troponin T Reference Range:  <14.0 ng/L- Negative Female for AMI  <22.0 ng/L- Negative Male for AMI  >=14 - Abnormal Female indicating possible myocardial injury.  >=22 - Abnormal Male indicating possible myocardial injury.   Clinicians would have to utilize clinical acumen, EKG, Troponin, and serial changes to determine if it is an Acute Myocardial Infarction or myocardial injury due to an underlying chronic condition.        LIPASE - Normal   BNP (IN-HOUSE) - Normal    Narrative:     This assay is used as an aid in the diagnosis of individuals suspected of having heart failure. It can be used as an aid in the diagnosis of acute decompensated heart failure (ADHF) in patients presenting with signs and symptoms of ADHF to the emergency department (ED). In addition, NT-proBNP of <300 pg/mL indicates ADHF is not likely.    Age Range Result Interpretation  NT-proBNP Concentration (pg/mL:      <50             Positive            >450                   Gray                 300-450                    Negative             <300    50-75           Positive            >900                  Gray                300-900                  Negative            <300      >75             Positive            >1800                  Gray                300-1800                  Negative            <300   POCT GLUCOSE FINGERSTICK - Normal   RAINBOW DRAW    Narrative:     The following orders were created for panel order [place] Draw.  Procedure                               Abnormality         Status                     ---------                               -----------         ------                     Green Top (Gel)[[id number]]                                  Final result               Lavender Top[[id number]]                                     Final result               Gold Top - SST[[id number]]                                   Final result               Gray Top[[id number]]                                         Final  result               Light Blue Top[[id number]]                                   Final result                 Please view results for these tests on the individual orders.   URINALYSIS W/ CULTURE IF INDICATED   POCT GLUCOSE FINGERSTICK   CBC AND DIFFERENTIAL    Narrative:     The following orders were created for panel order CBC & Differential.  Procedure                               Abnormality         Status                     ---------                               -----------         ------                     CBC Auto Differential[[id number]]        Abnormal            Final result                 Please view results for these tests on the individual orders.   GREEN TOP   LAVENDER TOP   GOLD TOP - SST   GRAY TOP   LIGHT BLUE TOP       Meds Given in ED:   Medications   sodium chloride 0.9 % flush 10 mL (has no administration in time range)   aspirin chewable tablet 324 mg (324 mg Oral Not Given 11/3/24 1229)   calcium gluconate 1000 Mg/50ml 0.675% NaCl IV SOLN (has no administration in time range)   sodium chloride 0.9 % bolus 1,000 mL (has no administration in time range)   sodium chloride 0.9 % bolus 1,000 mL (1,000 mL Intravenous New Bag 11/3/24 1306)           Last NIH score:                                                          Dysphagia screening results:  Patient Factors Component (Dysphagia:Stroke or Rule-out)  Best Eye Response: 4-->(E4) spontaneous (11/03/24 1223)  Best Motor Response: 6-->(M6) obeys commands (11/03/24 1223)  Best Verbal Response: 5-->(V5) oriented (11/03/24 1223)  Levar Coma Scale Score: 15 (11/03/24 1223)     Levar Coma Scale:  No data recorded     CIWA:        Restraint Type:            Isolation Status:  No active isolations

## 2024-11-04 ENCOUNTER — APPOINTMENT (OUTPATIENT)
Dept: CARDIOLOGY | Facility: HOSPITAL | Age: 64
End: 2024-11-04
Payer: MEDICARE

## 2024-11-04 ENCOUNTER — APPOINTMENT (OUTPATIENT)
Dept: ULTRASOUND IMAGING | Facility: HOSPITAL | Age: 64
End: 2024-11-04
Payer: MEDICARE

## 2024-11-04 LAB
ALBUMIN SERPL-MCNC: 2.8 G/DL (ref 3.5–5.2)
ALBUMIN/GLOB SERPL: 0.9 G/DL
ALP SERPL-CCNC: 75 U/L (ref 39–117)
ALT SERPL W P-5'-P-CCNC: 8 U/L (ref 1–41)
ANION GAP SERPL CALCULATED.3IONS-SCNC: 10 MMOL/L (ref 5–15)
ANION GAP SERPL CALCULATED.3IONS-SCNC: 13 MMOL/L (ref 5–15)
AST SERPL-CCNC: 16 U/L (ref 1–40)
ATMOSPHERIC PRESS: ABNORMAL MM[HG]
BACTERIA UR QL AUTO: ABNORMAL /HPF
BASE EXCESS BLDV CALC-SCNC: -0.4 MMOL/L (ref -2–2)
BASOPHILS # BLD AUTO: 0.02 10*3/MM3 (ref 0–0.2)
BASOPHILS NFR BLD AUTO: 0.3 % (ref 0–1.5)
BDY SITE: ABNORMAL
BH CV ECHO MEAS - AO MAX PG: 8.2 MMHG
BH CV ECHO MEAS - AO MEAN PG: 4 MMHG
BH CV ECHO MEAS - AO ROOT DIAM: 3.5 CM
BH CV ECHO MEAS - AO V2 MAX: 143 CM/SEC
BH CV ECHO MEAS - AO V2 VTI: 27.1 CM
BH CV ECHO MEAS - AVA(I,D): 3.1 CM2
BH CV ECHO MEAS - EDV(CUBED): 74.1 ML
BH CV ECHO MEAS - EDV(MOD-SP2): 95 ML
BH CV ECHO MEAS - EDV(MOD-SP4): 75.3 ML
BH CV ECHO MEAS - EF(MOD-BP): 61 %
BH CV ECHO MEAS - EF(MOD-SP2): 62 %
BH CV ECHO MEAS - EF(MOD-SP4): 62.9 %
BH CV ECHO MEAS - ESV(CUBED): 19.7 ML
BH CV ECHO MEAS - ESV(MOD-SP2): 36.1 ML
BH CV ECHO MEAS - ESV(MOD-SP4): 27.9 ML
BH CV ECHO MEAS - FS: 35.7 %
BH CV ECHO MEAS - IVS/LVPW: 1 CM
BH CV ECHO MEAS - IVSD: 1 CM
BH CV ECHO MEAS - LA DIMENSION: 4 CM
BH CV ECHO MEAS - LAT PEAK E' VEL: 9.1 CM/SEC
BH CV ECHO MEAS - LV MASS(C)D: 137.2 GRAMS
BH CV ECHO MEAS - LV MAX PG: 7.3 MMHG
BH CV ECHO MEAS - LV MEAN PG: 3 MMHG
BH CV ECHO MEAS - LV V1 MAX: 135 CM/SEC
BH CV ECHO MEAS - LV V1 VTI: 26.8 CM
BH CV ECHO MEAS - LVIDD: 4.2 CM
BH CV ECHO MEAS - LVIDS: 2.7 CM
BH CV ECHO MEAS - LVOT AREA: 3.1 CM2
BH CV ECHO MEAS - LVOT DIAM: 2 CM
BH CV ECHO MEAS - LVPWD: 1 CM
BH CV ECHO MEAS - MED PEAK E' VEL: 7.8 CM/SEC
BH CV ECHO MEAS - MV A MAX VEL: 116 CM/SEC
BH CV ECHO MEAS - MV DEC SLOPE: 295 CM/SEC2
BH CV ECHO MEAS - MV DEC TIME: 0.24 SEC
BH CV ECHO MEAS - MV E MAX VEL: 124 CM/SEC
BH CV ECHO MEAS - MV E/A: 1.07
BH CV ECHO MEAS - MV MAX PG: 5.4 MMHG
BH CV ECHO MEAS - MV MEAN PG: 3 MMHG
BH CV ECHO MEAS - MV P1/2T: 103.3 MSEC
BH CV ECHO MEAS - MV V2 VTI: 34.5 CM
BH CV ECHO MEAS - MVA(P1/2T): 2.13 CM2
BH CV ECHO MEAS - MVA(VTI): 2.44 CM2
BH CV ECHO MEAS - PA ACC TIME: 0.15 SEC
BH CV ECHO MEAS - RAP SYSTOLE: 3 MMHG
BH CV ECHO MEAS - SV(LVOT): 84.2 ML
BH CV ECHO MEAS - SV(MOD-SP2): 58.9 ML
BH CV ECHO MEAS - SV(MOD-SP4): 47.4 ML
BH CV ECHO MEAS - TAPSE (>1.6): 2.04 CM
BH CV ECHO MEASUREMENTS AVERAGE E/E' RATIO: 14.67
BH CV XLRA - RV BASE: 2.9 CM
BH CV XLRA - RV LENGTH: 7.3 CM
BH CV XLRA - RV MID: 1.9 CM
BH CV XLRA - TDI S': 11.6 CM/SEC
BILIRUB SERPL-MCNC: 0.6 MG/DL (ref 0–1.2)
BILIRUB UR QL STRIP: NEGATIVE
BODY TEMPERATURE: 37
BUN SERPL-MCNC: 13 MG/DL (ref 8–23)
BUN SERPL-MCNC: 14 MG/DL (ref 8–23)
BUN/CREAT SERPL: 5.7 (ref 7–25)
BUN/CREAT SERPL: 6 (ref 7–25)
CA-I SERPL ISE-MCNC: 1.09 MMOL/L (ref 1.15–1.3)
CALCIUM SPEC-SCNC: 8 MG/DL (ref 8.6–10.5)
CALCIUM SPEC-SCNC: 8 MG/DL (ref 8.6–10.5)
CHLORIDE SERPL-SCNC: 100 MMOL/L (ref 98–107)
CHLORIDE SERPL-SCNC: 103 MMOL/L (ref 98–107)
CHOLEST SERPL-MCNC: 86 MG/DL (ref 0–200)
CLARITY UR: ABNORMAL
CO2 BLDA-SCNC: 26.7 MMOL/L (ref 22–33)
CO2 SERPL-SCNC: 22 MMOL/L (ref 22–29)
CO2 SERPL-SCNC: 22 MMOL/L (ref 22–29)
COD CRY URNS QL: ABNORMAL /HPF
COHGB MFR BLD: 1.3 %
COLOR UR: ABNORMAL
CORTIS SERPL-MCNC: 1.12 MCG/DL
CORTIS SERPL-MCNC: 1.56 MCG/DL
CORTIS SERPL-MCNC: 4.56 MCG/DL
CORTIS SERPL-MCNC: 5.68 MCG/DL
CREAT SERPL-MCNC: 2.29 MG/DL (ref 0.76–1.27)
CREAT SERPL-MCNC: 2.35 MG/DL (ref 0.76–1.27)
CREAT UR-MCNC: 434.9 MG/DL
D-LACTATE SERPL-SCNC: 1.4 MMOL/L (ref 0.5–2)
D-LACTATE SERPL-SCNC: 2.9 MMOL/L (ref 0.5–2)
DEPRECATED RDW RBC AUTO: 44.1 FL (ref 37–54)
DEPRECATED RDW RBC AUTO: 45 FL (ref 37–54)
EGFRCR SERPLBLD CKD-EPI 2021: 30.1 ML/MIN/1.73
EGFRCR SERPLBLD CKD-EPI 2021: 31.1 ML/MIN/1.73
EOSINOPHIL # BLD AUTO: 0.1 10*3/MM3 (ref 0–0.4)
EOSINOPHIL NFR BLD AUTO: 1.3 % (ref 0.3–6.2)
EPAP: 0
ERYTHROCYTE [DISTWIDTH] IN BLOOD BY AUTOMATED COUNT: 13.6 % (ref 12.3–15.4)
ERYTHROCYTE [DISTWIDTH] IN BLOOD BY AUTOMATED COUNT: 13.8 % (ref 12.3–15.4)
GLOBULIN UR ELPH-MCNC: 3.1 GM/DL
GLUCOSE BLDC GLUCOMTR-MCNC: 106 MG/DL (ref 70–130)
GLUCOSE BLDC GLUCOMTR-MCNC: 109 MG/DL (ref 70–130)
GLUCOSE BLDC GLUCOMTR-MCNC: 124 MG/DL (ref 70–130)
GLUCOSE BLDC GLUCOMTR-MCNC: 133 MG/DL (ref 70–130)
GLUCOSE BLDC GLUCOMTR-MCNC: 136 MG/DL (ref 70–130)
GLUCOSE BLDC GLUCOMTR-MCNC: 166 MG/DL (ref 70–130)
GLUCOSE SERPL-MCNC: 115 MG/DL (ref 65–99)
GLUCOSE SERPL-MCNC: 128 MG/DL (ref 65–99)
GLUCOSE UR STRIP-MCNC: NEGATIVE MG/DL
HBA1C MFR BLD: 6.3 % (ref 4.8–5.6)
HCO3 BLDV-SCNC: 25.3 MMOL/L (ref 22–28)
HCT VFR BLD AUTO: 31.3 % (ref 37.5–51)
HCT VFR BLD AUTO: 32.4 % (ref 37.5–51)
HDLC SERPL-MCNC: 29 MG/DL (ref 40–60)
HGB BLD-MCNC: 10.7 G/DL (ref 13–17.7)
HGB BLD-MCNC: 11.1 G/DL (ref 13–17.7)
HGB BLDA-MCNC: 11.6 G/DL (ref 13.5–17.5)
HGB UR QL STRIP.AUTO: NEGATIVE
HYALINE CASTS UR QL AUTO: ABNORMAL /LPF
IMM GRANULOCYTES # BLD AUTO: 0.02 10*3/MM3 (ref 0–0.05)
IMM GRANULOCYTES NFR BLD AUTO: 0.3 % (ref 0–0.5)
INHALED O2 CONCENTRATION: 21 %
IPAP: 0
KETONES UR QL STRIP: ABNORMAL
LDLC SERPL CALC-MCNC: 39 MG/DL (ref 0–100)
LDLC/HDLC SERPL: 1.36 {RATIO}
LEFT ATRIUM VOLUME INDEX: 29.9 ML/M2
LEUKOCYTE ESTERASE UR QL STRIP.AUTO: ABNORMAL
LYMPHOCYTES # BLD AUTO: 1.5 10*3/MM3 (ref 0.7–3.1)
LYMPHOCYTES NFR BLD AUTO: 18.8 % (ref 19.6–45.3)
MAGNESIUM SERPL-MCNC: 1.3 MG/DL (ref 1.6–2.4)
MAGNESIUM SERPL-MCNC: 1.3 MG/DL (ref 1.6–2.4)
MCH RBC QN AUTO: 30.1 PG (ref 26.6–33)
MCH RBC QN AUTO: 30.3 PG (ref 26.6–33)
MCHC RBC AUTO-ENTMCNC: 34.2 G/DL (ref 31.5–35.7)
MCHC RBC AUTO-ENTMCNC: 34.3 G/DL (ref 31.5–35.7)
MCV RBC AUTO: 88.2 FL (ref 79–97)
MCV RBC AUTO: 88.5 FL (ref 79–97)
METHGB BLD QL: 0.3 %
MODALITY: ABNORMAL
MONOCYTES # BLD AUTO: 0.61 10*3/MM3 (ref 0.1–0.9)
MONOCYTES NFR BLD AUTO: 7.6 % (ref 5–12)
NEUTROPHILS NFR BLD AUTO: 5.74 10*3/MM3 (ref 1.7–7)
NEUTROPHILS NFR BLD AUTO: 71.7 % (ref 42.7–76)
NITRITE UR QL STRIP: NEGATIVE
NRBC BLD AUTO-RTO: 0 /100 WBC (ref 0–0.2)
OXYHGB MFR BLDV: 69.5 %
PAW @ PEAK INSP FLOW SETTING VENT: 0 CMH2O
PCO2 BLDV: 45 MM HG (ref 41–51)
PH BLDV: 7.36 PH UNITS (ref 7.31–7.41)
PH UR STRIP.AUTO: <=5 [PH] (ref 5–8)
PHOSPHATE SERPL-MCNC: 3.7 MG/DL (ref 2.5–4.5)
PLATELET # BLD AUTO: 134 10*3/MM3 (ref 140–450)
PLATELET # BLD AUTO: 141 10*3/MM3 (ref 140–450)
PMV BLD AUTO: 9.4 FL (ref 6–12)
PMV BLD AUTO: 9.5 FL (ref 6–12)
PO2 BLDV: 39.8 MM HG (ref 27–53)
POTASSIUM SERPL-SCNC: 3.7 MMOL/L (ref 3.5–5.2)
POTASSIUM SERPL-SCNC: 3.9 MMOL/L (ref 3.5–5.2)
PROT SERPL-MCNC: 5.9 G/DL (ref 6–8.5)
PROT UR QL STRIP: ABNORMAL
RBC # BLD AUTO: 3.55 10*6/MM3 (ref 4.14–5.8)
RBC # BLD AUTO: 3.66 10*6/MM3 (ref 4.14–5.8)
RBC # UR STRIP: ABNORMAL /HPF
REF LAB TEST METHOD: ABNORMAL
SODIUM SERPL-SCNC: 135 MMOL/L (ref 136–145)
SODIUM SERPL-SCNC: 135 MMOL/L (ref 136–145)
SODIUM UR-SCNC: 37 MMOL/L
SP GR UR STRIP: 1.03 (ref 1–1.03)
SQUAMOUS #/AREA URNS HPF: ABNORMAL /HPF
TOTAL RATE: 0 BREATHS/MINUTE
TRIGL SERPL-MCNC: 88 MG/DL (ref 0–150)
UROBILINOGEN UR QL STRIP: ABNORMAL
UUN 24H UR-MCNC: 139 MG/DL
VLDLC SERPL-MCNC: 18 MG/DL (ref 5–40)
WBC # UR STRIP: ABNORMAL /HPF
WBC NRBC COR # BLD AUTO: 7.83 10*3/MM3 (ref 3.4–10.8)
WBC NRBC COR # BLD AUTO: 7.99 10*3/MM3 (ref 3.4–10.8)

## 2024-11-04 PROCEDURE — 25010000002 HEPARIN (PORCINE) PER 1000 UNITS

## 2024-11-04 PROCEDURE — 76775 US EXAM ABDO BACK WALL LIM: CPT

## 2024-11-04 PROCEDURE — 94799 UNLISTED PULMONARY SVC/PX: CPT

## 2024-11-04 PROCEDURE — 25010000002 MAGNESIUM SULFATE 2 GM/50ML SOLUTION: Performed by: INTERNAL MEDICINE

## 2024-11-04 PROCEDURE — 84100 ASSAY OF PHOSPHORUS: CPT

## 2024-11-04 PROCEDURE — 83036 HEMOGLOBIN GLYCOSYLATED A1C: CPT

## 2024-11-04 PROCEDURE — 80061 LIPID PANEL: CPT

## 2024-11-04 PROCEDURE — 25810000003 SODIUM CHLORIDE 0.9 % SOLUTION: Performed by: FAMILY MEDICINE

## 2024-11-04 PROCEDURE — 25010000002 MAGNESIUM SULFATE 2 GM/50ML SOLUTION

## 2024-11-04 PROCEDURE — 85027 COMPLETE CBC AUTOMATED: CPT

## 2024-11-04 PROCEDURE — 63710000001 INSULIN LISPRO (HUMAN) PER 5 UNITS

## 2024-11-04 PROCEDURE — 99232 SBSQ HOSP IP/OBS MODERATE 35: CPT | Performed by: INTERNAL MEDICINE

## 2024-11-04 PROCEDURE — 82330 ASSAY OF CALCIUM: CPT | Performed by: INTERNAL MEDICINE

## 2024-11-04 PROCEDURE — 82805 BLOOD GASES W/O2 SATURATION: CPT

## 2024-11-04 PROCEDURE — 82948 REAGENT STRIP/BLOOD GLUCOSE: CPT

## 2024-11-04 PROCEDURE — 97116 GAIT TRAINING THERAPY: CPT

## 2024-11-04 PROCEDURE — 82533 TOTAL CORTISOL: CPT | Performed by: INTERNAL MEDICINE

## 2024-11-04 PROCEDURE — 85025 COMPLETE CBC W/AUTO DIFF WBC: CPT | Performed by: INTERNAL MEDICINE

## 2024-11-04 PROCEDURE — 83605 ASSAY OF LACTIC ACID: CPT

## 2024-11-04 PROCEDURE — 80053 COMPREHEN METABOLIC PANEL: CPT | Performed by: INTERNAL MEDICINE

## 2024-11-04 PROCEDURE — 93306 TTE W/DOPPLER COMPLETE: CPT | Performed by: INTERNAL MEDICINE

## 2024-11-04 PROCEDURE — 84300 ASSAY OF URINE SODIUM: CPT

## 2024-11-04 PROCEDURE — 84540 ASSAY OF URINE/UREA-N: CPT

## 2024-11-04 PROCEDURE — 83735 ASSAY OF MAGNESIUM: CPT

## 2024-11-04 PROCEDURE — 25010000002 HYDROCORTISONE SOD SUC (PF) 100 MG RECONSTITUTED SOLUTION: Performed by: INTERNAL MEDICINE

## 2024-11-04 PROCEDURE — 63710000001 INSULIN GLARGINE PER 5 UNITS

## 2024-11-04 PROCEDURE — 97162 PT EVAL MOD COMPLEX 30 MIN: CPT

## 2024-11-04 PROCEDURE — 81001 URINALYSIS AUTO W/SCOPE: CPT | Performed by: INTERNAL MEDICINE

## 2024-11-04 PROCEDURE — 25010000002 COSYNTROPIN PER 0.25 MG: Performed by: INTERNAL MEDICINE

## 2024-11-04 PROCEDURE — 94664 DEMO&/EVAL PT USE INHALER: CPT

## 2024-11-04 PROCEDURE — 25810000003 SODIUM CHLORIDE 0.9 % SOLUTION

## 2024-11-04 PROCEDURE — 93306 TTE W/DOPPLER COMPLETE: CPT

## 2024-11-04 PROCEDURE — 82570 ASSAY OF URINE CREATININE: CPT

## 2024-11-04 RX ORDER — COSYNTROPIN 0.25 MG/ML
0.25 INJECTION, POWDER, FOR SOLUTION INTRAMUSCULAR; INTRAVENOUS ONCE
Status: COMPLETED | OUTPATIENT
Start: 2024-11-04 | End: 2024-11-04

## 2024-11-04 RX ORDER — MIDODRINE HYDROCHLORIDE 10 MG/1
10 TABLET ORAL
Status: DISCONTINUED | OUTPATIENT
Start: 2024-11-04 | End: 2024-11-05

## 2024-11-04 RX ORDER — MAGNESIUM SULFATE HEPTAHYDRATE 40 MG/ML
2 INJECTION, SOLUTION INTRAVENOUS
Status: DISPENSED | OUTPATIENT
Start: 2024-11-04 | End: 2024-11-04

## 2024-11-04 RX ORDER — COSYNTROPIN 0.25 MG/ML
0.25 INJECTION, POWDER, FOR SOLUTION INTRAMUSCULAR; INTRAVENOUS ONCE
Status: DISCONTINUED | OUTPATIENT
Start: 2024-11-04 | End: 2024-11-04

## 2024-11-04 RX ORDER — MAGNESIUM SULFATE HEPTAHYDRATE 40 MG/ML
2 INJECTION, SOLUTION INTRAVENOUS ONCE
Status: COMPLETED | OUTPATIENT
Start: 2024-11-04 | End: 2024-11-04

## 2024-11-04 RX ADMIN — BUDESONIDE AND FORMOTEROL FUMARATE DIHYDRATE 2 PUFF: 160; 4.5 AEROSOL RESPIRATORY (INHALATION) at 19:56

## 2024-11-04 RX ADMIN — ASPIRIN 81 MG: 81 TABLET, COATED ORAL at 08:13

## 2024-11-04 RX ADMIN — MIDODRINE HYDROCHLORIDE 10 MG: 10 TABLET ORAL at 12:48

## 2024-11-04 RX ADMIN — MAGNESIUM SULFATE HEPTAHYDRATE 2 G: 40 INJECTION, SOLUTION INTRAVENOUS at 05:04

## 2024-11-04 RX ADMIN — GABAPENTIN 300 MG: 300 CAPSULE ORAL at 08:13

## 2024-11-04 RX ADMIN — SODIUM CHLORIDE 125 ML/HR: 9 INJECTION, SOLUTION INTRAVENOUS at 01:03

## 2024-11-04 RX ADMIN — GABAPENTIN 300 MG: 300 CAPSULE ORAL at 21:39

## 2024-11-04 RX ADMIN — BRIMONIDINE TARTRATE 1 DROP: 2 SOLUTION/ DROPS OPHTHALMIC at 21:41

## 2024-11-04 RX ADMIN — Medication 5 MG: at 21:39

## 2024-11-04 RX ADMIN — INSULIN GLARGINE 15 UNITS: 100 INJECTION, SOLUTION SUBCUTANEOUS at 21:41

## 2024-11-04 RX ADMIN — Medication 10 ML: at 08:14

## 2024-11-04 RX ADMIN — HEPARIN SODIUM 5000 UNITS: 5000 INJECTION INTRAVENOUS; SUBCUTANEOUS at 05:03

## 2024-11-04 RX ADMIN — SODIUM CHLORIDE 1000 ML: 9 INJECTION, SOLUTION INTRAVENOUS at 03:16

## 2024-11-04 RX ADMIN — MIDODRINE HYDROCHLORIDE 10 MG: 10 TABLET ORAL at 16:41

## 2024-11-04 RX ADMIN — HYDROCORTISONE SODIUM SUCCINATE 50 MG: 100 INJECTION, POWDER, FOR SOLUTION INTRAMUSCULAR; INTRAVENOUS at 21:39

## 2024-11-04 RX ADMIN — ATORVASTATIN CALCIUM 80 MG: 40 TABLET, FILM COATED ORAL at 21:39

## 2024-11-04 RX ADMIN — COSYNTROPIN 0.25 MG: 0.25 INJECTION, POWDER, LYOPHILIZED, FOR SOLUTION INTRAMUSCULAR; INTRAVENOUS at 16:41

## 2024-11-04 RX ADMIN — MAGNESIUM SULFATE HEPTAHYDRATE 2 G: 40 INJECTION, SOLUTION INTRAVENOUS at 12:01

## 2024-11-04 RX ADMIN — MAGNESIUM SULFATE HEPTAHYDRATE 2 G: 40 INJECTION, SOLUTION INTRAVENOUS at 08:16

## 2024-11-04 RX ADMIN — HEPARIN SODIUM 5000 UNITS: 5000 INJECTION INTRAVENOUS; SUBCUTANEOUS at 21:39

## 2024-11-04 RX ADMIN — BRIMONIDINE TARTRATE 1 DROP: 2 SOLUTION/ DROPS OPHTHALMIC at 08:13

## 2024-11-04 RX ADMIN — BUDESONIDE AND FORMOTEROL FUMARATE DIHYDRATE 2 PUFF: 160; 4.5 AEROSOL RESPIRATORY (INHALATION) at 08:24

## 2024-11-04 RX ADMIN — HEPARIN SODIUM 5000 UNITS: 5000 INJECTION INTRAVENOUS; SUBCUTANEOUS at 15:10

## 2024-11-04 RX ADMIN — Medication 10 ML: at 21:43

## 2024-11-04 RX ADMIN — INSULIN LISPRO 2 UNITS: 100 INJECTION, SOLUTION INTRAVENOUS; SUBCUTANEOUS at 21:41

## 2024-11-04 RX ADMIN — BRIMONIDINE TARTRATE 1 DROP: 2 SOLUTION/ DROPS OPHTHALMIC at 15:10

## 2024-11-04 NOTE — PAYOR COMM NOTE
"Amadeo Kong (63 y.o. Male)       Date of Birth   1960    Social Security Number       Address   [address] [address]    Home Phone   [phone]    MRN   [id number]       [place]   [facility]    Marital Status   Single                            Admission Date   11/3/24    Admission Type   Emergency    Admitting Provider   Venecia Bryson MD    Attending Provider   Venecia Bryson MD    Department, Room/Bed   [address], S516/1       Discharge Date       Discharge Disposition       Discharge Destination                                 Attending Provider: Venecia Bryson MD    Allergies: Carrot [Daucus Carota], Ensure, Shellfish Allergy, Strawberry, Banana, Dust Mite Extract, Sulfamethoxazole-trimethoprim, Vancomycin    Isolation: None   Infection: None   Code Status: CPR    Ht: 188 cm (74.02\")   Wt: 113 kg (249 lb 1.9 oz)    Admission Cmt: None   Principal Problem: THOMAS (acute kidney injury) [N17.9]                   Active Insurance as of 11/3/2024       Primary Coverage       Payor Plan Insurance Group Employer/Plan Group    AETNA MEDICARE REPLACEMENT AETNA MEDICARE [place]PLAC[place] [id number]       Payor Plan Address Payor Plan Phone Number Payor Plan Fax Number Effective Dates    [address] [phone]  2024 - None Entered    [facility] [id number]         Subscriber Name Subscriber Birth Date Member ID       AMADEO KONG 1960 [id number]                     Emergency Contacts        (Rel.) Home Phone Work Phone Mobile Phone    Angelica Schofield (Significant Other) [phone] -- [phone]                 History & Physical        Tony Lockhart MD at [address] [place] [address]   HISTORY AND PHYSICAL    Patient Name: Amadeo Kong  : 1960  MRN: [id number]  Primary Care Physician:  Bianka Ann MD  Date of admission: 11/3/2024    Subjective  Subjective     Chief Complaint: Sweating and vomiting with low blood " pressure    HPI:    Amadeo Kong is a 63 y.o. male with a PMH significant for type 2 diabetes mellitus, HTN, peripheral diabetic neuropathy on gabapentin, chronic back pain secondary to spondylolisthesis of lumbar region and lateral recess lumbar spine stenosis presenting to [facility] ED secondary to diaphoresis, lightheadedness and vomiting that began a few hours prior to presentation while the patient was at [facility], he states he suddenly felt nauseous and broke out in a sweat, vomited a very small amount (less than a cup) and EMS was called and he was brought to ED.  He denies any previous similar episodes however states he had transient midsternal chest pain that lasted less than 3 minutes and subsequently subsided without further episodes.  Of note he is on Lasix 40 p.o. as needed for fluid buildup, his last dose was 2 days ago.  He denies ever checking blood pressure at home.  Of note did have recent admission in August for bilateral segmental PE, no RV strain on echo however was prescribed Eliquis at discharge, subsequent CTA PE revealed interval resolution of PE.  He does not recall his medications accurately however is on multiple medications for hypertension. He denies any pleuritic chest pain, loss of consciousness, melena, hematochezia, hematemesis, palpitations, loss of consciousness, dyspnea, orthopnea or bendopnea.    In the ED, had soft blood pressures, was hypotensive with blood pressure 96/60 and HR 64 on admission subsequently had labile blood pressures however he is mentating well and satting well.  Initial troponin 63 with subsequent decline in troponins to 56, no significant EKG changes for ischemia, potassium 3.4, abrupt increase in creatinine 2.24 from previous check 1.18 September 2024, blood glucose 157, hemoglobin 12.1, proBNP WNL.  Patient had CT abdomen pelvis and chest, ruled out acute pathology at this time.  Hospitalist team was contacted agreed to admit  patient.        Review of Systems   The following systems were reviewed and negative;  constitution, eyes, ENT, respiratory, cardiovascular, gastrointestinal, musculoskeletal, neurological, and behavioral/psych.    Personal History     Past Medical History:   Diagnosis Date    Arthritis     Asthma     inhaler daily     Diabetes mellitus 2000    insulin daily; checks blood sugars on occasion-run 150-180    Elevated cholesterol     History of sleep apnea     years ago diagnosed but never used a machine at home     History of staph infection 2010    wound infection after left knee replacement -saw infectious disease MD     Hypertension     Wears glasses        Past Surgical History:   Procedure Laterality Date    COLONOSCOPY      HIP SURGERY Right 08/11/2014    [facility]    INCISION AND DRAINAGE OF WOUND Left 2010    x2 of total knee replacement wound -iv antibiotics    KNEE SURGERY Bilateral     Right knee- 2008, left knee- 2010- [facility]    LUMBAR LAMINECTOMY WITH FUSION N/A 7/27/2018    Procedure: TLIF, OSTEOTOMY L4-5 , POST FUSION L4-5;  Surgeon: Yo Bryson MD;  Location: [facility] OR;  Service: Neurosurgery    LUMBAR LAMINECTOMY WITH FUSION N/A 8/27/2018    Procedure: LUMBAR LAMINECTOMY POSTERIOR LUMBAR INTERBODY FUSION;  Surgeon: oY Bryson MD;  Location: [facility] OR;  Service: Neurosurgery    ROTATOR CUFF REPAIR Right     TONSILLECTOMY         Family History: family history includes Hypertension in his mother. Otherwise pertinent FHx was reviewed and not pertinent to current issue.    Social History:  reports that he has never smoked. He has never used smokeless tobacco. He reports that he does not drink alcohol and does not use drugs.    Home Medications:  Cholecalciferol, Insulin Syringe-Needle U-100, OneTouch Delica Plus [id number], acetaminophen, albuterol sulfate HFA, aspirin, atorvastatin, atropine, brimonidine, budesonide-formoterol, carvedilol, colchicine, furosemide, gabapentin, glucose  blood, insulin aspart prot-insulin aspart, levocetirizine, lisinopril, meloxicam, montelukast, multivitamin, naproxen, and potassium chloride ER      Allergies:  Allergies   Allergen Reactions    Carrot [Daucus Carota] Swelling    Ensure Swelling     Carrots only    Shellfish Allergy Swelling and Unknown (See Comments)     tongue swelling    Strawberry Swelling     tongue swelling    Banana Unknown (See Comments)    Dust Mite Extract Other (See Comments)    Sulfamethoxazole-Trimethoprim Other (See Comments)     Patient developed blisters on his hands.  Patient developed blisters on his hands.    Vancomycin Rash     Received vancomycin x1 dose 7/18/22       Objective  Objective     Vitals:   Temp:  [97.6 °F (36.4 °C)] 97.6 °F (36.4 °C)  Heart Rate:  [60-73] 73  Resp:  [18] 18  BP: ()/(55-70) 82/56  Physical Exam    Constitutional: Awake, alert   Eyes: PERRLA, sclerae anicteric, no conjunctival injection   HENT: NCAT, mucous membranes moist   Neck: Supple, no thyromegaly, no lymphadenopathy, trachea midline   Respiratory: Clear to auscultation bilaterally, nonlabored respirations    Cardiovascular: RRR, no murmurs, rubs, or gallops, palpable pedal pulses bilaterally   Gastrointestinal: Positive bowel sounds, soft, nontender, nondistended   Musculoskeletal: No bilateral ankle edema, no clubbing or cyanosis to extremities   Psychiatric: Appropriate affect, cooperative   Neurologic: Oriented x 3, strength symmetric in all extremities, Cranial Nerves grossly intact to confrontation, speech clear   Skin: No rashes     Result Review   Result Review:  I have personally reviewed the results from the time of this admission to 11/3/2024 18:06 EST and agree with these findings:  [x]  Laboratory list / accordion  []  Microbiology  [x]  Radiology  [x]  EKG/Telemetry   [x]  Cardiology/Vascular   []  Pathology  [x]  Old records  []  Other:  Most notable findings include: Hypotension and THOMAS      Assessment & Plan    Active  Hospital Problems:  Active Hospital Problems    Diagnosis     **THOMAS (acute kidney injury)     Symptomatic hypotension     Dehydration     Acute pulmonary embolism     Type 2 diabetes mellitus, with long-term current use of insulin     Vomiting     Chest pain at rest     Peripheral neuropathy     Chronic back pain     Hypertension      Plan:     THOMAS (acute kidney injury):  Patient presenting to [facility] ED with a creatinine of 2.27 (previous creatinine 1.18 September 2024)  - Endorses not drinking any water during the day, had episode of symptomatic hypotension, remained hypotensive in the ER despite IV fluids (2 L boluses).  On multiple blood pressure medications and recently took a dose of Lasix 40 p.o. that he uses for swelling (2 days ago), patient also on multiple NSAIDs  - Suspect secondary to hypotension, is able to urinate denies oligouria  - CT abdomen pelvis without contrast ruled out hydronephrosis, cysts or other intrinsic overt anomaly.  Will hold off on renal ultrasound for now  - Will order urine lites, urea and creatinine  - Will order urine analysis to evaluate urine studies, need to rule out ATN in setting of hypotension  - Will consult nephrology, appreciate recommendations    Symptomatic hypotension/Dehydration:  Patient hypotensive with soft blood pressures on admission, was symptomatic prior to coming in at [facility], had episode of vomiting (endorses small-volume less than 1 cup) and diaphoresis with some mild transient chest pain with no recurrent symptoms  - Patient on multiple blood pressure medications including Coreg, lisinopril 10 mg and as needed Lasix  - Will order lactic acid levels to evaluate for tissue perfusion  - Will hold home regimen for now  - If evidence of hypoperfusion and persistent hypotension low threshold for transfer to ICU    Previous pulmonary embolism:  PMH of acute bilateral segmental PE on admission in August 2024, patient was discharged on Eliquis  subsequently with interval resolution on CTA chest in September 2024   - Patient responding to IV fluids at this time, no need for echo TTE, low suspicion for PE related RV Strain as cause of hypotension  - Not on Eliquis at this time, appears was provoked according to documentation  - Can repeat echo TTE this admission if patient does not respond to IV fluids   - Will need to use caution with fluid administration if patient has RV strain    Transient chest pain:  Patient had episode of transient chest pain that lasted less than 3 minutes during episode of vomiting and diaphoresis prior to coming in, no subsequent symptoms or episodes  - Troponins downtrending peaked at 63 no significant delta  - Likely secondary to vomiting episode, described as midsternal nonradiating at rest    Type 2 diabetes mellitus, with long-term current use of insulin/peripheral/diabetic neuropathy:  PMH of type 2 diabetes mellitus, patient on twice daily 45 units insulin aspart protamine - insulin aspart  - Will recheck HbA1c this admission, on gabapentin 300 mg 3 times daily for diabetic neuropathy  - Will cut dose to 300 twice daily given THOMAS and hypotensive episodes, GFR 31  - Will initiate patient on Lantus 15 units nightly and sliding scale insulin at this time, blood sugar 147 at this time  - Can consider increasing during this hospitalization as needed    Chronic back pain:   Patient has chronic back pain with spondylolisthesis of lumbar spine with stenosis, on tramadol, gabapentin, meloxicam, naproxen previously  - Holding most pain medications at this time given low blood pressures, not endorsing active pain at this time    Hypertension:  On Coreg and lisinopril, holding home regimen given hypotension    Anemia:  Hemoglobin 12.1 this admission, will check iron panel, unclear previous colonoscopy    Hx of Gout:  Holding febuxostat and colchicine in setting of THOMAS    VTE Prophylaxis:  Pharmacologic & mechanical VTE prophylaxis  orders are present.        CODE STATUS:    Level Of Support Discussed With: Patient  Code Status (Patient has no pulse and is not breathing): CPR (Attempt to Resuscitate)  Medical Interventions (Patient has pulse or is breathing): Full Support    Admission Status:  I believe this patient meets Admission status.    Total time spent: 75 minutes  Time spent includes time reviewing chart, face-to-face time, counseling patient/family/caregiver, ordering medications/tests/procedures, communicating with other health care professionals, documenting clinical information in the electronic health record, and coordination of care.      Tony Lockhart MD    Electronically signed by Tony Lockhart MD at 11/03/24 [id number]       Physician Progress Notes (all)    No notes of this type exist for this encounter.       Consult Notes (all)    No notes of this type exist for this encounter.

## 2024-11-04 NOTE — PROGRESS NOTES
"    [facility] Medicine Services  PROGRESS NOTE    Patient Name: Amadeo Kong  : 1960  MRN: [id number]    Date of Admission: 11/3/2024  Primary Care Physician: Bianka Ann MD    Subjective   Subjective     CC: nausea/vomiting f/u    HPI:  Patient reports feeling much better - nausea/vomiting yesterday w some chest pressure now resolved  Walked halls with PT and appeared well  Reports BP has been running lower \"for a while\" but cannot quantify  Reports recent short course of steroids but not up to admission    Pena overnight, irritating this morning. He reports some issues urinating, nothing else documented. Will trial out    Objective   Objective     Vital Signs:   Temp:  [97.4 °F (36.3 °C)-98.2 °F (36.8 °C)] 97.9 °F (36.6 °C)  Heart Rate:  [71-77] 76  Resp:  [16-18] 18  BP: ()/(48-79) 102/79     Physical Exam:  Constitutional: No acute distress, awake, alert male sitting up in bed. Very talkative  Respiratory: Clear to auscultation bilaterally, respiratory effort normal on room air  Cardiovascular: RRR, no murmurs, rubs, or gallops. Mild LE pitting edema  Gastrointestinal: Tenderness in LLQ, appears distractible on repeat palpation, otherwise soft and nondistended  Musculoskeletal: Muscle tone within normal limits, no joint effusions appreciated  Psychiatric: Appropriate affect, cooperative  Neurologic: Alert and oriented, facial movements symmetric and spontaneous movement of all 4 extremities grossly equal bilaterally, speech clear  Skin: No rashes    Results Reviewed:  LAB RESULTS:      Lab 24  0313 24  0046 24  0003 24  2102 24  1751 24  1438 24  1226   WBC 7.83 7.99  --   --   --   --  5.22   HEMOGLOBIN 10.7* 11.1*  --   --   --   --  12.1*   HEMATOCRIT 31.3* 32.4*  --   --   --   --  35.6*   PLATELETS 134* 141  --   --   --   --  148   NEUTROS ABS  --  5.74  --   --   --   --  3.31   IMMATURE GRANS (ABS)  --  0.02  --   --   --  "  --  0.01   LYMPHS ABS  --  1.50  --   --   --   --  1.25   MONOS ABS  --  0.61  --   --   --   --  0.56   EOS ABS  --  0.10  --   --   --   --  0.07   MCV 88.2 88.5  --   --   --   --  89.2   LACTATE 1.4  --  2.9* 3.3* 2.8*  --   --    D DIMER QUANT  --   --   --   --  0.50  --   --    HSTROP T  --   --   --   --   --  56* 63*         Lab 11/04/24  0457 11/04/24  0313 11/04/24  0046 11/03/24  1438 11/03/24  1226   SODIUM  --  135* 135*  --  137   POTASSIUM  --  3.7 3.9  --  3.4*   CHLORIDE  --  103 100  --  98   CO2  --  22.0 22.0  --  26.0   ANION GAP  --  10.0 13.0  --  13.0   BUN  --  14 13  --  10   CREATININE  --  2.35* 2.29*  --  2.27*   EGFR  --  30.1* 31.1*  --  31.4*   GLUCOSE  --  115* 128*  --  157*   CALCIUM  --  8.0* 8.0*  --  8.3*   IONIZED CALCIUM  --   --  1.09*  --   --    MAGNESIUM 1.3* 1.3*  --   --   --    PHOSPHORUS  --  3.7  --  3.7  --    HEMOGLOBIN A1C  --  6.30*  --   --   --    TSH  --   --   --  0.008*  --          Lab 11/04/24  0046 11/03/24  1226   TOTAL PROTEIN 5.9* 6.4   ALBUMIN 2.8* 3.1*   GLOBULIN 3.1 3.3   ALT (SGPT) 8 8   AST (SGOT) 16 16   BILIRUBIN 0.6 0.9   ALK PHOS 75 77   LIPASE  --  14         Lab 11/03/24  1438 11/03/24  1226   PROBNP  --  66.9   HSTROP T 56* 63*         Lab 11/04/24  0313   CHOLESTEROL 86   LDL CHOL 39   HDL CHOL 29*   TRIGLYCERIDES 88         Lab 11/03/24  1438   IRON 48*   IRON SATURATION (TSAT) 28   TIBC 170*   TRANSFERRIN 114*   FERRITIN 702.00*         Lab 11/04/24  0009   FIO2 21   CARBOXYHEMOGLOBIN (VENOUS) 1.3     Brief Urine Lab Results  (Last result in the past 365 days)        Color   Clarity   Blood   Leuk Est   Nitrite   Protein   CREAT   Urine HCG        11/04/24 0103             434.9         11/04/24 0103 Dark Yellow   Cloudy   Negative   Trace   Negative   Trace                   Microbiology Results Abnormal       None            US Renal Limited    Result Date: 11/4/2024  US RENAL LIMITED Date of Exam: 11/4/2024 11:12 AM EST Indication:  bhanu. Comparison: CT abdomen and pelvis 11/3/2024 Technique: Grayscale and color Doppler ultrasound evaluation of the kidneys and urinary bladder was performed. Findings: RIGHT kidney measures 10.7 x 6.0 x 6.0 cm.  Kidney echogenicity, size, and vascularity appear within normal limits. There is no solid kidney mass.  No echogenic shadowing stone.  No hydronephrosis. LEFT kidney measures 10.8 x 5.6 x 5.3 cm. Kidney echogenicity, size, and vascularity appear within normal limits. There is no solid kidney mass.  No echogenic shadowing stone.  No hydronephrosis. Partially decompressed by reported catheter.     Impression: Impression: 1.Unremarkable appearance of the kidneys. Electronically Signed: Ranjit Ty MD  11/4/2024 11:40 AM EST  Workstation ID: [id number]    Adult Transthoracic Echo Complete W/ Cont if Necessary Per Protocol    Result Date: 11/4/2024    Left ventricular systolic function is normal. Calculated left ventricular EF = 61%   Left ventricular diastolic function was normal.   No significant valvular disease     CT Abdomen Pelvis Without Contrast    Result Date: 11/3/2024  CT CHEST WO CONTRAST DIAGNOSTIC, CT ABDOMEN PELVIS WO CONTRAST Date of Exam: 11/3/2024 1:42 PM EST Indication: CP, vomiting. Comparison: CTA chest 9/16/2024, CT abdomen and pelvis 8/8/2024 Technique: Axial CT images were obtained of the chest, abdomen, and pelvis without contrast administration.  Reconstructed coronal and sagittal images were also obtained. Automated exposure control and iterative construction methods were used. Findings: Chest: Coronary artery calcifications are noted. No pericardial effusion. No thoracic adenopathy. Unremarkable appearance of the chest wall soft tissues. The trachea and mainstem bronchi are patent. Evaluation of the lungs is somewhat limited from respiratory motion artifact. There are small scattered regions of subpleural reticulations similar to prior. No focal consolidation. No lung mass or nodule.  No pleural effusion or pneumothorax. No acute or suspicious bony findings. Abdomen and pelvis: Unremarkable appearance of the liver. There is cholelithiasis without CT evidence of cholecystitis. Normal appearance of the spleen and pancreas. Unremarkable appearance of the adrenal glands, kidneys, ureters, and bladder. Streak artifact and beam hardening from right hip arthroplasty limits evaluation of the lower pelvis. Unremarkable appearance of the prostate and seminal vesicles. No bowel obstruction or inflammatory change of the GI tract. No abdominopelvic free fluid. No pneumoperitoneum. No acute or suspicious bony findings. Right total hip arthroplasty is partially imaged. Redemonstration of L4-S1 posterior instrumented fusion hardware and interbody fusion.     Impression: Impression: No acute findings in the chest, abdomen, or pelvis. Cholelithiasis without CT evidence of cholecystitis. Electronically Signed: Adolph Sandoval MD  11/3/2024 2:06 PM EST  Workstation ID: [id number]    CT Chest Without Contrast Diagnostic    Result Date: 11/3/2024  CT CHEST WO CONTRAST DIAGNOSTIC, CT ABDOMEN PELVIS WO CONTRAST Date of Exam: 11/3/2024 1:42 PM EST Indication: CP, vomiting. Comparison: CTA chest 9/16/2024, CT abdomen and pelvis 8/8/2024 Technique: Axial CT images were obtained of the chest, abdomen, and pelvis without contrast administration.  Reconstructed coronal and sagittal images were also obtained. Automated exposure control and iterative construction methods were used. Findings: Chest: Coronary artery calcifications are noted. No pericardial effusion. No thoracic adenopathy. Unremarkable appearance of the chest wall soft tissues. The trachea and mainstem bronchi are patent. Evaluation of the lungs is somewhat limited from respiratory motion artifact. There are small scattered regions of subpleural reticulations similar to prior. No focal consolidation. No lung mass or nodule. No pleural effusion or pneumothorax. No  acute or suspicious bony findings. Abdomen and pelvis: Unremarkable appearance of the liver. There is cholelithiasis without CT evidence of cholecystitis. Normal appearance of the spleen and pancreas. Unremarkable appearance of the adrenal glands, kidneys, ureters, and bladder. Streak artifact and beam hardening from right hip arthroplasty limits evaluation of the lower pelvis. Unremarkable appearance of the prostate and seminal vesicles. No bowel obstruction or inflammatory change of the GI tract. No abdominopelvic free fluid. No pneumoperitoneum. No acute or suspicious bony findings. Right total hip arthroplasty is partially imaged. Redemonstration of L4-S1 posterior instrumented fusion hardware and interbody fusion.     Impression: Impression: No acute findings in the chest, abdomen, or pelvis. Cholelithiasis without CT evidence of cholecystitis. Electronically Signed: Adolph Sandoval MD  11/3/2024 2:06 PM EST  Workstation ID: [id number]    XR Chest 1 View    Result Date: 11/3/2024  XR CHEST 1 VW Date of Exam: 11/3/2024 12:20 PM EST Indication: Chest Pain Triage Protocol Comparison: Chest x-ray 9/16/2024 Findings: Normal cardiomediastinal silhouette. The lungs are clear. No pleural effusion or pneumothorax. No acute osseous findings.     Impression: Impression: No acute cardiopulmonary findings. Electronically Signed: Adolph Sandoval MD  11/3/2024 1:04 PM EST  Workstation ID: [id number]     Results for orders placed during the hospital encounter of 11/03/24    Adult Transthoracic Echo Complete W/ Cont if Necessary Per Protocol    Interpretation Summary    Left ventricular systolic function is normal. Calculated left ventricular EF = 61%    Left ventricular diastolic function was normal.    No significant valvular disease      Current medications:  Scheduled Meds:aspirin, 81 mg, Oral, Daily  atorvastatin, 80 mg, Oral, Nightly  brimonidine, 1 drop, Both Eyes, TID  budesonide-formoterol, 2 puff, Inhalation, BID -  RT  cosyntropin, 0.25 mg, Intravenous, Once  gabapentin, 300 mg, Oral, Q12H  heparin (porcine), 5,000 Units, Subcutaneous, Q8H  insulin glargine, 15 Units, Subcutaneous, Nightly  insulin lispro, 2-9 Units, Subcutaneous, 4x Daily AC & at Bedtime  melatonin, 5 mg, Oral, Nightly  midodrine, 10 mg, Oral, TID AC  sodium chloride, 10 mL, Intravenous, Q12H      Continuous Infusions:   PRN Meds:.  acetaminophen **OR** acetaminophen **OR** acetaminophen    acetaminophen    albuterol sulfate HFA    Calcium Replacement - Follow Nurse / BPA Driven Protocol    dextrose    dextrose    Magnesium Standard Dose Replacement - Follow Nurse / BPA Driven Protocol    naphazoline-pheniramine    nitroglycerin    Phosphorus Replacement - Follow Nurse / BPA Driven Protocol    Potassium Replacement - Follow Nurse / BPA Driven Protocol    sodium chloride    sodium chloride    Assessment & Plan   Assessment & Plan     Active Hospital Problems    Diagnosis  POA    **THOMAS (acute kidney injury) [N17.9]  Yes    Symptomatic hypotension [I95.9]  Unknown    Dehydration [E86.0]  Unknown    Vomiting [R11.10]  Unknown    Chest pain at rest [R07.9]  Unknown    Acute pulmonary embolism [I26.99]  Yes    Peripheral neuropathy [G62.9]  Yes    Chronic back pain [M54.9, G89.29]  Yes    Type 2 diabetes mellitus, with long-term current use of insulin [E11.9, Z79.4]  Not Applicable    Hypertension [I10]  Yes      Resolved Hospital Problems   No resolved problems to display.        Brief Hospital Course to date:  Amadeo Kong is a 63 y.o. male w DMII c/b neuropathy, chronic back pain who presented w acute onset nausea/vomiting/chest pain and found to have low blood pressure.    Acute hypotension on chronic HTN  -hold home BP meds  -cortisol 1.56 on random check yesterday concerning for adrenal insufficiency. Obtain stim test given stability (patient asx, walking halls) and then initiate steroids as required by testing  -d/c IVF, already hypervolemic on  exam    THOMAS  -very likely 2/2 above. D/c IVF given hypervolemia on exam  -d/c blackburn, monitor for retention need and need for I&O cath    Acute urinary retention  -d/c blackburn, bladder scan for need for I&O cath      Severe hypoMg - replace, repeat in AM    Chest pain - very transient in setting of vomiting, trops c/w priors, ECHO wnl  DMII c/b neuropathy - basal/bolus dosing to goal, home gabapentin.   Gout - restart meds as THOMAS improves  BMI 31    Expected Discharge Location and Transportation: home  Expected Discharge 11/6 (Discharge date is tentative pending patient's medical condition and is subject to change)  Expected Discharge Date: 11/6/2024; Expected Discharge Time:      VTE Prophylaxis:  Pharmacologic & mechanical VTE prophylaxis orders are present.         AM-PAC 6 Clicks Score (PT): 21 (11/04/24 2869)    CODE STATUS:   Code Status and Medical Interventions: CPR (Attempt to Resuscitate); Full Support   Ordered at: 11/03/24 7898     Level Of Support Discussed With:    Patient     Code Status (Patient has no pulse and is not breathing):    CPR (Attempt to Resuscitate)     Medical Interventions (Patient has pulse or is breathing):    Full Support       Venecia Bryson MD  11/04/24

## 2024-11-04 NOTE — PLAN OF CARE
Problem: Adult Inpatient Plan of Care  Goal: Plan of Care Review  Outcome: Progressing  Goal: Patient-Specific Goal (Individualized)  Outcome: Progressing  Goal: Absence of Hospital-Acquired Illness or Injury  Outcome: Progressing  Intervention: Identify and Manage Fall Risk  Recent Flowsheet Documentation  Taken 11/4/2024 0400 by Giovanni Alvarez RN  Safety Promotion/Fall Prevention:   activity supervised   assistive device/personal items within reach   clutter free environment maintained   toileting scheduled   safety round/check completed   room organization consistent   nonskid shoes/slippers when out of bed  Taken 11/4/2024 0200 by Giovanni Alvarez, RN  Safety Promotion/Fall Prevention:   activity supervised   assistive device/personal items within reach   clutter free environment maintained   toileting scheduled   safety round/check completed   room organization consistent   nonskid shoes/slippers when out of bed  Taken 11/4/2024 0000 by Giovanni Alvarez RN  Safety Promotion/Fall Prevention:   activity supervised   assistive device/personal items within reach   clutter free environment maintained   toileting scheduled   safety round/check completed   room organization consistent   nonskid shoes/slippers when out of bed  Taken 11/3/2024 2217 by Giovanni Alvarez, RN  Safety Promotion/Fall Prevention:   activity supervised   assistive device/personal items within reach   clutter free environment maintained   toileting scheduled   safety round/check completed   room organization consistent   nonskid shoes/slippers when out of bed  Taken 11/3/2024 2000 by Giovanni Alvarez, RN  Safety Promotion/Fall Prevention:   activity supervised   assistive device/personal items within reach   clutter free environment maintained   toileting scheduled   safety round/check completed   room organization consistent   nonskid shoes/slippers when out of bed  Intervention: Prevent Skin Injury  Recent Flowsheet  Documentation  Taken 11/4/2024 0400 by Giovanni Alvarez RN  Body Position: position changed independently  Skin Protection: transparent dressing maintained  Taken 11/4/2024 0200 by Giovanni Alvarez RN  Body Position: position changed independently  Skin Protection: transparent dressing maintained  Taken 11/4/2024 0000 by Giovanni Alvarez RN  Body Position: position changed independently  Skin Protection: transparent dressing maintained  Taken 11/3/2024 2217 by Giovanni Alvarez RN  Body Position: position changed independently  Skin Protection:   transparent dressing maintained   incontinence pads utilized  Taken 11/3/2024 2000 by Giovanni Alvarez RN  Body Position: position changed independently  Skin Protection: transparent dressing maintained  Intervention: Prevent Infection  Recent Flowsheet Documentation  Taken 11/4/2024 0400 by Giovanni Alvarez RN  Infection Prevention:   cohorting utilized   environmental surveillance performed   equipment surfaces disinfected   hand hygiene promoted   rest/sleep promoted   single patient room provided  Taken 11/4/2024 0200 by Giovanni Alvarez RN  Infection Prevention:   cohorting utilized   environmental surveillance performed   equipment surfaces disinfected   hand hygiene promoted   rest/sleep promoted   single patient room provided  Taken 11/4/2024 0000 by Giovanni Alvarez RN  Infection Prevention:   cohorting utilized   environmental surveillance performed   equipment surfaces disinfected   hand hygiene promoted   rest/sleep promoted   single patient room provided  Taken 11/3/2024 2217 by Giovanni Alvarez RN  Infection Prevention:   cohorting utilized   environmental surveillance performed   equipment surfaces disinfected   hand hygiene promoted   rest/sleep promoted   single patient room provided  Taken 11/3/2024 2000 by Giovanni Alvarez RN  Infection Prevention:   cohorting utilized   environmental surveillance performed   equipment surfaces  disinfected   hand hygiene promoted   rest/sleep promoted   single patient room provided  Goal: Optimal Comfort and Wellbeing  Outcome: Progressing  Intervention: Provide Person-Centered Care  Recent Flowsheet Documentation  Taken 11/3/2024 2217 by Giovanni Alvarez RN  Trust Relationship/Rapport:   care explained   choices provided   emotional support provided   empathic listening provided   questions answered   questions encouraged   reassurance provided   thoughts/feelings acknowledged  Goal: Readiness for Transition of Care  Outcome: Progressing     Problem: Fall Injury Risk  Goal: Absence of Fall and Fall-Related Injury  Outcome: Progressing  Intervention: Identify and Manage Contributors  Recent Flowsheet Documentation  Taken 11/4/2024 0400 by Giovanni Alvarez RN  Medication Review/Management: medications reviewed  Taken 11/4/2024 0200 by Giovanni Alvarez RN  Medication Review/Management: medications reviewed  Taken 11/4/2024 0000 by Giovanni Alvarez RN  Medication Review/Management: medications reviewed  Taken 11/3/2024 2217 by Giovanni Alvarez RN  Medication Review/Management: medications reviewed  Taken 11/3/2024 2000 by Giovanni Alvarez RN  Medication Review/Management: medications reviewed  Intervention: Promote Injury-Free Environment  Recent Flowsheet Documentation  Taken 11/4/2024 0400 by Giovanni Alvarez RN  Safety Promotion/Fall Prevention:   activity supervised   assistive device/personal items within reach   clutter free environment maintained   toileting scheduled   safety round/check completed   room organization consistent   nonskid shoes/slippers when out of bed  Taken 11/4/2024 0200 by Giovanni Alvarez RN  Safety Promotion/Fall Prevention:   activity supervised   assistive device/personal items within reach   clutter free environment maintained   toileting scheduled   safety round/check completed   room organization consistent   nonskid shoes/slippers when out of bed  Taken  11/4/2024 0000 by Giovanni Alvarez RN  Safety Promotion/Fall Prevention:   activity supervised   assistive device/personal items within reach   clutter free environment maintained   toileting scheduled   safety round/check completed   room organization consistent   nonskid shoes/slippers when out of bed  Taken 11/3/2024 2217 by Giovanni Alvarez, RN  Safety Promotion/Fall Prevention:   activity supervised   assistive device/personal items within reach   clutter free environment maintained   toileting scheduled   safety round/check completed   room organization consistent   nonskid shoes/slippers when out of bed  Taken 11/3/2024 2000 by Giovanni Alvarez RN  Safety Promotion/Fall Prevention:   activity supervised   assistive device/personal items within reach   clutter free environment maintained   toileting scheduled   safety round/check completed   room organization consistent   nonskid shoes/slippers when out of bed   Goal Outcome Evaluation:

## 2024-11-04 NOTE — THERAPY EVALUATION
Patient Name: Amadeo Kong  : 1960    MRN: [id number]                              Today's Date: 2024       Admit Date: 11/3/2024    Visit Dx:     ICD-10-CM ICD-9-CM   1. Chest pain, unspecified type  R07.9 786.50   2. Nausea and vomiting, unspecified vomiting type  R11.2 787.01   3. THOMAS (acute kidney injury)  N17.9 584.9   4. Hyperglycemia due to diabetes mellitus  E11.65 250.02   5. Impaired functional mobility, balance, gait, and endurance  Z74.09 V49.89     Patient Active Problem List   Diagnosis    Hypertension    Type 2 diabetes mellitus, with long-term current use of insulin    Arthritis    Chronic bilateral low back pain without sciatica    Spondylolisthesis of lumbar region    Acquired spondylolisthesis    Chronic back pain    Hyponatremia    Anemia    Sciatica of right side    Stenosis of lateral recess of lumbar spine    Anterior leg pain, right    THOMAS (acute kidney injury)    Sepsis due to Klebsiella    HLD (hyperlipidemia)    Peripheral neuropathy    Acute cystitis with hematuria    Aphasia    Hypomagnesemia    Acute pulmonary embolism    Symptomatic hypotension    Dehydration    Vomiting    Chest pain at rest     Past Medical History:   Diagnosis Date    Arthritis     Asthma     inhaler daily     Diabetes mellitus 2000    insulin daily; checks blood sugars on occasion-run 150-180    Elevated cholesterol     History of sleep apnea     years ago diagnosed but never used a machine at home     History of staph infection     wound infection after left knee replacement -saw infectious disease MD     Hypertension     Wears glasses      Past Surgical History:   Procedure Laterality Date    COLONOSCOPY      HIP SURGERY Right 2014    [facility]    INCISION AND DRAINAGE OF WOUND Left 2010    x2 of total knee replacement wound -iv antibiotics    KNEE SURGERY Bilateral     Right knee- , left knee- - [facility]    LUMBAR LAMINECTOMY WITH FUSION N/A 2018    Procedure:  TLIF, OSTEOTOMY L4-5 , POST FUSION L4-5;  Surgeon: Yo Bryson MD;  Location: [facility] OR;  Service: Neurosurgery    LUMBAR LAMINECTOMY WITH FUSION N/A 8/27/2018    Procedure: LUMBAR LAMINECTOMY POSTERIOR LUMBAR INTERBODY FUSION;  Surgeon: Yo Bryson MD;  Location:  [facility];  Service: Neurosurgery    ROTATOR CUFF REPAIR Right     TONSILLECTOMY        General Information       Row Name 11/04/24 1409          Physical Therapy Time and Intention    Document Type evaluation  -SD     Mode of Treatment individual therapy;physical therapy  -SD       Row Name 11/04/24 1409          General Information    Patient Profile Reviewed yes  -SD     Prior Level of Function independent:;all household mobility;community mobility;gait;transfer;bed mobility;ADL's;driving  -SD     Existing Precautions/Restrictions fall  monitor BP  -SD     Barriers to Rehab previous functional deficit  -SD       Row Name 11/04/24 1409          Living Environment    People in Home significant other  -SD       Row Name 11/04/24 1409          Home Main Entrance    Number of Stairs, Main Entrance two  -SD       Row Name 11/04/24 1409          Cognition    Orientation Status (Cognition) oriented x 3  -SD       Row Name 11/04/24 1409          Safety Issues/Impairments Affecting Functional Mobility    Safety Issues Affecting Function (Mobility) impulsivity;judgment;safety precaution awareness;safety precautions follow-through/compliance  Pt has big personality, likes to joke around. Needs cues for safety awareness and attention to task.  -SD     Impairments Affecting Function (Mobility) balance;strength;range of motion (ROM);postural/trunk control;endurance/activity tolerance  -SD               User Key  (r) = Recorded By, (t) = Taken By, (c) = Cosigned By      Initials Name Provider Type    SD Jennifer Kunz, PT Physical Therapist                   Mobility       Row Name 11/04/24 1503          Bed Mobility    Bed Mobility supine-sit;sit-supine   -SD     Supine-Sit [place] (Bed Mobility) modified independence  -SD     Sit-Supine [place] (Bed Mobility) modified independence  -SD     Assistive Device (Bed Mobility) bed rails;head of bed elevated  -SD     Comment, (Bed Mobility) orthostatics checked: supine 102/75, sitting 105/79, standing 121/83  -SD       Row Name 11/04/24 1505          Transfers    Comment, (Transfers) pt benefitted from elev bed surface for sit > stand  -SD       Row Name 11/04/24 1505          Sit-Stand Transfer    Sit-Stand [place] (Transfers) standby assist  -SD       Row Name 11/04/24 1505          Gait/Stairs (Locomotion)    [place] Level (Gait) contact guard;1 person assist;verbal cues;nonverbal cues (demo/gesture)  -SD     Assistive Device (Gait) cane, straight;other (see comments)  -SD     Distance in Feet (Gait) 200  -SD     Deviations/Abnormal Patterns (Gait) bilateral deviations;base of support, wide;sofy decreased  -SD     Bilateral Gait Deviations forward flexed posture;heel strike decreased  -SD     Comment, (Gait/Stairs) Pt amb in hallway 200ft without assistive device pushing IV pole, dem unsteadiness, forward flexed posture, and decreased safety with IV pole/line. Pt then issued single point cane and again ambulated 200ft with improved balance and posture. Pt continues to dem decreased safety awareness with IV with all mobility. Pt denied dizziness, SOA, lightheadedness.  -SD               User Key  (r) = Recorded By, (t) = Taken By, (c) = Cosigned By      Initials Name Provider Type    Jennifer Lai PT Physical Therapist                   Obj/Interventions       Row Name 11/04/24 1514          Range of Motion Comprehensive    General Range of Motion bilateral lower extremity ROM WFL  -SD       Row Name 11/04/24 1514          Strength Comprehensive (MMT)    General Manual Muscle Testing (MMT) Assessment lower extremity strength deficits identified  -SD     Comment, General Manual Muscle  Testing (MMT) Assessment BLE's 4+/5  -SD       Row Name 11/04/24 1514          Balance    Balance Assessment sitting static balance;sitting dynamic balance;standing static balance;standing dynamic balance  -SD     Static Sitting Balance independent  -SD     Dynamic Sitting Balance contact guard  -SD     Position, Sitting Balance unsupported;sitting edge of bed  -SD     Static Standing Balance supervision  -SD     Dynamic Standing Balance contact guard  -SD     Position/Device Used, Standing Balance supported;unsupported;cane, straight  -SD     Balance Interventions sitting;standing;static;dynamic;occupation based/functional task  -SD     Comment, Balance lower body dressing, using urinal  -SD               User Key  (r) = Recorded By, (t) = Taken By, (c) = Cosigned By      Initials Name Provider Type    Jennifer Lai, PT Physical Therapist                   Goals/Plan       Row Name 11/04/24 1518          Transfer Goal 1 (PT)    Activity/Assistive Device (Transfer Goal 1, PT) sit-to-stand/stand-to-sit;bed-to-chair/chair-to-bed  -SD     [place] Level/Cues Needed (Transfer Goal 1, PT) modified independence  -SD     Time Frame (Transfer Goal 1, PT) short term goal (STG);3 days  -SD       Row Name 11/04/24 1518          Gait Training Goal 1 (PT)    Activity/Assistive Device (Gait Training Goal 1, PT) gait (walking locomotion);cane, straight  -SD     [place] Level (Gait Training Goal 1, PT) supervision required  -SD     Distance (Gait Training Goal 1, PT) 500  -SD     Time Frame (Gait Training Goal 1, PT) long term goal (LTG);10 days  -SD       Row Name 11/04/24 1518          Therapy Assessment/Plan (PT)    Planned Therapy Interventions (PT) balance training;bed mobility training;gait training;home exercise program;patient/family education;postural re-education;neuromuscular re-education;transfer training;strengthening  -SD               User Key  (r) = Recorded By, (t) = Taken By, (c) = Cosigned By       Initials Name Provider Type    SD Jennifer Kunz, PT Physical Therapist                   Clinical Impression       Row Name 11/04/24 1515          Pain    Pretreatment Pain Rating 0/10 - no pain  -SD     Posttreatment Pain Rating 0/10 - no pain  -SD       Row Name 11/04/24 1515          Plan of Care Review    Plan of Care Reviewed With patient;spouse  -SD     Outcome Evaluation Pt presents with decreased activity monica, impaired balance, difficulty walking and is below baseline level of function for mobility. Orthostatic BP checked with no drop in BP. Pt amb in hallway 200ft without assistive device pushing IV pole, dem unsteadiness, forward flexed posture, and decreased safety with IV pole/line. Pt then issued single point cane and again ambulated 200ft with improved balance and posture. Pt continues to dem decreased safety awareness with IV with all mobility. Pt denied dizziness, SOA, lightheadedness throughout. Pt will benefit from IPPT services to address limitations. PT rec d/c home with assist and outpatient PT.  -SD       Row Name 11/04/24 1515          Therapy Assessment/Plan (PT)    Patient/Family Therapy Goals Statement (PT) return home  -SD     Rehab Potential (PT) good  -SD     Criteria for Skilled Interventions Met (PT) yes;skilled treatment is necessary  -SD     Therapy Frequency (PT) daily  -SD     Predicted Duration of Therapy Intervention (PT) 10 days  -SD       Row Name 11/04/24 1515          Vital Signs    Pre Systolic BP Rehab 102  supine  -SD     Pre Treatment Diastolic BP 75  -SD     Intra Systolic BP Rehab 105  sitting  -SD     Intra Treatment Diastolic BP 79  -SD     Post Systolic BP Rehab 121  standing  -SD     Post Treatment Diastolic BP 83  -SD       Row Name 11/04/24 1515          Positioning and Restraints    Pre-Treatment Position in bed  -SD     Post Treatment Position bed  -SD     In Bed notified nsg;fowlers;call light within reach;encouraged to call for assist;exit alarm  on;with family/caregiver  -SD               User Key  (r) = Recorded By, (t) = Taken By, (c) = Cosigned By      Initials Name Provider Type    Jennifer Lai, [place] Physical Therapist                   Outcome Measures       Row Name 11/04/24 1519 11/04/24 0800       How much help from another person do you currently need...    Turning from your back to your side while in flat bed without using bedrails? 4  -SD 4  -BC    Moving from lying on back to sitting on the side of a flat bed without bedrails? 4  -SD 4  -BC    Moving to and from a bed to a chair (including a wheelchair)? 3  -SD 4  -BC    Standing up from a chair using your arms (e.g., wheelchair, bedside chair)? 4  -SD 3  -BC    Climbing 3-5 steps with a railing? 3  -SD 3  -BC    To walk in hospital room? 3  -SD 3  -BC    AM-PAC 6 Clicks Score (PT) 21  -SD 21  -BC    Highest Level of Mobility Goal 6 --> Walk 10 steps or more  -SD 6 --> Walk 10 steps or more  -BC      Row Name 11/04/24 1519          Functional Assessment    Outcome Measure Options AM-PAC 6 Clicks Basic Mobility (PT)  -SD               User Key  (r) = Recorded By, (t) = Taken By, (c) = Cosigned By      Initials Name Provider Type    Jennifer Lai, PT Physical Therapist    Shavon Conley, RN Registered Nurse                                 Physical Therapy Education       Title: PT OT SLP Therapies (Done)       Topic: Physical Therapy (Done)       Point: Mobility training (Done)       Learning Progress Summary            Patient Eager, E, VU,NR by SD at 11/4/2024 1519                      Point: Home exercise program (Done)       Learning Progress Summary            Patient Eager, E, VU,NR by SD at 11/4/2024 1519                      Point: Body mechanics (Done)       Learning Progress Summary            Patient Eager, E, VU,NR by SD at 11/4/2024 1519                      Point: Precautions (Done)       Learning Progress Summary            Patient Eager, E, VU,NR by SD at  11/4/2024 1519                                      User Key       Initials Effective Dates Name Provider Type Discipline    SD 03/13/23 -  Jennifer Kunz, PT Physical Therapist PT                  PT Recommendation and Plan  Planned Therapy Interventions (PT): balance training, bed mobility training, gait training, home exercise program, patient/family education, postural re-education, neuromuscular re-education, transfer training, strengthening  Outcome Evaluation: Pt presents with decreased activity monica, impaired balance, difficulty walking and is below baseline level of function for mobility. Orthostatic BP checked with no drop in BP. Pt amb in hallway 200ft without assistive device pushing IV pole, dem unsteadiness, forward flexed posture, and decreased safety with IV pole/line. Pt then issued single point cane and again ambulated 200ft with improved balance and posture. Pt continues to dem decreased safety awareness with IV with all mobility. Pt denied dizziness, SOA, lightheadedness throughout. Pt will benefit from IPPT services to address limitations. PT rec d/c home with assist and outpatient PT.     Time Calculation:   PT Evaluation Complexity  History, PT Evaluation Complexity: 3 or more personal factors and/or comorbidities  Examination of Body Systems (PT Eval Complexity): total of 3 or more elements  Clinical Presentation (PT Evaluation Complexity): unstable  Clinical Decision Making (PT Evaluation Complexity): moderate complexity  Overall Complexity (PT Evaluation Complexity): moderate complexity     PT Charges       Row Name 11/04/24 1520             Time Calculation    Start Time 1409  -SD      PT Non-Billable Time (min) 48 min  -SD      PT Received On 11/04/24  -SD      PT Goal Re-Cert Due Date 11/14/24  -SD         Timed Charges    39375 - Gait Training Minutes  19  -SD         Total Minutes    Timed Charges Total Minutes 19  -SD       Total Minutes 19  -SD                User Key  (r) =  Recorded By, (t) = Taken By, (c) = Cosigned By      Initials Name Provider Type    SD Jennifer Kunz, PT Physical Therapist                  Therapy Charges for Today       Code Description Service Date Service Provider Modifiers Qty    68533372534 HC GAIT TRAINING EA 15 MIN 11/4/2024 Jennifer Kunz, PT GP 1    25829606713 HC PT EVAL MOD COMPLEXITY 4 11/4/2024 Jennifer Kunz, PT GP 1            PT G-Codes  Outcome Measure Options: AM-PAC 6 Clicks Basic Mobility (PT)  AM-PAC 6 Clicks Score (PT): 21  PT Discharge Summary  Anticipated Discharge Disposition (PT): home with assist, home with outpatient therapy services    Jennifer Kunz, [place]  11/4/2024

## 2024-11-04 NOTE — PLAN OF CARE
Problem: Adult Inpatient Plan of Care  Goal: Plan of Care Review  Outcome: Progressing  Goal: Patient-Specific Goal (Individualized)  Outcome: Progressing  Goal: Absence of Hospital-Acquired Illness or Injury  Outcome: Progressing  Intervention: Identify and Manage Fall Risk  Recent Flowsheet Documentation  Taken 11/4/2024 1400 by Shavon Teresa RN  Safety Promotion/Fall Prevention:   activity supervised   clutter free environment maintained   assistive device/personal items within reach   fall prevention program maintained   toileting scheduled   safety round/check completed   room organization consistent   nonskid shoes/slippers when out of bed  Taken 11/4/2024 1200 by Shavon Teresa RN  Safety Promotion/Fall Prevention:   activity supervised   clutter free environment maintained   assistive device/personal items within reach   fall prevention program maintained   toileting scheduled   safety round/check completed   room organization consistent   nonskid shoes/slippers when out of bed  Taken 11/4/2024 1000 by Shavon Teresa RN  Safety Promotion/Fall Prevention:   activity supervised   clutter free environment maintained   assistive device/personal items within reach   fall prevention program maintained   toileting scheduled   safety round/check completed   room organization consistent   nonskid shoes/slippers when out of bed  Taken 11/4/2024 0800 by Shavon Teresa RN  Safety Promotion/Fall Prevention:   activity supervised   assistive device/personal items within reach   clutter free environment maintained   fall prevention program maintained   toileting scheduled   safety round/check completed   room organization consistent   nonskid shoes/slippers when out of bed  Intervention: Prevent Skin Injury  Recent Flowsheet Documentation  Taken 11/4/2024 1400 by Shavon Teresa RN  Body Position: position changed independently  Skin Protection: transparent dressing maintained  Taken 11/4/2024 1200 by Brii  GABRIELLA Sands  Body Position: position changed independently  Skin Protection: transparent dressing maintained  Taken 11/4/2024 1000 by Shavon Teresa RN  Body Position: position changed independently  Skin Protection: transparent dressing maintained  Taken 11/4/2024 0800 by Shavon Teresa RN  Body Position: position changed independently  Skin Protection: transparent dressing maintained  Intervention: Prevent Infection  Recent Flowsheet Documentation  Taken 11/4/2024 1400 by Shavon Teresa RN  Infection Prevention:   environmental surveillance performed   hand hygiene promoted   single patient room provided   rest/sleep promoted  Taken 11/4/2024 1200 by Shavon Teresa RN  Infection Prevention:   environmental surveillance performed   hand hygiene promoted   single patient room provided   rest/sleep promoted  Taken 11/4/2024 1000 by Shavon Teresa RN  Infection Prevention:   environmental surveillance performed   hand hygiene promoted   single patient room provided   rest/sleep promoted  Taken 11/4/2024 0800 by Shavon Teresa RN  Infection Prevention:   environmental surveillance performed   hand hygiene promoted   single patient room provided   rest/sleep promoted  Goal: Optimal Comfort and Wellbeing  Outcome: Progressing  Intervention: Provide Person-Centered Care  Recent Flowsheet Documentation  Taken 11/4/2024 0800 by Shavon Teresa RN  Trust Relationship/Rapport:   care explained   choices provided   questions answered   questions encouraged   thoughts/feelings acknowledged   reassurance provided  Goal: Readiness for Transition of Care  Outcome: Progressing     Problem: Fall Injury Risk  Goal: Absence of Fall and Fall-Related Injury  Outcome: Progressing  Intervention: Identify and Manage Contributors  Recent Flowsheet Documentation  Taken 11/4/2024 1400 by Shavon Teresa RN  Medication Review/Management: medications reviewed  Taken 11/4/2024 1200 by Shavon Teresa RN  Medication  Review/Management: medications reviewed  Taken 11/4/2024 1000 by Shavon Teresa, RN  Medication Review/Management: medications reviewed  Taken 11/4/2024 0800 by Shavon Teresa RN  Medication Review/Management: medications reviewed  Intervention: Promote Injury-Free Environment  Recent Flowsheet Documentation  Taken 11/4/2024 1400 by Shavon Teresa, RN  Safety Promotion/Fall Prevention:   activity supervised   clutter free environment maintained   assistive device/personal items within reach   fall prevention program maintained   toileting scheduled   safety round/check completed   room organization consistent   nonskid shoes/slippers when out of bed  Taken 11/4/2024 1200 by Shavon Teresa, RN  Safety Promotion/Fall Prevention:   activity supervised   clutter free environment maintained   assistive device/personal items within reach   fall prevention program maintained   toileting scheduled   safety round/check completed   room organization consistent   nonskid shoes/slippers when out of bed  Taken 11/4/2024 1000 by Shavon Teresa, RN  Safety Promotion/Fall Prevention:   activity supervised   clutter free environment maintained   assistive device/personal items within reach   fall prevention program maintained   toileting scheduled   safety round/check completed   room organization consistent   nonskid shoes/slippers when out of bed  Taken 11/4/2024 0800 by Shavon Teresa, RN  Safety Promotion/Fall Prevention:   activity supervised   assistive device/personal items within reach   clutter free environment maintained   fall prevention program maintained   toileting scheduled   safety round/check completed   room organization consistent   nonskid shoes/slippers when out of bed   Goal Outcome Evaluation:

## 2024-11-04 NOTE — PLAN OF CARE
Goal Outcome Evaluation:  Plan of Care Reviewed With: patient, spouse           Outcome Evaluation: Pt presents with decreased activity monica, impaired balance, difficulty walking and is below baseline level of function for mobility. Orthostatic BP checked with no drop in BP. Pt amb in hallway 200ft without assistive device pushing IV pole, dem unsteadiness, forward flexed posture, and decreased safety with IV pole/line. Pt then issued single point cane and again ambulated 200ft with improved balance and posture. Pt continues to dem decreased safety awareness with IV with all mobility. Pt denied dizziness, SOA, lightheadedness throughout. Pt will benefit from IPPT services to address limitations. PT rec d/c home with assist and outpatient PT.    Anticipated Discharge Disposition (PT): home with assist, home with outpatient therapy services

## 2024-11-04 NOTE — CONSULTS
Patient Care Team:  Bianka Ann MD as PCP - General (Internal Medicine)  Ileana Page MD as Consulting Physician (Interventional Cardiology)  Randell Farfan MD as Consulting Physician (Orthopedic Surgery)    Chief complaint: THOMAS on CKD stage 3     Consults   Subjective   HPI   THIS IS A 63YOM with T2DM, HTN, Spondylitis. Patient was admitted on 11/3/24 wit vomiting, and nausea. Patient also reports some chest pain. Recent hospitalization in 8/2024 with Bilateral PE and he was started on OAC Eliuqis. On admission, patient was very hypotensive BP < 90/ 60. Nephrology consulted for THOMAS management with Scr of 2.3 with BASELINE Scr of 1.1 to 1.3 mg/dL. CT abd and pelvis WO contrast showe no acute finding. Home medications include lisinopril, meloxicam and naproxen but patient denied any NSAIDs use     On IVF Normal saline at 125 cc/h with blackburn catheter showed dark urine  BP 86/57         Review of Systems   Cardiovascular:  Positive for chest pain.   Gastrointestinal:  Positive for nausea.    12 points ROS negative except as per HPI    Past Medical History:   Diagnosis Date    Arthritis     Asthma     inhaler daily     Diabetes mellitus 2000    insulin daily; checks blood sugars on occasion-run 150-180    Elevated cholesterol     History of sleep apnea     years ago diagnosed but never used a machine at home     History of staph infection 2010    wound infection after left knee replacement -saw infectious disease MD     Hypertension     Wears glasses    ,   Past Surgical History:   Procedure Laterality Date    COLONOSCOPY      HIP SURGERY Right 08/11/2014    [facility]    INCISION AND DRAINAGE OF WOUND Left 2010    x2 of total knee replacement wound -iv antibiotics    KNEE SURGERY Bilateral     Right knee- 2008, left knee- 2010- [facility]    LUMBAR LAMINECTOMY WITH FUSION N/A 7/27/2018    Procedure: TLIF, OSTEOTOMY L4-5 , POST FUSION L4-5;  Surgeon: Yo Bryson MD;  Location: On license of [facility];  Service:  Neurosurgery    LUMBAR LAMINECTOMY WITH FUSION N/A 8/27/2018    Procedure: LUMBAR LAMINECTOMY POSTERIOR LUMBAR INTERBODY FUSION;  Surgeon: Yo Bryson MD;  Location: [facility];  Service: Neurosurgery    ROTATOR CUFF REPAIR Right     TONSILLECTOMY     ,   Family History   Problem Relation Age of Onset    Hypertension Mother    ,   Social History     Socioeconomic History    Marital status: Single   Tobacco Use    Smoking status: Never    Smokeless tobacco: Never   Vaping Use    Vaping status: Never Used   Substance and Sexual Activity    Alcohol use: No    Drug use: No    Sexual activity: Defer     E-cigarette/Vaping    E-cigarette/Vaping Use Never User     Passive Exposure No     Counseling Given No      E-cigarette/Vaping Substances    Nicotine No     THC No     CBD No     Flavoring No      E-cigarette/Vaping Devices    Disposable No     Pre-filled or Refillable Cartridge No     Refillable Tank No     Pre-filled Pod No          ,   Medications Prior to Admission   Medication Sig Dispense Refill Last Dose/Taking    acetaminophen (TYLENOL) 325 MG tablet Take 2 tablets by mouth Every 6 (Six) Hours As Needed for Fever.   Past Week    albuterol sulfate  (90 Base) MCG/ACT inhaler Inhale 2 puffs 4 (Four) Times a Day As Needed for Shortness of Air. 8 g 0 Past Week    aspirin 81 MG EC tablet Take 1 tablet by mouth Daily.   11/2/2024    atorvastatin (LIPITOR) 80 MG tablet Take 1 tablet by mouth Every Night. 90 tablet 0 11/2/2024    atropine 1 % ophthalmic solution Administer 1 drop into the left eye 2 (Two) Times a Day.   11/2/2024    brimonidine (ALPHAGAN) 0.2 % ophthalmic solution Apply 1 drop to eye(s) as directed by provider.   11/2/2024    budesonide-formoterol (SYMBICORT) 160-4.5 MCG/ACT inhaler Inhale 2 puffs 2 (Two) Times a Day. 10.2 g 0 11/2/2024    carvedilol (COREG) 6.25 MG tablet Take 1 tablet by mouth 2 (Two) Times a Day With Meals.   11/2/2024    Cholecalciferol 50 MCG (2000 UT) tablet Take 1 tablet  "by mouth Daily.   11/2/2024    colchicine 0.6 MG tablet Take 2 tablets by mouth at start of attack and 1 tablet after 1 hour.   Past Week    furosemide (LASIX) 40 MG tablet Take 1 tablet by mouth 2 (Two) Times a Day.   11/2/2024    gabapentin (NEURONTIN) 300 MG capsule Take 1 capsule by mouth 3 (Three) Times a Day.   11/2/2024    insulin aspart prot-insulin aspart (NovoLOG Mix 70/30) (70-30) 100 UNIT/ML injection Inject 45 units sub-q in the morning, 40 units sub-q every evening.   11/2/2024    levocetirizine (XYZAL) 5 MG tablet Take 1 tablet by mouth Every Evening.   11/2/2024    lisinopril (PRINIVIL,ZESTRIL) 10 MG tablet Take 1 tablet by mouth Daily. 30 tablet 0 11/2/2024    meloxicam (MOBIC) 15 MG tablet Take 1 tablet by mouth Daily As Needed for Mild Pain or Moderate Pain.   Past Month    montelukast (SINGULAIR) 10 MG tablet Take 1 tablet by mouth Every Night.   11/2/2024    Multiple Vitamin (multivitamin) capsule Take 1 capsule by mouth Daily. 30 capsule 0 11/2/2024    naproxen (NAPROSYN) 500 MG tablet Take 1 tablet by mouth 2 (Two) Times a Day.   11/2/2024    potassium chloride ER (K-TAB) 20 MEQ tablet controlled-release ER tablet Take 1 tablet by mouth Daily.   11/2/2024    glucose blood (OneTouch Ultra) test strip TEST BLOOD SUGAR THREE TIMES DAILY AS DIRECTED       Lancets (OneTouch Delica Plus [id number]) misc 1 LANCET AS DIRECTED FOUR TIMES A DAY. USE AS NEEDED       UltiCare Insulin Syringe 31G X 5/16\" 1 ML misc USE TWICE DAILY TO INJECT INSULIN AS DIRECTED      , Scheduled Meds:  aspirin, 81 mg, Oral, Daily  atorvastatin, 80 mg, Oral, Nightly  brimonidine, 1 drop, Both Eyes, TID  budesonide-formoterol, 2 puff, Inhalation, BID - RT  gabapentin, 300 mg, Oral, Q12H  heparin (porcine), 5,000 Units, Subcutaneous, Q8H  insulin glargine, 15 Units, Subcutaneous, Nightly  insulin lispro, 2-9 Units, Subcutaneous, 4x Daily AC & at Bedtime  magnesium sulfate, 2 g, Intravenous, Q2H  melatonin, 5 mg, Oral, " Nightly  sodium chloride, 10 mL, Intravenous, Q12H   , Continuous Infusions:   , PRN Meds:    acetaminophen **OR** acetaminophen **OR** acetaminophen    acetaminophen    albuterol sulfate HFA    Calcium Replacement - Follow Nurse / BPA Driven Protocol    dextrose    dextrose    Magnesium Standard Dose Replacement - Follow Nurse / BPA Driven Protocol    naphazoline-pheniramine    nitroglycerin    Phosphorus Replacement - Follow Nurse / BPA Driven Protocol    Potassium Replacement - Follow Nurse / BPA Driven Protocol    sodium chloride    sodium chloride, Allergies:  Carrot [daucus carota], Ensure, Shellfish allergy, Strawberry, Banana, Dust mite extract, Sulfamethoxazole-trimethoprim, and Vancomycin    Objective     Vital Signs  Temp:  [97.4 °F (36.3 °C)-98 °F (36.7 °C)] 98 °F (36.7 °C)  Heart Rate:  [60-73] 71  Resp:  [16-18] 18  BP: ()/(48-70) 97/66    I/O this shift:  In: 240 [P.O.:240]  Out: -   I/O last 3 completed shifts:  In: 2483.3 [I.V.:2483.3]  Out: 300 [Urine:300]    Physical Exam  Constitutional:       General: He is not in acute distress.     Appearance: Normal appearance. He is obese. He is not ill-appearing.   HENT:      Mouth/Throat:      Mouth: Mucous membranes are dry.   Eyes:      Extraocular Movements: Extraocular movements intact.      Pupils: Pupils are equal, round, and reactive to light.   Cardiovascular:      Rate and Rhythm: Normal rate.   Pulmonary:      Effort: Pulmonary effort is normal.   Abdominal:      General: There is distension.      Palpations: Abdomen is soft.      Tenderness: There is no abdominal tenderness.   Musculoskeletal:      Right lower leg: Edema present.      Left lower leg: Edema present.   Skin:     General: Skin is warm and dry.      Coloration: Skin is pale.   Neurological:      General: No focal deficit present.      Mental Status: He is alert.   Psychiatric:         Mood and Affect: Mood normal.         Thought Content: Thought content normal.  "        Results Review:      I reviewed the patient's new clinical results.    WBC WBC   Date Value Ref Range Status   11/04/2024 7.83 3.40 - 10.80 10*3/mm3 Final   11/04/2024 7.99 3.40 - 10.80 10*3/mm3 Final   11/03/2024 5.22 3.40 - 10.80 10*3/mm3 Final      HGB Hemoglobin   Date Value Ref Range Status   11/04/2024 10.7 (L) 13.0 - 17.7 g/dL Final   11/04/2024 11.1 (L) 13.0 - 17.7 g/dL Final   11/03/2024 12.1 (L) 13.0 - 17.7 g/dL Final      HCT Hematocrit   Date Value Ref Range Status   11/04/2024 31.3 (L) 37.5 - 51.0 % Final   11/04/2024 32.4 (L) 37.5 - 51.0 % Final   11/03/2024 35.6 (L) 37.5 - 51.0 % Final      Platlets No results found for: \"LABPLAT\"   MCV MCV   Date Value Ref Range Status   11/04/2024 88.2 79.0 - 97.0 fL Final   11/04/2024 88.5 79.0 - 97.0 fL Final   11/03/2024 89.2 79.0 - 97.0 fL Final          Sodium Sodium   Date Value Ref Range Status   11/04/2024 135 (L) 136 - 145 mmol/L Final   11/04/2024 135 (L) 136 - 145 mmol/L Final   11/03/2024 137 136 - 145 mmol/L Final      Potassium Potassium   Date Value Ref Range Status   11/04/2024 3.7 3.5 - 5.2 mmol/L Final     Comment:     Slight hemolysis detected by analyzer. Result may be falsely elevated.   11/04/2024 3.9 3.5 - 5.2 mmol/L Final     Comment:     Slight hemolysis detected by analyzer. Result may be falsely elevated.   11/03/2024 3.4 (L) 3.5 - 5.2 mmol/L Final     Comment:     Slight hemolysis detected by analyzer. Result may be falsely elevated.      Chloride Chloride   Date Value Ref Range Status   11/04/2024 103 98 - 107 mmol/L Final   11/04/2024 100 98 - 107 mmol/L Final   11/03/2024 98 98 - 107 mmol/L Final      CO2 CO2   Date Value Ref Range Status   11/04/2024 22.0 22.0 - 29.0 mmol/L Final   11/04/2024 22.0 22.0 - 29.0 mmol/L Final   11/03/2024 26.0 22.0 - 29.0 mmol/L Final      BUN BUN   Date Value Ref Range Status   11/04/2024 14 8 - 23 mg/dL Final   11/04/2024 13 8 - 23 mg/dL Final   11/03/2024 10 8 - 23 mg/dL Final      Creatinine " "Creatinine   Date Value Ref Range Status   11/04/2024 2.35 (H) 0.76 - 1.27 mg/dL Final   11/04/2024 2.29 (H) 0.76 - 1.27 mg/dL Final   11/03/2024 2.27 (H) 0.76 - 1.27 mg/dL Final      Calcium Calcium   Date Value Ref Range Status   11/04/2024 8.0 (L) 8.6 - 10.5 mg/dL Final   11/04/2024 8.0 (L) 8.6 - 10.5 mg/dL Final   11/03/2024 8.3 (L) 8.6 - 10.5 mg/dL Final      PO4 No results found for: \"CAPO4\"   Albumin Albumin   Date Value Ref Range Status   11/04/2024 2.8 (L) 3.5 - 5.2 g/dL Final   11/03/2024 3.1 (L) 3.5 - 5.2 g/dL Final      Magnesium Magnesium   Date Value Ref Range Status   11/04/2024 1.3 (L) 1.6 - 2.4 mg/dL Final   11/04/2024 1.3 (L) 1.6 - 2.4 mg/dL Final      Uric Acid No results found for: \"URICACID\"         Assessment & Plan       THOMAS (acute kidney injury)    Hypertension    Type 2 diabetes mellitus, with long-term current use of insulin    Chronic back pain    Peripheral neuropathy    Acute pulmonary embolism    Symptomatic hypotension    Dehydration    Vomiting    Chest pain at rest      Assessment & Plan    - Non oliguric THOMAS likely pre renal secondary to vomiting, hypotension in the setting of ACEi and NSAIDs use ( Baseline Scr of 1 mg/dL)    - Severe hypotension     - Vomiting     - Acute urine retention s.p Pena catheter    - T2DM     - Chronic back pain    PLAN   No acute indications for RRT at this time  Hold anti hypertension medications  Continue midodrine for goal BP > 65  Stop IVF may need pressors support  Check RENAL US   ECHO  UA/ UPCR, TSH  Dose all meds to GFR  Avoid nephrotoxins, IV contrast and NSAIDs use      I discussed the patients findings and my recommendations with patient    Debby Boyd MD  11/04/24  09:57 EST          "

## 2024-11-05 LAB
ANION GAP SERPL CALCULATED.3IONS-SCNC: 12 MMOL/L (ref 5–15)
BACTERIA UR QL AUTO: NORMAL /HPF
BILIRUB UR QL STRIP: NEGATIVE
BUN SERPL-MCNC: 12 MG/DL (ref 8–23)
BUN/CREAT SERPL: 9.2 (ref 7–25)
CALCIUM SPEC-SCNC: 8.4 MG/DL (ref 8.6–10.5)
CHLORIDE SERPL-SCNC: 104 MMOL/L (ref 98–107)
CK SERPL-CCNC: 84 U/L (ref 20–200)
CLARITY UR: CLEAR
CO2 SERPL-SCNC: 20 MMOL/L (ref 22–29)
COLOR UR: YELLOW
CREAT SERPL-MCNC: 1.31 MG/DL (ref 0.76–1.27)
DEPRECATED RDW RBC AUTO: 43.9 FL (ref 37–54)
EGFRCR SERPLBLD CKD-EPI 2021: 60.8 ML/MIN/1.73
ERYTHROCYTE [DISTWIDTH] IN BLOOD BY AUTOMATED COUNT: 13.5 % (ref 12.3–15.4)
GLUCOSE BLDC GLUCOMTR-MCNC: 175 MG/DL (ref 70–130)
GLUCOSE BLDC GLUCOMTR-MCNC: 175 MG/DL (ref 70–130)
GLUCOSE BLDC GLUCOMTR-MCNC: 187 MG/DL (ref 70–130)
GLUCOSE BLDC GLUCOMTR-MCNC: 190 MG/DL (ref 70–130)
GLUCOSE SERPL-MCNC: 154 MG/DL (ref 65–99)
GLUCOSE UR STRIP-MCNC: NEGATIVE MG/DL
HCT VFR BLD AUTO: 32.9 % (ref 37.5–51)
HGB BLD-MCNC: 11.3 G/DL (ref 13–17.7)
HGB UR QL STRIP.AUTO: ABNORMAL
HYALINE CASTS UR QL AUTO: NORMAL /LPF
KETONES UR QL STRIP: NEGATIVE
LEUKOCYTE ESTERASE UR QL STRIP.AUTO: NEGATIVE
MAGNESIUM SERPL-MCNC: 1.9 MG/DL (ref 1.6–2.4)
MCH RBC QN AUTO: 30.2 PG (ref 26.6–33)
MCHC RBC AUTO-ENTMCNC: 34.3 G/DL (ref 31.5–35.7)
MCV RBC AUTO: 88 FL (ref 79–97)
NITRITE UR QL STRIP: NEGATIVE
PH UR STRIP.AUTO: 6 [PH] (ref 5–8)
PLATELET # BLD AUTO: 150 10*3/MM3 (ref 140–450)
PMV BLD AUTO: 9.6 FL (ref 6–12)
POTASSIUM SERPL-SCNC: 4.3 MMOL/L (ref 3.5–5.2)
PROT UR QL STRIP: NEGATIVE
RBC # BLD AUTO: 3.74 10*6/MM3 (ref 4.14–5.8)
RBC # UR STRIP: NORMAL /HPF
REF LAB TEST METHOD: NORMAL
SODIUM SERPL-SCNC: 136 MMOL/L (ref 136–145)
SP GR UR STRIP: <=1.005 (ref 1–1.03)
SQUAMOUS #/AREA URNS HPF: NORMAL /HPF
T4 FREE SERPL-MCNC: 1.75 NG/DL (ref 0.92–1.68)
TSH SERPL DL<=0.05 MIU/L-ACNC: <0.005 UIU/ML (ref 0.27–4.2)
UROBILINOGEN UR QL STRIP: ABNORMAL
WBC # UR STRIP: NORMAL /HPF
WBC NRBC COR # BLD AUTO: 7.29 10*3/MM3 (ref 3.4–10.8)

## 2024-11-05 PROCEDURE — 63710000001 INSULIN LISPRO (HUMAN) PER 5 UNITS

## 2024-11-05 PROCEDURE — 63710000001 INSULIN GLARGINE PER 5 UNITS: Performed by: INTERNAL MEDICINE

## 2024-11-05 PROCEDURE — 94799 UNLISTED PULMONARY SVC/PX: CPT

## 2024-11-05 PROCEDURE — 80048 BASIC METABOLIC PNL TOTAL CA: CPT | Performed by: INTERNAL MEDICINE

## 2024-11-05 PROCEDURE — 94664 DEMO&/EVAL PT USE INHALER: CPT

## 2024-11-05 PROCEDURE — 81001 URINALYSIS AUTO W/SCOPE: CPT | Performed by: FAMILY MEDICINE

## 2024-11-05 PROCEDURE — 25010000002 HYDROCORTISONE SOD SUC (PF) 100 MG RECONSTITUTED SOLUTION: Performed by: INTERNAL MEDICINE

## 2024-11-05 PROCEDURE — 85027 COMPLETE CBC AUTOMATED: CPT | Performed by: INTERNAL MEDICINE

## 2024-11-05 PROCEDURE — 25010000002 HEPARIN (PORCINE) PER 1000 UNITS

## 2024-11-05 PROCEDURE — 82550 ASSAY OF CK (CPK): CPT | Performed by: INTERNAL MEDICINE

## 2024-11-05 PROCEDURE — 82024 ASSAY OF ACTH: CPT | Performed by: INTERNAL MEDICINE

## 2024-11-05 PROCEDURE — 83735 ASSAY OF MAGNESIUM: CPT | Performed by: INTERNAL MEDICINE

## 2024-11-05 PROCEDURE — 82948 REAGENT STRIP/BLOOD GLUCOSE: CPT

## 2024-11-05 PROCEDURE — 84443 ASSAY THYROID STIM HORMONE: CPT | Performed by: INTERNAL MEDICINE

## 2024-11-05 PROCEDURE — 84439 ASSAY OF FREE THYROXINE: CPT | Performed by: INTERNAL MEDICINE

## 2024-11-05 PROCEDURE — 99232 SBSQ HOSP IP/OBS MODERATE 35: CPT | Performed by: INTERNAL MEDICINE

## 2024-11-05 RX ORDER — HYDROCORTISONE 10 MG/1
20 TABLET ORAL
Status: DISCONTINUED | OUTPATIENT
Start: 2024-11-06 | End: 2024-11-06 | Stop reason: HOSPADM

## 2024-11-05 RX ORDER — HYDROCORTISONE 10 MG/1
10 TABLET ORAL
Status: DISCONTINUED | OUTPATIENT
Start: 2024-11-05 | End: 2024-11-06 | Stop reason: HOSPADM

## 2024-11-05 RX ADMIN — INSULIN GLARGINE 10 UNITS: 100 INJECTION, SOLUTION SUBCUTANEOUS at 21:39

## 2024-11-05 RX ADMIN — HYDROCORTISONE SODIUM SUCCINATE 50 MG: 100 INJECTION, POWDER, FOR SOLUTION INTRAMUSCULAR; INTRAVENOUS at 12:14

## 2024-11-05 RX ADMIN — Medication 10 ML: at 21:40

## 2024-11-05 RX ADMIN — GABAPENTIN 300 MG: 300 CAPSULE ORAL at 21:39

## 2024-11-05 RX ADMIN — INSULIN LISPRO 2 UNITS: 100 INJECTION, SOLUTION INTRAVENOUS; SUBCUTANEOUS at 12:14

## 2024-11-05 RX ADMIN — ATORVASTATIN CALCIUM 80 MG: 40 TABLET, FILM COATED ORAL at 21:39

## 2024-11-05 RX ADMIN — HEPARIN SODIUM 5000 UNITS: 5000 INJECTION INTRAVENOUS; SUBCUTANEOUS at 05:20

## 2024-11-05 RX ADMIN — INSULIN LISPRO 2 UNITS: 100 INJECTION, SOLUTION INTRAVENOUS; SUBCUTANEOUS at 17:20

## 2024-11-05 RX ADMIN — HYDROCORTISONE 10 MG: 10 TABLET ORAL at 17:20

## 2024-11-05 RX ADMIN — HEPARIN SODIUM 5000 UNITS: 5000 INJECTION INTRAVENOUS; SUBCUTANEOUS at 15:08

## 2024-11-05 RX ADMIN — INSULIN LISPRO 2 UNITS: 100 INJECTION, SOLUTION INTRAVENOUS; SUBCUTANEOUS at 08:48

## 2024-11-05 RX ADMIN — HYDROCORTISONE SODIUM SUCCINATE 50 MG: 100 INJECTION, POWDER, FOR SOLUTION INTRAMUSCULAR; INTRAVENOUS at 05:20

## 2024-11-05 RX ADMIN — BUDESONIDE AND FORMOTEROL FUMARATE DIHYDRATE 2 PUFF: 160; 4.5 AEROSOL RESPIRATORY (INHALATION) at 19:42

## 2024-11-05 RX ADMIN — BUDESONIDE AND FORMOTEROL FUMARATE DIHYDRATE 2 PUFF: 160; 4.5 AEROSOL RESPIRATORY (INHALATION) at 07:40

## 2024-11-05 RX ADMIN — Medication 10 ML: at 08:51

## 2024-11-05 RX ADMIN — INSULIN LISPRO 2 UNITS: 100 INJECTION, SOLUTION INTRAVENOUS; SUBCUTANEOUS at 21:39

## 2024-11-05 RX ADMIN — HEPARIN SODIUM 5000 UNITS: 5000 INJECTION INTRAVENOUS; SUBCUTANEOUS at 21:39

## 2024-11-05 RX ADMIN — BRIMONIDINE TARTRATE 1 DROP: 2 SOLUTION/ DROPS OPHTHALMIC at 08:48

## 2024-11-05 RX ADMIN — Medication 5 MG: at 21:39

## 2024-11-05 RX ADMIN — BRIMONIDINE TARTRATE 1 DROP: 2 SOLUTION/ DROPS OPHTHALMIC at 21:39

## 2024-11-05 RX ADMIN — GABAPENTIN 300 MG: 300 CAPSULE ORAL at 08:47

## 2024-11-05 RX ADMIN — BRIMONIDINE TARTRATE 1 DROP: 2 SOLUTION/ DROPS OPHTHALMIC at 15:08

## 2024-11-05 RX ADMIN — ASPIRIN 81 MG: 81 TABLET, COATED ORAL at 08:47

## 2024-11-05 NOTE — PROGRESS NOTES
[facility] Medicine Services  PROGRESS NOTE    Patient Name: Amadeo Kong  : 1960  MRN: [id number]    Date of Admission: 11/3/2024  Primary Care Physician: Bianka Ann MD    Subjective   Subjective     CC: nausea/vomiting f/u    HPI:  Feels well - nearly 100%  No complaints for me today  Eating/drinking well  Understands body's issue making steroids    Objective   Objective     Vital Signs:   Temp:  [97.6 °F (36.4 °C)-98.4 °F (36.9 °C)] 97.6 °F (36.4 °C)  Heart Rate:  [68-79] 70  Resp:  [16-18] 16  BP: ()/(69-87) 123/87     Physical Exam:  Constitutional: No acute distress, awake, alert male sitting up in bed. Very talkative. Wife closeby  Respiratory: Clear to auscultation bilaterally, respiratory effort normal on room air  Cardiovascular: RRR, no murmurs, rubs, or gallops. No LE edema appreciated  Gastrointestinal: Soft nontender  Musculoskeletal: Muscle tone within normal limits, no joint effusions appreciated  Psychiatric: Appropriate affect, cooperative  Neurologic: Alert and oriented, facial movements symmetric and spontaneous movement of all 4 extremities grossly equal bilaterally, speech clear  Skin: No rashes    Results Reviewed:  LAB RESULTS:      Lab 24  0714 24  0313 24  0046 24  0003 24  2102 24  1751 24  1438 24  1226   WBC 7.29 7.83 7.99  --   --   --   --  5.22   HEMOGLOBIN 11.3* 10.7* 11.1*  --   --   --   --  12.1*   HEMATOCRIT 32.9* 31.3* 32.4*  --   --   --   --  35.6*   PLATELETS 150 134* 141  --   --   --   --  148   NEUTROS ABS  --   --  5.74  --   --   --   --  3.31   IMMATURE GRANS (ABS)  --   --  0.02  --   --   --   --  0.01   LYMPHS ABS  --   --  1.50  --   --   --   --  1.25   MONOS ABS  --   --  0.61  --   --   --   --  0.56   EOS ABS  --   --  0.10  --   --   --   --  0.07   MCV 88.0 88.2 88.5  --   --   --   --  89.2   LACTATE  --  1.4  --  2.9* 3.3* 2.8*  --   --    D DIMER QUANT  --   --   --    --   --  0.50  --   --    HSTROP T  --   --   --   --   --   --  56* 63*         Lab 11/05/24  0714 11/04/24  0457 11/04/24  0313 11/04/24  0046 11/03/24  1438 11/03/24  1226   SODIUM 136  --  135* 135*  --  137   POTASSIUM 4.3  --  3.7 3.9  --  3.4*   CHLORIDE 104  --  103 100  --  98   CO2 20.0*  --  22.0 22.0  --  26.0   ANION GAP 12.0  --  10.0 13.0  --  13.0   BUN 12  --  14 13  --  10   CREATININE 1.31*  --  2.35* 2.29*  --  2.27*   EGFR 60.8  --  30.1* 31.1*  --  31.4*   GLUCOSE 154*  --  115* 128*  --  157*   CALCIUM 8.4*  --  8.0* 8.0*  --  8.3*   IONIZED CALCIUM  --   --   --  1.09*  --   --    MAGNESIUM 1.9 1.3* 1.3*  --   --   --    PHOSPHORUS  --   --  3.7  --  3.7  --    HEMOGLOBIN A1C  --   --  6.30*  --   --   --    TSH <0.005*  --   --   --  0.008*  --          Lab 11/04/24  0046 11/03/24  1226   TOTAL PROTEIN 5.9* 6.4   ALBUMIN 2.8* 3.1*   GLOBULIN 3.1 3.3   ALT (SGPT) 8 8   AST (SGOT) 16 16   BILIRUBIN 0.6 0.9   ALK PHOS 75 77   LIPASE  --  14         Lab 11/03/24  1438 11/03/24  1226   PROBNP  --  66.9   HSTROP T 56* 63*         Lab 11/04/24  0313   CHOLESTEROL 86   LDL CHOL 39   HDL CHOL 29*   TRIGLYCERIDES 88         Lab 11/03/24  1438   IRON 48*   IRON SATURATION (TSAT) 28   TIBC 170*   TRANSFERRIN 114*   FERRITIN 702.00*         Lab 11/04/24  0009   FIO2 21   CARBOXYHEMOGLOBIN (VENOUS) 1.3     Brief Urine Lab Results  (Last result in the past 365 days)        Color   Clarity   Blood   Leuk Est   Nitrite   Protein   CREAT   Urine HCG        11/05/24 0352 Yellow   Clear   Trace   Negative   Negative   Negative                   Microbiology Results Abnormal       None            US Renal Limited    Result Date: 11/4/2024  US RENAL LIMITED Date of Exam: 11/4/2024 11:12 AM EST Indication: [email or web address]. Comparison: CT abdomen and pelvis 11/3/2024 Technique: Grayscale and color Doppler ultrasound evaluation of the kidneys and urinary bladder was performed. Findings: RIGHT kidney measures 10.7 x 6.0 x 6.0  cm.  Kidney echogenicity, size, and vascularity appear within normal limits. There is no solid kidney mass.  No echogenic shadowing stone.  No hydronephrosis. LEFT kidney measures 10.8 x 5.6 x 5.3 cm. Kidney echogenicity, size, and vascularity appear within normal limits. There is no solid kidney mass.  No echogenic shadowing stone.  No hydronephrosis. Partially decompressed by reported catheter.     Impression: Impression: 1.Unremarkable appearance of the kidneys. Electronically Signed: Ranjit Ty MD  11/4/2024 11:40 AM EST  Workstation ID: [id number]     Results for orders placed during the hospital encounter of 11/03/24    Adult Transthoracic Echo Complete W/ Cont if Necessary Per Protocol    Interpretation Summary    Left ventricular systolic function is normal. Calculated left ventricular EF = 61%    Left ventricular diastolic function was normal.    No significant valvular disease      Current medications:  Scheduled Meds:aspirin, 81 mg, Oral, Daily  atorvastatin, 80 mg, Oral, Nightly  brimonidine, 1 drop, Both Eyes, TID  budesonide-formoterol, 2 puff, Inhalation, BID - RT  gabapentin, 300 mg, Oral, Q12H  heparin (porcine), 5,000 Units, Subcutaneous, Q8H  hydrocortisone, 10 mg, Oral, Daily With Dinner  [START ON 11/6/2024] hydrocortisone, 20 mg, Oral, Daily With Breakfast  insulin glargine, 10 Units, Subcutaneous, Nightly  insulin lispro, 2-9 Units, Subcutaneous, 4x Daily AC & at Bedtime  melatonin, 5 mg, Oral, Nightly  sodium chloride, 10 mL, Intravenous, Q12H      Continuous Infusions:   PRN Meds:.  acetaminophen **OR** acetaminophen **OR** acetaminophen    acetaminophen    albuterol sulfate HFA    Calcium Replacement - Follow Nurse / BPA Driven Protocol    dextrose    dextrose    Magnesium Standard Dose Replacement - Follow Nurse / BPA Driven Protocol    naphazoline-pheniramine    nitroglycerin    Phosphorus Replacement - Follow Nurse / BPA Driven Protocol    Potassium Replacement - Follow Nurse / BPA  Driven Protocol    sodium chloride    sodium chloride    Assessment & Plan   Assessment & Plan     Active Hospital Problems    Diagnosis  POA    **THOMAS (acute kidney injury) [N17.9]  Yes    Symptomatic hypotension [I95.9]  Unknown    Dehydration [E86.0]  Unknown    Vomiting [R11.10]  Unknown    Chest pain at rest [R07.9]  Unknown    Acute pulmonary embolism [I26.99]  Yes    Peripheral neuropathy [G62.9]  Yes    Chronic back pain [M54.9, G89.29]  Yes    Type 2 diabetes mellitus, with long-term current use of insulin [E11.9, Z79.4]  Not Applicable    Hypertension [I10]  Yes      Resolved Hospital Problems   No resolved problems to display.        Brief Hospital Course to date:  Amadeo Kong is a 63 y.o. male w DMII c/b neuropathy, chronic back pain who presented w acute onset nausea/vomiting/chest pain and found to have low blood pressure.  Found to have cortisol 1, on stim test peak cortisol 5  TSH also undetectably low but free T4 on borderline of normal    Acute hypotension concern for adrenal insufficiency new diagnosis - improved w steroid trx  -see cortisol stim result above raising concern  -IV hydrocortisone 50q8 w significant response, back to normotension   -d/w endocrinology for support 11/5: will start PO hydrocortisone 20/10, will  on 3x3 rule for illness. F/u with [facility] endocrine (consult placed). Will monitor overnight to ensure normotension  -hold home BP meds, stop midodrine today for monitoring  -will need reduced dose insulin on d/c  -if hypotensive overnight, restart IV hydrocortisone 50q8    Hyperthyroidism  -appears long-standing on review as well, free T4 borderline normal  -will add on thyroid stim ab in AM. No medication start given asx, f/u endocrine OP (see above)    THOMAS - resolved -2/2 above hypotension, resolved. No s/s of retention      Severe hypoMg - replace, repeat in AM    Chest pain - very transient in setting of vomiting, trops c/w priors, ECHO wnl  DMII c/b neuropathy -  basal/bolus dosing to goal (reduced dose), home gabapentin.   Gout - restart meds as THOMAS improves  BMI 31    Expected Discharge Location and Transportation: home  Expected Discharge 11/6 (Discharge date is tentative pending patient's medical condition and is subject to change)  Expected Discharge Date: 11/6/2024; Expected Discharge Time:      VTE Prophylaxis:  Pharmacologic & mechanical VTE prophylaxis orders are present.         AM-PAC 6 Clicks Score (PT): 21 (11/05/24 0800)    CODE STATUS:   Code Status and Medical Interventions: CPR (Attempt to Resuscitate); Full Support   Ordered at: 11/03/24 1648     Level Of Support Discussed With:    Patient     Code Status (Patient has no pulse and is not breathing):    CPR (Attempt to Resuscitate)     Medical Interventions (Patient has pulse or is breathing):    Full Support       Venecia Bryson MD  11/05/24

## 2024-11-05 NOTE — PLAN OF CARE
Problem: Adult Inpatient Plan of Care  Goal: Absence of Hospital-Acquired Illness or Injury  Intervention: Identify and Manage Fall Risk  Recent Flowsheet Documentation  Taken 11/5/2024 0630 by Willow Cooper, RN  Safety Promotion/Fall Prevention:   activity supervised   assistive device/personal items within reach   clutter free environment maintained   fall prevention program maintained   lighting adjusted   nonskid shoes/slippers when out of bed   room organization consistent   safety round/check completed  Taken 11/5/2024 0439 by Willow Cooper, RN  Safety Promotion/Fall Prevention:   activity supervised   assistive device/personal items within reach   clutter free environment maintained   fall prevention program maintained   lighting adjusted   nonskid shoes/slippers when out of bed   room organization consistent   safety round/check completed  Taken 11/5/2024 0239 by Willow Cooper, RN  Safety Promotion/Fall Prevention:   activity supervised   assistive device/personal items within reach   clutter free environment maintained   fall prevention program maintained   lighting adjusted   nonskid shoes/slippers when out of bed   room organization consistent   safety round/check completed  Taken 11/5/2024 0039 by Willow Cooper, RN  Safety Promotion/Fall Prevention:   activity supervised   assistive device/personal items within reach   clutter free environment maintained   fall prevention program maintained   lighting adjusted   nonskid shoes/slippers when out of bed   room organization consistent   safety round/check completed  Taken 11/4/2024 2239 by Willow Cooper, RN  Safety Promotion/Fall Prevention:   activity supervised   assistive device/personal items within reach   clutter free environment maintained   fall prevention program maintained   lighting adjusted   nonskid shoes/slippers when out of bed   room organization consistent   safety round/check completed  Taken 11/4/2024 2139 by Kenneth  GABRIELLA Daugherty  Safety Promotion/Fall Prevention:   activity supervised   assistive device/personal items within reach   clutter free environment maintained   fall prevention program maintained   lighting adjusted   nonskid shoes/slippers when out of bed   room organization consistent   safety round/check completed  Intervention: Prevent Skin Injury  Recent Flowsheet Documentation  Taken 11/5/2024 0630 by Willow Cooper RN  Body Position: position changed independently  Taken 11/5/2024 0439 by Willow Cooper RN  Body Position: position changed independently  Taken 11/5/2024 0239 by Willow Cooper RN  Body Position: position changed independently  Taken 11/5/2024 0039 by Willow Cooper RN  Body Position: position changed independently  Taken 11/4/2024 2239 by Willow Cooper RN  Body Position: position changed independently  Taken 11/4/2024 2139 by Willow Cooper RN  Body Position: position changed independently  Intervention: Prevent Infection  Recent Flowsheet Documentation  Taken 11/5/2024 0630 by Willow Cooper RN  Infection Prevention:   environmental surveillance performed   hand hygiene promoted   rest/sleep promoted   single patient room provided  Taken 11/5/2024 0439 by Willow Cooper RN  Infection Prevention:   hand hygiene promoted   environmental surveillance performed   rest/sleep promoted   single patient room provided  Taken 11/5/2024 0239 by Willow Cooper RN  Infection Prevention:   environmental surveillance performed   hand hygiene promoted   rest/sleep promoted   single patient room provided  Taken 11/5/2024 0039 by Willow Cooper RN  Infection Prevention:   environmental surveillance performed   hand hygiene promoted   rest/sleep promoted   single patient room provided  Taken 11/4/2024 2239 by Willow Cooper, RN  Infection Prevention:   environmental surveillance performed   hand hygiene promoted   rest/sleep promoted   single patient room provided  Taken 11/4/2024  2139 by Willow Cooper, RN  Infection Prevention:   environmental surveillance performed   hand hygiene promoted   rest/sleep promoted   single patient room provided  Goal: Optimal Comfort and Wellbeing  Intervention: Provide Person-Centered Care  Recent Flowsheet Documentation  Taken 11/4/2024 2139 by Willow Cooper RN  Trust Relationship/Rapport:   care explained   choices provided   questions answered   questions encouraged   thoughts/feelings acknowledged     Problem: Fall Injury Risk  Goal: Absence of Fall and Fall-Related Injury  Intervention: Promote Injury-Free Environment  Recent Flowsheet Documentation  Taken 11/5/2024 0630 by Willow Cooper, RN  Safety Promotion/Fall Prevention:   activity supervised   assistive device/personal items within reach   clutter free environment maintained   fall prevention program maintained   lighting adjusted   nonskid shoes/slippers when out of bed   room organization consistent   safety round/check completed  Taken 11/5/2024 0439 by Willow Cooper, RN  Safety Promotion/Fall Prevention:   activity supervised   assistive device/personal items within reach   clutter free environment maintained   fall prevention program maintained   lighting adjusted   nonskid shoes/slippers when out of bed   room organization consistent   safety round/check completed  Taken 11/5/2024 0239 by Willow Cooper, RN  Safety Promotion/Fall Prevention:   activity supervised   assistive device/personal items within reach   clutter free environment maintained   fall prevention program maintained   lighting adjusted   nonskid shoes/slippers when out of bed   room organization consistent   safety round/check completed  Taken 11/5/2024 0039 by Willow Cooper, RN  Safety Promotion/Fall Prevention:   activity supervised   assistive device/personal items within reach   clutter free environment maintained   fall prevention program maintained   lighting adjusted   nonskid shoes/slippers when out  of bed   room organization consistent   safety round/check completed  Taken 11/4/2024 2239 by Willow Cooper, RN  Safety Promotion/Fall Prevention:   activity supervised   assistive device/personal items within reach   clutter free environment maintained   fall prevention program maintained   lighting adjusted   nonskid shoes/slippers when out of bed   room organization consistent   safety round/check completed  Taken 11/4/2024 2139 by Willow Cooper, RN  Safety Promotion/Fall Prevention:   activity supervised   assistive device/personal items within reach   clutter free environment maintained   fall prevention program maintained   lighting adjusted   nonskid shoes/slippers when out of bed   room organization consistent   safety round/check completed   Goal Outcome Evaluation:

## 2024-11-05 NOTE — PLAN OF CARE
Problem: Adult Inpatient Plan of Care  Goal: Plan of Care Review  Outcome: Progressing  Goal: Patient-Specific Goal (Individualized)  Outcome: Progressing  Goal: Absence of Hospital-Acquired Illness or Injury  Outcome: Progressing  Intervention: Identify and Manage Fall Risk  Recent Flowsheet Documentation  Taken 11/5/2024 1200 by Shavon Teresa RN  Safety Promotion/Fall Prevention:   activity supervised   clutter free environment maintained   assistive device/personal items within reach   fall prevention program maintained   toileting scheduled   safety round/check completed   room organization consistent   nonskid shoes/slippers when out of bed  Taken 11/5/2024 1000 by Shavon Teresa RN  Safety Promotion/Fall Prevention:   activity supervised   clutter free environment maintained   assistive device/personal items within reach   fall prevention program maintained   toileting scheduled   safety round/check completed   room organization consistent   nonskid shoes/slippers when out of bed  Taken 11/5/2024 0800 by Shavon Teresa RN  Safety Promotion/Fall Prevention:   assistive device/personal items within reach   activity supervised   clutter free environment maintained   fall prevention program maintained   toileting scheduled   safety round/check completed   room organization consistent   nonskid shoes/slippers when out of bed  Intervention: Prevent Skin Injury  Recent Flowsheet Documentation  Taken 11/5/2024 1200 by Shavon Teresa RN  Body Position: position changed independently  Skin Protection:   transparent dressing maintained   incontinence pads utilized  Taken 11/5/2024 1000 by Shavon Teresa RN  Body Position: position changed independently  Taken 11/5/2024 0800 by Shavon Teresa RN  Body Position: position changed independently  Intervention: Prevent and Manage VTE (Venous Thromboembolism) Risk  Recent Flowsheet Documentation  Taken 11/5/2024 0800 by Shavon Teresa RN  VTE  Prevention/Management: (See MAR) other (see comments)  Intervention: Prevent Infection  Recent Flowsheet Documentation  Taken 11/5/2024 1200 by Shavon Teresa RN  Infection Prevention:   environmental surveillance performed   hand hygiene promoted   single patient room provided   rest/sleep promoted  Taken 11/5/2024 1000 by Shavon Teresa RN  Infection Prevention:   environmental surveillance performed   hand hygiene promoted   single patient room provided   rest/sleep promoted  Taken 11/5/2024 0800 by Shavon Teresa RN  Infection Prevention:   environmental surveillance performed   hand hygiene promoted   rest/sleep promoted   single patient room provided  Goal: Optimal Comfort and Wellbeing  Outcome: Progressing  Intervention: Provide Person-Centered Care  Recent Flowsheet Documentation  Taken 11/5/2024 0800 by Shavon Teresa RN  Trust Relationship/Rapport:   choices provided   care explained   questions answered   questions encouraged   reassurance provided   other (see comments)  Goal: Readiness for Transition of Care  Outcome: Progressing     Problem: Fall Injury Risk  Goal: Absence of Fall and Fall-Related Injury  Outcome: Progressing  Intervention: Identify and Manage Contributors  Recent Flowsheet Documentation  Taken 11/5/2024 1200 by Shavon Teresa RN  Medication Review/Management: medications reviewed  Taken 11/5/2024 1000 by Shavon Teresa RN  Medication Review/Management: medications reviewed  Intervention: Promote Injury-Free Environment  Recent Flowsheet Documentation  Taken 11/5/2024 1200 by Shavon Teresa RN  Safety Promotion/Fall Prevention:   activity supervised   clutter free environment maintained   assistive device/personal items within reach   fall prevention program maintained   toileting scheduled   safety round/check completed   room organization consistent   nonskid shoes/slippers when out of bed  Taken 11/5/2024 1000 by Shavon Teresa RN  Safety Promotion/Fall  Prevention:   activity supervised   clutter free environment maintained   assistive device/personal items within reach   fall prevention program maintained   toileting scheduled   safety round/check completed   room organization consistent   nonskid shoes/slippers when out of bed  Taken 11/5/2024 0800 by Shavon Teresa RN  Safety Promotion/Fall Prevention:   assistive device/personal items within reach   activity supervised   clutter free environment maintained   fall prevention program maintained   toileting scheduled   safety round/check completed   room organization consistent   nonskid shoes/slippers when out of bed   Goal Outcome Evaluation:                                             Stretcher Alarms/Hourly Rounding

## 2024-11-06 ENCOUNTER — READMISSION MANAGEMENT (OUTPATIENT)
Dept: CALL CENTER | Facility: HOSPITAL | Age: 64
End: 2024-11-06
Payer: MEDICARE

## 2024-11-06 VITALS
HEIGHT: 74 IN | DIASTOLIC BLOOD PRESSURE: 83 MMHG | BODY MASS INDEX: 31.97 KG/M2 | WEIGHT: 249.12 LBS | HEART RATE: 83 BPM | TEMPERATURE: 97.7 F | SYSTOLIC BLOOD PRESSURE: 116 MMHG | RESPIRATION RATE: 18 BRPM | OXYGEN SATURATION: 98 %

## 2024-11-06 PROBLEM — E05.90 HYPERTHYROIDISM: Status: ACTIVE | Noted: 2024-11-06

## 2024-11-06 PROBLEM — N17.9 AKI (ACUTE KIDNEY INJURY): Status: RESOLVED | Noted: 2018-10-18 | Resolved: 2024-11-06

## 2024-11-06 PROBLEM — R11.10 VOMITING: Status: RESOLVED | Noted: 2024-11-03 | Resolved: 2024-11-06

## 2024-11-06 PROBLEM — I95.9 SYMPTOMATIC HYPOTENSION: Status: RESOLVED | Noted: 2024-11-03 | Resolved: 2024-11-06

## 2024-11-06 PROBLEM — E27.40 ADRENAL INSUFFICIENCY: Status: ACTIVE | Noted: 2024-11-06

## 2024-11-06 LAB
ACTH PLAS-MCNC: <1.5 PG/ML (ref 7.2–63.3)
ANION GAP SERPL CALCULATED.3IONS-SCNC: 10 MMOL/L (ref 5–15)
BUN SERPL-MCNC: 12 MG/DL (ref 8–23)
BUN/CREAT SERPL: 13.5 (ref 7–25)
CALCIUM SPEC-SCNC: 8.3 MG/DL (ref 8.6–10.5)
CHLORIDE SERPL-SCNC: 103 MMOL/L (ref 98–107)
CO2 SERPL-SCNC: 21 MMOL/L (ref 22–29)
CREAT SERPL-MCNC: 0.89 MG/DL (ref 0.76–1.27)
EGFRCR SERPLBLD CKD-EPI 2021: 95.7 ML/MIN/1.73
GLUCOSE BLDC GLUCOMTR-MCNC: 146 MG/DL (ref 70–130)
GLUCOSE SERPL-MCNC: 157 MG/DL (ref 65–99)
MAGNESIUM SERPL-MCNC: 1.8 MG/DL (ref 1.6–2.4)
POTASSIUM SERPL-SCNC: 4.1 MMOL/L (ref 3.5–5.2)
SODIUM SERPL-SCNC: 134 MMOL/L (ref 136–145)

## 2024-11-06 PROCEDURE — 80048 BASIC METABOLIC PNL TOTAL CA: CPT | Performed by: INTERNAL MEDICINE

## 2024-11-06 PROCEDURE — 25010000002 HEPARIN (PORCINE) PER 1000 UNITS

## 2024-11-06 PROCEDURE — 94664 DEMO&/EVAL PT USE INHALER: CPT

## 2024-11-06 PROCEDURE — 94799 UNLISTED PULMONARY SVC/PX: CPT

## 2024-11-06 PROCEDURE — 82948 REAGENT STRIP/BLOOD GLUCOSE: CPT

## 2024-11-06 PROCEDURE — 83735 ASSAY OF MAGNESIUM: CPT | Performed by: INTERNAL MEDICINE

## 2024-11-06 PROCEDURE — 84445 ASSAY OF TSI GLOBULIN: CPT | Performed by: INTERNAL MEDICINE

## 2024-11-06 PROCEDURE — 99239 HOSP IP/OBS DSCHRG MGMT >30: CPT | Performed by: INTERNAL MEDICINE

## 2024-11-06 RX ORDER — HYDROCORTISONE 10 MG/1
TABLET ORAL
Qty: 90 TABLET | Refills: 2 | Status: SHIPPED | OUTPATIENT
Start: 2024-11-06

## 2024-11-06 RX ORDER — INSULIN ASPART 100 [IU]/ML
10 INJECTION, SUSPENSION SUBCUTANEOUS 2 TIMES DAILY WITH MEALS
Start: 2024-11-06

## 2024-11-06 RX ORDER — HYDROCORTISONE 20 MG/1
20 TABLET ORAL
Qty: 30 TABLET | Refills: 2 | Status: SHIPPED | OUTPATIENT
Start: 2024-11-06 | End: 2024-11-06

## 2024-11-06 RX ADMIN — Medication 10 ML: at 09:02

## 2024-11-06 RX ADMIN — BRIMONIDINE TARTRATE 1 DROP: 2 SOLUTION/ DROPS OPHTHALMIC at 09:02

## 2024-11-06 RX ADMIN — HEPARIN SODIUM 5000 UNITS: 5000 INJECTION INTRAVENOUS; SUBCUTANEOUS at 06:00

## 2024-11-06 RX ADMIN — GABAPENTIN 300 MG: 300 CAPSULE ORAL at 08:55

## 2024-11-06 RX ADMIN — ASPIRIN 81 MG: 81 TABLET, COATED ORAL at 08:55

## 2024-11-06 RX ADMIN — BUDESONIDE AND FORMOTEROL FUMARATE DIHYDRATE 2 PUFF: 160; 4.5 AEROSOL RESPIRATORY (INHALATION) at 08:23

## 2024-11-06 RX ADMIN — HYDROCORTISONE 20 MG: 10 TABLET ORAL at 08:55

## 2024-11-06 NOTE — DISCHARGE SUMMARY
[facility] Medicine Services  DISCHARGE SUMMARY    Patient Name: Amadeo Kong  : 1960  MRN: [id number]    Date of Admission: 11/3/2024 12:12 PM  Date of Discharge:  2024  Primary Care Physician: Bianka Ann MD    Consults       No orders found from 10/5/2024 to 2024.            Hospital Course     Presenting Problem: hypotension, nausea/vomiting    Active Hospital Problems    Diagnosis  POA    Adrenal insufficiency [E27.40]  Yes    Hyperthyroidism [E05.90]  Yes    Peripheral neuropathy [G62.9]  Yes    Chronic back pain [M54.9, G89.29]  Yes    Type 2 diabetes mellitus, with long-term current use of insulin [E11.9, Z79.4]  Not Applicable    Hypertension [I10]  Yes      Resolved Hospital Problems    Diagnosis Date Resolved POA    **THOMAS (acute kidney injury) [N17.9] 2024 Yes    Symptomatic hypotension [I95.9] 2024 Yes    Vomiting [R11.10] 2024 Yes          Hospital Course:  Amadeo Kong is a 63 y.o. male w DMII c/b neuropathy, chronic back pain who presented w acute onset nausea/vomiting/chest pain and found to have low blood pressure 70/50's at lowest.    Found to have cortisol 1, on stim test peak cortisol 5 very concerning for adrenal insufficiency. Given IV hydrocortisone w return to normotension quickly, good response. D/w  endocrinology and initiated PO hydrocortisone 20 AM and 10 PM with normotension. Will follow-up with  endocrinology (referral placed).     In mean time, many changes to home medications given this concern for adrenal insufficiency. Decrease insulin to 10 units BID (controlled here on similar dosing, home TDD 90 units). Hold home BP meds, take BP daily and follow-up with PCP.    TSH also undetectably low but free T4 on borderline of normal and patient asymptomatic. F/u with  endocrinology, no start to therapies for now     Discharge Follow Up Recommendations for outpatient labs/diagnostics:   F/u PCP 1 week for BP/Glc log check  and med adjustment as needed   F/u  endocrine (referral placed, d/w Dr Joshi): f/u thyroid stimulating Ab (pending at [facility]) and ACTH (unfortunately drawn AFTER steroid initiation)    Day of Discharge     HPI:   Doing well today, anxious to get home    Vital Signs:   Temp:  [97.6 °F (36.4 °C)-98.2 °F (36.8 °C)] 97.7 °F (36.5 °C)  Heart Rate:  [70-84] 83  Resp:  [16-18] 18  BP: (116-129)/(80-87) 116/83      Physical Exam:  Constitutional: No acute distress, awake, alert  HENT: NCAT, mucous membranes moist  Respiratory: Clear to auscultation bilaterally, respiratory effort normal   Cardiovascular: RRR, no murmurs, rubs, or gallops  Gastrointestinal: Soft, nontender, nondistended  Musculoskeletal: Muscle tone within normal limits, no joint effusions appreciated  Psychiatric: Appropriate affect, cooperative  Neurologic: Alert and oriented, facial movements symmetric and spontaneous movement of all 4 extremities grossly equal bilaterally, speech clear  Skin: No rashes    Pertinent  and/or Most Recent Results     LAB RESULTS:      Lab 11/05/24  0714 11/04/24  0313 11/04/24  0046 11/04/24  0003 11/03/24  2102 11/03/24  1751 11/03/24  1226   WBC 7.29 7.83 7.99  --   --   --  5.22   HEMOGLOBIN 11.3* 10.7* 11.1*  --   --   --  12.1*   HEMATOCRIT 32.9* 31.3* 32.4*  --   --   --  35.6*   PLATELETS 150 134* 141  --   --   --  148   NEUTROS ABS  --   --  5.74  --   --   --  3.31   IMMATURE GRANS (ABS)  --   --  0.02  --   --   --  0.01   LYMPHS ABS  --   --  1.50  --   --   --  1.25   MONOS ABS  --   --  0.61  --   --   --  0.56   EOS ABS  --   --  0.10  --   --   --  0.07   MCV 88.0 88.2 88.5  --   --   --  89.2   LACTATE  --  1.4  --  2.9* 3.3* 2.8*  --    D DIMER QUANT  --   --   --   --   --  0.50  --          Lab 11/06/24  0435 11/05/24  0714 11/04/24  0457 11/04/24  0313 11/04/24  0046 11/03/24  1438 11/03/24  1226   SODIUM 134* 136  --  135* 135*  --  137   POTASSIUM 4.1 4.3  --  3.7 3.9  --  3.4*   CHLORIDE 103 104  --   103 100  --  98   CO2 21.0* 20.0*  --  22.0 22.0  --  26.0   ANION GAP 10.0 12.0  --  10.0 13.0  --  13.0   BUN 12 12  --  14 13  --  10   CREATININE 0.89 1.31*  --  2.35* 2.29*  --  2.27*   EGFR 95.7 60.8  --  30.1* 31.1*  --  31.4*   GLUCOSE 157* 154*  --  115* 128*  --  157*   CALCIUM 8.3* 8.4*  --  8.0* 8.0*  --  8.3*   IONIZED CALCIUM  --   --   --   --  1.09*  --   --    MAGNESIUM 1.8 1.9 1.3* 1.3*  --   --   --    PHOSPHORUS  --   --   --  3.7  --  3.7  --    HEMOGLOBIN A1C  --   --   --  6.30*  --   --   --    TSH  --  <0.005*  --   --   --  0.008*  --          Lab 11/04/24  0046 11/03/24  1226   TOTAL PROTEIN 5.9* 6.4   ALBUMIN 2.8* 3.1*   GLOBULIN 3.1 3.3   ALT (SGPT) 8 8   AST (SGOT) 16 16   BILIRUBIN 0.6 0.9   ALK PHOS 75 77   LIPASE  --  14         Lab 11/03/24  1438 11/03/24  1226   PROBNP  --  66.9   HSTROP T 56* 63*         Lab 11/04/24  0313   CHOLESTEROL 86   LDL CHOL 39   HDL CHOL 29*   TRIGLYCERIDES 88         Lab 11/03/24  1438   IRON 48*   IRON SATURATION (TSAT) 28   TIBC 170*   TRANSFERRIN 114*   FERRITIN 702.00*         Lab 11/04/24  0009   FIO2 21   CARBOXYHEMOGLOBIN (VENOUS) 1.3     Brief Urine Lab Results  (Last result in the past 365 days)        Color   Clarity   Blood   Leuk Est   Nitrite   Protein   CREAT   Urine HCG        11/05/24 0352 Yellow   Clear   Trace   Negative   Negative   Negative                 Microbiology Results (last 10 days)       ** No results found for the last 240 hours. **            US Renal Limited    Result Date: 11/4/2024  US RENAL LIMITED Date of Exam: 11/4/2024 11:12 AM EST Indication: bhanu. Comparison: CT abdomen and pelvis 11/3/2024 Technique: Grayscale and color Doppler ultrasound evaluation of the kidneys and urinary bladder was performed. Findings: RIGHT kidney measures 10.7 x 6.0 x 6.0 cm.  Kidney echogenicity, size, and vascularity appear within normal limits. There is no solid kidney mass.  No echogenic shadowing stone.  No hydronephrosis. LEFT  kidney measures 10.8 x 5.6 x 5.3 cm. Kidney echogenicity, size, and vascularity appear within normal limits. There is no solid kidney mass.  No echogenic shadowing stone.  No hydronephrosis. Partially decompressed by reported catheter.     Impression: 1.Unremarkable appearance of the kidneys. Electronically Signed: Ranjit Ty MD  11/4/2024 11:40 AM EST  Workstation ID: [id number]    CT Abdomen Pelvis Without Contrast    Result Date: 11/3/2024  CT CHEST WO CONTRAST DIAGNOSTIC, CT ABDOMEN PELVIS WO CONTRAST Date of Exam: 11/3/2024 1:42 PM EST Indication: CP, vomiting. Comparison: CTA chest 9/16/2024, CT abdomen and pelvis 8/8/2024 Technique: Axial CT images were obtained of the chest, abdomen, and pelvis without contrast administration.  Reconstructed coronal and sagittal images were also obtained. Automated exposure control and iterative construction methods were used. Findings: Chest: Coronary artery calcifications are noted. No pericardial effusion. No thoracic adenopathy. Unremarkable appearance of the chest wall soft tissues. The trachea and mainstem bronchi are patent. Evaluation of the lungs is somewhat limited from respiratory motion artifact. There are small scattered regions of subpleural reticulations similar to prior. No focal consolidation. No lung mass or nodule. No pleural effusion or pneumothorax. No acute or suspicious bony findings. Abdomen and pelvis: Unremarkable appearance of the liver. There is cholelithiasis without CT evidence of cholecystitis. Normal appearance of the spleen and pancreas. Unremarkable appearance of the adrenal glands, kidneys, ureters, and bladder. Streak artifact and beam hardening from right hip arthroplasty limits evaluation of the lower pelvis. Unremarkable appearance of the prostate and seminal vesicles. No bowel obstruction or inflammatory change of the GI tract. No abdominopelvic free fluid. No pneumoperitoneum. No acute or suspicious bony findings. Right total hip  arthroplasty is partially imaged. Redemonstration of L4-S1 posterior instrumented fusion hardware and interbody fusion.     Impression: No acute findings in the chest, abdomen, or pelvis. Cholelithiasis without CT evidence of cholecystitis. Electronically Signed: Adolph Sandoval MD  11/3/2024 2:06 PM EST  Workstation ID: [id number]    CT Chest Without Contrast Diagnostic    Result Date: 11/3/2024  CT CHEST WO CONTRAST DIAGNOSTIC, CT ABDOMEN PELVIS WO CONTRAST Date of Exam: 11/3/2024 1:42 PM EST Indication: CP, vomiting. Comparison: CTA chest 9/16/2024, CT abdomen and pelvis 8/8/2024 Technique: Axial CT images were obtained of the chest, abdomen, and pelvis without contrast administration.  Reconstructed coronal and sagittal images were also obtained. Automated exposure control and iterative construction methods were used. Findings: Chest: Coronary artery calcifications are noted. No pericardial effusion. No thoracic adenopathy. Unremarkable appearance of the chest wall soft tissues. The trachea and mainstem bronchi are patent. Evaluation of the lungs is somewhat limited from respiratory motion artifact. There are small scattered regions of subpleural reticulations similar to prior. No focal consolidation. No lung mass or nodule. No pleural effusion or pneumothorax. No acute or suspicious bony findings. Abdomen and pelvis: Unremarkable appearance of the liver. There is cholelithiasis without CT evidence of cholecystitis. Normal appearance of the spleen and pancreas. Unremarkable appearance of the adrenal glands, kidneys, ureters, and bladder. Streak artifact and beam hardening from right hip arthroplasty limits evaluation of the lower pelvis. Unremarkable appearance of the prostate and seminal vesicles. No bowel obstruction or inflammatory change of the GI tract. No abdominopelvic free fluid. No pneumoperitoneum. No acute or suspicious bony findings. Right total hip arthroplasty is partially imaged. Redemonstration of  L4-S1 posterior instrumented fusion hardware and interbody fusion.     Impression: No acute findings in the chest, abdomen, or pelvis. Cholelithiasis without CT evidence of cholecystitis. Electronically Signed: Adolph Sandoval MD  11/3/2024 2:06 PM EST  Workstation ID: [id number]    XR Chest 1 View    Result Date: 11/3/2024  XR CHEST 1 VW Date of Exam: 11/3/2024 12:20 PM EST Indication: Chest Pain Triage Protocol Comparison: Chest x-ray 9/16/2024 Findings: Normal cardiomediastinal silhouette. The lungs are clear. No pleural effusion or pneumothorax. No acute osseous findings.     Impression: No acute cardiopulmonary findings. Electronically Signed: Adolph Sandoval MD  11/3/2024 1:04 PM EST  Workstation ID: [id number]     Results for orders placed during the hospital encounter of 05/22/24    Duplex Carotid Ultrasound CAR    Interpretation Summary    Right internal carotid artery demonstrates normal flow without evidence of hemodynamically significant stenosis.    Left internal carotid artery demonstrates normal flow without evidence of hemodynamically significant stenosis.    Bilateral intimal thickening and minimal scattered plaque is noted.      Results for orders placed during the hospital encounter of 05/22/24    Duplex Carotid Ultrasound CAR    Interpretation Summary    Right internal carotid artery demonstrates normal flow without evidence of hemodynamically significant stenosis.    Left internal carotid artery demonstrates normal flow without evidence of hemodynamically significant stenosis.    Bilateral intimal thickening and minimal scattered plaque is noted.      Results for orders placed during the hospital encounter of 11/03/24    Adult Transthoracic Echo Complete W/ Cont if Necessary Per Protocol    Interpretation Summary    Left ventricular systolic function is normal. Calculated left ventricular EF = 61%    Left ventricular diastolic function was normal.    No significant valvular disease      Plan for  Follow-up of Pending Labs/Results:   Pending Labs       Order Current Status    ACTH In process    Thyroid Stimulating Immunoglobulin In process          Discharge Details        Discharge Medications        New Medications        Instructions Start Date   hydrocortisone 10 MG tablet  Commonly known as: CORTEF   Take 2 tablets by mouth Daily With Breakfast AND 1 tablet Daily With Dinner.             Changes to Medications        Instructions Start Date   insulin aspart prot-insulin aspart (70-30) 100 UNIT/ML injection  Commonly known as: NovoLOG Mix 70/30  What changed:   how much to take  how to take this  when to take this  additional instructions   10 Units, Subcutaneous, 2 Times Daily With Meals             Continue These Medications        Instructions Start Date   acetaminophen 325 MG tablet  Commonly known as: TYLENOL   650 mg, Oral, Every 6 Hours PRN      albuterol sulfate  (90 Base) MCG/ACT inhaler  Commonly known as: PROVENTIL HFA;VENTOLIN HFA;PROAIR HFA   2 puffs, Inhalation, 4 Times Daily PRN      aspirin 81 MG EC tablet   81 mg, Daily      atorvastatin 80 MG tablet  Commonly known as: LIPITOR   80 mg, Oral, Nightly      atropine 1 % ophthalmic solution   1 drop, 2 Times Daily      brimonidine 0.2 % ophthalmic solution  Commonly known as: ALPHAGAN   1 drop      budesonide-formoterol 160-4.5 MCG/ACT inhaler  Commonly known as: SYMBICORT   2 puffs, Inhalation, 2 Times Daily - RT      Cholecalciferol 50 MCG (2000 UT) tablet   2,000 Units, Daily      colchicine 0.6 MG tablet   Take 2 tablets by mouth at start of attack and 1 tablet after 1 hour.      gabapentin 300 MG capsule  Commonly known as: NEURONTIN   300 mg, 3 Times Daily      levocetirizine 5 MG tablet  Commonly known as: XYZAL   5 mg, Every Evening      montelukast 10 MG tablet  Commonly known as: SINGULAIR   10 mg, Nightly      multivitamin capsule   1 capsule, Oral, Daily      naproxen 500 MG tablet  Commonly known as: NAPROSYN   500 mg, 2  "Times Daily      OneTouch Delica Plus [id number] misc   1 LANCET AS DIRECTED FOUR TIMES A DAY. USE AS NEEDED      OneTouch Ultra test strip  Generic drug: glucose blood   TEST BLOOD SUGAR THREE TIMES DAILY AS DIRECTED      potassium chloride ER 20 MEQ tablet controlled-release ER tablet  Commonly known as: K-TAB   20 mEq, Daily      UltiCare Insulin Syringe 31G X 5/16\" 1 ML misc  Generic drug: Insulin Syringe-Needle U-100   USE TWICE DAILY TO INJECT INSULIN AS DIRECTED             Stop These Medications      carvedilol 6.25 MG tablet  Commonly known as: COREG     furosemide 40 MG tablet  Commonly known as: LASIX     lisinopril 10 MG tablet  Commonly known as: PRINIVIL,ZESTRIL              Allergies   Allergen Reactions    Carrot [Daucus Carota] Swelling    Ensure Swelling     Carrots only    Shellfish Allergy Swelling and Unknown (See Comments)     tongue swelling    Strawberry Swelling     tongue swelling    Banana Unknown (See Comments)    Dust Mite Extract Other (See Comments)    Sulfamethoxazole-Trimethoprim Other (See Comments)     Patient developed blisters on his hands.  Patient developed blisters on his hands.    Vancomycin Rash     Received vancomycin x1 dose 7/18/22         Discharge Disposition:  Home or Self Care    Diet:  Hospital:  Diet Order   Procedures    Diet: Cardiac; Healthy Heart (2-3 Na+); Fluid Consistency: Thin (IDDSI 0)            Activity:      Restrictions or Other Recommendations:         CODE STATUS:    Code Status and Medical Interventions: CPR (Attempt to Resuscitate); Full Support   Ordered at: 11/03/24 8239     Level Of Support Discussed With:    Patient     Code Status (Patient has no pulse and is not breathing):    CPR (Attempt to Resuscitate)     Medical Interventions (Patient has pulse or is breathing):    Full Support       No future appointments.    Additional Instructions for the Follow-ups that You Need to Schedule       Discharge Follow-up with PCP   As directed       " Currently Documented PCP:    Bianka Ann MD    PCP Phone Number:    [phone]     Follow Up Details: Monday/Tuesday follow-up                      Venecia Bryson MD  11/06/24      Time Spent on Discharge:  I spent  45  minutes on this discharge activity which included: face-to-face encounter with the patient, reviewing the data in the system, coordination of the care with the nursing staff as well as consultants, documentation, and entering orders.  I d/w PCP

## 2024-11-06 NOTE — PLAN OF CARE
Problem: Adult Inpatient Plan of Care  Goal: Absence of Hospital-Acquired Illness or Injury  Intervention: Identify and Manage Fall Risk  Recent Flowsheet Documentation  Taken 11/6/2024 0439 by Willow Cooper, RN  Safety Promotion/Fall Prevention:   activity supervised   assistive device/personal items within reach   clutter free environment maintained   fall prevention program maintained   lighting adjusted   nonskid shoes/slippers when out of bed   room organization consistent   safety round/check completed  Taken 11/6/2024 0239 by Willow Cooper, RN  Safety Promotion/Fall Prevention:   activity supervised   assistive device/personal items within reach   clutter free environment maintained   fall prevention program maintained   lighting adjusted   nonskid shoes/slippers when out of bed   room organization consistent   safety round/check completed  Taken 11/6/2024 0039 by Willow Cooper, RN  Safety Promotion/Fall Prevention:   activity supervised   assistive device/personal items within reach   clutter free environment maintained   fall prevention program maintained   lighting adjusted   nonskid shoes/slippers when out of bed   room organization consistent   safety round/check completed  Taken 11/5/2024 2239 by Willow Cooper, RN  Safety Promotion/Fall Prevention:   activity supervised   assistive device/personal items within reach   clutter free environment maintained   fall prevention program maintained   lighting adjusted   nonskid shoes/slippers when out of bed   room organization consistent   safety round/check completed  Taken 11/5/2024 2139 by Willow Cooper, RN  Safety Promotion/Fall Prevention:   activity supervised   assistive device/personal items within reach   clutter free environment maintained   fall prevention program maintained   lighting adjusted   nonskid shoes/slippers when out of bed   room organization consistent   safety round/check completed  Intervention: Prevent Skin  Injury  Recent Flowsheet Documentation  Taken 11/6/2024 0439 by Willow Cooper RN  Body Position: position changed independently  Taken 11/6/2024 0239 by Willow Cooper RN  Body Position: position changed independently  Taken 11/6/2024 0039 by Willow Cooper RN  Body Position: position changed independently  Taken 11/5/2024 2239 by Willow Cooper RN  Body Position: position changed independently  Taken 11/5/2024 2139 by Willow Cooper RN  Body Position: position changed independently  Intervention: Prevent Infection  Recent Flowsheet Documentation  Taken 11/6/2024 0439 by Willow Cooper RN  Infection Prevention:   environmental surveillance performed   hand hygiene promoted   rest/sleep promoted   single patient room provided  Taken 11/6/2024 0239 by Willow Cooper RN  Infection Prevention:   environmental surveillance performed   hand hygiene promoted   rest/sleep promoted   single patient room provided  Taken 11/6/2024 0039 by Willow Cooper RN  Infection Prevention:   environmental surveillance performed   hand hygiene promoted   rest/sleep promoted   single patient room provided  Taken 11/5/2024 2239 by Willow Cooper RN  Infection Prevention:   environmental surveillance performed   hand hygiene promoted   rest/sleep promoted   single patient room provided  Taken 11/5/2024 2139 by Willow Cooper RN  Infection Prevention:   environmental surveillance performed   hand hygiene promoted   rest/sleep promoted   single patient room provided  Goal: Optimal Comfort and Wellbeing  Intervention: Provide Person-Centered Care  Recent Flowsheet Documentation  Taken 11/5/2024 2139 by Willow Cooper RN  Trust Relationship/Rapport:   care explained   choices provided   questions answered   questions encouraged   thoughts/feelings acknowledged     Problem: Fall Injury Risk  Goal: Absence of Fall and Fall-Related Injury  Intervention: Promote Injury-Free Environment  Recent Flowsheet  Documentation  Taken 11/6/2024 0439 by Willow Cooper, RN  Safety Promotion/Fall Prevention:   activity supervised   assistive device/personal items within reach   clutter free environment maintained   fall prevention program maintained   lighting adjusted   nonskid shoes/slippers when out of bed   room organization consistent   safety round/check completed  Taken 11/6/2024 0239 by Willow Cooper, RN  Safety Promotion/Fall Prevention:   activity supervised   assistive device/personal items within reach   clutter free environment maintained   fall prevention program maintained   lighting adjusted   nonskid shoes/slippers when out of bed   room organization consistent   safety round/check completed  Taken 11/6/2024 0039 by Willow Cooper, RN  Safety Promotion/Fall Prevention:   activity supervised   assistive device/personal items within reach   clutter free environment maintained   fall prevention program maintained   lighting adjusted   nonskid shoes/slippers when out of bed   room organization consistent   safety round/check completed  Taken 11/5/2024 2239 by Willow Cooper, RN  Safety Promotion/Fall Prevention:   activity supervised   assistive device/personal items within reach   clutter free environment maintained   fall prevention program maintained   lighting adjusted   nonskid shoes/slippers when out of bed   room organization consistent   safety round/check completed  Taken 11/5/2024 2139 by Willow Cooper, RN  Safety Promotion/Fall Prevention:   activity supervised   assistive device/personal items within reach   clutter free environment maintained   fall prevention program maintained   lighting adjusted   nonskid shoes/slippers when out of bed   room organization consistent   safety round/check completed   Goal Outcome Evaluation:

## 2024-11-07 NOTE — OUTREACH NOTE
Prep Survey      Flowsheet Row Responses   [facility] facility patient discharged from? [facility]   Is LACE score < 7 ? No   Eligibility Readm Mgmt   Discharge diagnosis THOMAS   Does the patient have one of the following disease processes/diagnoses(primary or secondary)? Other   Does the patient have Home health ordered? No   Is there a DME ordered? No   Prep survey completed? Yes            CHRIS GUZMAN - Registered Nurse

## 2024-11-08 NOTE — PAYOR COMM NOTE
"Ref# [id number]   Discharge Summary    SABIHA Prince, RN  Utilization Review  Phone [phone]  Fax [phone]    [address] [address]           Amadeo Kong (63 y.o. Male)       Date of Birth   1960    Social Security Number       Address   [facility] [address]    Home Phone   [phone]    MRN   [id number]       Gnosticism   [facility]    Marital Status   Single                            Admission Date   11/3/24    Admission Type   Emergency    Admitting Provider   Venecia Bryson MD    Attending Provider       Department, Room/Bed   [address], S516/1       Discharge Date   11/6/2024    Discharge Disposition   Home or Self Care    Discharge Destination                                 Attending Provider: (none)   Allergies: Carrot [Daucus Carota], Ensure, Shellfish Allergy, Strawberry, Banana, Dust Mite Extract, Sulfamethoxazole-trimethoprim, Vancomycin    Isolation: None   Infection: None   Code Status: Prior    Ht: 188 cm (74.02\")   Wt: 113 kg (249 lb 1.9 oz)    Admission Cmt: None   Principal Problem: THOMAS (acute kidney injury) [N17.9]                   Active Insurance as of 11/3/2024       Primary Coverage       Payor Plan Insurance Group Employer/Plan Group    AETNA MEDICARE REPLACEMENT AETNA MEDICARE REPLACEMENT [id number]       Payor Plan Address Payor Plan Phone Number Payor Plan Fax Number Effective Dates    [address] [phone]  7/1/2024 - None Entered    [facility] [id number]         Subscriber Name Subscriber Birth Date Member ID       AMADEO KONG 1960 [id number]                     Emergency Contacts        (Rel.) Home Phone Work Phone Mobile Phone    IsaacanselmoAngelica (Significant Other) [phone] -- [phone]                 Discharge Summary        Venecia Bryson MD at 11/06/24 1005              [facility] Medicine Services  DISCHARGE " SUMMARY    Patient Name: Amadeo Kong  : 1960  MRN: [id number]    Date of Admission: 11/3/2024 12:12 PM  Date of Discharge:  2024  Primary Care Physician: Bianka Ann MD    Consults       No orders found from 10/5/2024 to 2024.            Hospital Course     Presenting Problem: hypotension, nausea/vomiting    Active Hospital Problems    Diagnosis  POA    Adrenal insufficiency [E27.40]  Yes    Hyperthyroidism [E05.90]  Yes    Peripheral neuropathy [G62.9]  Yes    Chronic back pain [M54.9, G89.29]  Yes    Type 2 diabetes mellitus, with long-term current use of insulin [E11.9, Z79.4]  Not Applicable    Hypertension [I10]  Yes      Resolved Hospital Problems    Diagnosis Date Resolved POA    **THOMAS (acute kidney injury) [N17.9] 2024 Yes    Symptomatic hypotension [I95.9] 2024 Yes    Vomiting [R11.10] 2024 Yes          Hospital Course:  Amadeo Kong is a 63 y.o. male w DMII c/b neuropathy, chronic back pain who presented w acute onset nausea/vomiting/chest pain and found to have low blood pressure 70/50's at lowest.    Found to have cortisol 1, on stim test peak cortisol 5 very concerning for adrenal insufficiency. Given IV hydrocortisone w return to normotension quickly, good response. D/w  endocrinology and initiated PO hydrocortisone 20 AM and 10 PM with normotension. Will follow-up with  endocrinology (referral placed).     In mean time, many changes to home medications given this concern for adrenal insufficiency. Decrease insulin to 10 units BID (controlled here on similar dosing, home TDD 90 units). Hold home BP meds, take BP daily and follow-up with PCP.    TSH also undetectably low but free T4 on borderline of normal and patient asymptomatic. F/u with  endocrinology, no start to therapies for now     Discharge Follow Up Recommendations for outpatient labs/diagnostics:   F/u PCP 1 week for BP/Glc log check and med adjustment as needed   F/u  endocrine (referral placed,  d/w Dr Joshi): f/u thyroid stimulating Ab (pending at [place]) and ACTH (unfortunately drawn AFTER steroid initiation)    Day of Discharge     HPI:   Doing well today, anxious to get home    Vital Signs:   Temp:  [97.6 °F (36.4 °C)-98.2 °F (36.8 °C)] 97.7 °F (36.5 °C)  Heart Rate:  [70-84] 83  Resp:  [16-18] 18  BP: (116-129)/(80-87) 116/83      Physical Exam:  Constitutional: No acute distress, awake, alert  HENT: NCAT, mucous membranes moist  Respiratory: Clear to auscultation bilaterally, respiratory effort normal   Cardiovascular: RRR, no murmurs, rubs, or gallops  Gastrointestinal: Soft, nontender, nondistended  Musculoskeletal: Muscle tone within normal limits, no joint effusions appreciated  Psychiatric: Appropriate affect, cooperative  Neurologic: Alert and oriented, facial movements symmetric and spontaneous movement of all 4 extremities grossly equal bilaterally, speech clear  Skin: No rashes    Pertinent  and/or Most Recent Results     LAB RESULTS:      Lab 11/05/24  0714 11/04/24  0313 11/04/24  0046 11/04/24  0003 11/03/24  2102 11/03/24  1751 11/03/24  1226   WBC 7.29 7.83 7.99  --   --   --  5.22   HEMOGLOBIN 11.3* 10.7* 11.1*  --   --   --  12.1*   HEMATOCRIT 32.9* 31.3* 32.4*  --   --   --  35.6*   PLATELETS 150 134* 141  --   --   --  148   NEUTROS ABS  --   --  5.74  --   --   --  3.31   IMMATURE GRANS (ABS)  --   --  0.02  --   --   --  0.01   LYMPHS ABS  --   --  1.50  --   --   --  1.25   MONOS ABS  --   --  0.61  --   --   --  0.56   EOS ABS  --   --  0.10  --   --   --  0.07   MCV 88.0 88.2 88.5  --   --   --  89.2   LACTATE  --  1.4  --  2.9* 3.3* 2.8*  --    D DIMER QUANT  --   --   --   --   --  0.50  --          Lab 11/06/24  0435 11/05/24  0714 11/04/24  0457 11/04/24  0313 11/04/24  0046 11/03/24  1438 11/03/24  1226   SODIUM 134* 136  --  135* 135*  --  137   POTASSIUM 4.1 4.3  --  3.7 3.9  --  3.4*   CHLORIDE 103 104  --  103 100  --  98   CO2 21.0* 20.0*  --  22.0 22.0  --  26.0   ANION  GAP 10.0 12.0  --  10.0 13.0  --  13.0   BUN 12 12  --  14 13  --  10   CREATININE 0.89 1.31*  --  2.35* 2.29*  --  2.27*   EGFR 95.7 60.8  --  30.1* 31.1*  --  31.4*   GLUCOSE 157* 154*  --  115* 128*  --  157*   CALCIUM 8.3* 8.4*  --  8.0* 8.0*  --  8.3*   IONIZED CALCIUM  --   --   --   --  1.09*  --   --    MAGNESIUM 1.8 1.9 1.3* 1.3*  --   --   --    PHOSPHORUS  --   --   --  3.7  --  3.7  --    HEMOGLOBIN A1C  --   --   --  6.30*  --   --   --    TSH  --  <0.005*  --   --   --  0.008*  --          Lab 11/04/24  0046 11/03/24  1226   TOTAL PROTEIN 5.9* 6.4   ALBUMIN 2.8* 3.1*   GLOBULIN 3.1 3.3   ALT (SGPT) 8 8   AST (SGOT) 16 16   BILIRUBIN 0.6 0.9   ALK PHOS 75 77   LIPASE  --  14         Lab 11/03/24  1438 11/03/24  1226   PROBNP  --  66.9   HSTROP T 56* 63*         Lab 11/04/24  0313   CHOLESTEROL 86   LDL CHOL 39   HDL CHOL 29*   TRIGLYCERIDES 88         Lab 11/03/24  1438   IRON 48*   IRON SATURATION (TSAT) 28   TIBC 170*   TRANSFERRIN 114*   FERRITIN 702.00*         Lab 11/04/24  0009   FIO2 21   CARBOXYHEMOGLOBIN (VENOUS) 1.3     Brief Urine Lab Results  (Last result in the past 365 days)        Color   Clarity   Blood   Leuk Est   Nitrite   Protein   CREAT   Urine HCG        11/05/24 0352 Yellow   Clear   Trace   Negative   Negative   Negative                 Microbiology Results (last 10 days)       ** No results found for the last 240 hours. **            US Renal Limited    Result Date: 11/4/2024  US RENAL LIMITED Date of Exam: 11/4/2024 11:12 AM EST Indication: bhanu. Comparison: CT abdomen and pelvis 11/3/2024 Technique: Grayscale and color Doppler ultrasound evaluation of the kidneys and urinary bladder was performed. Findings: RIGHT kidney measures 10.7 x 6.0 x 6.0 cm.  Kidney echogenicity, size, and vascularity appear within normal limits. There is no solid kidney mass.  No echogenic shadowing stone.  No hydronephrosis. LEFT kidney measures 10.8 x 5.6 x 5.3 cm. Kidney echogenicity, size, and  vascularity appear within normal limits. There is no solid kidney mass.  No echogenic shadowing stone.  No hydronephrosis. Partially decompressed by reported catheter.     Impression: 1.Unremarkable appearance of the kidneys. Electronically Signed: Ranjit Ty MD  11/4/2024 11:40 AM EST  Workstation ID: [id number]    CT Abdomen Pelvis Without Contrast    Result Date: 11/3/2024  CT CHEST WO CONTRAST DIAGNOSTIC, CT ABDOMEN PELVIS WO CONTRAST Date of Exam: 11/3/2024 1:42 PM EST Indication: CP, vomiting. Comparison: CTA chest 9/16/2024, CT abdomen and pelvis 8/8/2024 Technique: Axial CT images were obtained of the chest, abdomen, and pelvis without contrast administration.  Reconstructed coronal and sagittal images were also obtained. Automated exposure control and iterative construction methods were used. Findings: Chest: Coronary artery calcifications are noted. No pericardial effusion. No thoracic adenopathy. Unremarkable appearance of the chest wall soft tissues. The trachea and mainstem bronchi are patent. Evaluation of the lungs is somewhat limited from respiratory motion artifact. There are small scattered regions of subpleural reticulations similar to prior. No focal consolidation. No lung mass or nodule. No pleural effusion or pneumothorax. No acute or suspicious bony findings. Abdomen and pelvis: Unremarkable appearance of the liver. There is cholelithiasis without CT evidence of cholecystitis. Normal appearance of the spleen and pancreas. Unremarkable appearance of the adrenal glands, kidneys, ureters, and bladder. Streak artifact and beam hardening from right hip arthroplasty limits evaluation of the lower pelvis. Unremarkable appearance of the prostate and seminal vesicles. No bowel obstruction or inflammatory change of the GI tract. No abdominopelvic free fluid. No pneumoperitoneum. No acute or suspicious bony findings. Right total hip arthroplasty is partially imaged. Redemonstration of L4-S1 posterior  instrumented fusion hardware and interbody fusion.     Impression: No acute findings in the chest, abdomen, or pelvis. Cholelithiasis without CT evidence of cholecystitis. Electronically Signed: Adolph Sandoval MD  11/3/2024 2:06 PM EST  Workstation ID: [id number]    CT Chest Without Contrast Diagnostic    Result Date: 11/3/2024  CT CHEST WO CONTRAST DIAGNOSTIC, CT ABDOMEN PELVIS WO CONTRAST Date of Exam: 11/3/2024 1:42 PM EST Indication: CP, vomiting. Comparison: CTA chest 9/16/2024, CT abdomen and pelvis 8/8/2024 Technique: Axial CT images were obtained of the chest, abdomen, and pelvis without contrast administration.  Reconstructed coronal and sagittal images were also obtained. Automated exposure control and iterative construction methods were used. Findings: Chest: Coronary artery calcifications are noted. No pericardial effusion. No thoracic adenopathy. Unremarkable appearance of the chest wall soft tissues. The trachea and mainstem bronchi are patent. Evaluation of the lungs is somewhat limited from respiratory motion artifact. There are small scattered regions of subpleural reticulations similar to prior. No focal consolidation. No lung mass or nodule. No pleural effusion or pneumothorax. No acute or suspicious bony findings. Abdomen and pelvis: Unremarkable appearance of the liver. There is cholelithiasis without CT evidence of cholecystitis. Normal appearance of the spleen and pancreas. Unremarkable appearance of the adrenal glands, kidneys, ureters, and bladder. Streak artifact and beam hardening from right hip arthroplasty limits evaluation of the lower pelvis. Unremarkable appearance of the prostate and seminal vesicles. No bowel obstruction or inflammatory change of the GI tract. No abdominopelvic free fluid. No pneumoperitoneum. No acute or suspicious bony findings. Right total hip arthroplasty is partially imaged. Redemonstration of L4-S1 posterior instrumented fusion hardware and interbody fusion.      Impression: No acute findings in the chest, abdomen, or pelvis. Cholelithiasis without CT evidence of cholecystitis. Electronically Signed: Adolph Sandoval MD  11/3/2024 2:06 PM EST  Workstation ID: [id number]    XR Chest 1 View    Result Date: 11/3/2024  XR CHEST 1 VW Date of Exam: 11/3/2024 12:20 PM EST Indication: Chest Pain Triage Protocol Comparison: Chest x-ray 9/16/2024 Findings: Normal cardiomediastinal silhouette. The lungs are clear. No pleural effusion or pneumothorax. No acute osseous findings.     Impression: No acute cardiopulmonary findings. Electronically Signed: Adolph Sandoval MD  11/3/2024 1:04 PM EST  Workstation ID: [id number]     Results for orders placed during the hospital encounter of 05/22/24    Duplex Carotid Ultrasound CAR    Interpretation Summary    Right internal carotid artery demonstrates normal flow without evidence of hemodynamically significant stenosis.    Left internal carotid artery demonstrates normal flow without evidence of hemodynamically significant stenosis.    Bilateral intimal thickening and minimal scattered plaque is noted.      Results for orders placed during the hospital encounter of 05/22/24    Duplex Carotid Ultrasound CAR    Interpretation Summary    Right internal carotid artery demonstrates normal flow without evidence of hemodynamically significant stenosis.    Left internal carotid artery demonstrates normal flow without evidence of hemodynamically significant stenosis.    Bilateral intimal thickening and minimal scattered plaque is noted.      Results for orders placed during the hospital encounter of 11/03/24    Adult Transthoracic Echo Complete W/ Cont if Necessary Per Protocol    Interpretation Summary    Left ventricular systolic function is normal. Calculated left ventricular EF = 61%    Left ventricular diastolic function was normal.    No significant valvular disease      Plan for Follow-up of Pending Labs/Results:   Pending Labs       Order Current  Status    ACTH In process    Thyroid Stimulating Immunoglobulin In process          Discharge Details        Discharge Medications        New Medications        Instructions Start Date   hydrocortisone 10 MG tablet  Commonly known as: CORTEF   Take 2 tablets by mouth Daily With Breakfast AND 1 tablet Daily With Dinner.             Changes to Medications        Instructions Start Date   insulin aspart prot-insulin aspart (70-30) 100 UNIT/ML injection  Commonly known as: NovoLOG Mix 70/30  What changed:   how much to take  how to take this  when to take this  additional instructions   10 Units, Subcutaneous, 2 Times Daily With Meals             Continue These Medications        Instructions Start Date   acetaminophen 325 MG tablet  Commonly known as: TYLENOL   650 mg, Oral, Every 6 Hours PRN      albuterol sulfate  (90 Base) MCG/ACT inhaler  Commonly known as: PROVENTIL HFA;VENTOLIN HFA;PROAIR HFA   2 puffs, Inhalation, 4 Times Daily PRN      aspirin 81 MG EC tablet   81 mg, Daily      atorvastatin 80 MG tablet  Commonly known as: LIPITOR   80 mg, Oral, Nightly      atropine 1 % ophthalmic solution   1 drop, 2 Times Daily      brimonidine 0.2 % ophthalmic solution  Commonly known as: ALPHAGAN   1 drop      budesonide-formoterol 160-4.5 MCG/ACT inhaler  Commonly known as: SYMBICORT   2 puffs, Inhalation, 2 Times Daily - RT      Cholecalciferol 50 MCG (2000 UT) tablet   2,000 Units, Daily      colchicine 0.6 MG tablet   Take 2 tablets by mouth at start of attack and 1 tablet after 1 hour.      gabapentin 300 MG capsule  Commonly known as: NEURONTIN   300 mg, 3 Times Daily      levocetirizine 5 MG tablet  Commonly known as: XYZAL   5 mg, Every Evening      montelukast 10 MG tablet  Commonly known as: SINGULAIR   10 mg, Nightly      multivitamin capsule   1 capsule, Oral, Daily      naproxen 500 MG tablet  Commonly known as: NAPROSYN   500 mg, 2 Times Daily      OneTouch Delica Plus [id number] misc   1 LANCET AS  "DIRECTED FOUR TIMES A DAY. USE AS NEEDED      OneTouch Ultra test strip  Generic drug: glucose blood   TEST BLOOD SUGAR THREE TIMES DAILY AS DIRECTED      potassium chloride ER 20 MEQ tablet controlled-release ER tablet  Commonly known as: K-TAB   20 mEq, Daily      UltiCare Insulin Syringe 31G X 5/16\" 1 ML misc  Generic drug: Insulin Syringe-Needle U-100   USE TWICE DAILY TO INJECT INSULIN AS DIRECTED             Stop These Medications      carvedilol 6.25 MG tablet  Commonly known as: COREG     furosemide 40 MG tablet  Commonly known as: LASIX     lisinopril 10 MG tablet  Commonly known as: PRINIVIL,ZESTRIL              Allergies   Allergen Reactions    Carrot [Daucus Carota] Swelling    Ensure Swelling     Carrots only    Shellfish Allergy Swelling and Unknown (See Comments)     tongue swelling    Strawberry Swelling     tongue swelling    Banana Unknown (See Comments)    Dust Mite Extract Other (See Comments)    Sulfamethoxazole-Trimethoprim Other (See Comments)     Patient developed blisters on his hands.  Patient developed blisters on his hands.    Vancomycin Rash     Received vancomycin x1 dose 7/18/22         Discharge Disposition:  Home or Self Care    Diet:  Hospital:  Diet Order   Procedures    Diet: Cardiac; Healthy Heart (2-3 Na+); Fluid Consistency: Thin (IDDSI 0)            Activity:      Restrictions or Other Recommendations:         CODE STATUS:    Code Status and Medical Interventions: CPR (Attempt to Resuscitate); Full Support   Ordered at: 11/03/24 1648     Level Of Support Discussed With:    Patient     Code Status (Patient has no pulse and is not breathing):    CPR (Attempt to Resuscitate)     Medical Interventions (Patient has pulse or is breathing):    Full Support       No future appointments.    Additional Instructions for the Follow-ups that You Need to Schedule       Discharge Follow-up with PCP   As directed       Currently Documented PCP:    Bianka Ann MD    PCP Phone Number:    " [phone]     Follow Up Details: Monday/Tuesday follow-up                      Meir Bryson MD  11/06/24      Time Spent on Discharge:  I spent  45  minutes on this discharge activity which included: face-to-face encounter with the patient, reviewing the data in the system, coordination of the care with the nursing staff as well as consultants, documentation, and entering orders.  I d/w PCP          Electronically signed by Meir Bryson MD at 11/06/24 1100       Discharge Order (From admission, onward)       Start     Ordered    11/06/24 1004  Discharge patient  Once        Expected Discharge Date: 11/06/24   Discharge Disposition: Home or Self Care   Physician of Record for Attribution - Please select from Treatment Team: MEIR BRYSON [[id number]]   Review needed by CMO to determine Physician of Record: No      Question Answer Comment   Physician of Record for Attribution - Please select from Treatment Team MEIR BRYSON    Review needed by CMO to determine Physician of Record No        11/06/24 1002

## 2024-11-09 LAB — TSI SER-ACNC: <0.1 IU/L (ref 0–0.55)

## 2024-11-12 ENCOUNTER — READMISSION MANAGEMENT (OUTPATIENT)
Dept: CALL CENTER | Facility: HOSPITAL | Age: 64
End: 2024-11-12
Payer: MEDICARE

## 2024-11-12 LAB
QT INTERVAL: 430 MS
QT INTERVAL: 448 MS

## 2024-11-12 NOTE — OUTREACH NOTE
Medical Week 1 Survey      Flowsheet Row Responses   [facility] patient discharged from? [place]   Does the patient have one of the following disease processes/diagnoses(primary or secondary)? Other   Week 1 attempt successful? No   Unsuccessful attempts Attempt 1            Lora MCGHEE - Registered Nurse

## 2024-11-19 ENCOUNTER — READMISSION MANAGEMENT (OUTPATIENT)
Dept: CALL CENTER | Facility: HOSPITAL | Age: 64
End: 2024-11-19
Payer: MEDICARE

## 2024-11-19 NOTE — OUTREACH NOTE
Medical Week 2 Survey      Flowsheet Row Responses   [facility] patient discharged from? [place]   Does the patient have one of the following disease processes/diagnoses(primary or secondary)? Other   Week 2 attempt successful? No   Unsuccessful attempts Attempt 1            Jessica SANCHEZ - Licensed Nurse

## 2024-11-21 ENCOUNTER — OFFICE VISIT (OUTPATIENT)
Age: 64
End: 2024-11-21
Payer: MEDICARE

## 2024-11-21 VITALS
SYSTOLIC BLOOD PRESSURE: 120 MMHG | HEART RATE: 81 BPM | DIASTOLIC BLOOD PRESSURE: 78 MMHG | OXYGEN SATURATION: 97 % | BODY MASS INDEX: 33.75 KG/M2 | WEIGHT: 263 LBS | HEIGHT: 74 IN

## 2024-11-21 DIAGNOSIS — E05.90 HYPERTHYROIDISM: Primary | ICD-10-CM

## 2024-11-21 DIAGNOSIS — E27.40 ADRENAL INSUFFICIENCY: ICD-10-CM

## 2024-11-21 LAB
T3FREE SERPL-MCNC: 2.61 PG/ML (ref 2–4.4)
T4 FREE SERPL-MCNC: 1.58 NG/DL (ref 0.92–1.68)
TSH SERPL DL<=0.05 MIU/L-ACNC: 0.04 UIU/ML (ref 0.27–4.2)

## 2024-11-21 PROCEDURE — 3074F SYST BP LT 130 MM HG: CPT | Performed by: INTERNAL MEDICINE

## 2024-11-21 PROCEDURE — 99204 OFFICE O/P NEW MOD 45 MIN: CPT | Performed by: INTERNAL MEDICINE

## 2024-11-21 PROCEDURE — 3078F DIAST BP <80 MM HG: CPT | Performed by: INTERNAL MEDICINE

## 2024-11-21 PROCEDURE — 84439 ASSAY OF FREE THYROXINE: CPT | Performed by: INTERNAL MEDICINE

## 2024-11-21 PROCEDURE — 3044F HG A1C LEVEL LT 7.0%: CPT | Performed by: INTERNAL MEDICINE

## 2024-11-21 PROCEDURE — 84443 ASSAY THYROID STIM HORMONE: CPT | Performed by: INTERNAL MEDICINE

## 2024-11-21 PROCEDURE — 84481 FREE ASSAY (FT-3): CPT | Performed by: INTERNAL MEDICINE

## 2024-11-21 RX ORDER — LORATADINE 10 MG/1
CAPSULE, LIQUID FILLED ORAL
COMMUNITY

## 2024-11-21 RX ORDER — CARVEDILOL 6.25 MG/1
6.25 TABLET ORAL 2 TIMES DAILY WITH MEALS
COMMUNITY

## 2024-11-21 RX ORDER — LISINOPRIL 10 MG/1
10 TABLET ORAL DAILY
COMMUNITY

## 2024-11-21 RX ORDER — NAPROXEN 500 MG/1
500 TABLET ORAL 2 TIMES DAILY WITH MEALS
COMMUNITY

## 2024-11-21 RX ORDER — DICYCLOMINE HCL 20 MG
20 TABLET ORAL EVERY 6 HOURS
COMMUNITY

## 2024-11-21 RX ORDER — FUROSEMIDE 40 MG/1
40 TABLET ORAL 2 TIMES DAILY
COMMUNITY

## 2024-11-21 NOTE — ASSESSMENT & PLAN NOTE
The patricia stim test is a false positive. He had just finished a steroid taper for back pain. This suppresses acth secretion and will cause a low cortisol. It's a normal response.  He needs to taper the hydrocortisone and stop  it over 2 weeks. He does not need this medication long term.

## 2024-11-21 NOTE — PROGRESS NOTES
"     Office Note      Date: 2024  Patient Name: Amadeo Kong  MRN: [id number]  : 1960    Chief Complaint   Patient presents with    Adrenal Problem       History of Present Illness:   Amadeo Kong is a 64 y.o. male who presents for Adrenal Problem  .   Patient is seen for a new patient evaluation     Pt is seen today because he went into the hospital with vomitting and was found to be hypotensive.. his cortisol was low and he had a blunted cortisol response to cosyntropin but he had just finished a course of steroids for back pain and actually thinks he was still on them at the time.  He was sent out on hydrocortisone and has been taking more that what has been prescribed thinking they would help get rid of his leg swelling    Other labs showed high free t4 and very low tsh   Subjective      [place] was [place] in [place]  Facial radiation exposure: No.  High iodine intake: No  Family hx of thyroid disease: No.    Review of Systems:   Review of Systems   Constitutional:  Positive for fatigue and unexpected weight change.   Cardiovascular:  Positive for leg swelling. Negative for palpitations.       The following portions of the patient's history were reviewed and updated as appropriate: allergies, current medications, past family history, past medical history, past social history, past surgical history, and problem list.    Objective     Visit Vitals  /78 (BP Location: Right arm, Patient Position: Sitting, Cuff Size: Adult)   Pulse 81   Ht 188 cm (74\")   Wt 119 kg (263 lb)   SpO2 97%   BMI 33.77 kg/m²           Physical Exam:  Physical Exam  Vitals reviewed.   Constitutional:       Appearance: Normal appearance.   HENT:      Head: Atraumatic.   Neck:      Comments: Thryoid is not enlarged or g=tender   Cardiovascular:      Rate and Rhythm: Normal rate.   Pulmonary:      Effort: Pulmonary effort is normal.   Musculoskeletal:      Right lower leg: No edema.      Left lower leg: No edema. "   Lymphadenopathy:      Cervical: No cervical adenopathy.   Neurological:      Mental Status: He is alert.   Psychiatric:         Mood and Affect: Mood normal.         Judgment: Judgment normal.         Assessment / Plan      Assessment & Plan:  Problem List Items Addressed This Visit       Adrenal insufficiency    Current Assessment & Plan     The patricia stim test is a false positive. He had just finished a steroid taper for back pain. This suppresses acth secretion and will cause a low cortisol. It's a normal response.  He needs to taper the hydrocortisone and stop  it over 2 weeks. He does not need this medication long term.          Hyperthyroidism - Primary    Current Assessment & Plan     He has low tsh and high free t4 with negative tsi and a normal thyroid on exam  This is probably due to iodine overload from all the iodine he has gotten with contrast dye in the last couple of months.  The plan is to recheck his levels today and then he would need to repeat them again in 2  months. If not normal then, he would need to start methimazole    I would be glad to see him back at any point          Relevant Medications    hydrocortisone (CORTEF) 10 MG tablet    carvedilol (COREG) 6.25 MG tablet    Other Relevant Orders    TSH    T4, Free    T3, Free        Electronically signed by  : Jose Juan Joshi MD   11/21/2024

## 2024-11-21 NOTE — ASSESSMENT & PLAN NOTE
He has low tsh and high free t4 with negative tsi and a normal thyroid on exam  This is probably due to iodine overload from all the iodine he has gotten with contrast dye in the last couple of months.  The plan is to recheck his levels today and then he would need to repeat them again in 2  months. If not normal then, he would need to start methimazole    I would be glad to see him back at any point

## 2024-11-26 ENCOUNTER — READMISSION MANAGEMENT (OUTPATIENT)
Dept: CALL CENTER | Facility: HOSPITAL | Age: 64
End: 2024-11-26
Payer: MEDICARE

## 2024-11-26 NOTE — OUTREACH NOTE
Medical Week 3 Survey      Flowsheet Row Responses   [facility] patient discharged from? [place]   Does the patient have one of the following disease processes/diagnoses(primary or secondary)? Other   Week 3 attempt successful? No   Unsuccessful attempts Attempt 1            Angela Ahmadi Registered Nurse

## 2024-12-02 ENCOUNTER — READMISSION MANAGEMENT (OUTPATIENT)
Dept: CALL CENTER | Facility: HOSPITAL | Age: 64
End: 2024-12-02
Payer: MEDICARE

## 2024-12-02 NOTE — OUTREACH NOTE
Medical Week 3 Survey      Flowsheet Row Responses   [facility] patient discharged from? [facility]   Does the patient have one of the following disease processes/diagnoses(primary or secondary)? Other   Week 3 attempt successful? No   Unsuccessful attempts Attempt 2  [attempted both numbers listed]            ALEYDA SAUCEDO - Registered Nurse

## 2024-12-28 ENCOUNTER — APPOINTMENT (OUTPATIENT)
Dept: CT IMAGING | Facility: HOSPITAL | Age: 64
End: 2024-12-28
Payer: MEDICARE

## 2024-12-28 ENCOUNTER — APPOINTMENT (OUTPATIENT)
Dept: GENERAL RADIOLOGY | Facility: HOSPITAL | Age: 64
End: 2024-12-28
Payer: MEDICARE

## 2024-12-28 ENCOUNTER — HOSPITAL ENCOUNTER (EMERGENCY)
Facility: HOSPITAL | Age: 64
Discharge: HOME | End: 2024-12-28
Attending: EMERGENCY MEDICINE
Payer: MEDICARE

## 2024-12-28 VITALS
RESPIRATION RATE: 18 BRPM | WEIGHT: 267 LBS | HEART RATE: 87 BPM | SYSTOLIC BLOOD PRESSURE: 214 MMHG | OXYGEN SATURATION: 99 % | HEIGHT: 74 IN | BODY MASS INDEX: 34.27 KG/M2 | DIASTOLIC BLOOD PRESSURE: 133 MMHG

## 2024-12-28 DIAGNOSIS — E87.6 HYPOKALEMIA: Primary | ICD-10-CM

## 2024-12-28 DIAGNOSIS — I10 ELEVATED BLOOD PRESSURE READING WITH DIAGNOSIS OF HYPERTENSION: ICD-10-CM

## 2024-12-28 DIAGNOSIS — Z00.00 ENCOUNTER FOR WELLNESS EXAMINATION IN ADULT: ICD-10-CM

## 2024-12-28 LAB
ALBUMIN SERPL-MCNC: 4 G/DL (ref 3.5–5.2)
ALBUMIN/GLOB SERPL: 1 G/DL
ALP SERPL-CCNC: 150 U/L (ref 39–117)
ALT SERPL W P-5'-P-CCNC: 11 U/L (ref 1–41)
AMPHET+METHAMPHET UR QL: NEGATIVE
AMPHETAMINES UR QL: NEGATIVE
ANION GAP SERPL CALCULATED.3IONS-SCNC: 11 MMOL/L (ref 5–15)
APAP SERPL-MCNC: <5 MCG/ML (ref 0–30)
AST SERPL-CCNC: 12 U/L (ref 1–40)
BACTERIA UR QL AUTO: ABNORMAL /HPF
BARBITURATES UR QL SCN: NEGATIVE
BASOPHILS # BLD AUTO: 0.02 10*3/MM3 (ref 0–0.2)
BASOPHILS NFR BLD AUTO: 0.2 % (ref 0–1.5)
BENZODIAZ UR QL SCN: NEGATIVE
BILIRUB SERPL-MCNC: 0.5 MG/DL (ref 0–1.2)
BILIRUB UR QL STRIP: NEGATIVE
BUN SERPL-MCNC: 13 MG/DL (ref 8–23)
BUN/CREAT SERPL: 10.6 (ref 7–25)
BUPRENORPHINE SERPL-MCNC: NEGATIVE NG/ML
CALCIUM SPEC-SCNC: 9.3 MG/DL (ref 8.6–10.5)
CANNABINOIDS SERPL QL: NEGATIVE
CHLORIDE SERPL-SCNC: 101 MMOL/L (ref 98–107)
CLARITY UR: CLEAR
CO2 SERPL-SCNC: 27 MMOL/L (ref 22–29)
COCAINE UR QL: NEGATIVE
COLOR UR: YELLOW
CREAT SERPL-MCNC: 1.23 MG/DL (ref 0.76–1.27)
DEPRECATED RDW RBC AUTO: 43.6 FL (ref 37–54)
EGFRCR SERPLBLD CKD-EPI 2021: 65.2 ML/MIN/1.73
EOSINOPHIL # BLD AUTO: 0.03 10*3/MM3 (ref 0–0.4)
EOSINOPHIL NFR BLD AUTO: 0.3 % (ref 0.3–6.2)
ERYTHROCYTE [DISTWIDTH] IN BLOOD BY AUTOMATED COUNT: 13.6 % (ref 12.3–15.4)
ETHANOL BLD-MCNC: <10 MG/DL (ref 0–10)
FENTANYL UR-MCNC: NEGATIVE NG/ML
GLOBULIN UR ELPH-MCNC: 4.1 GM/DL
GLUCOSE SERPL-MCNC: 201 MG/DL (ref 65–99)
GLUCOSE UR STRIP-MCNC: ABNORMAL MG/DL
HCT VFR BLD AUTO: 44.7 % (ref 37.5–51)
HGB BLD-MCNC: 14.9 G/DL (ref 13–17.7)
HGB UR QL STRIP.AUTO: ABNORMAL
HYALINE CASTS UR QL AUTO: ABNORMAL /LPF
IMM GRANULOCYTES # BLD AUTO: 0.03 10*3/MM3 (ref 0–0.05)
IMM GRANULOCYTES NFR BLD AUTO: 0.3 % (ref 0–0.5)
KETONES UR QL STRIP: NEGATIVE
LEUKOCYTE ESTERASE UR QL STRIP.AUTO: NEGATIVE
LYMPHOCYTES # BLD AUTO: 1.84 10*3/MM3 (ref 0.7–3.1)
LYMPHOCYTES NFR BLD AUTO: 18.9 % (ref 19.6–45.3)
MCH RBC QN AUTO: 29.5 PG (ref 26.6–33)
MCHC RBC AUTO-ENTMCNC: 33.3 G/DL (ref 31.5–35.7)
MCV RBC AUTO: 88.5 FL (ref 79–97)
METHADONE UR QL SCN: NEGATIVE
MONOCYTES # BLD AUTO: 0.6 10*3/MM3 (ref 0.1–0.9)
MONOCYTES NFR BLD AUTO: 6.2 % (ref 5–12)
NEUTROPHILS NFR BLD AUTO: 7.21 10*3/MM3 (ref 1.7–7)
NEUTROPHILS NFR BLD AUTO: 74.1 % (ref 42.7–76)
NITRITE UR QL STRIP: NEGATIVE
NRBC BLD AUTO-RTO: 0 /100 WBC (ref 0–0.2)
OPIATES UR QL: NEGATIVE
OXYCODONE UR QL SCN: NEGATIVE
PCP UR QL SCN: NEGATIVE
PH UR STRIP.AUTO: 6.5 [PH] (ref 5–8)
PLATELET # BLD AUTO: 251 10*3/MM3 (ref 140–450)
PMV BLD AUTO: 9.6 FL (ref 6–12)
POTASSIUM SERPL-SCNC: 3.4 MMOL/L (ref 3.5–5.2)
PROT SERPL-MCNC: 8.1 G/DL (ref 6–8.5)
PROT UR QL STRIP: ABNORMAL
RBC # BLD AUTO: 5.05 10*6/MM3 (ref 4.14–5.8)
RBC # UR STRIP: ABNORMAL /HPF
REF LAB TEST METHOD: ABNORMAL
SALICYLATES SERPL-MCNC: <0.3 MG/DL
SODIUM SERPL-SCNC: 139 MMOL/L (ref 136–145)
SP GR UR STRIP: 1.02 (ref 1–1.03)
SQUAMOUS #/AREA URNS HPF: ABNORMAL /HPF
TRICYCLICS UR QL SCN: NEGATIVE
UROBILINOGEN UR QL STRIP: ABNORMAL
WBC # UR STRIP: ABNORMAL /HPF
WBC NRBC COR # BLD AUTO: 9.73 10*3/MM3 (ref 3.4–10.8)

## 2024-12-28 PROCEDURE — 82077 ASSAY SPEC XCP UR&BREATH IA: CPT | Performed by: EMERGENCY MEDICINE

## 2024-12-28 PROCEDURE — 70450 CT HEAD/BRAIN W/O DYE: CPT

## 2024-12-28 PROCEDURE — 80179 DRUG ASSAY SALICYLATE: CPT | Performed by: EMERGENCY MEDICINE

## 2024-12-28 PROCEDURE — 85025 COMPLETE CBC W/AUTO DIFF WBC: CPT | Performed by: EMERGENCY MEDICINE

## 2024-12-28 PROCEDURE — 80143 DRUG ASSAY ACETAMINOPHEN: CPT | Performed by: EMERGENCY MEDICINE

## 2024-12-28 PROCEDURE — 81001 URINALYSIS AUTO W/SCOPE: CPT | Performed by: EMERGENCY MEDICINE

## 2024-12-28 PROCEDURE — 80307 DRUG TEST PRSMV CHEM ANLYZR: CPT | Performed by: EMERGENCY MEDICINE

## 2024-12-28 PROCEDURE — 80053 COMPREHEN METABOLIC PANEL: CPT | Performed by: EMERGENCY MEDICINE

## 2024-12-28 PROCEDURE — 99284 EMERGENCY DEPT VISIT MOD MDM: CPT

## 2024-12-28 PROCEDURE — 36415 COLL VENOUS BLD VENIPUNCTURE: CPT

## 2024-12-28 PROCEDURE — 73560 X-RAY EXAM OF KNEE 1 OR 2: CPT

## 2024-12-28 RX ORDER — POTASSIUM CHLORIDE 1.5 G/1.58G
40 POWDER, FOR SOLUTION ORAL ONCE
Status: COMPLETED | OUTPATIENT
Start: 2024-12-28 | End: 2024-12-28

## 2024-12-28 RX ORDER — POTASSIUM CHLORIDE 750 MG/1
10 TABLET, EXTENDED RELEASE ORAL 2 TIMES DAILY
Qty: 6 TABLET | Refills: 0 | Status: SHIPPED | OUTPATIENT
Start: 2024-12-28 | End: 2024-12-31

## 2024-12-28 RX ADMIN — POTASSIUM CHLORIDE 40 MEQ: 1.5 POWDER, FOR SOLUTION ORAL at 19:57

## 2024-12-29 NOTE — ED PROVIDER NOTES
"Subjective   History of Present Illness  64-year-old male presents for evaluation of multiple complaints.  Of note, the patient is accompanied by his girlfriend of 25 years who corroborates his history.  The patient presents today for a wellness check telling me that he wants to ensure that someone \"checks out the scratches and scars on his head and his right knee.\"  He tells me that the scratches are acute and tells me that his girlfriend has been the one that has been scratching him.  She denies this and tells me that he is having a \"crazy spell.\"  No fevers.  No neck pain or stiffness.  The patient denies any illicit drug or alcohol use.  He is not suicidal or homicidal.  He denies any headache.  He denies any pain to his right knee.  He is simply seeking validation that his reported scratches and scars are real.      Review of Systems   Skin:  Positive for wound.   All other systems reviewed and are negative.      Past Medical History:   Diagnosis Date    Arthritis     Asthma     inhaler daily     Diabetes mellitus 2000    insulin daily; checks blood sugars on occasion-run 150-180    Elevated cholesterol     History of sleep apnea     years ago diagnosed but never used a machine at home     History of staph infection 2010    wound infection after left knee replacement -saw infectious disease MD     Hypertension     Wears glasses        Allergies   Allergen Reactions    Carrot [Daucus Carota] Swelling    Ensure Swelling     Carrots only    Shellfish Allergy Swelling and Unknown (See Comments)     tongue swelling    Strawberry Swelling     tongue swelling    Banana Unknown (See Comments)    Dust Mite Extract Other (See Comments)    Sulfamethoxazole-Trimethoprim Other (See Comments)     Patient developed blisters on his hands.  Patient developed blisters on his hands.    Vancomycin Rash     Received vancomycin x1 dose 7/18/22       Past Surgical History:   Procedure Laterality Date    COLONOSCOPY      HIP SURGERY " Right 08/11/2014    [facility]    INCISION AND DRAINAGE OF WOUND Left 2010    x2 of total knee replacement wound -iv antibiotics    KNEE SURGERY Bilateral     Right knee- 2008, left knee- 2010- [facility]    LUMBAR LAMINECTOMY WITH FUSION N/A 7/27/2018    Procedure: TLIF, OSTEOTOMY L4-5 , POST FUSION L4-5;  Surgeon: Yo Bryson MD;  Location: [facility] OR;  Service: Neurosurgery    LUMBAR LAMINECTOMY WITH FUSION N/A 8/27/2018    Procedure: LUMBAR LAMINECTOMY POSTERIOR LUMBAR INTERBODY FUSION;  Surgeon: Yo Bryson MD;  Location:  [facility] OR;  Service: Neurosurgery    ROTATOR CUFF REPAIR Right     TONSILLECTOMY         Family History   Problem Relation Age of Onset    Hypertension Mother        Social History     Socioeconomic History    Marital status: Single   Tobacco Use    Smoking status: Never    Smokeless tobacco: Never   Vaping Use    Vaping status: Never Used   Substance and Sexual Activity    Alcohol use: No    Drug use: No    Sexual activity: Defer           Objective   Physical Exam  Vitals and nursing note reviewed.   Constitutional:       General: He is not in acute distress.     Appearance: Normal appearance. He is well-developed. He is not diaphoretic.      Comments: Well-appearing male in no acute distress   HENT:      Head: Normocephalic and atraumatic.   Eyes:      Pupils: Pupils are equal, round, and reactive to light.   Neck:      Vascular: No JVD.      Comments: No meningeal signs or nuchal rigidity  Cardiovascular:      Rate and Rhythm: Normal rate and regular rhythm.      Heart sounds: Normal heart sounds. No murmur heard.     No friction rub. No gallop.   Pulmonary:      Effort: Pulmonary effort is normal. No respiratory distress.      Breath sounds: Normal breath sounds. No wheezing or rales.   Abdominal:      General: Bowel sounds are normal. There is no distension.      Palpations: Abdomen is soft. There is no mass.      Tenderness: There is no abdominal tenderness. There is no  "guarding.   Musculoskeletal:         General: Normal range of motion.      Cervical back: Normal range of motion.   Skin:     General: Skin is warm and dry.      Coloration: Skin is not pale.      Findings: No erythema or rash.      Comments: Old appearing scars and scratches noted to bald scalp, scattered prior surgical scars noted anterior aspect of right knee   Neurological:      Mental Status: He is alert and oriented to person, place, and time.      Comments: Awake, alert, and oriented x3, clear and fluent speech, no ataxia or dysmetria, normal gait, neurovascularly intact distally in all fours with bounding distal pulses and normal sensation, 5 out of 5 strength in all fours, no cranial nerve deficits noted with cranial nerves II through XII grossly intact   Psychiatric:         Mood and Affect: Mood normal.         Thought Content: Thought content normal.         Judgment: Judgment normal.         Procedures           ED Course  ED Course as of 12/29/24 0218   Sat Dec 28, 2024   1738 64-year-old male presents for evaluation of multiple complaints.  The patient is accompanied by his girlfriend of 25 years.  The patient presents today for a wellness check telling me that he wants to ensure that someone \"checks out the scratches and scars on his head and right knee.\"  He tells me that the scratches are acute and tells me that his girlfriend has been the one that has been scratching him.  She denies this and tells me that he is having a \"crazy spell.\"  On arrival, the patient is nontoxic-appearing.  He is answering questions appropriately and seems to have full decision-making capacity; however, he does appear to be somewhat delusional as I see no acute scratches to his head or right knee at this time.  He has a few scattered scratches to his bald scalp and a few scattered surgical scars to the anterior aspect of his right knee.  He is agreeable with labs and imaging to rule out acute/emergent pathology.  He is " not suicidal or homicidal.  I do not feel that he represents a threat to himself or others in his current state.  Differential diagnosis is quite broad.  We will obtain labs and imaging, and we will reassess following initial interventions [DD]   1920 Aside from mild hypokalemia, labs are otherwise bland/unrevealing.  Oral potassium replacement initiated. [DD]   1924 Given the patient's reported change in mental status, I felt that CT head was warranted to rule out emergent central process.  I personally and independently reviewed the patient's CT images and findings, and I am in agreement with the radiologist regarding CT interpretation--particularly there is no emergent central process noted.  Doubt emergent central process at this time based on overall clinical picture. [DD]   1925 No indication for emergent MRI at this time.   [DD]   1925 I personally and independently viewed the patient's x-ray images myself, and I am in agreement with the radiologist's reading for final interpretation, particularly there is no underlying fracture of right knee.  [DD]   1925 I feel that the patient has been cleared from a medical standpoint and can be safely discharged home.  Doubt CNS infection.  The patient is answering questions appropriately and seems to have full capacity at this time.  He is girlfriend is in agreement.  He will follow-up with his primary care physician within the next week.  Prescription for oral potassium replacement over the next 3 days.  Agreeable with plan and given appropriate strict return precautions [DD]   Sun Dec 29, 2024   0217 Additionally, the patient's blood pressure was elevated in the emergency department.  The patient is currently asymptomatic from a blood pressure standpoint, and their clinical presentation clearly is not consistent with hypertensive emergency or hypertensive encephalopathy.  No indication to emergently lower the patient's blood pressure at this time per [facility]'s clinical  policy on asymptomatic hypertension.  Over the next 3 days, the patient will keep close tabs on their blood pressure and will keep a log of readings 2-3 times per day.  They will then follow-up with their primary care physician to discuss potential tweaks to their medication regimen.     [DD]      ED Course User Index  [DD] Gutierrez Olivas MD      Recent Results (from the past 24 hours)   Comprehensive Metabolic Panel    Collection Time: 12/28/24  6:28 PM    Specimen: Arm, Left; Blood   Result Value Ref Range    Glucose 201 (H) 65 - 99 mg/dL    BUN 13 8 - 23 mg/dL    Creatinine 1.23 0.76 - 1.27 mg/dL    Sodium 139 136 - 145 mmol/L    Potassium 3.4 (L) 3.5 - 5.2 mmol/L    Chloride 101 98 - 107 mmol/L    CO2 27.0 22.0 - 29.0 mmol/L    Calcium 9.3 8.6 - 10.5 mg/dL    Total Protein 8.1 6.0 - 8.5 g/dL    Albumin 4.0 3.5 - 5.2 g/dL    ALT (SGPT) 11 1 - 41 U/L    AST (SGOT) 12 1 - 40 U/L    Alkaline Phosphatase 150 (H) 39 - 117 U/L    Total Bilirubin 0.5 0.0 - 1.2 mg/dL    Globulin 4.1 gm/dL    A/G Ratio 1.0 g/dL    BUN/Creatinine Ratio 10.6 7.0 - 25.0    Anion Gap 11.0 5.0 - 15.0 mmol/L    eGFR 65.2 >60.0 mL/min/1.73   Acetaminophen Level    Collection Time: 12/28/24  6:28 PM    Specimen: Arm, Left; Blood   Result Value Ref Range    Acetaminophen <5.0 0.0 - 30.0 mcg/mL   Ethanol    Collection Time: 12/28/24  6:28 PM    Specimen: Arm, Left; Blood   Result Value Ref Range    Ethanol <10 0 - 10 mg/dL   Salicylate Level    Collection Time: 12/28/24  6:28 PM    Specimen: Arm, Left; Blood   Result Value Ref Range    Salicylate <0.3 <=30.0 mg/dL   CBC Auto Differential    Collection Time: 12/28/24  6:28 PM    Specimen: Arm, Left; Blood   Result Value Ref Range    WBC 9.73 3.40 - 10.80 10*3/mm3    RBC 5.05 4.14 - 5.80 10*6/mm3    Hemoglobin 14.9 13.0 - 17.7 g/dL    Hematocrit 44.7 37.5 - 51.0 %    MCV 88.5 79.0 - 97.0 fL    MCH 29.5 26.6 - 33.0 pg    MCHC 33.3 31.5 - 35.7 g/dL    RDW 13.6 12.3 - 15.4 %    RDW-SD 43.6 37.0 -  54.0 fl    MPV 9.6 6.0 - 12.0 fL    Platelets 251 140 - 450 10*3/mm3    Neutrophil % 74.1 42.7 - 76.0 %    Lymphocyte % 18.9 (L) 19.6 - 45.3 %    Monocyte % 6.2 5.0 - 12.0 %    Eosinophil % 0.3 0.3 - 6.2 %    Basophil % 0.2 0.0 - 1.5 %    Immature Grans % 0.3 0.0 - 0.5 %    Neutrophils, Absolute 7.21 (H) 1.70 - 7.00 10*3/mm3    Lymphocytes, Absolute 1.84 0.70 - 3.10 10*3/mm3    Monocytes, Absolute 0.60 0.10 - 0.90 10*3/mm3    Eosinophils, Absolute 0.03 0.00 - 0.40 10*3/mm3    Basophils, Absolute 0.02 0.00 - 0.20 10*3/mm3    Immature Grans, Absolute 0.03 0.00 - 0.05 10*3/mm3    nRBC 0.0 0.0 - 0.2 /100 WBC   Urinalysis With Culture If Indicated - Urine, Clean Catch    Collection Time: 12/28/24  6:30 PM    Specimen: Urine, Clean Catch   Result Value Ref Range    Color, UA Yellow Yellow, Straw    Appearance, UA Clear Clear    pH, UA 6.5 5.0 - 8.0    Specific Gravity, UA 1.019 1.001 - 1.030    Glucose,  mg/dL (Trace) (A) Negative    Ketones, UA Negative Negative    Bilirubin, UA Negative Negative    Blood, UA Small (1+) (A) Negative    Protein, UA >=300 mg/dL (3+) (A) Negative    Leuk Esterase, UA Negative Negative    Nitrite, UA Negative Negative    Urobilinogen, UA 1.0 E.U./dL 0.2 - 1.0 E.U./dL   Urine Drug Screen - Urine, Clean Catch    Collection Time: 12/28/24  6:30 PM    Specimen: Urine, Clean Catch   Result Value Ref Range    THC, Screen, Urine Negative Negative    Phencyclidine (PCP), Urine Negative Negative    Cocaine Screen, Urine Negative Negative    Methamphetamine, Ur Negative Negative    Opiate Screen Negative Negative    Amphetamine Screen, Urine Negative Negative    Benzodiazepine Screen, Urine Negative Negative    Tricyclic Antidepressants Screen Negative Negative    Methadone Screen, Urine Negative Negative    Barbiturates Screen, Urine Negative Negative    Oxycodone Screen, Urine Negative Negative    Buprenorphine, Screen, Urine Negative Negative   Fentanyl, Urine - Urine, Clean Catch     "Collection Time: 12/28/24  6:30 PM    Specimen: Urine, Clean Catch   Result Value Ref Range    Fentanyl, Urine Negative Negative   Urinalysis, Microscopic Only - Urine, Clean Catch    Collection Time: 12/28/24  6:30 PM    Specimen: Urine, Clean Catch   Result Value Ref Range    RBC, UA 6-10 (A) None Seen, 0-2 /HPF    WBC, UA 0-2 None Seen, 0-2 /HPF    Bacteria, UA None Seen None Seen, Trace /HPF    Squamous Epithelial Cells, UA 0-2 None Seen, 0-2 /HPF    Hyaline Casts, UA 0-6 0 - 6 /LPF    Methodology Automated Microscopy      Note: In addition to lab results from this visit, the labs listed above may include labs taken at another facility or during a different encounter within the last 24 hours. Please correlate lab times with ED admission and discharge times for further clarification of the services performed during this visit.    CT Head Without Contrast   Final Result   Impression:   No acute intracranial abnormality            Electronically Signed: Khalif Hawley DO     12/28/2024 7:19 PM EST     Workstation ID: [id number]      XR Knee 1 or 2 View Right   Final Result   Impression:   Status post total knee arthroplasty. No definite acute fracture or dislocation.            Electronically Signed: Khalif Hawley DO     12/28/2024 7:09 PM EST     Workstation ID: [id number]        Vitals:    12/28/24 1716 12/28/24 1720   BP:  (!) 214/133   BP Location:  Left arm   Patient Position:  Sitting   Pulse: 87    Resp: 18    TempSrc: Oral    SpO2: 99%    Weight: 121 kg (267 lb)    Height: 188 cm (74\")      Medications   potassium chloride (KLOR-CON) packet 40 mEq (40 mEq Oral Given 12/28/24 1957)     ECG/EMG Results (last 24 hours)       ** No results found for the last 24 hours. **          No orders to display                                              Recent Results (from the past 24 hours)   Comprehensive Metabolic Panel    Collection Time: 12/28/24  6:28 PM    Specimen: Arm, Left; Blood   Result Value Ref Range "    Glucose 201 (H) 65 - 99 mg/dL    BUN 13 8 - 23 mg/dL    Creatinine 1.23 0.76 - 1.27 mg/dL    Sodium 139 136 - 145 mmol/L    Potassium 3.4 (L) 3.5 - 5.2 mmol/L    Chloride 101 98 - 107 mmol/L    CO2 27.0 22.0 - 29.0 mmol/L    Calcium 9.3 8.6 - 10.5 mg/dL    Total Protein 8.1 6.0 - 8.5 g/dL    Albumin 4.0 3.5 - 5.2 g/dL    ALT (SGPT) 11 1 - 41 U/L    AST (SGOT) 12 1 - 40 U/L    Alkaline Phosphatase 150 (H) 39 - 117 U/L    Total Bilirubin 0.5 0.0 - 1.2 mg/dL    Globulin 4.1 gm/dL    A/G Ratio 1.0 g/dL    BUN/Creatinine Ratio 10.6 7.0 - 25.0    Anion Gap 11.0 5.0 - 15.0 mmol/L    eGFR 65.2 >60.0 mL/min/1.73   Acetaminophen Level    Collection Time: 12/28/24  6:28 PM    Specimen: Arm, Left; Blood   Result Value Ref Range    Acetaminophen <5.0 0.0 - 30.0 mcg/mL   Ethanol    Collection Time: 12/28/24  6:28 PM    Specimen: Arm, Left; Blood   Result Value Ref Range    Ethanol <10 0 - 10 mg/dL   Salicylate Level    Collection Time: 12/28/24  6:28 PM    Specimen: Arm, Left; Blood   Result Value Ref Range    Salicylate <0.3 <=30.0 mg/dL   CBC Auto Differential    Collection Time: 12/28/24  6:28 PM    Specimen: Arm, Left; Blood   Result Value Ref Range    WBC 9.73 3.40 - 10.80 10*3/mm3    RBC 5.05 4.14 - 5.80 10*6/mm3    Hemoglobin 14.9 13.0 - 17.7 g/dL    Hematocrit 44.7 37.5 - 51.0 %    MCV 88.5 79.0 - 97.0 fL    MCH 29.5 26.6 - 33.0 pg    MCHC 33.3 31.5 - 35.7 g/dL    RDW 13.6 12.3 - 15.4 %    RDW-SD 43.6 37.0 - 54.0 fl    MPV 9.6 6.0 - 12.0 fL    Platelets 251 140 - 450 10*3/mm3    Neutrophil % 74.1 42.7 - 76.0 %    Lymphocyte % 18.9 (L) 19.6 - 45.3 %    Monocyte % 6.2 5.0 - 12.0 %    Eosinophil % 0.3 0.3 - 6.2 %    Basophil % 0.2 0.0 - 1.5 %    Immature Grans % 0.3 0.0 - 0.5 %    Neutrophils, Absolute 7.21 (H) 1.70 - 7.00 10*3/mm3    Lymphocytes, Absolute 1.84 0.70 - 3.10 10*3/mm3    Monocytes, Absolute 0.60 0.10 - 0.90 10*3/mm3    Eosinophils, Absolute 0.03 0.00 - 0.40 10*3/mm3    Basophils, Absolute 0.02 0.00 - 0.20  10*3/mm3    Immature Grans, Absolute 0.03 0.00 - 0.05 10*3/mm3    nRBC 0.0 0.0 - 0.2 /100 WBC   Urinalysis With Culture If Indicated - Urine, Clean Catch    Collection Time: 12/28/24  6:30 PM    Specimen: Urine, Clean Catch   Result Value Ref Range    Color, UA Yellow Yellow, Straw    Appearance, UA Clear Clear    pH, UA 6.5 5.0 - 8.0    Specific Gravity, UA 1.019 1.001 - 1.030    Glucose,  mg/dL (Trace) (A) Negative    Ketones, UA Negative Negative    Bilirubin, UA Negative Negative    Blood, UA Small (1+) (A) Negative    Protein, UA >=300 mg/dL (3+) (A) Negative    Leuk Esterase, UA Negative Negative    Nitrite, UA Negative Negative    Urobilinogen, UA 1.0 E.U./dL 0.2 - 1.0 E.U./dL   Urine Drug Screen - Urine, Clean Catch    Collection Time: 12/28/24  6:30 PM    Specimen: Urine, Clean Catch   Result Value Ref Range    THC, Screen, Urine Negative Negative    Phencyclidine (PCP), Urine Negative Negative    Cocaine Screen, Urine Negative Negative    Methamphetamine, Ur Negative Negative    Opiate Screen Negative Negative    Amphetamine Screen, Urine Negative Negative    Benzodiazepine Screen, Urine Negative Negative    Tricyclic Antidepressants Screen Negative Negative    Methadone Screen, Urine Negative Negative    Barbiturates Screen, Urine Negative Negative    Oxycodone Screen, Urine Negative Negative    Buprenorphine, Screen, Urine Negative Negative   Fentanyl, Urine - Urine, Clean Catch    Collection Time: 12/28/24  6:30 PM    Specimen: Urine, Clean Catch   Result Value Ref Range    Fentanyl, Urine Negative Negative   Urinalysis, Microscopic Only - Urine, Clean Catch    Collection Time: 12/28/24  6:30 PM    Specimen: Urine, Clean Catch   Result Value Ref Range    RBC, UA 6-10 (A) None Seen, 0-2 /HPF    WBC, UA 0-2 None Seen, 0-2 /HPF    Bacteria, UA None Seen None Seen, Trace /HPF    Squamous Epithelial Cells, UA 0-2 None Seen, 0-2 /HPF    Hyaline Casts, UA 0-6 0 - 6 /LPF    Methodology Automated  "Microscopy      Note: In addition to lab results from this visit, the labs listed above may include labs taken at another facility or during a different encounter within the last 24 hours. Please correlate lab times with ED admission and discharge times for further clarification of the services performed during this visit.    CT Head Without Contrast   Final Result   Impression:   No acute intracranial abnormality            Electronically Signed: Khalif Hawley, DO     12/28/2024 7:19 PM EST     Workstation ID: [id number]      XR Knee 1 or 2 View Right   Final Result   Impression:   Status post total knee arthroplasty. No definite acute fracture or dislocation.            Electronically Signed: Khalif Hawley, DO     12/28/2024 7:09 PM EST     Workstation ID: [id number]        Vitals:    12/28/24 1716 12/28/24 1720   BP:  (!) 214/133   BP Location:  Left arm   Patient Position:  Sitting   Pulse: 87    Resp: 18    TempSrc: Oral    SpO2: 99%    Weight: 121 kg (267 lb)    Height: 188 cm (74\")      Medications   potassium chloride (KLOR-CON) packet 40 mEq (40 mEq Oral Given 12/28/24 1957)     ECG/EMG Results (last 24 hours)       ** No results found for the last 24 hours. **          No orders to display            Recent Results (from the past 24 hours)   Comprehensive Metabolic Panel    Collection Time: 12/28/24  6:28 PM    Specimen: Arm, Left; Blood   Result Value Ref Range    Glucose 201 (H) 65 - 99 mg/dL    BUN 13 8 - 23 mg/dL    Creatinine 1.23 0.76 - 1.27 mg/dL    Sodium 139 136 - 145 mmol/L    Potassium 3.4 (L) 3.5 - 5.2 mmol/L    Chloride 101 98 - 107 mmol/L    CO2 27.0 22.0 - 29.0 mmol/L    Calcium 9.3 8.6 - 10.5 mg/dL    Total Protein 8.1 6.0 - 8.5 g/dL    Albumin 4.0 3.5 - 5.2 g/dL    ALT (SGPT) 11 1 - 41 U/L    AST (SGOT) 12 1 - 40 U/L    Alkaline Phosphatase 150 (H) 39 - 117 U/L    Total Bilirubin 0.5 0.0 - 1.2 mg/dL    Globulin 4.1 gm/dL    A/G Ratio 1.0 g/dL    BUN/Creatinine Ratio 10.6 7.0 - 25.0    " Anion Gap 11.0 5.0 - 15.0 mmol/L    eGFR 65.2 >60.0 mL/min/1.73   Acetaminophen Level    Collection Time: 12/28/24  6:28 PM    Specimen: Arm, Left; Blood   Result Value Ref Range    Acetaminophen <5.0 0.0 - 30.0 mcg/mL   Ethanol    Collection Time: 12/28/24  6:28 PM    Specimen: Arm, Left; Blood   Result Value Ref Range    Ethanol <10 0 - 10 mg/dL   Salicylate Level    Collection Time: 12/28/24  6:28 PM    Specimen: Arm, Left; Blood   Result Value Ref Range    Salicylate <0.3 <=30.0 mg/dL   CBC Auto Differential    Collection Time: 12/28/24  6:28 PM    Specimen: Arm, Left; Blood   Result Value Ref Range    WBC 9.73 3.40 - 10.80 10*3/mm3    RBC 5.05 4.14 - 5.80 10*6/mm3    Hemoglobin 14.9 13.0 - 17.7 g/dL    Hematocrit 44.7 37.5 - 51.0 %    MCV 88.5 79.0 - 97.0 fL    MCH 29.5 26.6 - 33.0 pg    MCHC 33.3 31.5 - 35.7 g/dL    RDW 13.6 12.3 - 15.4 %    RDW-SD 43.6 37.0 - 54.0 fl    MPV 9.6 6.0 - 12.0 fL    Platelets 251 140 - 450 10*3/mm3    Neutrophil % 74.1 42.7 - 76.0 %    Lymphocyte % 18.9 (L) 19.6 - 45.3 %    Monocyte % 6.2 5.0 - 12.0 %    Eosinophil % 0.3 0.3 - 6.2 %    Basophil % 0.2 0.0 - 1.5 %    Immature Grans % 0.3 0.0 - 0.5 %    Neutrophils, Absolute 7.21 (H) 1.70 - 7.00 10*3/mm3    Lymphocytes, Absolute 1.84 0.70 - 3.10 10*3/mm3    Monocytes, Absolute 0.60 0.10 - 0.90 10*3/mm3    Eosinophils, Absolute 0.03 0.00 - 0.40 10*3/mm3    Basophils, Absolute 0.02 0.00 - 0.20 10*3/mm3    Immature Grans, Absolute 0.03 0.00 - 0.05 10*3/mm3    nRBC 0.0 0.0 - 0.2 /100 WBC   Urinalysis With Culture If Indicated - Urine, Clean Catch    Collection Time: 12/28/24  6:30 PM    Specimen: Urine, Clean Catch   Result Value Ref Range    Color, UA Yellow Yellow, Straw    Appearance, UA Clear Clear    pH, UA 6.5 5.0 - 8.0    Specific Gravity, UA 1.019 1.001 - 1.030    Glucose,  mg/dL (Trace) (A) Negative    Ketones, UA Negative Negative    Bilirubin, UA Negative Negative    Blood, UA Small (1+) (A) Negative    Protein, UA >=300  mg/dL (3+) (A) Negative    Leuk Esterase, UA Negative Negative    Nitrite, UA Negative Negative    Urobilinogen, UA 1.0 E.U./dL 0.2 - 1.0 E.U./dL   Urine Drug Screen - Urine, Clean Catch    Collection Time: 12/28/24  6:30 PM    Specimen: Urine, Clean Catch   Result Value Ref Range    THC, Screen, Urine Negative Negative    Phencyclidine (PCP), Urine Negative Negative    Cocaine Screen, Urine Negative Negative    Methamphetamine, Ur Negative Negative    Opiate Screen Negative Negative    Amphetamine Screen, Urine Negative Negative    Benzodiazepine Screen, Urine Negative Negative    Tricyclic Antidepressants Screen Negative Negative    Methadone Screen, Urine Negative Negative    Barbiturates Screen, Urine Negative Negative    Oxycodone Screen, Urine Negative Negative    Buprenorphine, Screen, Urine Negative Negative   Fentanyl, Urine - Urine, Clean Catch    Collection Time: 12/28/24  6:30 PM    Specimen: Urine, Clean Catch   Result Value Ref Range    Fentanyl, Urine Negative Negative   Urinalysis, Microscopic Only - Urine, Clean Catch    Collection Time: 12/28/24  6:30 PM    Specimen: Urine, Clean Catch   Result Value Ref Range    RBC, UA 6-10 (A) None Seen, 0-2 /HPF    WBC, UA 0-2 None Seen, 0-2 /HPF    Bacteria, UA None Seen None Seen, Trace /HPF    Squamous Epithelial Cells, UA 0-2 None Seen, 0-2 /HPF    Hyaline Casts, UA 0-6 0 - 6 /LPF    Methodology Automated Microscopy      Note: In addition to lab results from this visit, the labs listed above may include labs taken at another facility or during a different encounter within the last 24 hours. Please correlate lab times with ED admission and discharge times for further clarification of the services performed during this visit.    CT Head Without Contrast   Final Result   Impression:   No acute intracranial abnormality            Electronically Signed: Khalif Hawley DO     12/28/2024 7:19 PM EST     Workstation ID: [id number]      XR Knee 1 or 2 View Right  "  Final Result   Impression:   Status post total knee arthroplasty. No definite acute fracture or dislocation.            Electronically Signed: Khalif Hawley,      12/28/2024 7:09 PM EST     Workstation ID: [id number]        Vitals:    12/28/24 1716 12/28/24 1720   BP:  (!) 214/133   BP Location:  Left arm   Patient Position:  Sitting   Pulse: 87    Resp: 18    TempSrc: Oral    SpO2: 99%    Weight: 121 kg (267 lb)    Height: 188 cm (74\")      Medications   potassium chloride (KLOR-CON) packet 40 mEq (40 mEq Oral Given 12/28/24 1957)     ECG/EMG Results (last 24 hours)       ** No results found for the last 24 hours. **          No orders to display              Medical Decision Making      Final diagnoses:   Hypokalemia   Encounter for wellness examination in adult       ED Disposition  ED Disposition       ED Disposition   Discharge    Condition   Stable    Comment   --               Bianka Ann MD  [address] [address]  [phone]    In 1 week           Medication List        Changed      * potassium chloride 10 MEQ CR tablet  Take 1 tablet by mouth 2 (Two) Times a Day for 3 days.  What changed: You were already taking a medication with the same name, and this prescription was added. Make sure you understand how and when to take each.     * potassium chloride ER 20 MEQ tablet controlled-release ER tablet  Commonly known as: K-TAB  What changed: Another medication with the same name was added. Make sure you understand how and when to take each.           * This list has 2 medication(s) that are the same as other medications prescribed for you. Read the directions carefully, and ask your doctor or other care provider to review them with you.                   Where to Get Your Medications        These medications were sent to [id number] - [place], [place] - [address] AT [facility] - [phone]  - [phone] FX  [address] [address], [facility] [address]      Phone: [phone]   potassium chloride 10 MEQ CR tablet            Gutierrez Olivas MD  12/29/24 0216       Gutierrez Olivsa MD  12/29/24 0218

## 2025-03-20 ENCOUNTER — OFFICE VISIT (OUTPATIENT)
Dept: ORTHOPEDIC SURGERY | Facility: CLINIC | Age: 65
End: 2025-03-20
Payer: MEDICARE

## 2025-03-20 VITALS
HEIGHT: 74 IN | BODY MASS INDEX: 34.24 KG/M2 | WEIGHT: 266.8 LBS | DIASTOLIC BLOOD PRESSURE: 74 MMHG | SYSTOLIC BLOOD PRESSURE: 140 MMHG

## 2025-03-20 DIAGNOSIS — M54.50 CHRONIC RIGHT-SIDED LOW BACK PAIN, UNSPECIFIED WHETHER SCIATICA PRESENT: Primary | ICD-10-CM

## 2025-03-20 DIAGNOSIS — M70.61 TROCHANTERIC BURSITIS OF RIGHT HIP: ICD-10-CM

## 2025-03-20 DIAGNOSIS — G89.29 CHRONIC RIGHT-SIDED LOW BACK PAIN, UNSPECIFIED WHETHER SCIATICA PRESENT: Primary | ICD-10-CM

## 2025-03-20 DIAGNOSIS — Z96.641 STATUS POST TOTAL REPLACEMENT OF RIGHT HIP: ICD-10-CM

## 2025-03-20 DIAGNOSIS — M25.551 RIGHT HIP PAIN: ICD-10-CM

## 2025-03-20 PROCEDURE — 99204 OFFICE O/P NEW MOD 45 MIN: CPT | Performed by: ORTHOPAEDIC SURGERY

## 2025-03-20 PROCEDURE — 3078F DIAST BP <80 MM HG: CPT | Performed by: ORTHOPAEDIC SURGERY

## 2025-03-20 PROCEDURE — 3077F SYST BP >= 140 MM HG: CPT | Performed by: ORTHOPAEDIC SURGERY

## 2025-03-20 PROCEDURE — 1159F MED LIST DOCD IN RCRD: CPT | Performed by: ORTHOPAEDIC SURGERY

## 2025-03-20 PROCEDURE — 1160F RVW MEDS BY RX/DR IN RCRD: CPT | Performed by: ORTHOPAEDIC SURGERY

## 2025-03-20 NOTE — PROGRESS NOTES
Orthopaedic Clinic Note: Hip New Patient    Chief Complaint   Patient presents with    Right Hip - Pain        HPI    Amadeo Kong is a 64 y.o. male who presents with right hip pain for 2 to 3 years.  He is status post total hip arthroplasty on 8/11/2014 by Dr. Dario Farfan.  He comes clinic today complaining of a 2 to 3-year history of lateral and posterior based hip pain that has been ongoing.  He denies pain in the groin.  He has had multiple back surgeries and continues to have pain in his back.  He denies fevers chills or constitutional symptoms.  Is complaining primarily of aching and weakness in his legs as well as pain that results in his legs giving way.  He is here to discuss treatment for his ongoing pain.      Past Medical History:   Diagnosis Date    Arthritis     Asthma     inhaler daily     Diabetes mellitus 2000    insulin daily; checks blood sugars on occasion-run 150-180    Elevated cholesterol     History of sleep apnea     years ago diagnosed but never used a machine at home     History of staph infection 2010    wound infection after left knee replacement -saw infectious disease MD     Hypertension     Wears glasses       Past Surgical History:   Procedure Laterality Date    COLONOSCOPY      HIP SURGERY Right 08/11/2014    [facility]    INCISION AND DRAINAGE OF WOUND Left 2010    x2 of total knee replacement wound -iv antibiotics    KNEE SURGERY Bilateral     Right knee- 2008, left knee- 2010- [facility]    LUMBAR LAMINECTOMY WITH FUSION N/A 7/27/2018    Procedure: TLIF, OSTEOTOMY L4-5 , POST FUSION L4-5;  Surgeon: Yo Bryson MD;  Location:  [facility];  Service: Neurosurgery    LUMBAR LAMINECTOMY WITH FUSION N/A 8/27/2018    Procedure: LUMBAR LAMINECTOMY POSTERIOR LUMBAR INTERBODY FUSION;  Surgeon: Yo Bryson MD;  Location:  [facility];  Service: Neurosurgery    ROTATOR CUFF REPAIR Right     TONSILLECTOMY        Family History   Problem Relation Age of Onset    Hypertension Mother       Social History     Socioeconomic History    Marital status: Single   Tobacco Use    Smoking status: Never     Passive exposure: Never    Smokeless tobacco: Never   Vaping Use    Vaping status: Never Used   Substance and Sexual Activity    Alcohol use: No    Drug use: No    Sexual activity: Defer      Current Outpatient Medications on File Prior to Visit   Medication Sig Dispense Refill    apixaban (ELIQUIS) 5 MG tablet tablet Take 1 tablet by mouth 2 (Two) Times a Day.      atorvastatin (LIPITOR) 80 MG tablet Take 1 tablet by mouth Every Night. 90 tablet 0    carvedilol (COREG) 6.25 MG tablet Take 1 tablet by mouth 2 (Two) Times a Day With Meals.      Cholecalciferol 50 MCG (2000 UT) tablet Take 1 tablet by mouth Daily.      dicyclomine (BENTYL) 20 MG tablet Take 1 tablet by mouth Every 6 (Six) Hours.      furosemide (LASIX) 40 MG tablet Take 1 tablet by mouth 2 (Two) Times a Day.      gabapentin (NEURONTIN) 300 MG capsule Take 1 capsule by mouth 3 (Three) Times a Day.      glucose blood (OneTouch Ultra) test strip TEST BLOOD SUGAR THREE TIMES DAILY AS DIRECTED      Lancets (OneTouch Delica Plus [id number]) misc 1 LANCET AS DIRECTED FOUR TIMES A DAY. USE AS NEEDED      lisinopril (PRINIVIL,ZESTRIL) 10 MG tablet Take 1 tablet by mouth Daily.      Loratadine 10 MG capsule Take  by mouth.      montelukast (SINGULAIR) 10 MG tablet Take 1 tablet by mouth Every Night.      naproxen (NAPROSYN) 500 MG tablet Take 1 tablet by mouth 2 (Two) Times a Day With Meals.      potassium chloride ER (K-TAB) 20 MEQ tablet controlled-release ER tablet Take 1 tablet by mouth Daily.      budesonide-formoterol (SYMBICORT) 160-4.5 MCG/ACT inhaler Inhale 2 puffs 2 (Two) Times a Day. (Patient not taking: Reported on 3/20/2025) 10.2 g 0    hydrocortisone (CORTEF) 10 MG tablet Take 2 tablets by mouth Daily With Breakfast AND 1 tablet Daily With Dinner. (Patient not taking: Reported on 3/20/2025) 90 tablet 2     No current  "facility-administered medications on file prior to visit.      Allergies   Allergen Reactions    Carrot [Daucus Carota] Swelling    Ensure Swelling     Carrots only    Shellfish Allergy Swelling and Unknown (See Comments)     tongue swelling    Strawberry Swelling     tongue swelling    Banana Unknown (See Comments)    Dust Mite Extract Other (See Comments)    Sulfamethoxazole-Trimethoprim Other (See Comments)     Patient developed blisters on his hands.  Patient developed blisters on his hands.    Vancomycin Rash     Received vancomycin x1 dose 7/18/22        Review of Systems   Constitutional: Negative.    HENT: Negative.     Eyes: Negative.    Respiratory: Negative.     Cardiovascular: Negative.    Gastrointestinal: Negative.    Endocrine: Negative.    Genitourinary: Negative.    Musculoskeletal:  Positive for arthralgias.   Skin: Negative.    Allergic/Immunologic: Negative.    Neurological: Negative.    Hematological: Negative.    Psychiatric/Behavioral: Negative.          The patient's Review of Systems was personally reviewed and confirmed as accurate.    The following portions of the patient's history were reviewed and updated as appropriate: allergies, current medications, past family history, past medical history, past social history, past surgical history, and problem list.    Physical Exam  Blood pressure 140/74, height 188 cm (74.02\"), weight 121 kg (266 lb 12.8 oz).    Body mass index is 34.24 kg/m².    GENERAL APPEARANCE: awake, alert & oriented x 3, in no acute distress and well developed, well nourished  PSYCH: normal affect  LUNGS:  breathing nonlabored  EYES: sclera anicteric  CARDIOVASCULAR: palpable dorsalis pedis, palpable posterior tibial bilaterally. Capillary refill less than 2 seconds  EXTREMITIES: no clubbing, cyanosis  GAIT:  Antalgic           Right Hip Exam:  RANGE OF MOTION:   FLEXION CONTRACTURE: None   FLEXION: 110 degrees   INTERNAL ROTATION: 20 degrees at 90 degrees of flexion "   EXTERNAL ROTATION: 40 degrees at 90 degrees of flexion    PAIN WITH HIP MOTION: no  PAIN WITH LOGROLL: no  STINCHFIELD TEST: negative    KNEE EXAM: full knee ROM (0-120 degrees), stable to varus and valgus stress at terminal extension and 30 degrees flexion     STRENGTH:  5/5 hip adduction, abduction, flexion. 5/5 strength knee flexion, extension. 5/5 strength ankle dorsiflexion and plantarflexion.     GREATER TROCHANTER BURSAL PAIN:  yes  Tender to palpation about gluteal musculature and lumbar spine/paraspinal musculature     REFLEXES:   PATELLAR 2+/4   ACHILLES 2+/4    CLONUS: negative  STRAIGHT LEG TEST:   negative    SENSATION TO LIGHT TOUCH:  DEEP PERONEAL/SUPERFICIAL PERONEAL/SURAL/SAPHENOUS/TIBIAL:  intact    EDEMA:   no  ERYTHEMA:  no  WOUNDS/INCISIONS: no overlying skin problems.      ------------------------------------------------------------------    LEG LENGTHS:  equal  _____________________________________________________  _____________________________________________________    RADIOGRAPHIC FINDINGS:   Indication: Right hip pain    Comparison: Todays xrays were compared to previous xrays from 12/13/2023    AP pelvis, hip 2 views: Right: Demonstrate a well positioned total hip without evidence of wear, loosening, fracture or osteolysis, femoral head is concentrically reduced within the acetabulum and No significant changes compared to prior radiographs.; Left: moderate joint space narrowing,  minimal osteophyte formation and No significant changes compared to prior radiographs.    Labs from 12/28/2024 reveal creatinine of 1.23 and GFR 65.2.  11/4/2024 A1c is 6.3.    Assessment/Plan:   Diagnosis Plan   1. Chronic right-sided low back pain, unspecified whether sciatica present        2. Right hip pain  XR Hip With or Without Pelvis 2 - 3 View Right      3. Status post total replacement of right hip        4. Trochanteric bursitis of right hip          Patient is suffering from trochanteric bursitis as  well as chronic back pain.  He is currently ambulating with a crouched gait secondary to pain in his back.  He has asymptomatic hip range of motion.  I discussed that his pain is coming from his back, not his hip.  I recommend that he be evaluated by a spine specialist versus pain management for his ongoing back pain.  I reassured him that his hip replacement is not the source of his pain.  He will follow-up with me as needed.    Javi Stearns MD  03/20/25  15:08 EDT

## 2025-03-21 ENCOUNTER — PATIENT ROUNDING (BHMG ONLY) (OUTPATIENT)
Dept: ORTHOPEDIC SURGERY | Facility: CLINIC | Age: 65
End: 2025-03-21
Payer: MEDICARE

## 2025-03-21 NOTE — PROGRESS NOTES
March 21, 2025    Hello, may I speak with Amadeo Kong?    My name is Sharon      I am  with [facility]  [address]  [facility] [address].    Before we get started may I verify your date of birth? 1960    I am calling to officially welcome you to our practice and ask about your recent visit. Is this a good time to talk? No.  Left voice message.        Thank you, and have a great day.

## 2025-03-26 ENCOUNTER — HOSPITAL ENCOUNTER (EMERGENCY)
Facility: HOSPITAL | Age: 65
Discharge: HOME | End: 2025-03-26
Attending: EMERGENCY MEDICINE
Payer: MEDICARE

## 2025-03-26 VITALS
OXYGEN SATURATION: 98 % | BODY MASS INDEX: 34.14 KG/M2 | HEIGHT: 74 IN | DIASTOLIC BLOOD PRESSURE: 109 MMHG | RESPIRATION RATE: 16 BRPM | WEIGHT: 266 LBS | SYSTOLIC BLOOD PRESSURE: 157 MMHG | HEART RATE: 80 BPM | TEMPERATURE: 98.6 F

## 2025-03-26 DIAGNOSIS — S60.222A CONTUSION OF DORSUM OF LEFT HAND: Primary | ICD-10-CM

## 2025-03-26 PROCEDURE — 99283 EMERGENCY DEPT VISIT LOW MDM: CPT

## 2025-03-26 RX ORDER — ACETAMINOPHEN 500 MG
500 TABLET ORAL ONCE
Status: COMPLETED | OUTPATIENT
Start: 2025-03-26 | End: 2025-03-26

## 2025-03-26 RX ADMIN — ACETAMINOPHEN 500 MG: 500 TABLET ORAL at 16:36

## 2025-03-26 NOTE — ED PROVIDER NOTES
"Subjective   History of Present Illness  Pt is a 63 yo male presenting to ED with complaints of discoloration to left hand. PMHx significant for DM, HTN, HLD, Asthma and Arthritis. Pt reports waking up yesterday and noticing \"green\" discoloration to hand and that it was swollen. He denies hand pain, redness or difficulty moving fingers. He denies known injury. He denies weakness or numbness to LE. Denies any other complaints.     History provided by:  Patient and medical records      Review of Systems   Constitutional:  Negative for fever.   Musculoskeletal:  Positive for joint swelling. Negative for arthralgias and neck pain.   Skin:  Positive for color change. Negative for wound.   Neurological:  Negative for weakness and numbness.       Past Medical History:   Diagnosis Date    Arthritis     Asthma     inhaler daily     Diabetes mellitus 2000    insulin daily; checks blood sugars on occasion-run 150-180    Elevated cholesterol     History of sleep apnea     years ago diagnosed but never used a machine at home     History of staph infection 2010    wound infection after left knee replacement -saw infectious disease MD     Hypertension     Wears glasses        Allergies   Allergen Reactions    Carrot [Daucus Carota] Swelling    Ensure Swelling     Carrots only    Shellfish Allergy Swelling and Unknown (See Comments)     tongue swelling    Strawberry Swelling     tongue swelling    Banana Unknown (See Comments)    Dust Mite Extract Other (See Comments)    Sulfamethoxazole-Trimethoprim Other (See Comments)     Patient developed blisters on his hands.  Patient developed blisters on his hands.    Vancomycin Rash     Received vancomycin x1 dose 7/18/22       Past Surgical History:   Procedure Laterality Date    COLONOSCOPY      HIP SURGERY Right 08/11/2014    [facility]    INCISION AND DRAINAGE OF WOUND Left 2010    x2 of total knee replacement wound -iv antibiotics    KNEE SURGERY Bilateral     Right knee- 2008, " left knee- 2010- [facility]    LUMBAR LAMINECTOMY WITH FUSION N/A 7/27/2018    Procedure: TLIF, OSTEOTOMY L4-5 , POST FUSION L4-5;  Surgeon: Yo Bryson MD;  Location: [facility] OR;  Service: Neurosurgery    LUMBAR LAMINECTOMY WITH FUSION N/A 8/27/2018    Procedure: LUMBAR LAMINECTOMY POSTERIOR LUMBAR INTERBODY FUSION;  Surgeon: Yo Bryson MD;  Location:  [facility];  Service: Neurosurgery    ROTATOR CUFF REPAIR Right     TONSILLECTOMY         Family History   Problem Relation Age of Onset    Hypertension Mother        Social History     Socioeconomic History    Marital status: Single   Tobacco Use    Smoking status: Never     Passive exposure: Never    Smokeless tobacco: Never   Vaping Use    Vaping status: Never Used   Substance and Sexual Activity    Alcohol use: No    Drug use: No    Sexual activity: Defer           Objective   Physical Exam  Vitals and nursing note reviewed.   Constitutional:       General: He is not in acute distress.  HENT:      Head: Atraumatic.   Eyes:      Conjunctiva/sclera: Conjunctivae normal.   Cardiovascular:      Rate and Rhythm: Normal rate.      Pulses: Normal pulses.   Pulmonary:      Effort: Pulmonary effort is normal. No respiratory distress.   Musculoskeletal:         General: Normal range of motion.      Left hand: No swelling or tenderness. Normal range of motion. Normal strength. Normal sensation. There is no disruption of two-point discrimination. Normal capillary refill. Normal pulse.      Cervical back: Normal range of motion and neck supple.      Comments: Patient pointed to veins in hand and explained to patient that is normal appearance. He has no appreciable swelling to hand.    Skin:     General: Skin is warm.      Capillary Refill: Capillary refill takes less than 2 seconds.   Neurological:      General: No focal deficit present.      Mental Status: He is alert and oriented to person, place, and time.      Sensory: No sensory deficit.      Motor: No  "weakness.   Psychiatric:         Mood and Affect: Mood normal.         Behavior: Behavior normal.         Procedures           ED Course      No results found for this or any previous visit (from the past 24 hours).  Note: In addition to lab results from this visit, the labs listed above may include labs taken at another facility or during a different encounter within the last 24 hours. Please correlate lab times with ED admission and discharge times for further clarification of the services performed during this visit.    No orders to display     Vitals:    03/26/25 1452   BP: (!) 157/109   BP Location: Left arm   Patient Position: Sitting   Pulse: 80   Resp: 16   Temp: 98.6 °F (37 °C)   TempSrc: Oral   SpO2: 98%   Weight: 121 kg (266 lb)   Height: 188 cm (74\")     Medications   acetaminophen (TYLENOL) tablet 500 mg (500 mg Oral Given 3/26/25 1636)     ECG/EMG Results (last 24 hours)       ** No results found for the last 24 hours. **          No orders to display                                                        Medical Decision Making  Pt is a 65 yo male presenting to ED with complaints of swelling and discoloration to left hand.  I had a discussion with the patient / family regarding ED course, diagnosis, diagnostic results and treatment plan including medications and admission / discharge. Discussed if new or worse symptoms / concerns to return to ED for further evaluation. Discussed need for close follow up with PCP / specialists.     DDx  Vascular compromise, Fracture, Contusion, wound, cellulitis, NV compromise     Problems Addressed:  Contusion of dorsum of left hand: acute illness or injury    Amount and/or Complexity of Data Reviewed  External Data Reviewed: notes.     Details: Reviewed previous non ED visits including prior labs, imaging, available notes, medications, allergies and surgical hx.   Admission  for CP / THOMAS    Risk  OTC drugs.        Final diagnoses:   Contusion of dorsum of left " hand       ED Disposition  ED Disposition       ED Disposition   Discharge    Condition   Stable    Comment   --               Bianka Ann MD  [address]  [address]  [place] [address]  [phone]    Schedule an appointment as soon as possible for a visit       [facility] EMERGENCY DEPARTMENT  [address] [address] [phone]    If symptoms worsen         Medication List      No changes were made to your prescriptions during this visit.            Maricarmen Argueta, PA  03/26/25 2041

## 2025-05-19 ENCOUNTER — LAB (OUTPATIENT)
Facility: HOSPITAL | Age: 65
End: 2025-05-19
Payer: MEDICARE

## 2025-05-19 DIAGNOSIS — N18.30 STAGE 3 CHRONIC KIDNEY DISEASE, UNSPECIFIED WHETHER STAGE 3A OR 3B CKD: ICD-10-CM

## 2025-05-19 DIAGNOSIS — E78.5 HYPERLIPIDEMIA LDL GOAL <100: ICD-10-CM

## 2025-05-19 DIAGNOSIS — I10 HYPERTENSION, ESSENTIAL: Primary | ICD-10-CM

## 2025-05-19 DIAGNOSIS — I10 HYPERTENSION, ESSENTIAL: ICD-10-CM

## 2025-05-19 PROCEDURE — 83695 ASSAY OF LIPOPROTEIN(A): CPT

## 2025-05-19 PROCEDURE — 82570 ASSAY OF URINE CREATININE: CPT

## 2025-05-19 PROCEDURE — 80061 LIPID PANEL: CPT

## 2025-05-19 PROCEDURE — 82043 UR ALBUMIN QUANTITATIVE: CPT

## 2025-05-19 PROCEDURE — 85025 COMPLETE CBC W/AUTO DIFF WBC: CPT

## 2025-05-19 PROCEDURE — 36415 COLL VENOUS BLD VENIPUNCTURE: CPT

## 2025-05-19 PROCEDURE — 82248 BILIRUBIN DIRECT: CPT

## 2025-05-19 PROCEDURE — 86141 C-REACTIVE PROTEIN HS: CPT

## 2025-05-19 PROCEDURE — 80053 COMPREHEN METABOLIC PANEL: CPT

## 2025-05-20 LAB
ALBUMIN SERPL-MCNC: 4.1 G/DL (ref 3.5–5.2)
ALBUMIN UR-MCNC: 11.8 MG/DL
ALBUMIN/GLOB SERPL: 1.1 G/DL
ALP SERPL-CCNC: 116 U/L (ref 39–117)
ALT SERPL W P-5'-P-CCNC: 16 U/L (ref 1–41)
ANION GAP SERPL CALCULATED.3IONS-SCNC: 9 MMOL/L (ref 5–15)
AST SERPL-CCNC: 20 U/L (ref 1–40)
BASOPHILS # BLD AUTO: 0.02 10*3/MM3 (ref 0–0.2)
BASOPHILS NFR BLD AUTO: 0.3 % (ref 0–1.5)
BILIRUB CONJ SERPL-MCNC: 0.3 MG/DL (ref 0–0.3)
BILIRUB SERPL-MCNC: 0.7 MG/DL (ref 0–1.2)
BUN SERPL-MCNC: 14 MG/DL (ref 8–23)
BUN/CREAT SERPL: 10.5 (ref 7–25)
CALCIUM SPEC-SCNC: 9.7 MG/DL (ref 8.6–10.5)
CHLORIDE SERPL-SCNC: 105 MMOL/L (ref 98–107)
CHOLEST SERPL-MCNC: 158 MG/DL (ref 0–200)
CO2 SERPL-SCNC: 26 MMOL/L (ref 22–29)
CREAT SERPL-MCNC: 1.33 MG/DL (ref 0.76–1.27)
CREAT UR-MCNC: 110.1 MG/DL
CRP SERPL-MCNC: 0.05 MG/DL (ref 0.01–0.5)
DEPRECATED RDW RBC AUTO: 46 FL (ref 37–54)
EGFRCR SERPLBLD CKD-EPI 2021: 59.3 ML/MIN/1.73
EOSINOPHIL # BLD AUTO: 0.04 10*3/MM3 (ref 0–0.4)
EOSINOPHIL NFR BLD AUTO: 0.6 % (ref 0.3–6.2)
ERYTHROCYTE [DISTWIDTH] IN BLOOD BY AUTOMATED COUNT: 14.3 % (ref 12.3–15.4)
GLOBULIN UR ELPH-MCNC: 3.6 GM/DL
GLUCOSE SERPL-MCNC: 69 MG/DL (ref 65–99)
HCT VFR BLD AUTO: 44.6 % (ref 37.5–51)
HDLC SERPL-MCNC: 62 MG/DL (ref 40–60)
HGB BLD-MCNC: 15.5 G/DL (ref 13–17.7)
IMM GRANULOCYTES # BLD AUTO: 0.02 10*3/MM3 (ref 0–0.05)
IMM GRANULOCYTES NFR BLD AUTO: 0.3 % (ref 0–0.5)
LDLC SERPL CALC-MCNC: 83 MG/DL (ref 0–100)
LDLC/HDLC SERPL: 1.33 {RATIO}
LYMPHOCYTES # BLD AUTO: 1.64 10*3/MM3 (ref 0.7–3.1)
LYMPHOCYTES NFR BLD AUTO: 23.5 % (ref 19.6–45.3)
MCH RBC QN AUTO: 31.1 PG (ref 26.6–33)
MCHC RBC AUTO-ENTMCNC: 34.8 G/DL (ref 31.5–35.7)
MCV RBC AUTO: 89.4 FL (ref 79–97)
MICROALBUMIN/CREAT UR: 107.2 MG/G (ref 0–29)
MONOCYTES # BLD AUTO: 0.72 10*3/MM3 (ref 0.1–0.9)
MONOCYTES NFR BLD AUTO: 10.3 % (ref 5–12)
NEUTROPHILS NFR BLD AUTO: 4.54 10*3/MM3 (ref 1.7–7)
NEUTROPHILS NFR BLD AUTO: 65 % (ref 42.7–76)
NRBC BLD AUTO-RTO: 0 /100 WBC (ref 0–0.2)
PLATELET # BLD AUTO: 214 10*3/MM3 (ref 140–450)
PMV BLD AUTO: 9.3 FL (ref 6–12)
POTASSIUM SERPL-SCNC: 4.7 MMOL/L (ref 3.5–5.2)
PROT SERPL-MCNC: 7.7 G/DL (ref 6–8.5)
RBC # BLD AUTO: 4.99 10*6/MM3 (ref 4.14–5.8)
SODIUM SERPL-SCNC: 140 MMOL/L (ref 136–145)
TRIGL SERPL-MCNC: 67 MG/DL (ref 0–150)
VLDLC SERPL-MCNC: 13 MG/DL (ref 5–40)
WBC NRBC COR # BLD AUTO: 6.98 10*3/MM3 (ref 3.4–10.8)

## 2025-05-20 NOTE — PROGRESS NOTES
Cardiology Established Patient Note     Name: Amadeo Knog  :   1960  PCP: Bianka Ann MD  Date:   2025  Department: E [place] CARD [facility] CARDIOLOGY  [address]  [facility] [address]  Fax [phone]  Phone [phone]    Chief Complaint: Here for my heart   Problem list:  BUSTOS  Cath #1 2014 EF 55%, normal LV systolic function  Cath #2 2020 EF 60 to 65%, normal LV systolic function, normal coronaries  Cath #3 2022 EF 60%, normal coronaries  Echo 2024 EF 56.2%, diastolic dysfunction grade 1  Echo 3/3/2022 EF 55 to 60%, trace TR  Recent TIA  Carotid ultrasound 2024 less than 50% stenosis bilaterally  Diabetes mellitus type 2  Hypertension  Renal ultrasound 2021 no stenosis  Hyperlipidemia  6.CKD 3b  7-Palpitations    -MCT -No A Fib, rare PAC/PVC  Subjective     History of Present Illness  Amadeo Kong is a 64 y.o. male who presents today for 6-month follow-up.  Normal BP at home.  Denies chest pain or discomfort.  He has no palpitations.  CO severe LBP  Current Outpatient Medications   Medication Instructions    apixaban (ELIQUIS) 5 mg, 2 Times Daily    atorvastatin (LIPITOR) 80 mg, Oral, Nightly    budesonide-formoterol (SYMBICORT) 160-4.5 MCG/ACT inhaler 2 puffs, Inhalation, 2 Times Daily - RT    carvedilol (COREG) 6.25 mg, 2 Times Daily With Meals    Cholecalciferol 2,000 Units, Daily    dicyclomine (BENTYL) 20 mg, Every 6 Hours    Evolocumab (REPATHA) 140 mg, Subcutaneous, Every 14 Days    furosemide (LASIX) 40 mg, 2 Times Daily    gabapentin (NEURONTIN) 300 mg, 3 Times Daily    glucose blood (OneTouch Ultra) test strip TEST BLOOD SUGAR THREE TIMES DAILY AS DIRECTED    hydrocortisone (CORTEF) 10 MG tablet Take 2 tablets by mouth Daily With Breakfast AND 1 tablet Daily With Dinner.    Lancets (OneTouch Delica Plus [id number]) misc 1 LANCET AS DIRECTED FOUR TIMES A DAY. USE AS NEEDED    lisinopril  "(PRINIVIL,ZESTRIL) 10 mg, Daily    Loratadine 10 MG capsule Take  by mouth.    montelukast (SINGULAIR) 10 mg, Nightly    naproxen (NAPROSYN) 500 mg, 2 Times Daily With Meals    Ozempic (0.25 or 0.5 MG/DOSE) 0.25 mg, Subcutaneous, Weekly    potassium chloride ER (K-TAB) 20 MEQ tablet controlled-release ER tablet 20 mEq, Daily        Lab Results   Component Value Date    GLUCOSE 69 05/19/2025    BUN 14 05/19/2025    CREATININE 1.33 (H) 05/19/2025    BCR 10.5 05/19/2025    K 4.7 05/19/2025    CO2 26.0 05/19/2025    CALCIUM 9.7 05/19/2025    ALBUMIN 4.1 05/19/2025    AST 20 05/19/2025    ALT 16 05/19/2025     Lab Results   Component Value Date    WBC 6.98 05/19/2025    RBC 4.99 05/19/2025    HGB 15.5 05/19/2025    HCT 44.6 05/19/2025    MCV 89.4 05/19/2025     05/19/2025     Lab Results   Component Value Date    TSH 0.036 (L) 11/21/2024     Lab Results   Component Value Date    HGBA1C 6.30 (H) 11/04/2024     Lab Results   Component Value Date    CHOL 158 05/19/2025    TRIG 67 05/19/2025    HDL 62 (H) 05/19/2025    LDL 83 05/19/2025         Microalbumin, Urine   Date Value Ref Range Status   05/19/2025 11.8 mg/dL Final     Microalbumin/Creatinine Ratio   Date Value Ref Range Status   05/19/2025 107.2 (H) 0.0 - 29.0 mg/g Final     eGFR   Date Value Ref Range Status   05/19/2025 59.3 (L) >60.0 mL/min/1.73 Final       Objective     Vital Signs:  BP 95/62 (BP Location: Left arm, Patient Position: Sitting, Cuff Size: Adult)   Pulse 68   Ht 188 cm (74\")   Wt 118 kg (260 lb 1.6 oz)   BMI 33.39 kg/m²   Estimated body mass index is 33.39 kg/m² as calculated from the following:    Height as of this encounter: 188 cm (74\").    Weight as of this encounter: 118 kg (260 lb 1.6 oz).       Physical Exam          Assessment and Plan     Assessment & Plan  Primary hypertension  Hypertension is stable and controlled  Continue current treatment regimen.  Blood pressure will be reassessed in 6 months  .    Orders:    Duplex Aorta " IVC Iliac Graft Complete CAR; Future    Hyperlipidemia LDL goal <55   Not within goal  Add Repatha    Orders:    Evolocumab (REPATHA) solution auto-injector SureClick injection; Inject 1 mL under the skin into the appropriate area as directed Every 14 (Fourteen) Days for 12 doses.    TIA (transient ischemic attack)  Stable  No recurrence  Continue Eliquis       Stage 3b chronic kidney disease  Continue Kerendia,Farxiga,Lisinopril and Statin Rx    Orders:    Duplex Renal Artery - Bilateral Complete CAR; Future    Palpitations  No A Fib on MCT last yr.       Type 2 diabetes mellitus with other circulatory complication, with long-term current use of insulin  Need better A1C control  Add Ozempic    Orders:    Semaglutide,0.25 or 0.5MG/DOS, (Ozempic, 0.25 or 0.5 MG/DOSE,) 2 MG/3ML solution pen-injector; Inject 0.25 mg under the skin into the appropriate area as directed 1 (One) Time Per Week for 4 doses.    Abdominal aortic bruit    Orders:    Duplex Aorta IVC Iliac Graft Complete CAR; Future    Chronic bilateral low back pain without sciatica    Orders:    Ambulatory Referral to Neurology      Follow Up  Return in about 5 weeks (around 6/25/2025).    [facility] Cardiology

## 2025-05-21 ENCOUNTER — OFFICE VISIT (OUTPATIENT)
Age: 65
End: 2025-05-21
Payer: MEDICAID

## 2025-05-21 VITALS
WEIGHT: 260.1 LBS | DIASTOLIC BLOOD PRESSURE: 62 MMHG | HEART RATE: 68 BPM | HEIGHT: 74 IN | BODY MASS INDEX: 33.38 KG/M2 | SYSTOLIC BLOOD PRESSURE: 95 MMHG

## 2025-05-21 DIAGNOSIS — I10 PRIMARY HYPERTENSION: Primary | ICD-10-CM

## 2025-05-21 DIAGNOSIS — N18.32 STAGE 3B CHRONIC KIDNEY DISEASE: ICD-10-CM

## 2025-05-21 DIAGNOSIS — E11.59 TYPE 2 DIABETES MELLITUS WITH OTHER CIRCULATORY COMPLICATION, WITH LONG-TERM CURRENT USE OF INSULIN: ICD-10-CM

## 2025-05-21 DIAGNOSIS — Z79.4 TYPE 2 DIABETES MELLITUS WITH OTHER CIRCULATORY COMPLICATION, WITH LONG-TERM CURRENT USE OF INSULIN: ICD-10-CM

## 2025-05-21 DIAGNOSIS — R09.89 ABDOMINAL AORTIC BRUIT: ICD-10-CM

## 2025-05-21 DIAGNOSIS — E78.5 HYPERLIPIDEMIA LDL GOAL <55: ICD-10-CM

## 2025-05-21 DIAGNOSIS — R00.2 PALPITATIONS: ICD-10-CM

## 2025-05-21 DIAGNOSIS — G45.9 TIA (TRANSIENT ISCHEMIC ATTACK): ICD-10-CM

## 2025-05-21 DIAGNOSIS — M54.50 CHRONIC BILATERAL LOW BACK PAIN WITHOUT SCIATICA: ICD-10-CM

## 2025-05-21 DIAGNOSIS — G89.29 CHRONIC BILATERAL LOW BACK PAIN WITHOUT SCIATICA: ICD-10-CM

## 2025-05-21 LAB — LPA SERPL-SCNC: 221.3 NMOL/L

## 2025-05-21 RX ORDER — SEMAGLUTIDE 0.68 MG/ML
0.25 INJECTION, SOLUTION SUBCUTANEOUS WEEKLY
Qty: 1.5 ML | Refills: 0 | Status: SHIPPED | OUTPATIENT
Start: 2025-05-21 | End: 2025-06-12

## 2025-05-21 NOTE — ASSESSMENT & PLAN NOTE
Hypertension is stable and controlled  Continue current treatment regimen.  Blood pressure will be reassessed in 6 months  .    Orders:    Duplex Aorta IVC Iliac Graft Complete CAR; Future

## 2025-05-21 NOTE — ASSESSMENT & PLAN NOTE
Need better A1C control  Add Ozempic    Orders:    Semaglutide,0.25 or 0.5MG/DOS, (Ozempic, 0.25 or 0.5 MG/DOSE,) 2 MG/3ML solution pen-injector; Inject 0.25 mg under the skin into the appropriate area as directed 1 (One) Time Per Week for 4 doses.

## 2025-05-28 DIAGNOSIS — I42.8 NICM (NONISCHEMIC CARDIOMYOPATHY): Primary | ICD-10-CM

## 2025-05-28 NOTE — TELEPHONE ENCOUNTER
Rx Refill Note  Requested Prescriptions     Signed Prescriptions Disp Refills    empagliflozin (Jardiance) 10 MG tablet tablet 90 tablet 1     Sig: Take 1 tablet by mouth Daily.     Authorizing Provider: NIRAJ SWANSON     Ordering User: AMANDA MIKE      Last office visit with prescribing clinician: 5/21/2025   Last telemedicine visit with prescribing clinician: Visit date not found   Next office visit with prescribing clinician: 6/26/2025                        Pharmacy Info    Last Fill Date:  Rx Written Date:   Prescribed Qty:   Additional Details from Pharmacy:    Patient passed protocols per pepe.         Amanda Mike MA  05/28/25, 09:22 EDT

## 2025-06-25 ENCOUNTER — TELEPHONE (OUTPATIENT)
Age: 65
End: 2025-06-25
Payer: MEDICARE

## 2025-06-25 NOTE — TELEPHONE ENCOUNTER
CALLED PATIENT TO SEE IF HE WOULD BE WILLING TO RESCHEDULE APPOINTMENT THAT IS SCHEDULED FOR TOMORROW. HE IS SCHEDULED TO HAVE TESTING DONE ON 06/30 AND WE WOULD LIKE TO RESCHEDULE TO TWO WEEKS AFTER THE TESTING. NO ANSWER, LEFT VOICEMAIL. OKAY FOR HUB TO RESCHEDULE IF PATIENT CALLS BACK.

## 2025-06-25 NOTE — TELEPHONE ENCOUNTER
Caller: Amadeo Kong    Relationship to patient: Self    Best call back number: [phone]    Patient is needing: WIFE ADVISING THE PATIENT'S MONITOR FELL OFF AND COULD NOT FIND IT. PLEASE CALL THE ABOVE TO DISCUSS.

## 2025-06-30 ENCOUNTER — HOSPITAL ENCOUNTER (OUTPATIENT)
Dept: CARDIOLOGY | Facility: HOSPITAL | Age: 65
Discharge: HOME | End: 2025-06-30
Payer: MEDICARE

## 2025-06-30 VITALS
BODY MASS INDEX: 33.39 KG/M2 | HEIGHT: 74 IN | BODY MASS INDEX: 33.39 KG/M2 | WEIGHT: 260.14 LBS | WEIGHT: 260.14 LBS | HEIGHT: 74 IN

## 2025-06-30 DIAGNOSIS — N18.32 STAGE 3B CHRONIC KIDNEY DISEASE: ICD-10-CM

## 2025-06-30 DIAGNOSIS — R09.89 ABDOMINAL AORTIC BRUIT: ICD-10-CM

## 2025-06-30 DIAGNOSIS — I10 PRIMARY HYPERTENSION: ICD-10-CM

## 2025-06-30 LAB
ABDOMINAL DIST AORTA AP: 1.6 CM
ABDOMINAL DIST AORTA VEL: 33 CM/S
ABDOMINAL LT COM ILIAC AP: 0.9 CM
ABDOMINAL LT COM ILIAC VEL: 74.7 CM/S
ABDOMINAL LT EXT ILIAC VEL: 101 CM/S
ABDOMINAL MID AORTA AP: 1.5 CM
ABDOMINAL MID AORTA VEL: 45 CM/S
ABDOMINAL PROX AORTA AP: 1.8 CM
ABDOMINAL PROX AORTA VEL: 78.4 CM/S
ABDOMINAL RT COM ILIAC VEL: 78 CM/S
ABDOMINAL RT EXT ILIAC VEL: 102.1 CM/S
BH CV ECHO MEAS - DIST REN A EDV LEFT: 28.7 CM/S
BH CV ECHO MEAS - DIST REN A PSV LEFT: 75.4 CM/S
BH CV ECHO MEAS - MID REN A EDV LEFT: 29.7 CM/S
BH CV ECHO MEAS - MID REN A PSV LEFT: 82.3 CM/S
BH CV ECHO MEAS - PROX REN A EDV LEFT: 27.9 CM/S
BH CV ECHO MEAS - PROX REN A PSV LEFT: 85.5 CM/S
BH CV VAS ABD AO LT EXTERNAL ILIAC AP: 0.9 CM
BH CV VAS ABD AO RT EXTERNAL ILIAC AP: 0.9 CM
BH CV VAS BP LEFT ARM: NORMAL MMHG
BH CV VAS KIDNEY HEIGHT LEFT: 5.9 CM
BH CV VAS RENAL AORTIC MID EDV: 9.8 CM/S
BH CV VAS RENAL AORTIC MID PSV: 62.6 CM/S
BH CV VAS RENAL HILUM LEFT EDV: 13.8 CM/S
BH CV VAS RENAL HILUM LEFT PSV: 42.5 CM/S
BH CV VAS RENAL HILUM RIGHT EDV: 15.4 CM/S
BH CV VAS RENAL HILUM RIGHT PSV: 41.7 CM/S
BH CV XLRA MEAS - KID L LEFT: 9.8 CM
BH CV XLRA MEAS DIST REN A EDV RIGHT: 46 CM/S
BH CV XLRA MEAS DIST REN A PSV RIGHT: 132.4 CM/S
BH CV XLRA MEAS KID H RIGHT: 5.3 CM
BH CV XLRA MEAS KID L RIGHT: 9.6 CM
BH CV XLRA MEAS KID W RIGHT: 3.6 CM
BH CV XLRA MEAS MID REN A EDV RIGHT: 34.7 CM/S
BH CV XLRA MEAS MID REN A PSV RIGHT: 99.3 CM/S
BH CV XLRA MEAS PROX REN A EDV RIGHT: 29.4 CM/S
BH CV XLRA MEAS PROX REN A PSV RIGHT: 98.9 CM/S
BH CV XLRA MEAS RAR LEFT: 0.67
BH CV XLRA MEAS RAR RIGHT: 0.47
BH CV XLRA MEAS RENAL A ORG EDV LEFT: 26 CM/S
BH CV XLRA MEAS RENAL A ORG EDV RIGHT: 31.8 CM/S
BH CV XLRA MEAS RENAL A ORG PSV LEFT: 94.1 CM/S
BH CV XLRA MEAS RENAL A ORG PSV RIGHT: 87.2 CM/S
LEFT KIDNEY WIDTH: 4.5 CM
LEFT RENAL UPPER PARENCHYMA MAX: 20.4 CM/S
LEFT RENAL UPPER PARENCHYMA MIN: 8 CM/S
LEFT RENAL UPPER PARENCHYMA RI: 0.61
RIGHT RENAL UPPER PARENCHYMA MAX: 26.4 CM/S
RIGHT RENAL UPPER PARENCHYMA MIN: 10.3 CM/S
RIGHT RENAL UPPER PARENCHYMA RI: 0.61

## 2025-06-30 PROCEDURE — 93978 VASCULAR STUDY: CPT

## 2025-06-30 PROCEDURE — 93975 VASCULAR STUDY: CPT

## 2025-06-30 PROCEDURE — 93975 VASCULAR STUDY: CPT | Performed by: INTERNAL MEDICINE

## 2025-06-30 PROCEDURE — 93978 VASCULAR STUDY: CPT | Performed by: INTERNAL MEDICINE

## (undated) DEVICE — GAMMEX® NON-LATEX SIZE 6.5, STERILE NEOPRENE POWDER-FREE SURGICAL GLOVE: Brand: GAMMEX

## (undated) DEVICE — DEV MEASURING DIRECT FOR 5MM LOCK SCRWS

## (undated) DEVICE — TB SXN FRAZIER 12F STRL

## (undated) DEVICE — IRRIGATOR BULB ASEPTO 60CC STRL

## (undated) DEVICE — 3M™ STERI-DRAPE™ INSTRUMENT POUCH 1018: Brand: STERI-DRAPE™

## (undated) DEVICE — MEDI-VAC NON-CONDUCTIVE SUCTION TUBING: Brand: CARDINAL HEALTH

## (undated) DEVICE — SPNG GZ WOVN 4X4IN 12PLY 10/BX STRL

## (undated) DEVICE — APPL CHLORAPREP W/TINT 26ML BLU

## (undated) DEVICE — JACKSON-PRATT 100CC BULB RESERVOIR: Brand: CARDINAL HEALTH

## (undated) DEVICE — SOL NACL 0.9PCT 1000ML

## (undated) DEVICE — SUT VIC 0 CTD BR 18IN UNDYE VCP724D

## (undated) DEVICE — ADHESIVE ISLAND DRESSING: Brand: TELFA

## (undated) DEVICE — SUT ETHLN 3/0 FS1 30IN 669H

## (undated) DEVICE — LARYNG GLIDESCOPE COBALT/RANGER GVL4ST

## (undated) DEVICE — CANNULA,OXY,ADULT,SUPERSOFT,W/7'TUB,UC: Brand: MEDLINE

## (undated) DEVICE — TOOL 14MH30 LEGEND 14CM 3MM: Brand: MIDAS REX ™

## (undated) DEVICE — FLTR HME STR UNIV W/SMPL PORT

## (undated) DEVICE — ST INF 2NDARY 20DRP VNT/NOVNT 30IN

## (undated) DEVICE — ANTIBACTERIAL UNDYED BRAIDED (POLYGLACTIN 910), SYNTHETIC ABSORBABLE SUTURE: Brand: COATED VICRYL

## (undated) DEVICE — TIBURON SPLIT SHEET: Brand: CONVERTORS

## (undated) DEVICE — NEURO SPONGES: Brand: DEROYAL

## (undated) DEVICE — MAYFIELD® DISPOSABLE ADULT SKULL PIN (PLASTIC BASE): Brand: MAYFIELD®

## (undated) DEVICE — ACCY PA700 LUBRICANT DIFFUSER MR7 4 PACK: Brand: MIDAS REX

## (undated) DEVICE — SOL LR 1000ML

## (undated) DEVICE — TRAP,MUCUS SPECIMEN,40CC: Brand: MEDLINE

## (undated) DEVICE — C-ARM DRAPE: Brand: DEROYAL

## (undated) DEVICE — AIRWY SZ11

## (undated) DEVICE — SHEET,DRAPE,40X58,STERILE: Brand: MEDLINE

## (undated) DEVICE — MEDI-VAC YANKAUER SUCTION HANDLE W/BULBOUS TIP: Brand: CARDINAL HEALTH

## (undated) DEVICE — 2963 MEDIPORE SOFT CLOTH TAPE 3 IN X 10 YD 12 RLS/CS: Brand: 3M™ MEDIPORE™

## (undated) DEVICE — PACK,UNIVERSAL,NO GOWNS: Brand: MEDLINE

## (undated) DEVICE — 100% SILICONE FOLEY TRAY,16 FR/CH (5.3 MM), 5 ML CATHETER PRE-CONNECTED TO 2000 ML DRAINAGE BAG WITH LUER-LOCK SAMPLING AND ADHESIVE CATHETER SECUREMENT: Brand: DOVER

## (undated) DEVICE — SPHR MARKR STEALTH STATION

## (undated) DEVICE — GOWN,REINF,POLY,ECL,PP SLV,XXL: Brand: MEDLINE

## (undated) DEVICE — PK NEURO DISC 10

## (undated) DEVICE — ENCORE® LATEX MICRO SIZE 6.5, STERILE LATEX POWDER-FREE SURGICAL GLOVE: Brand: ENCORE

## (undated) DEVICE — ELECTRD BLD EXT EDGE 1P COAT 6.5IN STRL

## (undated) DEVICE — PROXIMATE RH ROTATING HEAD SKIN STAPLERS (35 WIDE) CONTAINS 35 STAINLESS STEEL STAPLES: Brand: PROXIMATE

## (undated) DEVICE — GOWN,NON-REINFORCED,SIRUS,SET IN SLV,XL: Brand: MEDLINE

## (undated) DEVICE — ENCORE® LATEX MICRO SIZE 7.5, STERILE LATEX POWDER-FREE SURGICAL GLOVE: Brand: ENCORE

## (undated) DEVICE — UNDERGLV SURG BIOGEL INDICATOR LF PF 7.5

## (undated) DEVICE — JP PERF DRN SIL FLT 10MM FULL: Brand: CARDINAL HEALTH

## (undated) DEVICE — JACKSON TABLE POSITIONER KIT: Brand: MEDLINE INDUSTRIES, INC.

## (undated) DEVICE — 3M™ MEDIPORE™ H SOFT CLOTH SURGICAL TAPE 2862, 2 INCH X 10 YARD (5CM X 9,1M), 12 ROLLS/CASE: Brand: 3M™ MEDIPORE™

## (undated) DEVICE — COVER,LIGHT HANDLE,FLX,1/PK: Brand: MEDLINE INDUSTRIES, INC.

## (undated) DEVICE — MIXER A07A CEMENT

## (undated) DEVICE — SPNG LAP PREWSH SFTPK 18X18IN STRL PK/5

## (undated) DEVICE — BONE TAMP KIT KPX203NB AF X2 20/3

## (undated) DEVICE — 3M™ STERI-STRIP™ REINFORCED ADHESIVE SKIN CLOSURES, R1546, 1/4 IN X 4 IN (6 MM X 100 MM), 10 STRIPS/ENVELOPE: Brand: 3M™ STERI-STRIP™

## (undated) DEVICE — ADAPT ST INFUS ADMIN SYR 70IN